# Patient Record
Sex: MALE | Race: WHITE | Employment: OTHER | URBAN - METROPOLITAN AREA
[De-identification: names, ages, dates, MRNs, and addresses within clinical notes are randomized per-mention and may not be internally consistent; named-entity substitution may affect disease eponyms.]

---

## 2017-03-24 ENCOUNTER — APPOINTMENT (OUTPATIENT)
Dept: CT IMAGING | Age: 77
End: 2017-03-24
Attending: EMERGENCY MEDICINE
Payer: MEDICARE

## 2017-03-24 ENCOUNTER — HOSPITAL ENCOUNTER (OUTPATIENT)
Age: 77
Setting detail: OBSERVATION
Discharge: HOME | End: 2017-03-26
Attending: EMERGENCY MEDICINE | Admitting: SURGERY
Payer: MEDICARE

## 2017-03-24 DIAGNOSIS — K56.609 SBO (SMALL BOWEL OBSTRUCTION) (HCC): Primary | ICD-10-CM

## 2017-03-24 PROBLEM — K56.600 PARTIAL SMALL BOWEL OBSTRUCTION (HCC): Status: ACTIVE | Noted: 2017-03-24

## 2017-03-24 PROBLEM — K21.9 GERD (GASTROESOPHAGEAL REFLUX DISEASE): Status: ACTIVE | Noted: 2017-03-24

## 2017-03-24 LAB
ALBUMIN SERPL BCP-MCNC: 4 G/DL (ref 3.5–5)
ALBUMIN/GLOB SERPL: 1 {RATIO} (ref 1.1–2.2)
ALP SERPL-CCNC: 106 U/L (ref 45–117)
ALT SERPL-CCNC: 33 U/L (ref 12–78)
ANION GAP BLD CALC-SCNC: 9 MMOL/L (ref 5–15)
AST SERPL W P-5'-P-CCNC: 22 U/L (ref 15–37)
ATRIAL RATE: 85 BPM
BASOPHILS # BLD AUTO: 0 K/UL (ref 0–0.1)
BASOPHILS # BLD: 0 % (ref 0–1)
BILIRUB SERPL-MCNC: 0.7 MG/DL (ref 0.2–1)
BUN SERPL-MCNC: 17 MG/DL (ref 6–20)
BUN/CREAT SERPL: 15 (ref 12–20)
CALCIUM SERPL-MCNC: 9.7 MG/DL (ref 8.5–10.1)
CALCULATED P AXIS, ECG09: 37 DEGREES
CALCULATED R AXIS, ECG10: -18 DEGREES
CALCULATED T AXIS, ECG11: 27 DEGREES
CHLORIDE SERPL-SCNC: 105 MMOL/L (ref 97–108)
CO2 SERPL-SCNC: 27 MMOL/L (ref 21–32)
CREAT SERPL-MCNC: 1.14 MG/DL (ref 0.7–1.3)
DIAGNOSIS, 93000: NORMAL
EOSINOPHIL # BLD: 0.1 K/UL (ref 0–0.4)
EOSINOPHIL NFR BLD: 1 % (ref 0–7)
ERYTHROCYTE [DISTWIDTH] IN BLOOD BY AUTOMATED COUNT: 14.3 % (ref 11.5–14.5)
GLOBULIN SER CALC-MCNC: 4.2 G/DL (ref 2–4)
GLUCOSE SERPL-MCNC: 156 MG/DL (ref 65–100)
HCT VFR BLD AUTO: 42.7 % (ref 36.6–50.3)
HGB BLD-MCNC: 14.6 G/DL (ref 12.1–17)
LIPASE SERPL-CCNC: 88 U/L (ref 73–393)
LYMPHOCYTES # BLD AUTO: 13 % (ref 12–49)
LYMPHOCYTES # BLD: 1.3 K/UL (ref 0.8–3.5)
MCH RBC QN AUTO: 29.9 PG (ref 26–34)
MCHC RBC AUTO-ENTMCNC: 34.2 G/DL (ref 30–36.5)
MCV RBC AUTO: 87.5 FL (ref 80–99)
MONOCYTES # BLD: 0.5 K/UL (ref 0–1)
MONOCYTES NFR BLD AUTO: 5 % (ref 5–13)
NEUTS SEG # BLD: 7.9 K/UL (ref 1.8–8)
NEUTS SEG NFR BLD AUTO: 81 % (ref 32–75)
P-R INTERVAL, ECG05: 164 MS
PLATELET # BLD AUTO: 214 K/UL (ref 150–400)
POTASSIUM SERPL-SCNC: 4 MMOL/L (ref 3.5–5.1)
PROT SERPL-MCNC: 8.2 G/DL (ref 6.4–8.2)
Q-T INTERVAL, ECG07: 380 MS
QRS DURATION, ECG06: 94 MS
QTC CALCULATION (BEZET), ECG08: 452 MS
RBC # BLD AUTO: 4.88 M/UL (ref 4.1–5.7)
SODIUM SERPL-SCNC: 141 MMOL/L (ref 136–145)
TROPONIN I SERPL-MCNC: <0.04 NG/ML
VENTRICULAR RATE, ECG03: 85 BPM
WBC # BLD AUTO: 9.7 K/UL (ref 4.1–11.1)

## 2017-03-24 PROCEDURE — 74011250636 HC RX REV CODE- 250/636: Performed by: EMERGENCY MEDICINE

## 2017-03-24 PROCEDURE — 93005 ELECTROCARDIOGRAM TRACING: CPT

## 2017-03-24 PROCEDURE — 74011250637 HC RX REV CODE- 250/637: Performed by: EMERGENCY MEDICINE

## 2017-03-24 PROCEDURE — 99218 HC RM OBSERVATION: CPT

## 2017-03-24 PROCEDURE — 83690 ASSAY OF LIPASE: CPT | Performed by: EMERGENCY MEDICINE

## 2017-03-24 PROCEDURE — 96374 THER/PROPH/DIAG INJ IV PUSH: CPT

## 2017-03-24 PROCEDURE — 96375 TX/PRO/DX INJ NEW DRUG ADDON: CPT

## 2017-03-24 PROCEDURE — 74011250636 HC RX REV CODE- 250/636: Performed by: NURSE PRACTITIONER

## 2017-03-24 PROCEDURE — 74011000250 HC RX REV CODE- 250: Performed by: NURSE PRACTITIONER

## 2017-03-24 PROCEDURE — C9113 INJ PANTOPRAZOLE SODIUM, VIA: HCPCS | Performed by: NURSE PRACTITIONER

## 2017-03-24 PROCEDURE — 74176 CT ABD & PELVIS W/O CONTRAST: CPT

## 2017-03-24 PROCEDURE — 84484 ASSAY OF TROPONIN QUANT: CPT | Performed by: EMERGENCY MEDICINE

## 2017-03-24 PROCEDURE — 74011000250 HC RX REV CODE- 250: Performed by: EMERGENCY MEDICINE

## 2017-03-24 PROCEDURE — 99285 EMERGENCY DEPT VISIT HI MDM: CPT

## 2017-03-24 PROCEDURE — 96361 HYDRATE IV INFUSION ADD-ON: CPT

## 2017-03-24 PROCEDURE — 80053 COMPREHEN METABOLIC PANEL: CPT | Performed by: EMERGENCY MEDICINE

## 2017-03-24 PROCEDURE — 85025 COMPLETE CBC W/AUTO DIFF WBC: CPT | Performed by: EMERGENCY MEDICINE

## 2017-03-24 PROCEDURE — 36415 COLL VENOUS BLD VENIPUNCTURE: CPT | Performed by: EMERGENCY MEDICINE

## 2017-03-24 PROCEDURE — C9113 INJ PANTOPRAZOLE SODIUM, VIA: HCPCS | Performed by: EMERGENCY MEDICINE

## 2017-03-24 RX ORDER — THERA TABS 400 MCG
1 TAB ORAL 2 TIMES DAILY
COMMUNITY

## 2017-03-24 RX ORDER — SODIUM CHLORIDE 0.9 % (FLUSH) 0.9 %
5-10 SYRINGE (ML) INJECTION EVERY 8 HOURS
Status: DISCONTINUED | OUTPATIENT
Start: 2017-03-24 | End: 2017-03-26 | Stop reason: HOSPADM

## 2017-03-24 RX ORDER — MORPHINE SULFATE 10 MG/ML
2-4 INJECTION, SOLUTION INTRAMUSCULAR; INTRAVENOUS
Status: DISCONTINUED | OUTPATIENT
Start: 2017-03-24 | End: 2017-03-26 | Stop reason: HOSPADM

## 2017-03-24 RX ORDER — ENOXAPARIN SODIUM 100 MG/ML
40 INJECTION SUBCUTANEOUS EVERY 24 HOURS
Status: DISCONTINUED | OUTPATIENT
Start: 2017-03-24 | End: 2017-03-26 | Stop reason: HOSPADM

## 2017-03-24 RX ORDER — DORZOLAMIDE HYDROCHLORIDE AND TIMOLOL MALEATE 20; 5 MG/ML; MG/ML
1 SOLUTION/ DROPS OPHTHALMIC
COMMUNITY

## 2017-03-24 RX ORDER — DIPHENHYDRAMINE HYDROCHLORIDE 50 MG/ML
12.5 INJECTION, SOLUTION INTRAMUSCULAR; INTRAVENOUS
Status: DISCONTINUED | OUTPATIENT
Start: 2017-03-24 | End: 2017-03-26 | Stop reason: HOSPADM

## 2017-03-24 RX ORDER — ONDANSETRON 2 MG/ML
4 INJECTION INTRAMUSCULAR; INTRAVENOUS
Status: COMPLETED | OUTPATIENT
Start: 2017-03-24 | End: 2017-03-24

## 2017-03-24 RX ORDER — LATANOPROST 50 UG/ML
1 SOLUTION/ DROPS OPHTHALMIC
COMMUNITY
End: 2019-02-01

## 2017-03-24 RX ORDER — SODIUM CHLORIDE 0.9 % (FLUSH) 0.9 %
5-10 SYRINGE (ML) INJECTION AS NEEDED
Status: DISCONTINUED | OUTPATIENT
Start: 2017-03-24 | End: 2017-03-26 | Stop reason: HOSPADM

## 2017-03-24 RX ORDER — DEXTROSE, SODIUM CHLORIDE, AND POTASSIUM CHLORIDE 5; .45; .15 G/100ML; G/100ML; G/100ML
75 INJECTION INTRAVENOUS CONTINUOUS
Status: DISCONTINUED | OUTPATIENT
Start: 2017-03-24 | End: 2017-03-26 | Stop reason: HOSPADM

## 2017-03-24 RX ORDER — AMLODIPINE BESYLATE 5 MG/1
5 TABLET ORAL DAILY
Status: DISCONTINUED | OUTPATIENT
Start: 2017-03-25 | End: 2017-03-26 | Stop reason: HOSPADM

## 2017-03-24 RX ORDER — DORZOLAMIDE HYDROCHLORIDE AND TIMOLOL MALEATE 20; 5 MG/ML; MG/ML
1 SOLUTION/ DROPS OPHTHALMIC 2 TIMES DAILY
Status: DISCONTINUED | OUTPATIENT
Start: 2017-03-24 | End: 2017-03-26 | Stop reason: HOSPADM

## 2017-03-24 RX ORDER — AMLODIPINE BESYLATE 5 MG/1
5 TABLET ORAL DAILY
COMMUNITY
End: 2019-02-03

## 2017-03-24 RX ORDER — NALOXONE HYDROCHLORIDE 0.4 MG/ML
0.4 INJECTION, SOLUTION INTRAMUSCULAR; INTRAVENOUS; SUBCUTANEOUS AS NEEDED
Status: DISCONTINUED | OUTPATIENT
Start: 2017-03-24 | End: 2017-03-26 | Stop reason: HOSPADM

## 2017-03-24 RX ORDER — SUCRALFATE 1 G/10ML
2 SUSPENSION ORAL
Status: COMPLETED | OUTPATIENT
Start: 2017-03-24 | End: 2017-03-24

## 2017-03-24 RX ORDER — LATANOPROST 50 UG/ML
1 SOLUTION/ DROPS OPHTHALMIC
Status: DISCONTINUED | OUTPATIENT
Start: 2017-03-24 | End: 2017-03-26 | Stop reason: HOSPADM

## 2017-03-24 RX ORDER — OMEPRAZOLE 20 MG/1
20 CAPSULE, DELAYED RELEASE ORAL DAILY
COMMUNITY
End: 2020-11-02

## 2017-03-24 RX ORDER — ONDANSETRON 2 MG/ML
4 INJECTION INTRAMUSCULAR; INTRAVENOUS
Status: DISCONTINUED | OUTPATIENT
Start: 2017-03-24 | End: 2017-03-26 | Stop reason: HOSPADM

## 2017-03-24 RX ORDER — PANTOPRAZOLE SODIUM 40 MG/10ML
40 INJECTION, POWDER, LYOPHILIZED, FOR SOLUTION INTRAVENOUS
Status: DISCONTINUED | OUTPATIENT
Start: 2017-03-24 | End: 2017-03-24 | Stop reason: SDUPTHER

## 2017-03-24 RX ORDER — MORPHINE SULFATE 4 MG/ML
4 INJECTION, SOLUTION INTRAMUSCULAR; INTRAVENOUS
Status: COMPLETED | OUTPATIENT
Start: 2017-03-24 | End: 2017-03-24

## 2017-03-24 RX ADMIN — Medication 4 MG: at 11:40

## 2017-03-24 RX ADMIN — SODIUM CHLORIDE 1000 ML: 900 INJECTION, SOLUTION INTRAVENOUS at 09:11

## 2017-03-24 RX ADMIN — ONDANSETRON 4 MG: 2 INJECTION INTRAMUSCULAR; INTRAVENOUS at 09:11

## 2017-03-24 RX ADMIN — DORZOLAMIDE HYDROCHLORIDE AND TIMOLOL MALEATE 1 DROP: 20; 5 SOLUTION/ DROPS OPHTHALMIC at 17:52

## 2017-03-24 RX ADMIN — ENOXAPARIN SODIUM 40 MG: 40 INJECTION SUBCUTANEOUS at 14:09

## 2017-03-24 RX ADMIN — Medication 10 ML: at 22:00

## 2017-03-24 RX ADMIN — SODIUM CHLORIDE 40 MG: 9 INJECTION INTRAMUSCULAR; INTRAVENOUS; SUBCUTANEOUS at 09:11

## 2017-03-24 RX ADMIN — SODIUM CHLORIDE 40 MG: 9 INJECTION INTRAMUSCULAR; INTRAVENOUS; SUBCUTANEOUS at 14:09

## 2017-03-24 RX ADMIN — SUCRALFATE 2 G: 1 SUSPENSION ORAL at 09:11

## 2017-03-24 RX ADMIN — LATANOPROST 1 DROP: 50 SOLUTION OPHTHALMIC at 22:10

## 2017-03-24 RX ADMIN — DEXTROSE MONOHYDRATE, SODIUM CHLORIDE, AND POTASSIUM CHLORIDE 125 ML/HR: 50; 4.5; 1.49 INJECTION, SOLUTION INTRAVENOUS at 14:10

## 2017-03-24 NOTE — CONSULTS
[id number] [facility] Surgery at [address] [facility] Consultation    Admit Date: 3/24/2017  Reason for Consultation: partial SBO    HPI:  Sendy Tinoco is a 64 y.o. male whom we are asked to see in consultation for evaluation of partial small bowel obstruction. Symptoms began around 11p last night with sharp pain over his epigastrium. He put his fingers down his throat to induce vomiting with some relief of symptoms. However pain returned in the same location. He induced vomiting multiple times over night for transient relief of symptoms. Around 5 am he was feeling a bit better, but then had a terrible stabbing pain 10/10 so he called the ambulance. He was vomiting some solid food and yellow bile. Denies fevers. His abdomen is distended and he had to change his pants to larger size this morning. Has hx of IBS, takes soluble fiber daily and normally has several BMs in a day. Has had no BM since yesterday and no flatus. Hx of laparoscopic cholecystectomy which was done for acute attack in [place] last September. Last colonoscopy was about 5 years ago, states there were some polyps but no surgery was done. Denies recent weight loss. Has seen Dr. Gt oJseph (GI) in the past, last visit was years ago. WBC 9.7. CT:  GI: The stomach is empty, however there is mild distention of proximal small  bowel loops with fecal-like material in the mid small bowel. Distal small bowel  loops are decompressed and normal in caliber. Colon demonstrates diverticulosis  without diverticulitis. .  IMPRESSION: There is distention of the proximal half of the small bowel with  fecal-like material in the mid small bowel loops. This is consistent with at  least partial obstruction of the small bowel. No structural abnormality is  identified to account for this.     Pain is improved after receiving morphine. There are no active problems to display for this patient.     Past Medical History:   Diagnosis Date  Depression     GERD (gastroesophageal reflux disease)     Glaucoma     High cholesterol     IBS (irritable bowel syndrome)       Past Surgical History:   Procedure Laterality Date    HX CHOLECYSTECTOMY      HX HEMORRHOIDECTOMY      HX ORTHOPAEDIC      RIGHT knee    HX TONSILLECTOMY        Social History   Substance Use Topics    Smoking status: Never Smoker    Smokeless tobacco: Not on file    Alcohol use Yes      History reviewed. No pertinent family history. Prior to Admission medications    Medication Sig Start Date End Date Taking? Authorizing Provider   amLODIPine (NORVASC) 5 mg tablet Take 5 mg by mouth daily. Yes Historical Provider   therapeutic multivitamin (THERAGRAN) tablet Take 1 Tab by mouth daily. Yes Historical Provider   dorzolamide-timolol (COSOPT) 22.3-6.8 mg/mL ophthalmic solution Administer 1 Drop to both eyes two (2) times a day. Yes Historical Provider   latanoprost (XALATAN) 0.005 % ophthalmic solution Administer 1 Drop to both eyes nightly. Yes Historical Provider   omeprazole (PRILOSEC) 20 mg capsule Take 20 mg by mouth daily. Yes Historical Provider   vortioxetine (BRINTELLIX) 10 mg tablet Take 10 mg by mouth daily. Yes Historical Provider   aspirin 81 mg chewable tablet Take 81 mg by mouth daily. Yes Rodo Bloom MD   atorvastatin (LIPITOR) 40 mg tablet Take 40 mg by mouth daily. Yes Rodo Bloom MD     No current facility-administered medications for this encounter. Current Outpatient Prescriptions   Medication Sig    amLODIPine (NORVASC) 5 mg tablet Take 5 mg by mouth daily.  therapeutic multivitamin (THERAGRAN) tablet Take 1 Tab by mouth daily.  dorzolamide-timolol (COSOPT) 22.3-6.8 mg/mL ophthalmic solution Administer 1 Drop to both eyes two (2) times a day.  latanoprost (XALATAN) 0.005 % ophthalmic solution Administer 1 Drop to both eyes nightly.  omeprazole (PRILOSEC) 20 mg capsule Take 20 mg by mouth daily.     vortioxetine (BRINTELLIX) 10 mg tablet Take 10 mg by mouth daily.  aspirin 81 mg chewable tablet Take 81 mg by mouth daily.  atorvastatin (LIPITOR) 40 mg tablet Take 40 mg by mouth daily. Allergies   Allergen Reactions    Sulfa (Sulfonamide Antibiotics) Unknown (comments)          Subjective:     Review of Systems:    A comprehensive review of systems was negative except for that written in the History of Present Illness. Objective:     Blood pressure 145/68, pulse 78, temperature 97.6 °F (36.4 °C), resp. rate 18, height 5' 11\" (1.803 m), weight 80.7 kg (178 lb), SpO2 91 %. Temp (24hrs), Av.6 °F (36.4 °C), Min:97.6 °F (36.4 °C), Max:97.6 °F (36.4 °C)      Recent Labs      17   0843   WBC  9.7   HGB  14.6   HCT  42.7   PLT  214     Recent Labs      17   0843   NA  141   K  4.0   CL  105   CO2  27   GLU  156*   BUN  17   CREA  1.14   CA  9.7   ALB  4.0   TBILI  0.7   SGOT  22   ALT  33     Recent Labs      17   0843   LPSE  88       No intake or output data in the 24 hours ending 17 1155     _____________________  Physical Exam:     General:  Alert, cooperative, no distress, appears stated age. Eyes:   PERRL, EOMs intact. Sclera clear. Throat: Lips, mucosa, and tongue normal.   Neck: Supple, symmetrical, trachea midline. Lungs:   Clear to auscultation bilaterally. Heart:  Regular rate and rhythm. Abdomen:   Normal BS, moderate distention, soft, NT at present. Well healed laparoscopy sites visible. Extremities: Extremities normal, atraumatic, no cyanosis or edema. Skin: Skin color, texture, turgor normal. No rashes or lesions. Assessment:   Partial small bowel obstruction        Plan:     Admit to obs  IVF/NPO  Place NG if vomiting or severely nauseated  Labs and KUB in am  Further plan per Dr. Jesu Chavis    Thank you for allowing us to participate in the care of this patient. Total time spent with patient: 18 minutes.     Signed By: Torrey Lombardi NP March 24, 2017        Attending addendum:  I supervised the NP and reviewed the progress note. We discussed the plan of care. I visited and examined the pt independently. I have also viewed the CT images. The patient reports sig improvement, that is, no pain and less bloating. Also denies nausea or vomiting. Usually has several BMs daily due to IBS. Has not had BM since onset of symptoms and has no appetite despite missing two meals. Patient Vitals for the past 12 hrs:   Temp Pulse Resp BP SpO2   03/24/17 1401 98.3 °F (36.8 °C) 72 16 158/88 97 %   03/24/17 1300 97.5 °F (36.4 °C) 82 16 120/69 95 %   03/24/17 1230 - - - 126/66 92 %   03/24/17 1200 - - - 137/78 95 %   03/24/17 1130 - - - 145/74 92 %   03/24/17 1100 - - - 164/87 94 %   03/24/17 0930 - - - 145/68 91 %   03/24/17 0900 - - - 143/79 92 %   03/24/17 0835 97.6 °F (36.4 °C) 78 18 - 98 %        Physical Exam:   Gen NAD  Abd Soft, minimal distension and tympany, normal BS, non-tender    Hospital Problems as of 3/24/2017  Never Reviewed          Codes Class Noted - Resolved POA    * (Principal)[facility] small bowel [facility] ([facility] ICD-10-CM: K56.69  ICD-9-CM: 560.9  3/24/2017 - Present Yes        GERD (gastroesophageal reflux disease) ICD-10-CM: K21.9  ICD-9-CM: 530.81  3/24/2017 - Present Yes            He has no signs of ischemia and appears to be improving. I explained the likely etiology of obstruction and the likelihood of resolving nonoperatively. Also discussed what we are monitoring to determine if an operation is needed.      Signed By: Percy Chase MD     March 24, 2017

## 2017-03-24 NOTE — IP AVS SNAPSHOT
Current Discharge Medication List  
  
START taking these medications Dose & Instructions Dispensing Information Comments Morning Noon Evening Bedtime  
 docusate sodium 100 mg capsule Commonly known as:  Joe Sheldon Your last dose was: Your next dose is:    
   
   
 Dose:  100 mg Take 1 Cap by mouth two (2) times a day for 90 days. Quantity:  30 Cap Refills:  0  
     
   
   
   
  
 oxyCODONE-acetaminophen 5-325 mg per tablet Commonly known as:  PERCOCET Your last dose was: Your next dose is:    
   
   
 Dose:  1 Tab Take 1 Tab by mouth every six (6) hours as needed. Max Daily Amount: 4 Tabs. Quantity:  30 Tab Refills:  0 CONTINUE these medications which have NOT CHANGED Dose & Instructions Dispensing Information Comments Morning Noon Evening Bedtime  
 amLODIPine 5 mg tablet Commonly known as:  Maranacecilia Elaines Your last dose was: Your next dose is:    
   
   
 Dose:  5 mg Take 5 mg by mouth daily. Refills:  0  
     
   
   
   
  
 aspirin 81 mg chewable tablet Your last dose was: Your next dose is:    
   
   
 Dose:  81 mg Take 81 mg by mouth daily. Refills:  0 BRINTELLIX 10 mg tablet Generic drug:  vortioxetine Your last dose was: Your next dose is:    
   
   
 Dose:  10 mg Take 10 mg by mouth daily. Refills:  0  
     
   
   
   
  
 dorzolamide-timolol 22.3-6.8 mg/mL ophthalmic solution Commonly known as:  COSOPT Your last dose was: Your next dose is:    
   
   
 Dose:  1 Drop Administer 1 Drop to both eyes two (2) times a day. Refills:  0  
     
   
   
   
  
 latanoprost 0.005 % ophthalmic solution Commonly known as:  Yeny Hark Your last dose was: Your next dose is:    
   
   
 Dose:  1 Drop Administer 1 Drop to both eyes nightly. Refills:  0 LIPITOR 40 mg tablet Generic drug:  atorvastatin Your last dose was: Your next dose is:    
   
   
 Dose:  40 mg Take 40 mg by mouth daily. Refills:  0  
     
   
   
   
  
 omeprazole 20 mg capsule Commonly known as:  PRILOSEC Your last dose was: Your next dose is:    
   
   
 Dose:  20 mg Take 20 mg by mouth daily. Refills:  0  
     
   
   
   
  
 therapeutic multivitamin tablet Commonly known as:  [facility] Your last dose was: Your next dose is:    
   
   
 Dose:  1 Tab Take 1 Tab by mouth daily. Refills:  0 Where to Get Your Medications Information on where to get these meds will be given to you by the nurse or doctor. ! Ask your nurse or doctor about these medications  
  docusate sodium 100 mg capsule  
 oxyCODONE-acetaminophen 5-325 mg per tablet

## 2017-03-24 NOTE — PROGRESS NOTES
Admission Medication Reconciliation:    Information obtained from: Patient, Rx Query, [facility]    Significant PMH/Disease States:   Past Medical History:   Diagnosis Date    Depression     GERD (gastroesophageal reflux disease)     Glaucoma     High cholesterol     IBS (irritable bowel syndrome)        Chief Complaint for this Admission:  Abdominal pain    Allergies:  Sulfa (sulfonamide antibiotics)    Prior to Admission Medications:   Prior to Admission Medications   Prescriptions Last Dose Informant Patient Reported? Taking? amLODIPine (NORVASC) 5 mg tablet 3/23/2017  Yes Yes   Sig: Take 5 mg by mouth daily. aspirin 81 mg chewable tablet 3/23/2017  Yes Yes   Sig: Take 81 mg by mouth daily. atorvastatin (LIPITOR) 40 mg tablet 3/23/2017  Yes Yes   Sig: Take 40 mg by mouth daily. dorzolamide-timolol (COSOPT) 22.3-6.8 mg/mL ophthalmic solution   Yes Yes   Sig: Administer 1 Drop to both eyes two (2) times a day. latanoprost (XALATAN) 0.005 % ophthalmic solution   Yes Yes   Sig: Administer 1 Drop to both eyes nightly. omeprazole (PRILOSEC) 20 mg capsule   Yes Yes   Sig: Take 20 mg by mouth daily. therapeutic multivitamin [facility]) tablet 3/23/2017  Yes Yes   Sig: Take 1 Tab by mouth daily. vortioxetine (BRINTELLIX) 10 mg tablet 3/23/2017  Yes Yes   Sig: Take 10 mg by mouth daily. Facility-Administered Medications: None         Comments/Recommendations: \[facility] [place] [facility]\"" med list updated via information obtained from the patient, Rx Query, and [employer]. 1) The patient states he has not had any of his medications today.     2) In addition to his Rx  medications, the patient reports taking an additional 15 tablets of supplements from the [facility] twice daily for prostate prevention, oil soluble vitamins, arthritis, bone health, curcumen, vitamin D 3, and general health

## 2017-03-24 NOTE — IP AVS SNAPSHOT
[address] [address] 
[phone] Patient: Andrew Reid MRN: [id number] WCB:[birth year] You are allergic to the following Allergen Reactions Sulfa (Sulfonamide Antibiotics) Unknown (comments) Recent Documentation Height Weight BMI Smoking Status 1.803 m 80.7 kg 24.83 kg/m2 Never Smoker Emergency Contacts Name Discharge Info Relation Home Work Mobile Odette Sierra DISCHARGE CAREGIVER [3] Spouse [3] [phone] About your hospitalization You were admitted on:  March 24, 2017 You last received care in the:  [facility] [id number] You were discharged on:  March 26, 2017 Unit phone number:  [phone] Why you were hospitalized Your primary diagnosis was:  Partial Small Bowel Obstruction (Hcc) Your diagnoses also included:  Gerd (Gastroesophageal Reflux Disease) Providers Seen During Your Hospitalizations Provider Role Specialty Primary office phone Chrystal King MD Attending Provider Emergency Medicine [phone] Jose M Gamez MD Attending Provider General Surgery [phone] Your Primary Care Physician (PCP) Primary Care Physician Office Phone Office Fax Venecia Prow [phone] Follow-up Information Follow up With Details Comments Contact Info Martha Shah MD   [address] [place] [address] [phone] Current Discharge Medication List  
  
START taking these medications Dose & Instructions Dispensing Information Comments Morning Noon Evening Bedtime  
 docusate sodium 100 mg capsule Commonly known as:  José Rogelio Your last dose was: Your next dose is:    
   
   
 Dose:  100 mg Take 1 Cap by mouth two (2) times a day for 90 days. Quantity:  30 Cap Refills:  0 oxyCODONE-acetaminophen 5-325 mg per tablet Commonly known as:  PERCOCET Your last dose was: Your next dose is:    
   
   
 Dose:  1 Tab Take 1 Tab by mouth every six (6) hours as needed. Max Daily Amount: 4 Tabs. Quantity:  30 Tab Refills:  0 CONTINUE these medications which have NOT CHANGED Dose & Instructions Dispensing Information Comments Morning Noon Evening Bedtime  
 amLODIPine 5 mg tablet Commonly known as:  Mj Streeter Your last dose was: Your next dose is:    
   
   
 Dose:  5 mg Take 5 mg by mouth daily. Refills:  0  
     
   
   
   
  
 aspirin 81 mg chewable tablet Your last dose was: Your next dose is:    
   
   
 Dose:  81 mg Take 81 mg by mouth daily. Refills:  0 BRINTELLIX 10 mg tablet Generic drug:  vortioxetine Your last dose was: Your next dose is:    
   
   
 Dose:  10 mg Take 10 mg by mouth daily. Refills:  0  
     
   
   
   
  
 dorzolamide-timolol 22.3-6.8 mg/mL ophthalmic solution Commonly known as:  COSOPT Your last dose was: Your next dose is:    
   
   
 Dose:  1 Drop Administer 1 Drop to both eyes two (2) times a day. Refills:  0  
     
   
   
   
  
 latanoprost 0.005 % ophthalmic solution Commonly known as:  Hope Worthington Your last dose was: Your next dose is:    
   
   
 Dose:  1 Drop Administer 1 Drop to both eyes nightly. Refills:  0 LIPITOR 40 mg tablet Generic drug:  atorvastatin Your last dose was: Your next dose is:    
   
   
 Dose:  40 mg Take 40 mg by mouth daily. Refills:  0  
     
   
   
   
  
 omeprazole 20 mg capsule Commonly known as:  PRILOSEC Your last dose was: Your next dose is:    
   
   
 Dose:  20 mg Take 20 mg by mouth daily. Refills:  0  
     
   
   
   
  
 therapeutic multivitamin tablet Commonly known as:  [facility] Your last dose was: Your next dose is:    
   
   
 Dose:  1 Tab Take 1 Tab by mouth daily. Refills:  0 Where to Get Your Medications Information on where to get these meds will be given to you by the nurse or doctor. ! Ask your nurse or doctor about these medications  
  docusate sodium 100 mg capsule  
 oxyCODONE-acetaminophen 5-325 mg per tablet Discharge Instructions 1. Do not drive while on pain medication. You should take a stool softener while on pain medication to prevent constipation. 2.  You may resume your home medications. 3.  You may call [phone] to schedule a follow-up with Dr. Geovanni Moon as needed. Bowel Blockage (Intestinal Obstruction): Care Instructions Your Care Instructions A bowel blockage, also called an intestinal obstruction, can prevent gas, fluids, or solids from moving through the intestines normally. It can cause constipation and, rarely, diarrhea. You may have pain, nausea, vomiting, and cramping. Most of the time, complete blockages require a stay in the hospital and possibly surgery. But if your bowel is only partly blocked, your doctor may tell you to wait until it clears on its own and you are able to pass gas and stool. If so, there are things you can do at home to help make you feel better. If you have had surgery for a bowel blockage, there are things you can do at home to make sure you heal well. You can also make some changes to keep your bowel from becoming blocked again. Follow-up care is a key part of your treatment and safety. Be sure to make and go to all appointments, and call your doctor if you are having problems. It's also a good idea to know your test results and keep a list of the medicines you take. How can you care for yourself at home? If your doctor has told you to wait at home for a blockage to clear on its own: · Follow your doctor's instructions. These may include eating a liquid diet to avoid complete blockage. · Take your medicines exactly as prescribed. Call your doctor if you think you are having a problem with your medicine. · Put a heating pad set on low on your belly to relieve mild cramps and pain. To prevent another blockage · Try to eat smaller amounts of food more often. For example, have 5 or 6 small meals throughout the day instead of 2 or 3 large meals. · Chew your food very well. Try to chew each bite about 20 times or until it is liquid. · Avoid high-fiber foods and raw fruits and vegetables with skins, husks, strings, or seeds. These can form a ball of undigested material that can cause a blockage if a part of your bowel is scarred or narrowed. · Check with your doctor before you eat whole-grain products or use a fiber supplement such as Citrucel or Metamucil. · To help you have regular bowel movements, eat at regular times, do not strain during a bowel movement, and drink at least 8 to 10 glasses of water each day. If you have kidney, heart, or liver disease and have to limit fluids, talk with your doctor or before you increase the amount of fluids you drink. · Drink high-calorie liquid formulas if your doctor says to. Severe symptoms may make it hard for your body to take in vitamins and minerals. · Get regular exercise. It helps you digest your food better. Get at least 30 minutes of physical activity on most days of the week. Walking is a good choice. When should you call for help? Call 911 anytime you think you may need emergency care. For example, call if: 
· You passed out (lost consciousness). · You have severe pain or swelling in your belly. · You vomit blood or what looks like coffee grounds. · You pass maroon or very bloody stools. · You cannot pass any stools or gas. Call your doctor now or seek immediate medical care if: 
· You have a new or higher fever. · You cannot keep fluids or medicines down. · You have new pain that gets worse when you move or cough. · Your symptoms become much worse than usual. 
Watch closely for changes in your health, and be sure to contact your doctor if: 
· You do not get better as expected. · You have steady diarrhea for more than 2 weeks. · You've been losing weight. Where can you learn more? Go to http://pascula-charan.info/. Enter L472 in the search box to learn more about \"Bowel Blockage (Intestinal Obstruction): Care Instructions. \" Current as of: August 9, 2016 Content Version: 11.1 © 6642-8324 Here@ Networks. Care instructions adapted under license by [employer] (which disclaims liability or warranty for this information). If you have questions about a medical condition or this instruction, always ask your healthcare professional. [address] [address] any warranty or liability for your use of this information. DISCHARGE SUMMARY from Nurse The following personal items are in your possession at time of discharge: 
 
Dental Appliances: None Visual Aid: Glasses, With patient Home Medications: Sent home Jewelry: Ring Clothing: At bedside, Belt, Gloves, Footwear, Pants Other Valuables: Cell Phone, Eyeglasses PATIENT INSTRUCTIONS: 
 
After general anesthesia or intravenous sedation, for 24 hours or while taking prescription Narcotics: · Limit your activities · Do not drive and operate hazardous machinery · Do not make important personal or business decisions · Do  not drink alcoholic beverages · If you have not urinated within 8 hours after discharge, please contact your surgeon on call. Report the following to your surgeon: 
· Excessive pain, swelling, redness or odor of or around the surgical area · Temperature over 100.5 · Nausea and vomiting lasting longer than 4 hours or if unable to take medications · Any signs of decreased circulation or nerve impairment to extremity: change in color, persistent  numbness, tingling, coldness or increase pain · Any questions These are general instructions for a healthy lifestyle: No smoking/ No tobacco products/ Avoid exposure to second hand smoke Surgeon General's Warning:  Quitting smoking now greatly reduces serious risk to your health. Obesity, smoking, and sedentary lifestyle greatly increases your risk for illness A healthy diet, regular physical exercise & weight monitoring are important for maintaining a healthy lifestyle You may be retaining fluid if you have a history of heart failure or if you experience any of the following symptoms:  Weight gain of 3 pounds or more overnight or 5 pounds in a week, increased swelling in our hands or feet or shortness of breath while lying flat in bed. Please call your doctor as soon as you notice any of these symptoms; do not wait until your next office visit. Recognize signs and symptoms of STROKE: 
 
F-face looks uneven A-arms unable to move or move unevenly S-speech slurred or non-existent T-time-call 911 as soon as signs and symptoms begin-DO NOT go Back to bed or wait to see if you get better-TIME IS BRAIN. Warning Signs of HEART ATTACK Call 911 if you have these symptoms: 
? Chest discomfort. Most heart attacks involve discomfort in the center of the chest that lasts more than a few minutes, or that goes away and comes back. It can feel like uncomfortable pressure, squeezing, fullness, or pain. ? Discomfort in other areas of the upper body. Symptoms can include pain or discomfort in one or both arms, the back, neck, jaw, or stomach. ? Shortness of breath with or without chest discomfort. ? Other signs may include breaking out in a cold sweat, nausea, or lightheadedness. Don't wait more than five minutes to call [address]!  Fast action can save your life. Calling 911 is almost always the fastest way to get lifesaving treatment. Emergency Medical Services staff can begin treatment when they arrive  up to an hour sooner than if someone gets to the hospital by car. The discharge information has been reviewed with the patient. The patient verbalized understanding. Discharge medications reviewed with the patient and appropriate educational materials and side effects teaching were provided. [employer] Activation Thank you for requesting access to [employer]. Please follow the instructions below to securely access and download your online medical record. [employer] allows you to send messages to your doctor, view your test results, renew your prescriptions, schedule appointments, and more. How Do I Sign Up? 1. In your internet browser, go to [email or web address] 
2. Click on the First Time User? Click Here link in the Sign In box. You will be redirect to the New Member Sign Up page. 3. Enter your [employer] Access Code exactly as it appears below. You will not need to use this code after youve completed the sign-up process. If you do not sign up before the expiration date, you must request a new code. [employer] Access Code: Activation code not generated Current [employer] Status: Active (This is the date your [employer] access code will ) 4. Enter the last four digits of your Social Security Number (xxxx) and Date of Birth (mm/dd/yyyy) as indicated and click Submit. You will be taken to the next sign-up page. 5. Create a [employer] ID. This will be your [employer] login ID and cannot be changed, so think of one that is secure and easy to remember. 6. Create a [employer] password. You can change your password at any time. 7. Enter your Password Reset Question and Answer. This can be used at a later time if you forget your password. 8. Enter your e-mail address.  You will receive e-mail notification when new information is available in [employer]. 9. Click Sign Up. You can now view and download portions of your medical record. 10. Click the Download Summary menu link to download a portable copy of your medical information. Additional Information If you have questions, please visit the Frequently Asked Questions section of the [employer] website at [email or web address] [email or web address] grass [email or web address]/. Remember, MyChart is NOT to be used for urgent needs. For medical emergencies, dial 911. Discharge Orders None [facility]! Dear Torsten Lee: Thank you for requesting a [employer] account. Our records indicate that you already have an active [employer] account. You can access your account anytime at [email or web address]. [employer]/[email or web address] grass [email or web address] Did you know that you can access your hospital and ER discharge instructions at any time in [employer]? You can also review all of your test results from your hospital stay or ER visit. Additional Information If you have questions, please visit the Frequently Asked Questions section of the [employer] website at [email or web address] [email or web address]/. Remember, MyChart is NOT to be used for urgent needs. For medical emergencies, dial 911. Now available from your iPhone and Android! General Information Please provide this summary of care documentation to your next provider. Patient Signature:  ____________________________________________________________ Date:  ____________________________________________________________  
  
Nataliia Lugo Provider Signature:  ____________________________________________________________ Date:  ____________________________________________________________

## 2017-03-24 NOTE — ROUTINE PROCESS
TRANSFER - OUT REPORT:    Verbal report given to Jayleen Hong RN(name) on [employer]  being transferred to [facility](unit) for routine progression of care       Report consisted of patients Situation, Background, Assessment and   Recommendations(SBAR). Information from the following report(s) ED Summary was reviewed with the receiving nurse. Lines:   Peripheral IV 03/24/17 Left Antecubital (Active)   Site Assessment Clean, dry, & intact 3/24/2017  8:53 AM   Phlebitis Assessment 0 3/24/2017  8:53 AM   Dressing Type Transparent 3/24/2017  8:53 AM   Hub Color/Line Status Pink 3/24/2017  8:53 AM       Peripheral IV 03/24/17 Right Hand (Active)   Site Assessment Clean, dry, & intact 3/24/2017  8:53 AM   Phlebitis Assessment 0 3/24/2017  8:53 AM   Infiltration Assessment 0 3/24/2017  8:53 AM   Dressing Status Clean, dry, & intact 3/24/2017  8:53 AM   Dressing Type Transparent 3/24/2017  8:53 AM   Hub Color/Line Status Blue 3/24/2017  8:53 AM        Opportunity for questions and clarification was provided.       Patient transported with:   Kaminario

## 2017-03-24 NOTE — ED PROVIDER NOTES
HPI Comments: 64 y.o. male with past medical history significant for GERD, IBS, high cholesterol, glaucoma, and high cholesterol who presents via EMS with chief complaint of abdominal pain. Yesterday evening (~21:00), patient developed epigastric abdominal pain that has since then presented intermittently, varying in intensity. Patient mentions h/o acid reflux, and regularly takes Omeprazole -- denies any relief. Patient has also taken Rolaids tablets, but without relief. Patient has had some relief with self-induced vomiting -- \"I stuck my fingers down my throat and brought up some yellow liquid. Sometimes it helped, sometimes it didn't. \"  Patient has had similar pain in the past, but notes that it has recently become more frequent in occurrence. Patient notes that the \"intensity\" of the pain felt similar to when he was having his \"gallbladder attacks\" ~6 months ago -- cholecystectomy done around then in [place]. At this time, patient additionally complains of HA. Patient reports passing normal bowel movements. Patient denies any fever or diarrhea. There are no other acute medical concerns at this time. Social hx: +; denies tobacco smoking  PCP: Karli Armenta MD  GI: Herbert Hutchinson MD    Note written by Mendoza Zhu, as dictated by Sasha Stanford MD 8:51 AM    The history is provided by the patient. Past Medical History:   Diagnosis Date    Depression     GERD (gastroesophageal reflux disease)     Glaucoma     High cholesterol     IBS (irritable bowel syndrome)        Past Surgical History:   Procedure Laterality Date    HX CHOLECYSTECTOMY      HX HEMORRHOIDECTOMY      HX ORTHOPAEDIC      RIGHT knee    HX TONSILLECTOMY           No family history on file. Social History     Social History    Marital status:      Spouse name: N/A    Number of children: N/A    Years of education: N/A     Occupational History    Not on file.      Social History Main Topics    Smoking status: Never Smoker    Smokeless tobacco: Not on file    Alcohol use Yes    Drug use: No    Sexual activity: Not on file     Other Topics Concern    Not on file     Social History Narrative         ALLERGIES: Sulfa (sulfonamide antibiotics)    Review of Systems   Constitutional: Negative for diaphoresis and fever. HENT: Negative for facial swelling. Eyes: Negative for visual disturbance. Respiratory: Negative for cough. Cardiovascular: Negative for chest pain. Gastrointestinal: Positive for abdominal pain. Negative for diarrhea. Genitourinary: Negative for dysuria. Musculoskeletal: Negative for joint swelling. Skin: Negative for rash. Neurological: Positive for headaches. Hematological: Negative for adenopathy. Psychiatric/Behavioral: Negative for suicidal ideas. All other systems reviewed and are negative. Vitals:    03/24/17 0835   Pulse: 78   Resp: 18   Temp: 97.6 °F (36.4 °C)   SpO2: 98%   Weight: 80.7 kg (178 lb)   Height: 5' 11\" (1.803 m)            Physical Exam   Constitutional: He is oriented to person, place, and time. He appears well-developed and well-nourished. No distress. HENT:   Head: Normocephalic and atraumatic. Mouth/Throat: Oropharynx is clear and moist.   Eyes: Pupils are equal, round, and reactive to light. Neck: Normal range of motion. Neck supple. Cardiovascular: Normal rate, regular rhythm, normal heart sounds and intact distal pulses. Pulmonary/Chest: Effort normal and breath sounds normal. No respiratory distress. Abdominal: Soft. Bowel sounds are normal. He exhibits no distension. There is no tenderness. Musculoskeletal: Normal range of motion. He exhibits no edema. Neurological: He is alert and oriented to person, place, and time. Skin: Skin is warm and dry. Nursing note and vitals reviewed.      Note written by Mendoza Liu, as dictated by Dejon Roe MD 8:51 AM    MDM  Number of Diagnoses or Management Options  Diagnosis management comments: A:  Abdominal pain and vomiting since last night. VS stable. No focal ttp. DDx:  Pancreatitis, GERD/PUD, bowel obstruction    P:  Labs  Ct a/p  ivf  zofran  pepcid  reassess    ED Course       Procedures    ED EKG interpretation:  Rhythm: normal sinus rhythm. Rate (approx.): 85. Axis: normal.  ST segment:  No concerning ST elevations or depressions. This EKG was interpreted by Jm Boyle MD,ED Provider. Labs unremarkable. CT s/p shows partial SBO. Will discuss findings with general surgery. Plan on admission.

## 2017-03-24 NOTE — ED TRIAGE NOTES
Triage: Abdominal pain that started between 10-11 last night. Pain is medial lower and per patient when the pain gets so intense \" I stick my finger down my throat to throw up and the pain goes down\", vomiting yellow emesis.

## 2017-03-25 LAB
ANION GAP BLD CALC-SCNC: 7 MMOL/L (ref 5–15)
BUN SERPL-MCNC: 14 MG/DL (ref 6–20)
BUN/CREAT SERPL: 14 (ref 12–20)
CALCIUM SERPL-MCNC: 8.2 MG/DL (ref 8.5–10.1)
CHLORIDE SERPL-SCNC: 111 MMOL/L (ref 97–108)
CO2 SERPL-SCNC: 24 MMOL/L (ref 21–32)
CREAT SERPL-MCNC: 1.01 MG/DL (ref 0.7–1.3)
ERYTHROCYTE [DISTWIDTH] IN BLOOD BY AUTOMATED COUNT: 14.3 % (ref 11.5–14.5)
GLUCOSE SERPL-MCNC: 113 MG/DL (ref 65–100)
HCT VFR BLD AUTO: 35 % (ref 36.6–50.3)
HGB BLD-MCNC: 11.6 G/DL (ref 12.1–17)
MAGNESIUM SERPL-MCNC: 2 MG/DL (ref 1.6–2.4)
MCH RBC QN AUTO: 29.4 PG (ref 26–34)
MCHC RBC AUTO-ENTMCNC: 33.1 G/DL (ref 30–36.5)
MCV RBC AUTO: 88.6 FL (ref 80–99)
PHOSPHATE SERPL-MCNC: 2.9 MG/DL (ref 2.6–4.7)
PLATELET # BLD AUTO: 178 K/UL (ref 150–400)
POTASSIUM SERPL-SCNC: 4 MMOL/L (ref 3.5–5.1)
RBC # BLD AUTO: 3.95 M/UL (ref 4.1–5.7)
SODIUM SERPL-SCNC: 142 MMOL/L (ref 136–145)
WBC # BLD AUTO: 6 K/UL (ref 4.1–11.1)

## 2017-03-25 PROCEDURE — C9113 INJ PANTOPRAZOLE SODIUM, VIA: HCPCS | Performed by: NURSE PRACTITIONER

## 2017-03-25 PROCEDURE — 74011250637 HC RX REV CODE- 250/637: Performed by: SURGERY

## 2017-03-25 PROCEDURE — 85027 COMPLETE CBC AUTOMATED: CPT | Performed by: NURSE PRACTITIONER

## 2017-03-25 PROCEDURE — 96376 TX/PRO/DX INJ SAME DRUG ADON: CPT

## 2017-03-25 PROCEDURE — 74011250637 HC RX REV CODE- 250/637: Performed by: NURSE PRACTITIONER

## 2017-03-25 PROCEDURE — 36415 COLL VENOUS BLD VENIPUNCTURE: CPT | Performed by: NURSE PRACTITIONER

## 2017-03-25 PROCEDURE — 74011250636 HC RX REV CODE- 250/636: Performed by: NURSE PRACTITIONER

## 2017-03-25 PROCEDURE — 80048 BASIC METABOLIC PNL TOTAL CA: CPT | Performed by: NURSE PRACTITIONER

## 2017-03-25 PROCEDURE — 96361 HYDRATE IV INFUSION ADD-ON: CPT

## 2017-03-25 PROCEDURE — 84100 ASSAY OF PHOSPHORUS: CPT | Performed by: NURSE PRACTITIONER

## 2017-03-25 PROCEDURE — 74011000250 HC RX REV CODE- 250: Performed by: NURSE PRACTITIONER

## 2017-03-25 PROCEDURE — 99218 HC RM OBSERVATION: CPT

## 2017-03-25 PROCEDURE — 83735 ASSAY OF MAGNESIUM: CPT | Performed by: NURSE PRACTITIONER

## 2017-03-25 PROCEDURE — 96372 THER/PROPH/DIAG INJ SC/IM: CPT

## 2017-03-25 RX ORDER — DOCUSATE SODIUM 100 MG/1
100 CAPSULE, LIQUID FILLED ORAL 2 TIMES DAILY
Status: DISCONTINUED | OUTPATIENT
Start: 2017-03-25 | End: 2017-03-26 | Stop reason: HOSPADM

## 2017-03-25 RX ORDER — OXYCODONE AND ACETAMINOPHEN 5; 325 MG/1; MG/1
1 TABLET ORAL
Status: DISCONTINUED | OUTPATIENT
Start: 2017-03-25 | End: 2017-03-26 | Stop reason: HOSPADM

## 2017-03-25 RX ADMIN — ENOXAPARIN SODIUM 40 MG: 40 INJECTION SUBCUTANEOUS at 15:20

## 2017-03-25 RX ADMIN — DEXTROSE MONOHYDRATE, SODIUM CHLORIDE, AND POTASSIUM CHLORIDE 125 ML/HR: 50; 4.5; 1.49 INJECTION, SOLUTION INTRAVENOUS at 07:42

## 2017-03-25 RX ADMIN — SODIUM CHLORIDE 40 MG: 9 INJECTION INTRAMUSCULAR; INTRAVENOUS; SUBCUTANEOUS at 10:41

## 2017-03-25 RX ADMIN — DORZOLAMIDE HYDROCHLORIDE AND TIMOLOL MALEATE 1 DROP: 20; 5 SOLUTION/ DROPS OPHTHALMIC at 10:44

## 2017-03-25 RX ADMIN — DORZOLAMIDE HYDROCHLORIDE AND TIMOLOL MALEATE 1 DROP: 20; 5 SOLUTION/ DROPS OPHTHALMIC at 18:00

## 2017-03-25 RX ADMIN — Medication 10 ML: at 07:37

## 2017-03-25 RX ADMIN — Medication 10 ML: at 11:30

## 2017-03-25 RX ADMIN — LATANOPROST 1 DROP: 50 SOLUTION OPHTHALMIC at 22:44

## 2017-03-25 RX ADMIN — AMLODIPINE BESYLATE 5 MG: 5 TABLET ORAL at 10:57

## 2017-03-25 NOTE — PROGRESS NOTES
Bedside shift change report given to Sangeetha Berman (oncoming nurse) by Kathleen Montano RN (offgoing nurse). Report included the following information SBAR, Kardex, Intake/Output, MAR and Recent Results.

## 2017-03-25 NOTE — PROGRESS NOTES
Progress Note    Patient: Alexa Lucero MRN: [id number]  SSN: [id number]    YOB: 1940  Age: 64 y.o. Sex: male      Admit Date: 3/24/2017    Partial small bowel obstruction    Subjective:     No acute surgical issues. Pt reported passing a little flatus and had small BM. No nausea or vomiting. Pain in under control.     Objective:     Visit Vitals    /81 (BP Patient Position: Sitting)    Pulse 81    Temp 97.4 °F (36.3 °C)    Resp 20    Ht 5' 11\" (1.803 m)    Wt 178 lb (80.7 kg)    SpO2 97%    BMI 24.83 kg/m2       Temp (24hrs), Av.7 °F (36.5 °C), Min:97.4 °F (36.3 °C), Max:98.3 °F (36.8 °C)        Physical Exam:    Gen:  NAD  Pulm:  Unlabored  Abd:  S/ND/non-TTP    Recent Results (from the past 24 hour(s))   CBC W/O DIFF    Collection Time: 17  6:24 AM   Result Value Ref Range    WBC 6.0 4.1 - 11.1 K/uL    RBC 3.95 (L) 4.10 - 5.70 M/uL    HGB 11.6 (L) 12.1 - 17.0 g/dL    HCT 35.0 (L) 36.6 - 50.3 %    MCV 88.6 80.0 - 99.0 FL    MCH 29.4 26.0 - 34.0 PG    MCHC 33.1 30.0 - 36.5 g/dL    RDW 14.3 11.5 - 14.5 %    PLATELET 059 563 - 342 K/uL   METABOLIC PANEL, BASIC    Collection Time: 17  6:24 AM   Result Value Ref Range    Sodium 142 136 - 145 mmol/L    Potassium 4.0 3.5 - 5.1 mmol/L    Chloride 111 (H) 97 - 108 mmol/L    CO2 24 21 - 32 mmol/L    Anion gap 7 5 - 15 mmol/L    Glucose 113 (H) 65 - 100 mg/dL    BUN 14 6 - 20 MG/DL    Creatinine 1.01 0.70 - 1.30 MG/DL    BUN/Creatinine ratio 14 12 - 20      GFR est AA >60 >60 ml/min/1.73m2    GFR est non-AA >60 >60 ml/min/1.73m2    Calcium 8.2 (L) 8.5 - 10.1 MG/DL   MAGNESIUM    Collection Time: 17  6:24 AM   Result Value Ref Range    Magnesium 2.0 1.6 - 2.4 mg/dL   PHOSPHORUS    Collection Time: 17  6:24 AM   Result Value Ref Range    Phosphorus 2.9 2.6 - 4.7 MG/DL         Assessment:     Hospital Problems  Never Reviewed          Codes Class Noted POA    * (Principal)Partial small bowel obstruction (HCC) ICD-10-CM: K56.69  ICD-9-CM: 560.9  3/24/2017 Yes        GERD (gastroesophageal reflux disease) ICD-10-CM: K21.9  ICD-9-CM: 530.81  3/24/2017 Yes              Plan/Recommendations/Medical Decision Making:     - SBO:  Resolving.   Advance to clears  - Encourage ambulation  - Pain control  - bowel regiment  - Repeat AXR in am    Signed By: Thuy Hobbs MD     March 25, 2017

## 2017-03-26 ENCOUNTER — APPOINTMENT (OUTPATIENT)
Dept: GENERAL RADIOLOGY | Age: 77
End: 2017-03-26
Attending: SURGERY
Payer: MEDICARE

## 2017-03-26 VITALS
SYSTOLIC BLOOD PRESSURE: 151 MMHG | WEIGHT: 178 LBS | HEART RATE: 69 BPM | BODY MASS INDEX: 24.92 KG/M2 | RESPIRATION RATE: 16 BRPM | OXYGEN SATURATION: 96 % | HEIGHT: 71 IN | DIASTOLIC BLOOD PRESSURE: 85 MMHG | TEMPERATURE: 97.5 F

## 2017-03-26 PROCEDURE — 74011250637 HC RX REV CODE- 250/637: Performed by: SURGERY

## 2017-03-26 PROCEDURE — 74011250637 HC RX REV CODE- 250/637: Performed by: NURSE PRACTITIONER

## 2017-03-26 PROCEDURE — 74020 XR ABD FLAT/ ERECT: CPT

## 2017-03-26 PROCEDURE — C9113 INJ PANTOPRAZOLE SODIUM, VIA: HCPCS | Performed by: NURSE PRACTITIONER

## 2017-03-26 PROCEDURE — 99218 HC RM OBSERVATION: CPT

## 2017-03-26 PROCEDURE — 74011250636 HC RX REV CODE- 250/636: Performed by: NURSE PRACTITIONER

## 2017-03-26 PROCEDURE — 74011000250 HC RX REV CODE- 250: Performed by: NURSE PRACTITIONER

## 2017-03-26 PROCEDURE — 74011250636 HC RX REV CODE- 250/636: Performed by: SURGERY

## 2017-03-26 RX ORDER — DOCUSATE SODIUM 100 MG/1
100 CAPSULE, LIQUID FILLED ORAL 2 TIMES DAILY
Qty: 30 CAP | Refills: 0 | Status: SHIPPED | OUTPATIENT
Start: 2017-03-26 | End: 2017-06-24

## 2017-03-26 RX ORDER — OXYCODONE AND ACETAMINOPHEN 5; 325 MG/1; MG/1
1 TABLET ORAL
Qty: 30 TAB | Refills: 0 | Status: SHIPPED | OUTPATIENT
Start: 2017-03-26 | End: 2019-02-03

## 2017-03-26 RX ADMIN — DORZOLAMIDE HYDROCHLORIDE AND TIMOLOL MALEATE 1 DROP: 20; 5 SOLUTION/ DROPS OPHTHALMIC at 10:40

## 2017-03-26 RX ADMIN — SODIUM CHLORIDE 40 MG: 9 INJECTION INTRAMUSCULAR; INTRAVENOUS; SUBCUTANEOUS at 10:36

## 2017-03-26 RX ADMIN — DEXTROSE MONOHYDRATE, SODIUM CHLORIDE, AND POTASSIUM CHLORIDE 75 ML/HR: 50; 4.5; 1.49 INJECTION, SOLUTION INTRAVENOUS at 05:24

## 2017-03-26 NOTE — PROGRESS NOTES
1400 Patient educated with discharge instructions and RX. Patient verbalized understanding with successful teachback. Patient signed a copy of the discharge instructions that were then placed on the hard chart.

## 2017-03-26 NOTE — PROGRESS NOTES
Spiritual Care Partner Volunteer visited patient in Surgical Unit on 03/26/17  Documented by:    Nils Carlisle M.S. MYolandaDiv.   [address] ([id number])

## 2017-03-26 NOTE — DISCHARGE INSTRUCTIONS
1.  Do not drive while on pain medication. You should take a stool softener while on pain medication to prevent constipation. 2.  You may resume your home medications. 3.  You may call [phone] to schedule a follow-up with Dr. Dev Isaac as needed. Bowel Blockage (Intestinal Obstruction): Care Instructions  Your Care Instructions  A bowel blockage, also called an intestinal obstruction, can prevent gas, fluids, or solids from moving through the intestines normally. It can cause constipation and, rarely, diarrhea. You may have pain, nausea, vomiting, and cramping. Most of the time, complete blockages require a stay in the hospital and possibly surgery. But if your bowel is only partly blocked, your doctor may tell you to wait until it clears on its own and you are able to pass gas and stool. If so, there are things you can do at home to help make you feel better. If you have had surgery for a bowel blockage, there are things you can do at home to make sure you heal well. You can also make some changes to keep your bowel from becoming blocked again. Follow-up care is a key part of your treatment and safety. Be sure to make and go to all appointments, and call your doctor if you are having problems. It's also a good idea to know your test results and keep a list of the medicines you take. How can you care for yourself at home? If your doctor has told you to wait at home for a blockage to clear on its own:  · Follow your doctor's instructions. These may include eating a liquid diet to avoid complete blockage. · Take your medicines exactly as prescribed. Call your doctor if you think you are having a problem with your medicine. · Put a heating pad set on low on your belly to relieve mild cramps and pain. To prevent another blockage  · Try to eat smaller amounts of food more often. For example, have 5 or 6 small meals throughout the day instead of 2 or 3 large meals. · Chew your food very well.  Try to chew each bite about 20 times or until it is liquid. · Avoid high-fiber foods and raw fruits and vegetables with skins, husks, strings, or seeds. These can form a ball of undigested material that can cause a blockage if a part of your bowel is scarred or narrowed. · Check with your doctor before you eat whole-grain products or use a fiber supplement such as Citrucel or Metamucil. · To help you have regular bowel movements, eat at regular times, do not strain during a bowel movement, and drink at least 8 to 10 glasses of water each day. If you have kidney, heart, or liver disease and have to limit fluids, talk with your doctor or before you increase the amount of fluids you drink. · Drink high-calorie liquid formulas if your doctor says to. Severe symptoms may make it hard for your body to take in vitamins and minerals. · Get regular exercise. It helps you digest your food better. Get at least 30 minutes of physical activity on most days of the week. Walking is a good choice. When should you call for help? Call 911 anytime you think you may need emergency care. For example, call if:  · You passed out (lost consciousness). · You have severe pain or swelling in your belly. · You vomit blood or what looks like coffee grounds. · You pass maroon or very bloody stools. · You cannot pass any stools or gas. Call your doctor now or seek immediate medical care if:  · You have a new or higher fever. · You cannot keep fluids or medicines down. · You have new pain that gets worse when you move or cough. · Your symptoms become much worse than usual.  Watch closely for changes in your health, and be sure to contact your doctor if:  · You do not get better as expected. · You have steady diarrhea for more than 2 weeks. · You've been losing weight. Where can you learn more? Go to http://pascual-charan.info/.   Enter N866 in the search box to learn more about \"Bowel Blockage (Intestinal Obstruction): Care Instructions. \"  Current as of: August 9, 2016  Content Version: 11.1  © 9230-1339 [employer] ([employer]). Care instructions adapted under license by [employer] (which disclaims liability or warranty for this information). If you have questions about a medical condition or this instruction, always ask your healthcare professional. [place] [address] any warranty or liability for your use of this information. DISCHARGE SUMMARY from Nurse    The following personal items are in your possession at time of discharge:    Dental Appliances: None  Visual Aid: Glasses, With patient     Home Medications: Sent home  Jewelry: Ring  Clothing: At bedside, Belt, Gloves, Footwear, Pants  Other Valuables: Cell Phone, Eyeglasses             PATIENT INSTRUCTIONS:    After general anesthesia or intravenous sedation, for 24 hours or while taking prescription Narcotics:  · Limit your activities  · Do not drive and operate hazardous machinery  · Do not make important personal or business decisions  · Do  not drink alcoholic beverages  · If you have not urinated within 8 hours after discharge, please contact your surgeon on call. Report the following to your surgeon:  · Excessive pain, swelling, redness or odor of or around the surgical area  · Temperature over 100.5  · Nausea and vomiting lasting longer than 4 hours or if unable to take medications  · Any signs of decreased circulation or nerve impairment to extremity: change in color, persistent  numbness, tingling, coldness or increase pain  · Any questions          These are general instructions for a healthy lifestyle:    No smoking/ No tobacco products/ Avoid exposure to second hand smoke    Surgeon General's Warning:  Quitting smoking now greatly reduces serious risk to your health.     Obesity, smoking, and sedentary lifestyle greatly increases your risk for illness    A healthy diet, regular physical exercise & weight monitoring are important for maintaining a healthy lifestyle    You may be retaining fluid if you have a history of heart failure or if you experience any of the following symptoms:  Weight gain of 3 pounds or more overnight or 5 pounds in a week, increased swelling in our hands or feet or shortness of breath while lying flat in bed. Please call your doctor as soon as you notice any of these symptoms; do not wait until your next office visit. Recognize signs and symptoms of STROKE:    F-face looks uneven    A-arms unable to move or move unevenly    S-speech slurred or non-existent    T-time-call 911 as soon as signs and symptoms begin-DO NOT go       Back to bed or wait to see if you get better-TIME IS BRAIN. Warning Signs of HEART ATTACK     Call 911 if you have these symptoms:   Chest discomfort. Most heart attacks involve discomfort in the center of the chest that lasts more than a few minutes, or that goes away and comes back. It can feel like uncomfortable pressure, squeezing, fullness, or pain.  Discomfort in other areas of the upper body. Symptoms can include pain or discomfort in one or both arms, the back, neck, jaw, or stomach.  Shortness of breath with or without chest discomfort.  Other signs may include breaking out in a cold sweat, nausea, or lightheadedness. Don't wait more than five minutes to call 911 - MINUTES MATTER! Fast action can save your life. Calling 911 is almost always the fastest way to get lifesaving treatment. Emergency Medical Services staff can begin treatment when they arrive -- up to an hour sooner than if someone gets to the hospital by car. The discharge information has been reviewed with the patient. The patient verbalized understanding. Discharge medications reviewed with the patient and appropriate educational materials and side effects teaching were provided. Food on the Tablet Activation    Thank you for requesting access to [facility].  Please follow the instructions below to securely access and download your online medical record. [employer] allows you to send messages to your doctor, view your test results, renew your prescriptions, schedule appointments, and more. How Do I Sign Up? 1. In your internet browser, go to [email or web address]  2. Click on the First Time User? Click Here link in the Sign In box. You will be redirect to the New Member Sign Up page. 3. Enter your [employer] Access Code exactly as it appears below. You will not need to use this code after youve completed the sign-up process. If you do not sign up before the expiration date, you must request a new code. [employer] Access Code: Activation code not generated  Current [employer] Status: Active (This is the date your [employer] access code will )    4. Enter the last four digits of your Social Security Number (xxxx) and Date of Birth (mm/dd/yyyy) as indicated and click Submit. You will be taken to the next sign-up page. 5. Create a [employer] ID. This will be your [employer] login ID and cannot be changed, so think of one that is secure and easy to remember. 6. Create a [employer] password. You can change your password at any time. 7. Enter your Password Reset Question and Answer. This can be used at a later time if you forget your password. 8. Enter your e-mail address. You will receive e-mail notification when new information is available in [address] 9. Click Sign Up. You can now view and download portions of your medical record. 10. Click the Download Summary menu link to download a portable copy of your medical information. Additional Information    If you have questions, please visit the Frequently Asked Questions section of the [employer] website at [email or web address] Remember, [employer] is NOT to be used for urgent needs. For medical emergencies, dial 911.

## 2017-03-26 NOTE — PROGRESS NOTES
Progress Note    Patient: Layton Duque MRN: [id number]  SSN: [id number]    YOB: 1940  Age: 64 y.o. Sex: male      Admit Date: 3/24/2017    Partial small bowel obstruction    Subjective:     No acute surgical issues. Pt doing well. Passing flatus and having BM. Pt tolerated GI lite diet. Objective:     Visit Vitals    /85    Pulse 69    Temp 97.5 °F (36.4 °C)    Resp 16    Ht 5' 11\" (1.803 m)    Wt 178 lb (80.7 kg)    SpO2 96%    BMI 24.83 kg/m2       Temp (24hrs), Av °F (36.7 °C), Min:97.5 °F (36.4 °C), Max:98.3 °F (36.8 °C)        Physical Exam:    Gen:  NAD  Pulm:  Unlabored  Abd:  S/ND/non-TTP    No results found for this or any previous visit (from the past 24 hour(s)). Assessment:     Hospital Problems  Never Reviewed          Codes Class Noted POA    * (Principal)Partial small bowel obstruction ([facility] [address] ICD-10-CM: K56.69  ICD-9-CM: 560.9  3/24/2017 Yes        GERD (gastroesophageal reflux disease) ICD-10-CM: K21.9  ICD-9-CM: 530.81  3/24/2017 Yes              Plan/Recommendations/Medical Decision Making:     - SBO:  Resolved.    - GI lite diet  - Encourage ambulation  - Pain control  - bowel regiment  - Plan DC home today    Signed By: Hakan Sandhu MD     2017

## 2017-09-22 ENCOUNTER — HOSPITAL ENCOUNTER (OUTPATIENT)
Dept: GENERAL RADIOLOGY | Age: 77
Discharge: HOSPICE | End: 2017-09-22
Payer: MEDICARE

## 2017-09-22 DIAGNOSIS — R93.89 ABNORMAL CHEST XRAY: ICD-10-CM

## 2017-09-22 PROCEDURE — 71020 XR CHEST PA LAT: CPT

## 2019-02-01 ENCOUNTER — HOSPITAL ENCOUNTER (INPATIENT)
Age: 79
LOS: 2 days | Discharge: HOME | DRG: 310 | End: 2019-02-03
Attending: EMERGENCY MEDICINE | Admitting: FAMILY MEDICINE
Payer: MEDICARE

## 2019-02-01 ENCOUNTER — APPOINTMENT (OUTPATIENT)
Dept: GENERAL RADIOLOGY | Age: 79
DRG: 310 | End: 2019-02-01
Attending: EMERGENCY MEDICINE
Payer: MEDICARE

## 2019-02-01 DIAGNOSIS — I48.0 PAROXYSMAL ATRIAL FIBRILLATION (HCC): Primary | ICD-10-CM

## 2019-02-01 PROBLEM — I48.91 ATRIAL FIBRILLATION WITH RVR (HCC): Status: ACTIVE | Noted: 2019-02-01

## 2019-02-01 LAB
ALBUMIN SERPL-MCNC: 3.8 G/DL (ref 3.5–5)
ALBUMIN/GLOB SERPL: 0.9 {RATIO} (ref 1.1–2.2)
ALP SERPL-CCNC: 90 U/L (ref 45–117)
ALT SERPL-CCNC: 39 U/L (ref 12–78)
ANION GAP SERPL CALC-SCNC: 8 MMOL/L (ref 5–15)
APTT PPP: 32 SEC (ref 22.1–32)
AST SERPL-CCNC: 25 U/L (ref 15–37)
BASOPHILS # BLD: 0 K/UL (ref 0–0.1)
BASOPHILS NFR BLD: 1 % (ref 0–1)
BILIRUB SERPL-MCNC: 1.5 MG/DL (ref 0.2–1)
BNP SERPL-MCNC: 2401 PG/ML (ref 0–450)
BUN SERPL-MCNC: 12 MG/DL (ref 6–20)
BUN/CREAT SERPL: 10 (ref 12–20)
CALCIUM SERPL-MCNC: 9 MG/DL (ref 8.5–10.1)
CHLORIDE SERPL-SCNC: 108 MMOL/L (ref 97–108)
CK SERPL-CCNC: 221 U/L (ref 39–308)
CO2 SERPL-SCNC: 24 MMOL/L (ref 21–32)
COMMENT, HOLDF: NORMAL
CREAT SERPL-MCNC: 1.24 MG/DL (ref 0.7–1.3)
DIFFERENTIAL METHOD BLD: ABNORMAL
EOSINOPHIL # BLD: 0.1 K/UL (ref 0–0.4)
EOSINOPHIL NFR BLD: 2 % (ref 0–7)
ERYTHROCYTE [DISTWIDTH] IN BLOOD BY AUTOMATED COUNT: 13.8 % (ref 11.5–14.5)
GLOBULIN SER CALC-MCNC: 4.3 G/DL (ref 2–4)
GLUCOSE BLD STRIP.AUTO-MCNC: 118 MG/DL (ref 65–100)
GLUCOSE SERPL-MCNC: 105 MG/DL (ref 65–100)
HCT VFR BLD AUTO: 39.3 % (ref 36.6–50.3)
HGB BLD-MCNC: 12.3 G/DL (ref 12.1–17)
IMM GRANULOCYTES # BLD AUTO: 0 K/UL (ref 0–0.04)
IMM GRANULOCYTES NFR BLD AUTO: 0 % (ref 0–0.5)
INR PPP: 1.2 (ref 0.9–1.1)
LYMPHOCYTES # BLD: 1.1 K/UL (ref 0.8–3.5)
LYMPHOCYTES NFR BLD: 20 % (ref 12–49)
MAGNESIUM SERPL-MCNC: 2 MG/DL (ref 1.6–2.4)
MCH RBC QN AUTO: 29 PG (ref 26–34)
MCHC RBC AUTO-ENTMCNC: 31.3 G/DL (ref 30–36.5)
MCV RBC AUTO: 92.7 FL (ref 80–99)
MONOCYTES # BLD: 0.6 K/UL (ref 0–1)
MONOCYTES NFR BLD: 10 % (ref 5–13)
NEUTS SEG # BLD: 3.9 K/UL (ref 1.8–8)
NEUTS SEG NFR BLD: 67 % (ref 32–75)
NRBC # BLD: 0 K/UL (ref 0–0.01)
NRBC BLD-RTO: 0 PER 100 WBC
PLATELET # BLD AUTO: 142 K/UL (ref 150–400)
PMV BLD AUTO: 11.3 FL (ref 8.9–12.9)
POTASSIUM SERPL-SCNC: 3.7 MMOL/L (ref 3.5–5.1)
PROT SERPL-MCNC: 8.1 G/DL (ref 6.4–8.2)
PROTHROMBIN TIME: 11.9 SEC (ref 9–11.1)
RBC # BLD AUTO: 4.24 M/UL (ref 4.1–5.7)
SAMPLES BEING HELD,HOLD: NORMAL
SERVICE CMNT-IMP: ABNORMAL
SODIUM SERPL-SCNC: 140 MMOL/L (ref 136–145)
THERAPEUTIC RANGE,PTTT: NORMAL SECS (ref 58–77)
TROPONIN I SERPL-MCNC: <0.05 NG/ML
TSH SERPL DL<=0.05 MIU/L-ACNC: 2.01 UIU/ML (ref 0.36–3.74)
WBC # BLD AUTO: 5.9 K/UL (ref 4.1–11.1)

## 2019-02-01 PROCEDURE — 85730 THROMBOPLASTIN TIME PARTIAL: CPT

## 2019-02-01 PROCEDURE — 74011000258 HC RX REV CODE- 258: Performed by: EMERGENCY MEDICINE

## 2019-02-01 PROCEDURE — 71045 X-RAY EXAM CHEST 1 VIEW: CPT

## 2019-02-01 PROCEDURE — 74011250637 HC RX REV CODE- 250/637: Performed by: FAMILY MEDICINE

## 2019-02-01 PROCEDURE — 84443 ASSAY THYROID STIM HORMONE: CPT

## 2019-02-01 PROCEDURE — 81001 URINALYSIS AUTO W/SCOPE: CPT

## 2019-02-01 PROCEDURE — 99284 EMERGENCY DEPT VISIT MOD MDM: CPT

## 2019-02-01 PROCEDURE — 74011250636 HC RX REV CODE- 250/636: Performed by: FAMILY MEDICINE

## 2019-02-01 PROCEDURE — 93005 ELECTROCARDIOGRAM TRACING: CPT

## 2019-02-01 PROCEDURE — 82550 ASSAY OF CK (CPK): CPT

## 2019-02-01 PROCEDURE — 96374 THER/PROPH/DIAG INJ IV PUSH: CPT

## 2019-02-01 PROCEDURE — 74011000250 HC RX REV CODE- 250: Performed by: EMERGENCY MEDICINE

## 2019-02-01 PROCEDURE — 80053 COMPREHEN METABOLIC PANEL: CPT

## 2019-02-01 PROCEDURE — 74011000250 HC RX REV CODE- 250: Performed by: FAMILY MEDICINE

## 2019-02-01 PROCEDURE — 83880 ASSAY OF NATRIURETIC PEPTIDE: CPT

## 2019-02-01 PROCEDURE — 36415 COLL VENOUS BLD VENIPUNCTURE: CPT

## 2019-02-01 PROCEDURE — 82962 GLUCOSE BLOOD TEST: CPT

## 2019-02-01 PROCEDURE — 83735 ASSAY OF MAGNESIUM: CPT

## 2019-02-01 PROCEDURE — 84484 ASSAY OF TROPONIN QUANT: CPT

## 2019-02-01 PROCEDURE — 85025 COMPLETE CBC W/AUTO DIFF WBC: CPT

## 2019-02-01 PROCEDURE — 85610 PROTHROMBIN TIME: CPT

## 2019-02-01 PROCEDURE — 65660000001 HC RM ICU INTERMED STEPDOWN

## 2019-02-01 RX ORDER — SODIUM CHLORIDE 9 MG/ML
75 INJECTION, SOLUTION INTRAVENOUS CONTINUOUS
Status: DISCONTINUED | OUTPATIENT
Start: 2019-02-01 | End: 2019-02-02

## 2019-02-01 RX ORDER — OMEPRAZOLE 20 MG/1
20 CAPSULE, DELAYED RELEASE ORAL DAILY
Status: DISCONTINUED | OUTPATIENT
Start: 2019-02-02 | End: 2019-02-01 | Stop reason: CLARIF

## 2019-02-01 RX ORDER — ATORVASTATIN CALCIUM 20 MG/1
40 TABLET, FILM COATED ORAL DAILY
Status: DISCONTINUED | OUTPATIENT
Start: 2019-02-02 | End: 2019-02-03 | Stop reason: HOSPADM

## 2019-02-01 RX ORDER — TIMOLOL MALEATE 5 MG/ML
1 SOLUTION/ DROPS OPHTHALMIC 2 TIMES DAILY
Status: DISCONTINUED | OUTPATIENT
Start: 2019-02-01 | End: 2019-02-02 | Stop reason: SDUPTHER

## 2019-02-01 RX ORDER — BENZONATATE 100 MG/1
100 CAPSULE ORAL
Status: DISCONTINUED | OUTPATIENT
Start: 2019-02-01 | End: 2019-02-03 | Stop reason: HOSPADM

## 2019-02-01 RX ORDER — ACETAMINOPHEN 325 MG/1
650 TABLET ORAL
Status: DISCONTINUED | OUTPATIENT
Start: 2019-02-01 | End: 2019-02-03 | Stop reason: HOSPADM

## 2019-02-01 RX ORDER — SODIUM CHLORIDE 0.9 % (FLUSH) 0.9 %
5-40 SYRINGE (ML) INJECTION EVERY 8 HOURS
Status: DISCONTINUED | OUTPATIENT
Start: 2019-02-01 | End: 2019-02-03 | Stop reason: HOSPADM

## 2019-02-01 RX ORDER — DORZOLAMIDE HCL 20 MG/ML
1 SOLUTION/ DROPS OPHTHALMIC 2 TIMES DAILY
Status: DISCONTINUED | OUTPATIENT
Start: 2019-02-01 | End: 2019-02-02 | Stop reason: SDUPTHER

## 2019-02-01 RX ORDER — BUSPIRONE HYDROCHLORIDE 10 MG/1
10 TABLET ORAL 2 TIMES DAILY
COMMUNITY
End: 2021-02-16

## 2019-02-01 RX ORDER — DILTIAZEM HYDROCHLORIDE 5 MG/ML
10 INJECTION INTRAVENOUS
Status: COMPLETED | OUTPATIENT
Start: 2019-02-01 | End: 2019-02-01

## 2019-02-01 RX ORDER — PANTOPRAZOLE SODIUM 40 MG/1
40 TABLET, DELAYED RELEASE ORAL
Status: DISCONTINUED | OUTPATIENT
Start: 2019-02-02 | End: 2019-02-02

## 2019-02-01 RX ORDER — GUAIFENESIN 100 MG/5ML
81 LIQUID (ML) ORAL DAILY
Status: DISCONTINUED | OUTPATIENT
Start: 2019-02-02 | End: 2019-02-03 | Stop reason: HOSPADM

## 2019-02-01 RX ORDER — BUSPIRONE HYDROCHLORIDE 10 MG/1
10 TABLET ORAL 2 TIMES DAILY
Status: DISCONTINUED | OUTPATIENT
Start: 2019-02-01 | End: 2019-02-03 | Stop reason: HOSPADM

## 2019-02-01 RX ORDER — SODIUM CHLORIDE 0.9 % (FLUSH) 0.9 %
5-40 SYRINGE (ML) INJECTION AS NEEDED
Status: DISCONTINUED | OUTPATIENT
Start: 2019-02-01 | End: 2019-02-03 | Stop reason: HOSPADM

## 2019-02-01 RX ORDER — THERA TABS 400 MCG
1 TAB ORAL DAILY
Status: DISCONTINUED | OUTPATIENT
Start: 2019-02-02 | End: 2019-02-03 | Stop reason: HOSPADM

## 2019-02-01 RX ORDER — DORZOLAMIDE HYDROCHLORIDE AND TIMOLOL MALEATE 20; 5 MG/ML; MG/ML
1 SOLUTION/ DROPS OPHTHALMIC 2 TIMES DAILY
Status: DISCONTINUED | OUTPATIENT
Start: 2019-02-01 | End: 2019-02-01 | Stop reason: RX

## 2019-02-01 RX ADMIN — TIMOLOL MALEATE 1 DROP: 5 SOLUTION OPHTHALMIC at 21:37

## 2019-02-01 RX ADMIN — DILTIAZEM HYDROCHLORIDE 5 MG/HR: 5 INJECTION, SOLUTION INTRAVENOUS at 19:02

## 2019-02-01 RX ADMIN — DORZOLAMIDE HYDROCHLORIDE 1 DROP: 20 SOLUTION/ DROPS OPHTHALMIC at 21:37

## 2019-02-01 RX ADMIN — SODIUM CHLORIDE 75 ML/HR: 900 INJECTION, SOLUTION INTRAVENOUS at 21:45

## 2019-02-01 RX ADMIN — DILTIAZEM HYDROCHLORIDE 2.5 MG/HR: 5 INJECTION, SOLUTION INTRAVENOUS at 18:05

## 2019-02-01 RX ADMIN — DILTIAZEM HYDROCHLORIDE 10 MG: 5 INJECTION INTRAVENOUS at 17:43

## 2019-02-01 RX ADMIN — DILTIAZEM HYDROCHLORIDE 7.5 MG/HR: 5 INJECTION, SOLUTION INTRAVENOUS at 19:26

## 2019-02-01 RX ADMIN — BUSPIRONE HYDROCHLORIDE 10 MG: 10 TABLET ORAL at 21:38

## 2019-02-01 RX ADMIN — Medication 10 ML: at 22:00

## 2019-02-01 RX ADMIN — APIXABAN 5 MG: 5 TABLET, FILM COATED ORAL at 21:37

## 2019-02-01 NOTE — ED PROVIDER NOTES
[de-identified] y.o. male with past medical history significant for high cholesterol, GERD, IBS, depression, glaucoma, who presents ambulatory with wife to the ED with cc of referral for new onset atrial fibrillation. Pt reports \"rapid\" palpitations x 3 weeks with associated shortness of breath and cough worse while supine at night. He notes he had been having intermittent tachycardia on his smart-watch heart monitor and initially believed this was due to device malfunction. However, today he was evaluated by his PCP for cough with negative CXR, and was referred to the ED for abnormal EKG. Pt denies history of CAD or stenting, but is followed by [facility] cardiology and takes Amlodipine. He specifically denies any chest pain. There are no other acute medical concerns at this time. Social Hx: Never Tobacco use, yes EtOH use, denies Illicit Drug use PCP: Mundo Crews MD 
 
Note written by Mendoza Zamarripa, as dictated by Teresita Rodriguez MD 5:23 PM 
 
 
 
The history is provided by the patient and the spouse. No  was used. Past Medical History:  
Diagnosis Date  Depression  GERD (gastroesophageal reflux disease)  Glaucoma  High cholesterol  IBS (irritable bowel syndrome) Past Surgical History:  
Procedure Laterality Date  HX CHOLECYSTECTOMY  HX HEMORRHOIDECTOMY  HX ORTHOPAEDIC    
 RIGHT knee  HX TONSILLECTOMY History reviewed. No pertinent family history. Social History Socioeconomic History  Marital status:  Spouse name: Not on file  Number of children: Not on file  Years of education: Not on file  Highest education level: Not on file Social Needs  Financial resource strain: Not on file  Food insecurity - worry: Not on file  Food insecurity - inability: Not on file  Transportation needs - medical: Not on file  Transportation needs - non-medical: Not on file Occupational History  Not on file Tobacco Use  Smoking status: Never Smoker Substance and Sexual Activity  Alcohol use: Yes  Drug use: No  
 Sexual activity: Not on file Other Topics Concern  Not on file Social History Narrative  Not on file ALLERGIES: Sulfa (sulfonamide antibiotics) Review of Systems Respiratory: Positive for cough and shortness of breath. Cardiovascular: Positive for palpitations. Negative for chest pain. All other systems reviewed and are negative. Vitals:  
 02/01/19 1657 Pulse: (!) 136 SpO2: 96% Physical Exam  
Constitutional: He is oriented to person, place, and time. He appears well-developed and well-nourished. No distress. HENT:  
Head: Normocephalic and atraumatic. Eyes: Conjunctivae are normal. No scleral icterus. Neck: Neck supple. No tracheal deviation present. Cardiovascular: Normal heart sounds and intact distal pulses. An irregularly irregular rhythm present. Exam reveals no gallop and no friction rub. No murmur heard. Atrial fibrillation with RVR Pulmonary/Chest: Effort normal. He has no wheezes. He has rales (bibasilar). Abdominal: Soft. He exhibits no distension. There is no tenderness. There is no rebound and no guarding. Musculoskeletal: He exhibits no edema. No peripheral edema Neurological: He is alert and oriented to person, place, and time. Skin: Skin is warm and dry. No rash noted. Psychiatric: He has a normal mood and affect. Nursing note and vitals reviewed. Note written by Mendoza Ramos, as dictated by Rad West MD 5:23 PM 
 
MDM Number of Diagnoses or Management Options Paroxysmal atrial fibrillation ([facility] [address]): Amount and/or Complexity of Data Reviewed Clinical lab tests: ordered and reviewed Tests in the radiology section of CPT®: ordered and reviewed Tests in the medicine section of CPT®: ordered and reviewed Procedures ED EKG interpretation: Atrial fibrillation with RVR, rate 118, low voltage QRS. Note written by Mendoza Gomez, as dictated by Alexa Leal MD 5:19 PM 
 
5:40 PM 
Old Chart Review: Pt had SBO in March 2018. CONSULT NOTE: 
6:05 PM Alexa Leal MD spoke with Dr. Karyle Mealy, Consult for Cardiology. Discussed available diagnostic tests and clinical findings. Dr. Gladys Ellis recommends Sally Honey, admit to hospitalist and will consult. Total critical care time spent exclusive of procedures:  34   minutes Hospitalist TigerText for Admission, Dr. Corrina العراقي 
6:09 PM 
 
ED Room Number: ER30/30 Patient Name and age:  Santy Ferreira [de-identified] y.o.  male Working Diagnosis: 1. Paroxysmal atrial fibrillation (HCC) Readmission: no 
Isolation Requirements:  no 
Recommended Level of Care:  telemetry Code Status:  Prior Other: full

## 2019-02-01 NOTE — ED TRIAGE NOTES
Referred to ED for further evaluation after abnormal 12 lead EKG today. Pt c/o rapid heart rate, sob and cough onset 3 weeks ago.

## 2019-02-01 NOTE — PROGRESS NOTES
Admission Medication Reconciliation: 
 
Information obtained from: Patient, RX query Significant PMH/Disease States:  
Past Medical History:  
Diagnosis Date  Depression  GERD (gastroesophageal reflux disease)  Glaucoma  High cholesterol  IBS (irritable bowel syndrome) Chief Complaint for this Admission:  rapid heart rate Allergies:  Sulfa (sulfonamide antibiotics) Prior to Admission Medications:  
Prior to Admission Medications Prescriptions Last Dose Informant Patient Reported? Taking? amLODIPine (NORVASC) 5 mg tablet 2/1/2019  Yes Yes Sig: Take 5 mg by mouth daily. aspirin 81 mg chewable tablet 2/1/2019  Yes Yes Sig: Take 81 mg by mouth daily. atorvastatin (LIPITOR) 80 mg tablet 2/1/2019  Yes Yes Sig: Take 40 mg by mouth daily. busPIRone (BUSPAR) 10 mg tablet 2/1/2019 at Unknown time  Yes Yes Sig: Take 10 mg by mouth two (2) times a day. dorzolamide-timolol (COSOPT) 22.3-6.8 mg/mL ophthalmic solution 1/31/2019 at Unknown time  Yes Yes Sig: Administer 1 Drop to right eye two (2) times a day. latanoprostene bunod (VYZULTA) 0.024 % drop 1/31/2019 at Unknown time  Yes Yes Sig: Administer 1 Drop to right eye nightly. omeprazole (PRILOSEC) 20 mg capsule 2/1/2019  Yes Yes Sig: Take 20 mg by mouth daily. oxyCODONE-acetaminophen (PERCOCET) 5-325 mg per tablet 2/1/2019  No Yes Sig: Take 1 Tab by mouth every six (6) hours as needed. Max Daily Amount: 4 Tabs. therapeutic multivitamin [facility]) tablet 2/1/2019  Yes Yes Sig: Take 1 Tab by mouth daily. vortioxetine (BRINTELLIX) 10 mg tablet 2/1/2019  Yes Yes Sig: Take 10 mg by mouth daily. Facility-Administered Medications: None Comments/Recommendations: 1. Removed Xalatan, replaced with Vyzulta 2. Changed Cosopt from both eyes to right eye only 3. Removed Percocet 4. Added buspirone 5. Pt also reports taking \"a lot\" of supplements. He can provide a list later.

## 2019-02-02 ENCOUNTER — APPOINTMENT (OUTPATIENT)
Dept: NON INVASIVE DIAGNOSTICS | Age: 79
DRG: 310 | End: 2019-02-02
Attending: FAMILY MEDICINE
Payer: MEDICARE

## 2019-02-02 LAB
ANION GAP SERPL CALC-SCNC: 10 MMOL/L (ref 5–15)
APPEARANCE UR: CLEAR
ATRIAL RATE: 131 BPM
BACTERIA URNS QL MICRO: NEGATIVE /HPF
BASOPHILS # BLD: 0.1 K/UL (ref 0–0.1)
BASOPHILS NFR BLD: 1 % (ref 0–1)
BILIRUB UR QL: NEGATIVE
BUN SERPL-MCNC: 13 MG/DL (ref 6–20)
BUN/CREAT SERPL: 14 (ref 12–20)
CALCIUM SERPL-MCNC: 8.5 MG/DL (ref 8.5–10.1)
CALCULATED R AXIS, ECG10: -18 DEGREES
CALCULATED T AXIS, ECG11: 70 DEGREES
CHLORIDE SERPL-SCNC: 110 MMOL/L (ref 97–108)
CK SERPL-CCNC: 197 U/L (ref 39–308)
CO2 SERPL-SCNC: 21 MMOL/L (ref 21–32)
COLOR UR: NORMAL
CREAT SERPL-MCNC: 0.95 MG/DL (ref 0.7–1.3)
DIAGNOSIS, 93000: NORMAL
DIFFERENTIAL METHOD BLD: ABNORMAL
EOSINOPHIL # BLD: 0.2 K/UL (ref 0–0.4)
EOSINOPHIL NFR BLD: 5 % (ref 0–7)
EPITH CASTS URNS QL MICRO: NORMAL /LPF
ERYTHROCYTE [DISTWIDTH] IN BLOOD BY AUTOMATED COUNT: 13.7 % (ref 11.5–14.5)
GLUCOSE BLD STRIP.AUTO-MCNC: 106 MG/DL (ref 65–100)
GLUCOSE BLD STRIP.AUTO-MCNC: 120 MG/DL (ref 65–100)
GLUCOSE SERPL-MCNC: 129 MG/DL (ref 65–100)
GLUCOSE UR STRIP.AUTO-MCNC: NEGATIVE MG/DL
HCT VFR BLD AUTO: 35.3 % (ref 36.6–50.3)
HGB BLD-MCNC: 11.4 G/DL (ref 12.1–17)
HGB UR QL STRIP: NEGATIVE
HYALINE CASTS URNS QL MICRO: NORMAL /LPF (ref 0–5)
IMM GRANULOCYTES # BLD AUTO: 0 K/UL (ref 0–0.04)
IMM GRANULOCYTES NFR BLD AUTO: 0 % (ref 0–0.5)
KETONES UR QL STRIP.AUTO: NEGATIVE MG/DL
LEUKOCYTE ESTERASE UR QL STRIP.AUTO: NEGATIVE
LYMPHOCYTES # BLD: 1 K/UL (ref 0.8–3.5)
LYMPHOCYTES NFR BLD: 22 % (ref 12–49)
MCH RBC QN AUTO: 29.8 PG (ref 26–34)
MCHC RBC AUTO-ENTMCNC: 32.3 G/DL (ref 30–36.5)
MCV RBC AUTO: 92.4 FL (ref 80–99)
MONOCYTES # BLD: 0.5 K/UL (ref 0–1)
MONOCYTES NFR BLD: 11 % (ref 5–13)
NEUTS SEG # BLD: 2.7 K/UL (ref 1.8–8)
NEUTS SEG NFR BLD: 61 % (ref 32–75)
NITRITE UR QL STRIP.AUTO: NEGATIVE
NRBC # BLD: 0 K/UL (ref 0–0.01)
NRBC BLD-RTO: 0 PER 100 WBC
PH UR STRIP: 5.5 [PH] (ref 5–8)
PLATELET # BLD AUTO: 131 K/UL (ref 150–400)
PMV BLD AUTO: 11.4 FL (ref 8.9–12.9)
POTASSIUM SERPL-SCNC: 3.5 MMOL/L (ref 3.5–5.1)
PROT UR STRIP-MCNC: NEGATIVE MG/DL
Q-T INTERVAL, ECG07: 354 MS
QRS DURATION, ECG06: 102 MS
QTC CALCULATION (BEZET), ECG08: 496 MS
RBC # BLD AUTO: 3.82 M/UL (ref 4.1–5.7)
RBC #/AREA URNS HPF: NORMAL /HPF (ref 0–5)
SERVICE CMNT-IMP: ABNORMAL
SERVICE CMNT-IMP: ABNORMAL
SODIUM SERPL-SCNC: 141 MMOL/L (ref 136–145)
SP GR UR REFRACTOMETRY: 1.03 (ref 1–1.03)
TROPONIN I SERPL-MCNC: <0.05 NG/ML
UA: UC IF INDICATED,UAUC: NORMAL
UROBILINOGEN UR QL STRIP.AUTO: 0.2 EU/DL (ref 0.2–1)
VENTRICULAR RATE, ECG03: 118 BPM
WBC # BLD AUTO: 4.5 K/UL (ref 4.1–11.1)
WBC URNS QL MICRO: NORMAL /HPF (ref 0–4)

## 2019-02-02 PROCEDURE — 65660000001 HC RM ICU INTERMED STEPDOWN

## 2019-02-02 PROCEDURE — 74011250637 HC RX REV CODE- 250/637: Performed by: NURSE PRACTITIONER

## 2019-02-02 PROCEDURE — 74011250637 HC RX REV CODE- 250/637: Performed by: FAMILY MEDICINE

## 2019-02-02 PROCEDURE — 74011250637 HC RX REV CODE- 250/637: Performed by: INTERNAL MEDICINE

## 2019-02-02 PROCEDURE — 94762 N-INVAS EAR/PLS OXIMTRY CONT: CPT

## 2019-02-02 PROCEDURE — 85025 COMPLETE CBC W/AUTO DIFF WBC: CPT

## 2019-02-02 PROCEDURE — 84484 ASSAY OF TROPONIN QUANT: CPT

## 2019-02-02 PROCEDURE — 74011000258 HC RX REV CODE- 258: Performed by: FAMILY MEDICINE

## 2019-02-02 PROCEDURE — 82550 ASSAY OF CK (CPK): CPT

## 2019-02-02 PROCEDURE — 93306 TTE W/DOPPLER COMPLETE: CPT

## 2019-02-02 PROCEDURE — 80048 BASIC METABOLIC PNL TOTAL CA: CPT

## 2019-02-02 PROCEDURE — 36415 COLL VENOUS BLD VENIPUNCTURE: CPT

## 2019-02-02 PROCEDURE — 82962 GLUCOSE BLOOD TEST: CPT

## 2019-02-02 PROCEDURE — 74011000250 HC RX REV CODE- 250: Performed by: FAMILY MEDICINE

## 2019-02-02 RX ORDER — DOCUSATE SODIUM 100 MG/1
100 CAPSULE, LIQUID FILLED ORAL DAILY
Status: DISCONTINUED | OUTPATIENT
Start: 2019-02-02 | End: 2019-02-03 | Stop reason: HOSPADM

## 2019-02-02 RX ORDER — DOCUSATE SODIUM 100 MG/1
100 CAPSULE, LIQUID FILLED ORAL DAILY
Status: DISCONTINUED | OUTPATIENT
Start: 2019-02-03 | End: 2019-02-02

## 2019-02-02 RX ORDER — DILTIAZEM HYDROCHLORIDE 240 MG/1
240 CAPSULE, COATED, EXTENDED RELEASE ORAL DAILY
Status: DISCONTINUED | OUTPATIENT
Start: 2019-02-02 | End: 2019-02-03 | Stop reason: HOSPADM

## 2019-02-02 RX ORDER — GUAIFENESIN/DEXTROMETHORPHAN 100-10MG/5
10 SYRUP ORAL
Status: DISCONTINUED | OUTPATIENT
Start: 2019-02-02 | End: 2019-02-03 | Stop reason: HOSPADM

## 2019-02-02 RX ORDER — GUAIFENESIN/DEXTROMETHORPHAN 100-10MG/5
10 SYRUP ORAL ONCE
Status: COMPLETED | OUTPATIENT
Start: 2019-02-02 | End: 2019-02-02

## 2019-02-02 RX ORDER — GUAIFENESIN 100 MG/5ML
100 SOLUTION ORAL
Status: DISCONTINUED | OUTPATIENT
Start: 2019-02-02 | End: 2019-02-02

## 2019-02-02 RX ORDER — OMEPRAZOLE 20 MG/1
20 CAPSULE, DELAYED RELEASE ORAL
Status: DISCONTINUED | OUTPATIENT
Start: 2019-02-02 | End: 2019-02-03 | Stop reason: HOSPADM

## 2019-02-02 RX ORDER — DORZOLAMIDE HYDROCHLORIDE AND TIMOLOL MALEATE 20; 5 MG/ML; MG/ML
1 SOLUTION/ DROPS OPHTHALMIC 2 TIMES DAILY
Status: DISCONTINUED | OUTPATIENT
Start: 2019-02-02 | End: 2019-02-03 | Stop reason: HOSPADM

## 2019-02-02 RX ADMIN — ACETAMINOPHEN 650 MG: 325 TABLET ORAL at 22:41

## 2019-02-02 RX ADMIN — GUAIFENESIN 100 MG: 200 SOLUTION ORAL at 09:18

## 2019-02-02 RX ADMIN — TIMOLOL MALEATE 1 DROP: 5 SOLUTION OPHTHALMIC at 08:31

## 2019-02-02 RX ADMIN — APIXABAN 5 MG: 5 TABLET, FILM COATED ORAL at 08:30

## 2019-02-02 RX ADMIN — GUAIFENESIN AND DEXTROMETHORPHAN 10 ML: 100; 10 SYRUP ORAL at 22:41

## 2019-02-02 RX ADMIN — THERA TABS 1 TABLET: TAB at 08:26

## 2019-02-02 RX ADMIN — GUAIFENESIN AND DEXTROMETHORPHAN 10 ML: 100; 10 SYRUP ORAL at 14:07

## 2019-02-02 RX ADMIN — DORZOLAMIDE HYDROCHLORIDE AND TIMOLOL MALEATE 1 DROP: 20; 5 SOLUTION/ DROPS OPHTHALMIC at 20:38

## 2019-02-02 RX ADMIN — DILTIAZEM HYDROCHLORIDE 10 MG/HR: 5 INJECTION, SOLUTION INTRAVENOUS at 05:02

## 2019-02-02 RX ADMIN — Medication 10 ML: at 14:00

## 2019-02-02 RX ADMIN — BENZONATATE 100 MG: 100 CAPSULE ORAL at 00:44

## 2019-02-02 RX ADMIN — BUSPIRONE HYDROCHLORIDE 10 MG: 10 TABLET ORAL at 08:24

## 2019-02-02 RX ADMIN — Medication 10 ML: at 22:42

## 2019-02-02 RX ADMIN — BENZONATATE 100 MG: 100 CAPSULE ORAL at 17:55

## 2019-02-02 RX ADMIN — ATORVASTATIN CALCIUM 40 MG: 20 TABLET, FILM COATED ORAL at 08:26

## 2019-02-02 RX ADMIN — APIXABAN 5 MG: 5 TABLET, FILM COATED ORAL at 17:55

## 2019-02-02 RX ADMIN — ACETAMINOPHEN 650 MG: 325 TABLET ORAL at 05:09

## 2019-02-02 RX ADMIN — DORZOLAMIDE HYDROCHLORIDE 1 DROP: 20 SOLUTION/ DROPS OPHTHALMIC at 08:31

## 2019-02-02 RX ADMIN — DOCUSATE SODIUM 100 MG: 100 CAPSULE, LIQUID FILLED ORAL at 17:55

## 2019-02-02 RX ADMIN — ACETAMINOPHEN 650 MG: 325 TABLET ORAL at 16:22

## 2019-02-02 RX ADMIN — BUSPIRONE HYDROCHLORIDE 10 MG: 10 TABLET ORAL at 17:57

## 2019-02-02 RX ADMIN — ACETAMINOPHEN 650 MG: 325 TABLET ORAL at 01:04

## 2019-02-02 RX ADMIN — DILTIAZEM HYDROCHLORIDE 240 MG: 240 CAPSULE, COATED, EXTENDED RELEASE ORAL at 08:27

## 2019-02-02 RX ADMIN — ASPIRIN 81 MG CHEWABLE TABLET 81 MG: 81 TABLET CHEWABLE at 08:26

## 2019-02-02 NOTE — PROGRESS NOTES
TRANSFER - IN REPORT: 
 
Verbal report received from [facility] RN(name) on [employer]  being received from ED(unit) for routine progression of care Report consisted of patients Situation, Background, Assessment and  
Recommendations(SBAR). Information from the following report(s) SBAR, Kardex, ED Summary, Intake/Output, MAR, Recent Results and Cardiac Rhythm Afib was reviewed with the receiving nurse. Opportunity for questions and clarification was provided. Assessment completed upon patients arrival to unit and care assumed. Patient does not agree to dual skin check at this time. Educated him that we assess every patient's skin for wounds, abrasions, or anything significant. Pt says, \"Can't you just take my word for it? \"

## 2019-02-02 NOTE — PROGRESS NOTES
Problem: Afib Pathway: Day 2 Goal: Medications Outcome: Progressing Towards Goal 
Pt in Afib rate controlled this AM with heart rate from 80's to 100's. Orders received from Dr. Gladys Ellis to stop cardizem drip and give PO cardizem and eliquis. Pt educated on medications and plan of care. Pt verbalized understanding. Bedside shift change report given to Leonel Cheema RN (oncoming nurse) by Josh Ron RN (offgoing nurse). Report included the following information SBAR, Kardex, ED Summary, Procedure Summary, Intake/Output, MAR, Accordion, Recent Results, Med Rec Status, Cardiac Rhythm Afib and Alarm Parameters .

## 2019-02-02 NOTE — PROGRESS NOTES
Hospitalist Progress Note Nelly Mock NP Answering service: [phone] OR [phone] from in house phone Cell: [phone] Date of Service:  2019 NAME:  Delaney Holter :  1940 MRN:  [id number] Admission Summary:  
Mr. Miriam Penny is a [de-identified] yo male presented to the ED for palpitations and some dizziness. He reportedly had a heart monitor at his home reading in the 140s at times. PMH significant for XOL, GERD, depression, glaucoma, anger issues, and IBS. In ED found to be in atrial fibrillation Interval history / Subjective:  
Sitting in chair watching TV. Main complaint is his cough and asking for cough medicine. Off Diltiazem gtt and just received PO Diltiazem. Explained new medications (Diltiazem & Eliquis) at length with him and spouse at bedside including side effects. Assessment & Plan:  
 
Atrial fibrillation, new diagnosis - Continues in a-fib presently - Diltiazem gtt off and now on PO medications - Will need Eliquis at discharge 
- ECHO ordered and pending 
- Dr. Britt Oppenheim to see this week in clinic and if has not converted will consider cardioversion at that time H/o HTN 
- BP ok today -> Holding Amlodipine now that on Diltiazem Cough 
- Order Robitussin - CXR clear Code status: Full DVT prophylaxis: Eliquis Care Plan discussed with: Patient, RN, Dr. Vijaya Enriquez, Dr. Britt Oppenheim 
Disposition: Home tomorrow morning Hospital Problems  Date Reviewed: 2019 Codes Class Noted POA * (Principal) Atrial fibrillation with RVR ([facility] [address] ICD-10-CM: I48.91 
ICD-9-CM: 427.31  2019 Unknown Review of Systems:  
Still having palpitations, dry cough, no fevers/chills, no abdominal pain, no SOB Vital Signs:  
 Last 24hrs VS reviewed since prior progress note. Most recent are: 
Visit Vitals /86 (BP 1 Location: Left arm, BP Patient Position: Sitting) Pulse (!) 103 Temp 97.4 °F (36.3 °C) Resp 19 Wt 86.5 kg (190 lb 9.6 oz) SpO2 97% BMI 26.58 kg/m² Intake/Output Summary (Last 24 hours) at 2/2/2019 1200 Last data filed at 2/2/2019 0830 Gross per 24 hour Intake 1046.25 ml Output  Net 1046.25 ml Physical Examination:  
 
 
     
Constitutional:  No acute distress, cooperative, pleasant   
ENT:  Oral mucous moist, oropharynx benign. Neck supple Resp:  CTA bilaterally. No wheezing/rhonchi/rales. No accessory muscle use CV:  Regular rhythm, normal rate, no murmurs, gallops, rubs. Atrial fibrillation on tele GI:  Soft, non distended, non tender. normoactive bowel sounds, no hepatosplenomegaly Musculoskeletal:  No edema, warm, 2+ pulses throughout Neurologic:  Moves all extremities. AAOx3, CN II-XII reviewed Psych:  Good insight, Not anxious nor agitated. Skin:  Good turgor, no rashes or ulcers Data Review:  
 Review and/or order of clinical lab test 
Review and/or order of tests in the radiology section of CPT Review and/or order of tests in the medicine section of CPT Labs:  
 
Recent Labs 02/02/19 0052 02/01/19 
1726 WBC 4.5 5.9 HGB 11.4* 12.3 HCT 35.3* 39.3 * 142* Recent Labs 02/02/19 0052 02/01/19 
1726  140  
K 3.5 3.7 * 108 CO2 21 24 BUN 13 12 CREA 0.95 1.24 * 105* CA 8.5 9.0 MG  --  2.0 Recent Labs 02/01/19 
1726 SGOT 25 ALT 39 AP 90 TBILI 1.5* TP 8.1 ALB 3.8 GLOB 4.3* Recent Labs 02/01/19 
1726 INR 1.2* PTP 11.9* APTT 32.0 No results for input(s): FE, TIBC, PSAT, FERR in the last 72 hours. No results found for: FOL, RBCF No results for input(s): PH, PCO2, PO2 in the last 72 hours. Recent Labs 02/02/19 0052 02/01/19 
1726   --   
TROIQ <0.05 <0.05 No results found for: CHOL, CHOLX, CHLST, CHOLV, HDL, LDL, LDLC, DLDLP, TGLX, TRIGL, TRIGP, CHHD, CHHDX Lab Results Component Value Date/Time Glucose (POC) 106 (H) 02/02/2019 06:16 AM  
 Glucose (POC) 118 (H) 02/01/2019 09:08 PM  
 Glucose (POC) 119 (H) 03/23/2013 01:40 AM  
 
Lab Results Component Value Date/Time Color DARK YELLOW 02/01/2019 11:50 PM  
 Appearance CLEAR 02/01/2019 11:50 PM  
 Specific gravity 1.027 02/01/2019 11:50 PM  
 pH (UA) 5.5 02/01/2019 11:50 PM  
 Protein NEGATIVE  02/01/2019 11:50 PM  
 Glucose NEGATIVE  02/01/2019 11:50 PM  
 Ketone NEGATIVE  02/01/2019 11:50 PM  
 Bilirubin NEGATIVE  02/01/2019 11:50 PM  
 Urobilinogen 0.2 02/01/2019 11:50 PM  
 Nitrites NEGATIVE  02/01/2019 11:50 PM  
 Leukocyte Esterase NEGATIVE  02/01/2019 11:50 PM  
 Epithelial cells FEW 02/01/2019 11:50 PM  
 Bacteria NEGATIVE  02/01/2019 11:50 PM  
 WBC 0-4 02/01/2019 11:50 PM  
 RBC 0-5 02/01/2019 11:50 PM  
 
 
 
Medications Reviewed:  
 
Current Facility-Administered Medications Medication Dose Route Frequency  dilTIAZem CD (CARDIZEM CD) capsule 240 mg  240 mg Oral DAILY  guaiFENesin (ROBITUSSIN) 100 mg/5 mL oral liquid 100 mg  100 mg Oral Q4H PRN  
 aspirin chewable tablet 81 mg  81 mg Oral DAILY  atorvastatin (LIPITOR) tablet 40 mg  40 mg Oral DAILY  busPIRone (BUSPAR) tablet 10 mg  10 mg Oral BID  
 . PHARMACY TO SUBSTITUTE PER PROTOCOL (Reordered from: latanoprostene bunod (VYZULTA) 0.024 % drop)    Per Protocol  therapeutic multivitamin (THERAGRAN) tablet 1 Tab  1 Tab Oral DAILY  . PHARMACY TO SUBSTITUTE PER PROTOCOL (Reordered from: vortioxetine (BRINTELLIX) 10 mg tablet)    Per Protocol  sodium chloride (NS) flush 5-40 mL  5-40 mL IntraVENous Q8H  
 sodium chloride (NS) flush 5-40 mL  5-40 mL IntraVENous PRN  
 acetaminophen (TYLENOL) tablet 650 mg  650 mg Oral Q4H PRN  
 apixaban (ELIQUIS) tablet 5 mg  5 mg Oral BID  pantoprazole (PROTONIX) tablet 40 mg  40 mg Oral ACB  dorzolamide (TRUSOPT) 2 % ophthalmic solution 1 Drop  1 Drop Right Eye BID  And  
  timolol (TIMOPTIC) 0.5 % ophthalmic solution 1 Drop  1 Drop Right Eye BID  
 benzonatate (TESSALON) capsule 100 mg  100 mg Oral TID PRN  
 
______________________________________________________________________ EXPECTED LENGTH OF STAY: - - - 
ACTUAL LENGTH OF STAY:          1 Tom Correia NP

## 2019-02-02 NOTE — PROGRESS NOTES
Patient has continually and progressively complained about \"not being able to take his medications. \" He is upset he cannot have meds from home. RN educated patient that he can bring in meds as long as they are verified by pharmacy and given a label. Patient says there are too many rules and \"bureaucracy\" here and that he doesn't have \"access to his medications. \" RN asked him which meds he is missing or hasn't had but he cannot verbalize any. RN educated patient that there are reasons why we have to keep documentation of what meds he takes and when, but patient wants us to \"take his word\" for it. Patient is not accepting of education about hospital medication policies.

## 2019-02-02 NOTE — H&P
[address] [facility] HISTORY AND PHYSICAL Katie Baron 
MR#: [id number] : 1940 ACCOUNT #: [de-identified] ADMIT DATE: 2019 86 CHIEF COMPLAINT:  Irregular heartbeat. HISTORY OF PRESENT ILLNESS:  The patient is an 78-year-old male with past medical history of hypercholesterolemia, GERD, IBS, depression, anger issues, glaucoma who presents to the hospital with the above-mentioned symptoms. The patient reports that his symptoms started about 3 weeks back. He started noticing some palpitations, especially when he exercises and felt that he was a little dizzy with the palpitations felt and has a heart monitor at home, which displayed an elevated heart rate. The patient reports that he thought that this was a malfunction and sent the heart monitor for repair, but continued to have the symptoms. The patient reports that in the last 3 days, he started having some shortness of breath and cough. Today, he went to his primary care physician because he was having cough and an x-ray was done which showed atrial fibrillation and the patient was sent to the ER. Since the patient has been in the ER, he reports his cough has gotten much better. Patient was seen in the ER, was found to be in AFib with RVR, rate of around 140s to 150s on arrival.  Patient denies any other complaints or problems. Reports that he follows up with cardiology at [facility] but has not been there for \"many years. \"  The patient denies any chest pain associated with his symptoms, any new medication. Denies any history of atrial fibrillation. Denies ever being on blood thinners. Denies any other complaints or problems.   The patient denies any headache, blurry vision, sore throat, trouble swallowing, trouble with speech, any chest pain, abdominal pain, constipation, diarrhea, urinary symptoms, focal or generalized neurological weakness, recent travel, sick contacts, falls, injuries, hematemesis, melena, hemoptysis, hematuria, or other concerns or problems. PAST MEDICAL HISTORY:  See above. HOME MEDICATIONS:  Currently, the patient is on Vyzulta 1 drop right eye nightly,  BuSpar 10 mg b.i.d., amlodipine 5 mg daily, therapeutic multivitamin, Cosopt 1 drop right eye b.i.d., omeprazole 20 mg daily, aspirin 81 mg daily, Lipitor 80 mg daily. SOCIAL HISTORY:  No history of tobacco abuse. Occasional alcohol. No IV drug abuse. ALLERGIES:  SULFA MEDICATIONS. REVIEW OF SYSTEMS:  All systems were reviewed and found to be essentially negative except for the symptoms mentioned above. FAMILY HISTORY:  Discussed, was found to be noncontributory. PHYSICAL EXAMINATION: 
VITAL SIGNS:  Temperature is not recorded in the chart. Pulse rate is 136, blood pressure 138/81, pulse ox 96% on room air. GENERAL:  Alert x3, awake, mildly distressed, pleasant male, appears to be stated age. HEENT:  Pupils equal, reactive to light. Dry mucous membranes. Tympanic membranes clear. NECK:  Supple. LUNGS:  Clear to auscultation bilaterally. HEART:  Irregularly irregular. ABDOMEN:  Soft, nontender, nondistended. Bowel sounds are physiological. 
EXTREMITIES:  No clubbing. No cyanosis. No edema. NEUROPSYCHIATRIC Pleasant mood and affect. Cranial nerves II-XII grossly intact. Sensory grossly within normal limits. DTR 2+/4. Strength 5/5. SKIN:  Warm. LABORATORY DATA:  White count 5.9, hemoglobin 12.3, hemoglobin 39.3, platelets 723. Sodium 140, potassium 3.7, chloride 100, bicarbonate 24, anion gap 8, glucose 105, BUN 12, creatinine 1.24, calcium 9.0, bilirubin total 1.5, ALT 39, AST 25, alkaline phosphatase 90, troponin less than 0.05. X-ray of the chest shows no evidence of acute cardiopulmonary process. EKG shows atrial fibrillation with rate of around 125 beats per minute.  
 
ASSESSMENT AND PLAN: 
 1.  Atrial fibrillation with rapid ventricular response, new onset. The patient will be admitted on a telemetry bed. Continue patient on diltiazem drip. Cardiology was consulted and per Dr. Robby Gabriel, Cardiology recommends continuing the patient on diltiazem drip and starting the patient on Eliquis. This was per Dr. Jamar Villarreal. The patient will be started on intravenous fluids. Cardiac monitor, echocardiogram, troponins, CK level, and TSH level. We will also get magnesium levels and further intervention will be per hospital course. Once the rate improves and the patient is rate controlled, may consider switching to oral medication. Further intervention will be per hospital course. Continue to closely monitor. 2.  History of hyperlipidemia. Continue home medications and continue to monitor. 3.  Hypertension. I will hold the amlodipine for now, as the patient is on diltiazem drip. 4.  Depression. Continue home medication. The patient denies any suicidal or homicidal ideation. 5.  Gastrointestinal and deep venous thrombosis prophylaxis. The patient is on Eliquis. Katty Penny MD MM/LUIS M 
D: 02/01/2019 19:31    
T: 02/01/2019 20:06 JOB #: [id number]

## 2019-02-02 NOTE — CONSULTS
Cardiology Consult Note    CC: Afib with RVR  Reason for consult:  Afib with RVR  Requesting MD:  Dr. Monroe Panchal     Subjective:      Date of  Admission: 2/1/2019  5:06 PM     Admission type:Emergency    Trev Marie is a [de-identified] y.o. male admitted for Atrial fibrillation with RVR ([facility] [address] Patient complains of one week duration of fatigue and three weeks duration of irregular heart rhythm noted on his heart monitor at home. His HR would be up in 120s with fluctuation. He denies any CP or SOB. He might have felt some dizziness. He was found to be in Afib for the first time yesterday at PCP and was sent here. His cough was the reason he went to see his PCP and CXR did not show any abnormality. His cough overnight on IV Cardizem has improved. No orthopnea or weight change. No HOLLY. Radha Short Two years ago he went to see Dr. Falcon at [facility] for exertional SOB and chest tightness and he was told that his BP was up and started on Amlodipine. His last stress test was about five years ago. Patient Active Problem List    Diagnosis Date Noted    Atrial fibrillation with RVR ([facility] [address] 02/01/2019    Partial small bowel obstruction ([facility] [address] 03/24/2017    GERD (gastroesophageal reflux disease) 03/24/2017      Brooks Peterson MD  Past Medical History:   Diagnosis Date    Depression     GERD (gastroesophageal reflux disease)     Glaucoma     High cholesterol     IBS (irritable bowel syndrome)       Past Surgical History:   Procedure Laterality Date    HX CHOLECYSTECTOMY      HX HEMORRHOIDECTOMY      HX ORTHOPAEDIC      RIGHT knee    HX TONSILLECTOMY       Allergies   Allergen Reactions    Sulfa (Sulfonamide Antibiotics) Unknown (comments)     From infancy, unsure if actually allergic        History reviewed. No pertinent family history.    Current Facility-Administered Medications   Medication Dose Route Frequency    dilTIAZem CD (CARDIZEM CD) capsule 240 mg  240 mg Oral DAILY    aspirin chewable tablet 81 mg  81 mg Oral DAILY    atorvastatin (LIPITOR) tablet 40 mg  40 mg Oral DAILY    busPIRone (BUSPAR) tablet 10 mg  10 mg Oral BID    . PHARMACY TO SUBSTITUTE PER PROTOCOL (Reordered from: latanoprostene bunod (VYZULTA) 0.024 % drop)    Per Protocol    therapeutic multivitamin (THERAGRAN) tablet 1 Tab  1 Tab Oral DAILY    . PHARMACY TO SUBSTITUTE PER PROTOCOL (Reordered from: vortioxetine (BRINTELLIX) 10 mg tablet)    Per Protocol    sodium chloride (NS) flush 5-40 mL  5-40 mL IntraVENous Q8H    sodium chloride (NS) flush 5-40 mL  5-40 mL IntraVENous PRN    0.9% sodium chloride infusion  75 mL/hr IntraVENous CONTINUOUS    acetaminophen (TYLENOL) tablet 650 mg  650 mg Oral Q4H PRN    apixaban (ELIQUIS) tablet 5 mg  5 mg Oral BID    pantoprazole (PROTONIX) tablet 40 mg  40 mg Oral ACB    dorzolamide (TRUSOPT) 2 % ophthalmic solution 1 Drop  1 Drop Right Eye BID    And    timolol (TIMOPTIC) 0.5 % ophthalmic solution 1 Drop  1 Drop Right Eye BID    benzonatate (TESSALON) capsule 100 mg  100 mg Oral TID PRN        Prior to Admission Medications:  Prior to Admission medications    Medication Sig Start Date End Date Taking? Authorizing Provider   latanoprostene bunod (VYZULTA) 0.024 % drop Administer 1 Drop to right eye nightly. Yes Provider, Historical   busPIRone (BUSPAR) 10 mg tablet Take 10 mg by mouth two (2) times a day. Yes Provider, Historical   oxyCODONE-acetaminophen (PERCOCET) 5-325 mg per tablet Take 1 Tab by mouth every six (6) hours as needed. Max Daily Amount: 4 Tabs. 3/26/17  Yes Raynette Sacks, MD   amLODIPine (NORVASC) 5 mg tablet Take 5 mg by mouth daily. Yes Provider, Historical   therapeutic multivitamin (THERAGRAN) tablet Take 1 Tab by mouth daily. Yes Provider, Historical   dorzolamide-timolol (COSOPT) 22.3-6.8 mg/mL ophthalmic solution Administer 1 Drop to right eye two (2) times a day. Yes Provider, Historical   omeprazole (PRILOSEC) 20 mg capsule Take 20 mg by mouth daily.    Yes Provider, Historical vortioxetine (BRINTELLIX) 10 mg tablet Take 10 mg by mouth daily. Yes Provider, Historical   aspirin 81 mg chewable tablet Take 81 mg by mouth daily. Yes Other, MD Rodo   atorvastatin (LIPITOR) 80 mg tablet Take 40 mg by mouth daily. Yes Other, MD Rodo        Review of Symptoms:  Except as noted in HPI, patient denies recent fever or chills, nausea, vomiting, diarrhea, hemoptysis, hematemesis, dysuria, myalgias, focal neurologic symptoms, ecchymosis, angioedema, odynophagia, dysphagia, sore throat, earache,rash, melena, hematochezia, depression, GERD, cold intolerance, petechia, bleeding gums, or significant weight loss. Pertinent items are noted in HPI. Subjective:    24 hr VS reviewed, overall VSSAF  Temp (24hrs), Av.3 °F (36.8 °C), Min:97.9 °F (36.6 °C), Max:99 °F (37.2 °C)    Patient Vitals for the past 8 hrs:   Pulse   19 0400 81   19 0354 80   19 0330 86   19 0300 86   19 0230 90   19 0106 83   19 0000 99    Patient Vitals for the past 8 hrs:   Resp   19 0354 22   19 0000 21    Patient Vitals for the past 8 hrs:   BP   19 0354 125/86   19 0106 128/69   19 0000 119/71        No intake or output data in the 24 hours ending 19 0756      Physical Exam (complete single organ system exam)      Visit Vitals  /86 (BP 1 Location: Left arm, BP Patient Position: At rest)   Pulse 81   Temp 97.9 °F (36.6 °C)   Resp 22   Wt 190 lb 9.6 oz (86.5 kg)   SpO2 95%   BMI 26.58 kg/m²     General Appearance:  Well developed, well nourished,alert and oriented x 3, and individual in no acute distress. Ears/Nose/Mouth/Throat:   Hearing grossly normal.         Neck: Supple. Chest:   Lungs clear to auscultation bilaterally. Cardiovascular:  irregular rate and rhythm, S1, S2 normal, no murmur. Abdomen:   Soft, non-tender, bowel sounds are active. Extremities: No edema bilaterally. Skin: Warm and dry. Cardiographics    Telemetry: AFIB  ECG:  n atrial fibrillation, rate 90s  Echocardiogram: Not done    Labs:   Recent Results (from the past 24 hour(s))   EKG, 12 LEAD, INITIAL    Collection Time: 02/01/19  5:07 PM   Result Value Ref Range    Ventricular Rate 118 BPM    Atrial Rate 131 BPM    QRS Duration 102 ms    Q-T Interval 354 ms    QTC Calculation (Bezet) 496 ms    Calculated R Axis -18 degrees    Calculated T Axis 70 degrees    Diagnosis       Atrial fibrillation  Low voltage QRS  When compared with ECG of 24-MAR-2017 08:56,  Atrial fibrillation has replaced Sinus rhythm     CBC WITH AUTOMATED DIFF    Collection Time: 02/01/19  5:26 PM   Result Value Ref Range    WBC 5.9 4.1 - 11.1 K/uL    RBC 4.24 4.10 - 5.70 M/uL    HGB 12.3 12.1 - 17.0 g/dL    HCT 39.3 36.6 - 50.3 %    MCV 92.7 80.0 - 99.0 FL    MCH 29.0 26.0 - 34.0 PG    MCHC 31.3 30.0 - 36.5 g/dL    RDW 13.8 11.5 - 14.5 %    PLATELET 960 (L) 299 - 400 K/uL    MPV 11.3 8.9 - 12.9 FL    NRBC 0.0 0  WBC    ABSOLUTE NRBC 0.00 0.00 - 0.01 K/uL    NEUTROPHILS 67 32 - 75 %    LYMPHOCYTES 20 12 - 49 %    MONOCYTES 10 5 - 13 %    EOSINOPHILS 2 0 - 7 %    BASOPHILS 1 0 - 1 %    IMMATURE GRANULOCYTES 0 0.0 - 0.5 %    ABS. NEUTROPHILS 3.9 1.8 - 8.0 K/UL    ABS. LYMPHOCYTES 1.1 0.8 - 3.5 K/UL    ABS. MONOCYTES 0.6 0.0 - 1.0 K/UL    ABS. EOSINOPHILS 0.1 0.0 - 0.4 K/UL    ABS. BASOPHILS 0.0 0.0 - 0.1 K/UL    ABS. IMM.  GRANS. 0.0 0.00 - 0.04 K/UL    DF AUTOMATED     METABOLIC PANEL, COMPREHENSIVE    Collection Time: 02/01/19  5:26 PM   Result Value Ref Range    Sodium 140 136 - 145 mmol/L    Potassium 3.7 3.5 - 5.1 mmol/L    Chloride 108 97 - 108 mmol/L    CO2 24 21 - 32 mmol/L    Anion gap 8 5 - 15 mmol/L    Glucose 105 (H) 65 - 100 mg/dL    BUN 12 6 - 20 MG/DL    Creatinine 1.24 0.70 - 1.30 MG/DL    BUN/Creatinine ratio 10 (L) 12 - 20      GFR est AA >60 >60 ml/min/1.73m2    GFR est non-AA 56 (L) >60 ml/min/1.73m2    Calcium 9.0 8.5 - 10.1 MG/DL Bilirubin, total 1.5 (H) 0.2 - 1.0 MG/DL    ALT (SGPT) 39 12 - 78 U/L    AST (SGOT) 25 15 - 37 U/L    Alk. phosphatase 90 45 - 117 U/L    Protein, total 8.1 6.4 - 8.2 g/dL    Albumin 3.8 3.5 - 5.0 g/dL    Globulin 4.3 (H) 2.0 - 4.0 g/dL    A-G Ratio 0.9 (L) 1.1 - 2.2     TROPONIN I    Collection Time: 02/01/19  5:26 PM   Result Value Ref Range    Troponin-I, Qt. <0.05 <0.05 ng/mL   SAMPLES BEING HELD    Collection Time: 02/01/19  5:26 PM   Result Value Ref Range    SAMPLES BEING HELD 1RED 1BLU     COMMENT        Add-on orders for these samples will be processed based on acceptable specimen integrity and analyte stability, which may vary by analyte.    CK W/ REFLX CKMB    Collection Time: 02/01/19  5:26 PM   Result Value Ref Range     39 - 308 U/L   NT-PRO BNP    Collection Time: 02/01/19  5:26 PM   Result Value Ref Range    NT pro-BNP 2,401 (H) 0 - 450 PG/ML   PTT    Collection Time: 02/01/19  5:26 PM   Result Value Ref Range    aPTT 32.0 22.1 - 32.0 sec    aPTT, therapeutic range     58.0 - 77.0 SECS   TSH 3RD GENERATION    Collection Time: 02/01/19  5:26 PM   Result Value Ref Range    TSH 2.01 0.36 - 3.74 uIU/mL   MAGNESIUM    Collection Time: 02/01/19  5:26 PM   Result Value Ref Range    Magnesium 2.0 1.6 - 2.4 mg/dL   PROTHROMBIN TIME + INR    Collection Time: 02/01/19  5:26 PM   Result Value Ref Range    INR 1.2 (H) 0.9 - 1.1      Prothrombin time 11.9 (H) 9.0 - 11.1 sec   GLUCOSE, POC    Collection Time: 02/01/19  9:08 PM   Result Value Ref Range    Glucose (POC) 118 (H) 65 - 100 mg/dL    Performed by Enma Lainez   PCT    URINALYSIS W/ REFLEX CULTURE    Collection Time: 02/01/19 11:50 PM   Result Value Ref Range    Color DARK YELLOW      Appearance CLEAR CLEAR      Specific gravity 1.027 1.003 - 1.030      pH (UA) 5.5 5.0 - 8.0      Protein NEGATIVE  NEG mg/dL    Glucose NEGATIVE  NEG mg/dL    Ketone NEGATIVE  NEG mg/dL    Bilirubin NEGATIVE  NEG      Blood NEGATIVE  NEG      Urobilinogen 0.2 0.2 - 1.0 EU/dL    Nitrites NEGATIVE  NEG      Leukocyte Esterase NEGATIVE  NEG      WBC 0-4 0 - 4 /hpf    RBC 0-5 0 - 5 /hpf    Epithelial cells FEW FEW /lpf    Bacteria NEGATIVE  NEG /hpf    UA:UC IF INDICATED CULTURE NOT INDICATED BY UA RESULT CNI      Hyaline cast 2-5 0 - 5 /lpf   METABOLIC PANEL, BASIC    Collection Time: 02/02/19 12:52 AM   Result Value Ref Range    Sodium 141 136 - 145 mmol/L    Potassium 3.5 3.5 - 5.1 mmol/L    Chloride 110 (H) 97 - 108 mmol/L    CO2 21 21 - 32 mmol/L    Anion gap 10 5 - 15 mmol/L    Glucose 129 (H) 65 - 100 mg/dL    BUN 13 6 - 20 MG/DL    Creatinine 0.95 0.70 - 1.30 MG/DL    BUN/Creatinine ratio 14 12 - 20      GFR est AA >60 >60 ml/min/1.73m2    GFR est non-AA >60 >60 ml/min/1.73m2    Calcium 8.5 8.5 - 10.1 MG/DL   CBC WITH AUTOMATED DIFF    Collection Time: 02/02/19 12:52 AM   Result Value Ref Range    WBC 4.5 4.1 - 11.1 K/uL    RBC 3.82 (L) 4.10 - 5.70 M/uL    HGB 11.4 (L) 12.1 - 17.0 g/dL    HCT 35.3 (L) 36.6 - 50.3 %    MCV 92.4 80.0 - 99.0 FL    MCH 29.8 26.0 - 34.0 PG    MCHC 32.3 30.0 - 36.5 g/dL    RDW 13.7 11.5 - 14.5 %    PLATELET 887 (L) 138 - 400 K/uL    MPV 11.4 8.9 - 12.9 FL    NRBC 0.0 0  WBC    ABSOLUTE NRBC 0.00 0.00 - 0.01 K/uL    NEUTROPHILS 61 32 - 75 %    LYMPHOCYTES 22 12 - 49 %    MONOCYTES 11 5 - 13 %    EOSINOPHILS 5 0 - 7 %    BASOPHILS 1 0 - 1 %    IMMATURE GRANULOCYTES 0 0.0 - 0.5 %    ABS. NEUTROPHILS 2.7 1.8 - 8.0 K/UL    ABS. LYMPHOCYTES 1.0 0.8 - 3.5 K/UL    ABS. MONOCYTES 0.5 0.0 - 1.0 K/UL    ABS. EOSINOPHILS 0.2 0.0 - 0.4 K/UL    ABS. BASOPHILS 0.1 0.0 - 0.1 K/UL    ABS. IMM.  GRANS. 0.0 0.00 - 0.04 K/UL    DF AUTOMATED     CK    Collection Time: 02/02/19 12:52 AM   Result Value Ref Range     39 - 308 U/L   TROPONIN I    Collection Time: 02/02/19 12:52 AM   Result Value Ref Range    Troponin-I, Qt. <0.05 <0.05 ng/mL   GLUCOSE, POC    Collection Time: 02/02/19  6:16 AM   Result Value Ref Range    Glucose (POC) 106 (H) 65 - 100 mg/dL Performed by Tony Camacho   PCT         Assessment:     Assessment:   PAF; new onset  Cough; related to PAF; no evidence of failure  Glaucoma  HTN     Plan:   Tele  Start Cardizem 240 mg everyday and Eliquis 5 mg BID  Followup with me in one to two weeks    Zamzam Vázquez MD

## 2019-02-03 VITALS
DIASTOLIC BLOOD PRESSURE: 99 MMHG | TEMPERATURE: 98.4 F | OXYGEN SATURATION: 96 % | BODY MASS INDEX: 24.57 KG/M2 | SYSTOLIC BLOOD PRESSURE: 128 MMHG | HEART RATE: 118 BPM | HEIGHT: 71 IN | WEIGHT: 175.49 LBS | RESPIRATION RATE: 20 BRPM

## 2019-02-03 PROBLEM — I48.91 ATRIAL FIBRILLATION WITH RVR (HCC): Status: RESOLVED | Noted: 2019-02-01 | Resolved: 2019-02-03

## 2019-02-03 PROBLEM — I48.19 PERSISTENT ATRIAL FIBRILLATION (HCC): Status: ACTIVE | Noted: 2019-02-03

## 2019-02-03 LAB
ECHO AV AREA PEAK VELOCITY: 2.1 CM2
ECHO AV PEAK GRADIENT: 11.1 MMHG
ECHO AV PEAK VELOCITY: 166.71 CM/S
ECHO LA AREA 4C: 26 CM2
ECHO LA MAJOR AXIS: 4.43 CM
ECHO LA VOL 2C: 74.71 ML (ref 18–58)
ECHO LA VOL 4C: 73.2 ML (ref 18–58)
ECHO LA VOL BP: 84.65 ML (ref 18–58)
ECHO LA VOL/BSA BIPLANE: 42.18 ML/M2 (ref 16–28)
ECHO LA VOLUME INDEX A2C: 37.22 ML/M2 (ref 16–28)
ECHO LA VOLUME INDEX A4C: 36.47 ML/M2 (ref 16–28)
ECHO LV E' LATERAL VELOCITY: 14.83 CM/S
ECHO LV E' SEPTAL VELOCITY: 9.46 CM/S
ECHO LV INTERNAL DIMENSION DIASTOLIC: 5.46 CM (ref 4.2–5.9)
ECHO LV INTERNAL DIMENSION SYSTOLIC: 3.61 CM
ECHO LV IVSD: 1.29 CM (ref 0.6–1)
ECHO LV MASS 2D: 347.9 G (ref 88–224)
ECHO LV MASS INDEX 2D: 173.3 G/M2 (ref 49–115)
ECHO LV POSTERIOR WALL DIASTOLIC: 1.25 CM (ref 0.6–1)
ECHO LVOT DIAM: 1.99 CM
ECHO LVOT PEAK GRADIENT: 5.1 MMHG
ECHO LVOT PEAK VELOCITY: 112.99 CM/S
ECHO MV A VELOCITY: 26.74 CM/S
ECHO MV AREA PHT: 4.1 CM2
ECHO MV E DECELERATION TIME (DT): 184.2 MS
ECHO MV E VELOCITY: 1.06 CM/S
ECHO MV E/A RATIO: 0.04
ECHO MV E/E' LATERAL: 0.07
ECHO MV E/E' RATIO (AVERAGED): 0.09
ECHO MV E/E' SEPTAL: 0.11
ECHO MV PRESSURE HALF TIME (PHT): 53.4 MS
ECHO PULMONARY ARTERY SYSTOLIC PRESSURE (PASP): 33 MMHG
ECHO PV MAX VELOCITY: 63.12 CM/S
ECHO PV PEAK GRADIENT: 1.6 MMHG
ECHO RV INTERNAL DIMENSION: 3.23 CM
ECHO RV TAPSE: 2.7 CM (ref 1.5–2)
ECHO TV REGURGITANT MAX VELOCITY: 266.97 CM/S
ECHO TV REGURGITANT PEAK GRADIENT: 28.5 MMHG

## 2019-02-03 PROCEDURE — 74011250637 HC RX REV CODE- 250/637: Performed by: NURSE PRACTITIONER

## 2019-02-03 PROCEDURE — 74011250637 HC RX REV CODE- 250/637: Performed by: FAMILY MEDICINE

## 2019-02-03 PROCEDURE — 74011250637 HC RX REV CODE- 250/637: Performed by: INTERNAL MEDICINE

## 2019-02-03 RX ORDER — LOSARTAN POTASSIUM 50 MG/1
50 TABLET ORAL DAILY
Status: DISCONTINUED | OUTPATIENT
Start: 2019-02-03 | End: 2019-02-03 | Stop reason: HOSPADM

## 2019-02-03 RX ORDER — DILTIAZEM HYDROCHLORIDE 240 MG/1
240 CAPSULE, COATED, EXTENDED RELEASE ORAL DAILY
Qty: 30 CAP | Refills: 1 | Status: SHIPPED | OUTPATIENT
Start: 2019-02-04 | End: 2020-11-02

## 2019-02-03 RX ORDER — ATORVASTATIN CALCIUM 40 MG/1
40 TABLET, FILM COATED ORAL DAILY
Qty: 30 TAB | Refills: 0 | Status: SHIPPED
Start: 2019-02-03 | End: 2020-11-02 | Stop reason: DRUGHIGH

## 2019-02-03 RX ORDER — LOSARTAN POTASSIUM 50 MG/1
50 TABLET ORAL DAILY
Qty: 30 TAB | Refills: 1 | Status: SHIPPED | OUTPATIENT
Start: 2019-02-04 | End: 2020-11-02

## 2019-02-03 RX ADMIN — OMEPRAZOLE 20 MG: 20 CAPSULE, DELAYED RELEASE ORAL at 08:34

## 2019-02-03 RX ADMIN — ASPIRIN 81 MG CHEWABLE TABLET 81 MG: 81 TABLET CHEWABLE at 08:35

## 2019-02-03 RX ADMIN — APIXABAN 5 MG: 5 TABLET, FILM COATED ORAL at 08:35

## 2019-02-03 RX ADMIN — DORZOLAMIDE HYDROCHLORIDE AND TIMOLOL MALEATE 1 DROP: 20; 5 SOLUTION/ DROPS OPHTHALMIC at 08:36

## 2019-02-03 RX ADMIN — Medication 10 ML: at 08:37

## 2019-02-03 RX ADMIN — DOCUSATE SODIUM 100 MG: 100 CAPSULE, LIQUID FILLED ORAL at 08:35

## 2019-02-03 RX ADMIN — DILTIAZEM HYDROCHLORIDE 240 MG: 240 CAPSULE, COATED, EXTENDED RELEASE ORAL at 08:35

## 2019-02-03 RX ADMIN — BUSPIRONE HYDROCHLORIDE 10 MG: 10 TABLET ORAL at 08:41

## 2019-02-03 RX ADMIN — LOSARTAN POTASSIUM 50 MG: 50 TABLET ORAL at 08:35

## 2019-02-03 RX ADMIN — THERA TABS 1 TABLET: TAB at 08:34

## 2019-02-03 NOTE — DISCHARGE INSTRUCTIONS
Discharge Instructions     PATIENT ID: Lavon Ambrose  MRN: [id number]   YOB: 1940    DATE OF ADMISSION: 2/1/2019  5:06 PM    DATE OF DISCHARGE: 2/3/2019  PRIMARY CARE PROVIDER: Jen Cordoba MD   ATTENDING PHYSICIAN: Yuri Ulloa DO  DISCHARGING PROVIDER: Ariel Brown NP. To contact this individual call [phone] and ask the  to page. If unavailable ask to be transferred the Adult Hospitalist Department. DISCHARGE DIAGNOSES   Atrial Fibrillation  Hypertension    CONSULTATIONS: IP CONSULT TO CARDIOLOGY    FOLLOW UP APPOINTMENTS:   Follow-up Information     Follow up With Specialties Details Why Dhiraj Johnson MD [facility]  As needed [address] [facility] [address] [facility]      Manolo Mayer MD Cardiology In 2 weeks  [address] [address]  [phone]           ADDITIONAL CARE RECOMMENDATIONS:   - please see atrial fibrillation education attached to discharge instructions    - you will be starting 3 new medications:     - cardizem    - losartan    - eliquis (co-pay savings cards will be provided on discharge)    - take BP/HR daily as as needed and keep record for cardiology to review    DIET: Cardiac Diet    ACTIVITY: Activity as tolerated  · It is important that you take the medication exactly as they are prescribed. · Keep your medication in the bottles provided by the pharmacist and keep a list of the medication names, dosages, and times to be taken in your wallet. · Do not take other medications without consulting your doctor. NOTIFY YOUR PHYSICIAN FOR ANY OF THE FOLLOWING:   Fever over 101 degrees for 24 hours. Chest pain, shortness of breath, fever, chills, nausea, vomiting, diarrhea, change in mentation, falling, weakness, bleeding. Severe pain or pain not relieved by medications. Or, any other signs or symptoms that you may have questions about.     DISPOSITION:    Home With:   OT  PT [place]  RN       SNF/Inpatient Rehab/LTAC    Independent/assisted living    Hospice   xx Other: Home     CDMP Checked:   Yes *x*     PROBLEM LIST Updated:  Yes *x*     Signed:   Joe Lucero NP  2/3/2019  10:04 AM

## 2019-02-03 NOTE — PROGRESS NOTES
36- RN in room to administer trusopt and timoptic eye drops. Pt says \"don't worry about giving me those I've taken care of it. \" When asked further about what that means, pt says he has his medications from home that we do not have so we don't need to worry about it. When asked to give me the medications to verify with pharmacy, pt refused. Pt admits to taking his home meds, but will not be specific about which ones he took. RN made charge nurse, Mathew Jewell and nursing supervisor, Rolando Pedersen aware of situation. Pt yelling at RN and nursing supervisor when approached about giving us his home meds. Nursing supervisor approved sitter for pt. Pts wife came back to the hospital with his home medications in labeled bottles. Pt allowed RN to take meds to pharmacy for verification and RN witnessed pt give the home meds that were in unlabeled plastic bags to his wife to take home.

## 2019-02-03 NOTE — PROGRESS NOTES
Reviewed discharge instructions and medications with patient. Peripheral IV removed. Prescriptions given to patient. Transported to discharge area via wheelchair with belongings.

## 2019-02-03 NOTE — PROGRESS NOTES
Cardiology Progress Note                                        Admit Date: 2/1/2019 Assessment/Plan:  
 
PAF; I believe duration of Afib is longer than one week; rate is acceptable; AC started HTN; needs better control; will start ARB Andrés Jewell is a [de-identified] y.o. male with PROBLEM LIST: 
Patient Active Problem List  
 Diagnosis Date Noted  Atrial fibrillation with RVR ([facility] 02/01/2019  Partial small bowel obstruction ([facility] [address] 03/24/2017  GERD (gastroesophageal reflux disease) 03/24/2017 Subjective:  
 
Andrés Jewell reports none. Visit Vitals BP (!) 150/93 (BP 1 Location: Left arm, BP Patient Position: At rest) Pulse (!) 102 Temp 98.5 °F (36.9 °C) Resp 17 Ht 5' 11\" (1.803 m) Wt 175 lb 7.8 oz (79.6 kg) SpO2 99% BMI 24.48 kg/m² Intake/Output Summary (Last 24 hours) at 2/3/2019 3833 Last data filed at 2/2/2019 1249 Gross per 24 hour Intake 1286.25 ml Output  Net 1286.25 ml Objective:  
  
Physical Exam: 
HEENT: P[place], EOMI Neck: No JVD,  No thyroidmegaly Resp: CTA bilaterally; No wheezes or rales CV: irregular s1s2 No murmur no s3 Abd:Soft, Nontender Ext: No edema Neuro: Alert and oriented; Nonfocal 
Skin: Warm, Dry, Intact Pulses: 2+ DP/PT/Rad Telemetry: AFIB Current Facility-Administered Medications Medication Dose Route Frequency  losartan (COZAAR) tablet 50 mg  50 mg Oral DAILY  dilTIAZem CD (CARDIZEM CD) capsule 240 mg  240 mg Oral DAILY  guaiFENesin-dextromethorphan (ROBITUSSIN DM) 100-10 mg/5 mL syrup 10 mL  10 mL Oral Q6H PRN  
 docusate sodium (COLACE) capsule 100 mg  100 mg Oral DAILY  dorzolamide-timolol (COSOPT) 22.3-6.8 mg/mL ophthalmic solution 1 Drop (Patient Supplied)  1 Drop Right Eye BID  omeprazole (PRILOSEC) capsule 20 mg (Patient Supplied)  20 mg Oral ACB  aspirin chewable tablet 81 mg  81 mg Oral DAILY  atorvastatin (LIPITOR) tablet 40 mg  40 mg Oral DAILY  busPIRone (BUSPAR) tablet 10 mg  10 mg Oral BID  latanoprostene bunod 0.024 % drop 1 Drop (Patient Supplied)  1 Drop Both Eyes QPM  
 therapeutic multivitamin (THERAGRAN) tablet 1 Tab  1 Tab Oral DAILY  vortioxetine (TRINTELLIX) tablet 10 mg (Patient Supplied)  10 mg Oral DAILY WITH BREAKFAST  sodium chloride (NS) flush 5-40 mL  5-40 mL IntraVENous Q8H  
 sodium chloride (NS) flush 5-40 mL  5-40 mL IntraVENous PRN  
 acetaminophen (TYLENOL) tablet 650 mg  650 mg Oral Q4H PRN  
 apixaban (ELIQUIS) tablet 5 mg  5 mg Oral BID  
 benzonatate (TESSALON) capsule 100 mg  100 mg Oral TID PRN Data Review:  
Labs:   
Recent Results (from the past 24 hour(s)) ECHO ADULT COMPLETE Collection Time: 02/02/19 12:50 PM  
Result Value Ref Range LA Volume 84.65 (A) 18 - 58 mL  
 LV E' Lateral Velocity 14.83 cm/s LV E' Septal Velocity 9.46 cm/s Tapse 2.70 (A) 1.5 - 2.0 cm  
 LA Vol Index 42.18 16 - 28 ml/m2 Aortic Valve Systolic Peak Velocity 730.58 cm/s Aortic Valve Area by Continuity of Peak Velocity 2.1 cm2 AoV PG 11.1 mmHg LVIDd 5.46 4.2 - 5.9 cm  
 LVPWd 1.25 (A) 0.6 - 1.0 cm LVIDs 3.61 cm IVSd 1.29 (A) 0.6 - 1.0 cm  
 LVOT d 1.99 cm  
 LVOT Peak Velocity 112.99 cm/s LVOT Peak Gradient 5.1 mmHg MVA (PHT) 4.1 cm2  
 MV A Aram 26.74 cm/s  
 MV E Aram 1.06 cm/s  
 MV E/A 4.00   
 RVIDd 3.23 cm  
 LA Vol 4C 73.20 (A) 18 - 58 mL  
 LA Vol 2C 74.71 (A) 18 - 58 mL  
 LA Area 4C 26.0 cm2 LV Mass .9 (A) 88 - 224 g LV Mass AL Index 173.3 49 - 115 g/m2 E/E' lateral 0.07   
 E/E' septal 0.11   
 E/E' ratio (averaged) 0.09 Mitral Valve E Wave Deceleration Time 184.2 ms Mitral Valve Pressure Half-time 53.4 ms Left Atrium Major Axis 4.43 cm Triscuspid Valve Regurgitation Peak Gradient 28.5 mmHg Pulmonic Valve Max Velocity 63.12 cm/s  
 TR Max Velocity 266.97 cm/s  
 LA Vol Index 37.22 16 - 28 ml/m2 LA Vol Index 36.47 16 - 28 ml/m2 PV peak gradient 1.6 mmHg PASP 33.0 mmHg GLUCOSE, POC Collection Time: 02/02/19  9:09 PM  
Result Value Ref Range Glucose (POC) 120 (H) 65 - 100 mg/dL Performed by Tiffany Del aCstillo

## 2019-02-03 NOTE — PROGRESS NOTES
Problem: Afib Pathway: Day 3 Goal: Activity/Safety Outcome: Progressing Towards Goal 
Patient up ad maranda, ambulating with steady gait, minimal shortness of breath with exertion. Goal: Respiratory Outcome: Progressing Towards Goal 
Patient O2 saturations >95% on room air. Goal: Psychosocial 
Outcome: Not Progressing Towards Goal 
Patient very agitated about medications and not being able to take them whenever he wants. Becomes easily upset and argumentative.

## 2019-02-03 NOTE — DISCHARGE SUMMARY
Discharge Summary     PATIENT ID: Michell Rebolledo  MRN: [id number]   YOB: 1940    DATE OF ADMISSION: 2/1/2019  5:06 PM    DATE OF DISCHARGE: 2/3/2019  PRIMARY CARE PROVIDER: Anita Roy MD   ATTENDING PHYSICIAN: Selene Sapp MD  DISCHARGING PROVIDER: Kelvin Ellis NP. To contact this individual call [phone] and ask the  to page. If unavailable ask to be transferred the Adult Hospitalist Department. CONSULTATIONS: IP CONSULT TO CARDIOLOGY    ADMITTING [facility] COURSE:   Mr. Lennie Lee is a [de-identified] yo male presented to the ED for palpitations and some dizziness. He reportedly had a heart monitor at his home reading in the 140s at times. PMH significant for XOL, GERD, depression, glaucoma, anger issues, and IBS. In ED found to be in atrial fibrillation. DISCHARGE DIAGNOSES / PLAN:      Atrial fibrillation, new diagnosis:  - Continues in a-fib presently, HR fluctuating from . Cardiology is aware and still ok for pt to be discharged home. Discussed plan with Dr Glenroy Varela over the phone.   - Echo: Estimated EF 56 - 60%. Left ventricular hypertrophy. Normal left ventricular wall motion, no regional wall motion abnormality noted. Normal left ventricular strain. L atrial cavity size is mildly dilated. Mild mitral valve regurgitation. Mild tricuspid valve regurgitation is present.  No evidence of pulmonary hypertension.  - diltiazem and eliquis on discharge  - Dr. Glenroy Varela to see in 1-2 weeks.      Hx HTN:  - Amlodipine stopped now that on Diltiazem   - cardiology added losartan today, will prescribe on d/c     Cough:  - may use Robitussin at home  - CXR clear        FOLLOW UP APPOINTMENTS:    Follow-up Information     Follow up With Specialties Details Why Anton Hubbard MD [facility]  As needed [address] [facility]  [address] [facility]  Azra Guzman MD Cardiology In 2 weeks  [address] [address]  [phone]           ADDITIONAL CARE RECOMMENDATIONS:   - please see atrial fibrillation education attached to discharge instructions    - you will be starting 3 new medications:     - cardizem    - losartan    - eliquis (co-pay savings cards will be provided on discharge)    - take BP/HR daily as as needed and keep record for cardiology to review    DIET: Cardiac Diet    ACTIVITY: Activity as tolerated    DISCHARGE MEDICATIONS:  Current Discharge Medication List      START taking these medications    Details   apixaban (ELIQUIS) 5 mg tablet Take 1 Tab by mouth two (2) times a day. Qty: 60 Tab, Refills: 1      dilTIAZem CD (CARDIZEM CD) 240 mg ER capsule Take 1 Cap by mouth daily. Qty: 30 Cap, Refills: 1      losartan (COZAAR) 50 mg tablet Take 1 Tab by mouth daily. Qty: 30 Tab, Refills: 1         CONTINUE these medications which have CHANGED    Details   atorvastatin (LIPITOR) 40 mg tablet Take 1 Tab by mouth daily. Qty: 30 Tab, Refills: 0         CONTINUE these medications which have NOT CHANGED    Details   latanoprostene bunod (VYZULTA) 0.024 % drop Administer 1 Drop to right eye nightly. busPIRone (BUSPAR) 10 mg tablet Take 10 mg by mouth two (2) times a day. therapeutic multivitamin (THERAGRAN) tablet Take 1 Tab by mouth daily. dorzolamide-timolol (COSOPT) 22.3-6.8 mg/mL ophthalmic solution Administer 1 Drop to right eye two (2) times a day. omeprazole (PRILOSEC) 20 mg capsule Take 20 mg by mouth daily. vortioxetine (BRINTELLIX) 10 mg tablet Take 10 mg by mouth daily. aspirin 81 mg chewable tablet Take 81 mg by mouth daily. STOP taking these medications       oxyCODONE-acetaminophen (PERCOCET) 5-325 mg per tablet Comments:   Reason for Stopping:         amLODIPine (NORVASC) 5 mg tablet Comments:   Reason for Stopping:             NOTIFY YOUR PHYSICIAN FOR ANY OF THE FOLLOWING:   Fever over 101 degrees for 24 hours.    Chest pain, shortness of breath, fever, chills, nausea, vomiting, diarrhea, change in mentation, falling, weakness, bleeding. Severe pain or pain not relieved by medications. Or, any other signs or symptoms that you may have questions about. DISPOSITION:    Home With:   OT  PT  HH  RN       Long term SNF/Inpatient Rehab    Independent/assisted living    Hospice   xx Other: home     PATIENT CONDITION AT DISCHARGE:   Functional status    Poor     Deconditioned    xx Independent      Cognition    xx Lucid     Forgetful     Dementia      Catheters/lines (plus indication)    Cavazos     PICC     PEG    xx None      Code status   xx  Full code     DNR      PHYSICAL EXAMINATION AT DISCHARGE:  BP (!) 128/99 (BP 1 Location: Right arm, BP Patient Position: At rest;Supine)   Pulse (!) 118   Temp 98.4 °F (36.9 °C)   Resp 20   Ht 5' 11\" (1.803 m)   Wt 79.6 kg (175 lb 7.8 oz)   SpO2 96%   BMI 24.48 kg/m²     Pt sitting up in chair, no new complaints. Feels well and denies any SOB, chest pain or lightheadedness/dizziness. Discussed discharge plans for today and reviewed new medications with pt. encouraged him to purchase an up to date BP cuff which will also measure HR. Constitutional:  No acute distress, cooperative, pleasant    ENT:  Oral mucous moist, oropharynx benign. Resp:  CTA bilaterally. No accessory muscle use and on RA   CV:  irregular rhythm. Atrial fibrillation on tele. GI:  Soft, non distended, non tender. Normoactive bowel sounds, no BM (pt reports feels constipated but declined intervention)    Musculoskeletal:  No edema, warm, 2+ pulses throughout    Neurologic:  Moves all extremities. AAOx3, CN II-XII reviewed                           Psych:  Good insight, Not anxious nor agitated.                              Skin:  Good turgor, no rashes or ulcers     CHRONIC MEDICAL DIAGNOSES:  Problem List as of 2/3/2019 Date Reviewed: 2/3/2019          Codes Class Noted - Resolved    Persistent atrial fibrillation ([facility] [address] ICD-10-CM: I48.1  ICD-9-CM: 427.31  2/3/2019 - Present        Partial small bowel obstruction (H[facility]) ICD-10-CM: K56.600  ICD-9-CM: 560.9  3/24/2017 - Present        GERD (gastroesophageal reflux disease) ICD-10-CM: K21.9  ICD-9-CM: 530.81  3/24/2017 - Present        * (Principal) RESOLVED: Atrial fibrillation with RVR ([facility] [address] ICD-10-CM: I48.91  ICD-9-CM: 427.31  2/1/2019 - 2/3/2019            Greater than 30 minutes were spent with the patient on counseling and coordination of care    Signed:   Beverly Mendez NP  2/3/2019  9:59 AM

## 2019-02-03 NOTE — PROGRESS NOTES
2000 - Patient is very agitated during bedside report, upset about medications being \"withheld\" and \"the bureaucracy\" of the hospital. Very argumentative. Patient's wife brought in home medications. Meds brought in and verified were omeprazole, dorzolamide-timolol eye drops, latanaoprostene eye drops, and Trintellix. Placed in locked med  patient room. Patient is very argumentative, says \"there should be a lawsuit against this hospital\" and that that is \"something that he can do. \" 
Patient very agitated that a sitter was placed in the room, said his \"privacy\" was being taken away. He asked to speak to supervisors and have sitter removed. Called supervisor who said if his medications were verified and in locked drawer, and if he relinquished the pills he was holding (which he did), a sitter was not necessary. Pulled sitter. 2245 - Patient more calm and cooperative, requesting cough medication. 2330 - Patient requesting something to relieve constipation. Advised that he received Colace at [phone] and that it would take some time to have an effect. RN suggested to try prune juice, patient agreeable. 0800 - Bedside shift change report given to [address] (oncoming nurse) by Christopher Bermudez (offgoing nurse). Report included the following information SBAR, Kardex, ED Summary, Procedure Summary, Intake/Output, MAR, Recent Results and Cardiac Rhythm Afib.

## 2019-03-13 ENCOUNTER — ANESTHESIA EVENT (OUTPATIENT)
Dept: CARDIAC CATH/INVASIVE PROCEDURES | Age: 79
End: 2019-03-13
Payer: MEDICARE

## 2019-03-13 ENCOUNTER — HOSPITAL ENCOUNTER (OUTPATIENT)
Age: 79
Setting detail: OUTPATIENT SURGERY
Discharge: HOME | End: 2019-03-13
Attending: INTERNAL MEDICINE | Admitting: INTERNAL MEDICINE
Payer: MEDICARE

## 2019-03-13 ENCOUNTER — ANESTHESIA (OUTPATIENT)
Dept: CARDIAC CATH/INVASIVE PROCEDURES | Age: 79
End: 2019-03-13
Payer: MEDICARE

## 2019-03-13 VITALS
HEART RATE: 69 BPM | TEMPERATURE: 97.6 F | OXYGEN SATURATION: 100 % | SYSTOLIC BLOOD PRESSURE: 99 MMHG | DIASTOLIC BLOOD PRESSURE: 59 MMHG | BODY MASS INDEX: 25.47 KG/M2 | WEIGHT: 188 LBS | HEIGHT: 72 IN | RESPIRATION RATE: 17 BRPM

## 2019-03-13 DIAGNOSIS — I48.0 PAROXYSMAL ATRIAL FIBRILLATION (HCC): ICD-10-CM

## 2019-03-13 LAB
ATRIAL RATE: 66 BPM
CALCULATED P AXIS, ECG09: 47 DEGREES
CALCULATED R AXIS, ECG10: -11 DEGREES
CALCULATED T AXIS, ECG11: 13 DEGREES
DIAGNOSIS, 93000: NORMAL
P-R INTERVAL, ECG05: 216 MS
Q-T INTERVAL, ECG07: 454 MS
QRS DURATION, ECG06: 100 MS
QTC CALCULATION (BEZET), ECG08: 475 MS
VENTRICULAR RATE, ECG03: 66 BPM

## 2019-03-13 PROCEDURE — 74011250636 HC RX REV CODE- 250/636

## 2019-03-13 PROCEDURE — 93005 ELECTROCARDIOGRAM TRACING: CPT

## 2019-03-13 PROCEDURE — 77030039046 HC PAD DEFIB RADIOTRNSPNT CNMD -B: Performed by: INTERNAL MEDICINE

## 2019-03-13 PROCEDURE — 92960 CARDIOVERSION ELECTRIC EXT: CPT | Performed by: INTERNAL MEDICINE

## 2019-03-13 PROCEDURE — 76060000031 HC ANESTHESIA FIRST 0.5 HR: Performed by: INTERNAL MEDICINE

## 2019-03-13 RX ORDER — SODIUM CHLORIDE 0.9 % (FLUSH) 0.9 %
5-40 SYRINGE (ML) INJECTION EVERY 8 HOURS
Status: DISCONTINUED | OUTPATIENT
Start: 2019-03-13 | End: 2019-03-13 | Stop reason: HOSPADM

## 2019-03-13 RX ORDER — SODIUM CHLORIDE 0.9 % (FLUSH) 0.9 %
5-40 SYRINGE (ML) INJECTION AS NEEDED
Status: DISCONTINUED | OUTPATIENT
Start: 2019-03-13 | End: 2019-03-13 | Stop reason: HOSPADM

## 2019-03-13 RX ORDER — PROPOFOL 10 MG/ML
INJECTION, EMULSION INTRAVENOUS AS NEEDED
Status: DISCONTINUED | OUTPATIENT
Start: 2019-03-13 | End: 2019-03-13 | Stop reason: HOSPADM

## 2019-03-13 RX ADMIN — PROPOFOL 40 MG: 10 INJECTION, EMULSION INTRAVENOUS at 08:30

## 2019-03-13 NOTE — PROGRESS NOTES
Cardiac Cath Lab Recovery Arrival Note:      Floydene Standard arrived to Cardiac Cath Lab, Recovery Area. Staff introduced to patient. Patient identifiers verified with NAME and DATE OF BIRTH. Procedure verified with patient. Consent forms reviewed and signed by patient or authorized representative and verified. Allergies verified. Patient and family oriented to department. Patient and family informed of procedure and plan of care. Questions answered with review. Patient prepped for procedure, per orders from physician, prior to arrival.    Patient on cardiac monitor, non-invasive blood pressure, SPO2 monitor. On Room Air. Patient is A&Ox 4. Patient reports No pain. Patient in stretcher, in low position, with side rails up, call bell within reach, patient instructed to call if assistance as needed. Patient prep in: [address], [place] [address]. Family in: Waiting Room.    Prep by: Ban Weeks RN

## 2019-03-13 NOTE — ANESTHESIA PREPROCEDURE EVALUATION
Anesthetic History   No history of anesthetic complications            Review of Systems / Medical History  Patient summary reviewed, nursing notes reviewed and pertinent labs reviewed    Pulmonary  Within defined limits                 Neuro/Psych   Within defined limits      Psychiatric history     Cardiovascular  Within defined limits          Dysrhythmias            GI/Hepatic/Renal  Within defined limits   GERD           Endo/Other  Within defined limits           Other Findings              Physical Exam    Airway  Mallampati: II  TM Distance: > 6 cm  Neck ROM: normal range of motion   Mouth opening: Normal     Cardiovascular  Regular rate and rhythm,  S1 and S2 normal,  no murmur, click, rub, or gallop             Dental    Dentition: Caps/crowns     Pulmonary  Breath sounds clear to auscultation               Abdominal  GI exam deferred       Other Findings            Anesthetic Plan    ASA: 3  Anesthesia type: general    Monitoring Plan: BIS      Induction: Intravenous  Anesthetic plan and risks discussed with: Patient

## 2019-03-13 NOTE — DISCHARGE INSTRUCTIONS
AFTER YOUR electrocardioversion    Be sure someone else drives you home. You may feel drowsy for several hours. Do not eat or drink for at least two hours after your procedure. Call your doctor if:      · You have trouble breathing all of a sudden. Doctor: Caitlin    Special Instructions:    No driving for 24 hours.       Discharge Medications: same

## 2019-03-13 NOTE — PROGRESS NOTES
Pt and Pt's spouse verbalized understanding of discharge instructions. PIV removed and guaze with tape placed; no redness or swelling at site. Pt  escorted to car via wheelchair with belongings.

## 2019-03-13 NOTE — PROGRESS NOTES
Cardiac Cath Lab Procedure Area Arrival Note:    Alessia Lewis arrived to Cardiac Cath Lab, Procedure Area. Patient identifiers verified with NAME and DATE OF BIRTH. Procedure verified with patient. Consent forms verified. Allergies verified. Patient informed of procedure and plan of care. Questions answered with review. Patient voiced understanding of procedure and plan of care. Patient on cardiac monitor, non-invasive blood pressure, SPO2 monitor. On O2 @ 2 lpm via NC.  IV of NS on pump at 25 ml/hr. Patient status doing well without problems. Patient is A&Ox 4. Patient reports no pain. Patient medicated during procedure with orders obtained and verified by Dr. Carol Rock. Refer to patients Cardiac Cath Lab PROCEDURE REPORT for vital signs, assessment, status, and response during procedure, printed at end of case. Printed report on chart or scanned into chart. TRANSFER - OUT REPORT:    Verbal report given to Steve Ramirez on Alessia Lewis being transferred to cath lab recovery for routine progression of care       Report consisted of patients Situation, Background, Assessment and   Recommendations(SBAR). Information from the following report(s) Procedure Summary was reviewed with the receiving nurse. Opportunity for questions and clarification was provided.

## 2019-03-13 NOTE — ANESTHESIA POSTPROCEDURE EVALUATION
Procedure(s):  EP CARDIOVERSION. Anesthesia Post Evaluation        Patient location during evaluation: PACU  Note status: Adequate. Level of consciousness: responsive to verbal stimuli and sleepy but conscious  Pain management: satisfactory to patient  Airway patency: patent  Anesthetic complications: no  Cardiovascular status: acceptable  Respiratory status: acceptable  Hydration status: acceptable  Comments: +Post-Anesthesia Evaluation and Assessment    Patient: Janet Cheung MRN: [id number]  SSN: [id number]   YOB: 1940  Age: [de-identified] y.o. Sex: male          Cardiovascular Function/Vital Signs    /61   Pulse 74   Temp 36.4 °C (97.6 °F)   Resp 16   Ht 6' (1.829 m)   Wt 85.3 kg (188 lb)   SpO2 100%   BMI 25.50 kg/m²     Patient is status post Procedure(s):  EP CARDIOVERSION. Nausea/Vomiting: Controlled. Postoperative hydration reviewed and adequate. Pain:  Pain Scale 1: Numeric (0 - 10) (03/13/19 0839)  Pain Intensity 1: 0 (03/13/19 0839)   Managed. Neurological Status: At baseline. Mental Status and Level of Consciousness: Arousable. Pulmonary Status:   O2 Device: Nasal cannula (03/13/19 0839)   Adequate oxygenation and airway patent. Complications related to anesthesia: None    Post-anesthesia assessment completed. No concerns. I have evaluated the patient and the patient is stable and ready to be discharged from PACU .     Signed By: Fly Arias MD    3/13/2019        Visit Vitals  /61   Pulse 74   Temp 36.4 °C (97.6 °F)   Resp 16   Ht 6' (1.829 m)   Wt 85.3 kg (188 lb)   SpO2 100%   BMI 25.50 kg/m²

## 2019-03-13 NOTE — PROGRESS NOTES
TRANSFER - IN REPORT:    Verbal report received from Rajendra Renee on [employer]  being received from procedure area for routine progression of care. Report consisted of patients Situation, Background, Assessment and Recommendations(SBAR). Information from the following report(s) SBAR, Procedure Summary, MAR, Recent Results and Cardiac Rhythm NSR was reviewed with the receiving clinician. Opportunity for questions and clarification was provided. Assessment completed upon patients arrival to [address] [place] [address] and care assumed. Cardiac Cath Lab Recovery Arrival Note:    [employer] arrived to [facility] recovery area. Patient procedure= DCCV. Patient on cardiac monitor, non-invasive blood pressure, SPO2 monitor. On  O2 @ 2 lpm via NC.  IV  of NS on pump at 25 ml/hr. Patient status doing well without problems. Patient is A&Ox 4. Patient reports No Pain. PROCEDURE SITE CHECK:    Procedure site:, No pain/discomfort reported at procedure site. No change in patient status. Continue to monitor patient and status.

## 2019-04-16 ENCOUNTER — HOSPITAL ENCOUNTER (INPATIENT)
Age: 79
LOS: 2 days | Discharge: HOME | DRG: 812 | End: 2019-04-18
Attending: EMERGENCY MEDICINE | Admitting: FAMILY MEDICINE
Payer: MEDICARE

## 2019-04-16 ENCOUNTER — APPOINTMENT (OUTPATIENT)
Dept: GENERAL RADIOLOGY | Age: 79
DRG: 812 | End: 2019-04-16
Attending: EMERGENCY MEDICINE
Payer: MEDICARE

## 2019-04-16 DIAGNOSIS — Z79.01 ANTICOAGULATED: ICD-10-CM

## 2019-04-16 DIAGNOSIS — K62.5 RECTAL BLEEDING: ICD-10-CM

## 2019-04-16 DIAGNOSIS — D64.9 ANEMIA, UNSPECIFIED TYPE: Primary | ICD-10-CM

## 2019-04-16 PROBLEM — I48.91 A-FIB (HCC): Status: ACTIVE | Noted: 2019-04-16

## 2019-04-16 PROBLEM — K92.2 GI BLEED: Status: ACTIVE | Noted: 2019-04-16

## 2019-04-16 LAB
ALBUMIN SERPL-MCNC: 3.4 G/DL (ref 3.5–5)
ALBUMIN/GLOB SERPL: 0.9 {RATIO} (ref 1.1–2.2)
ALP SERPL-CCNC: 85 U/L (ref 45–117)
ALT SERPL-CCNC: 25 U/L (ref 12–78)
ANION GAP SERPL CALC-SCNC: 8 MMOL/L (ref 5–15)
APTT PPP: 30 SEC (ref 22.1–32)
AST SERPL-CCNC: 22 U/L (ref 15–37)
BASOPHILS # BLD: 0 K/UL (ref 0–0.1)
BASOPHILS NFR BLD: 1 % (ref 0–1)
BILIRUB SERPL-MCNC: 0.6 MG/DL (ref 0.2–1)
BUN SERPL-MCNC: 16 MG/DL (ref 6–20)
BUN/CREAT SERPL: 15 (ref 12–20)
CALCIUM SERPL-MCNC: 9 MG/DL (ref 8.5–10.1)
CHLORIDE SERPL-SCNC: 108 MMOL/L (ref 97–108)
CO2 SERPL-SCNC: 23 MMOL/L (ref 21–32)
COMMENT, HOLDF: NORMAL
CREAT SERPL-MCNC: 1.05 MG/DL (ref 0.7–1.3)
DIFFERENTIAL METHOD BLD: ABNORMAL
EOSINOPHIL # BLD: 0.3 K/UL (ref 0–0.4)
EOSINOPHIL NFR BLD: 6 % (ref 0–7)
ERYTHROCYTE [DISTWIDTH] IN BLOOD BY AUTOMATED COUNT: 14.5 % (ref 11.5–14.5)
GLOBULIN SER CALC-MCNC: 4 G/DL (ref 2–4)
GLUCOSE SERPL-MCNC: 103 MG/DL (ref 65–100)
HCT VFR BLD AUTO: 26.5 % (ref 36.6–50.3)
HGB BLD-MCNC: 8 G/DL (ref 12.1–17)
IMM GRANULOCYTES # BLD AUTO: 0 K/UL (ref 0–0.04)
IMM GRANULOCYTES NFR BLD AUTO: 0 % (ref 0–0.5)
INR PPP: 1.1 (ref 0.9–1.1)
LYMPHOCYTES # BLD: 1.7 K/UL (ref 0.8–3.5)
LYMPHOCYTES NFR BLD: 31 % (ref 12–49)
MCH RBC QN AUTO: 26 PG (ref 26–34)
MCHC RBC AUTO-ENTMCNC: 30.2 G/DL (ref 30–36.5)
MCV RBC AUTO: 86 FL (ref 80–99)
MONOCYTES # BLD: 0.7 K/UL (ref 0–1)
MONOCYTES NFR BLD: 12 % (ref 5–13)
NEUTS SEG # BLD: 2.8 K/UL (ref 1.8–8)
NEUTS SEG NFR BLD: 50 % (ref 32–75)
NRBC # BLD: 0 K/UL (ref 0–0.01)
NRBC BLD-RTO: 0 PER 100 WBC
PLATELET # BLD AUTO: 227 K/UL (ref 150–400)
PMV BLD AUTO: 10.7 FL (ref 8.9–12.9)
POTASSIUM SERPL-SCNC: 4 MMOL/L (ref 3.5–5.1)
PROT SERPL-MCNC: 7.4 G/DL (ref 6.4–8.2)
PROTHROMBIN TIME: 11.4 SEC (ref 9–11.1)
RBC # BLD AUTO: 3.08 M/UL (ref 4.1–5.7)
SAMPLES BEING HELD,HOLD: NORMAL
SODIUM SERPL-SCNC: 139 MMOL/L (ref 136–145)
THERAPEUTIC RANGE,PTTT: NORMAL SECS (ref 58–77)
TROPONIN I SERPL-MCNC: <0.05 NG/ML
WBC # BLD AUTO: 5.6 K/UL (ref 4.1–11.1)

## 2019-04-16 PROCEDURE — 85610 PROTHROMBIN TIME: CPT

## 2019-04-16 PROCEDURE — 93005 ELECTROCARDIOGRAM TRACING: CPT

## 2019-04-16 PROCEDURE — 82746 ASSAY OF FOLIC ACID SERUM: CPT

## 2019-04-16 PROCEDURE — 99284 EMERGENCY DEPT VISIT MOD MDM: CPT

## 2019-04-16 PROCEDURE — 80053 COMPREHEN METABOLIC PANEL: CPT

## 2019-04-16 PROCEDURE — 85730 THROMBOPLASTIN TIME PARTIAL: CPT

## 2019-04-16 PROCEDURE — 86900 BLOOD TYPING SEROLOGIC ABO: CPT

## 2019-04-16 PROCEDURE — 82607 VITAMIN B-12: CPT

## 2019-04-16 PROCEDURE — 71045 X-RAY EXAM CHEST 1 VIEW: CPT

## 2019-04-16 PROCEDURE — 85025 COMPLETE CBC W/AUTO DIFF WBC: CPT

## 2019-04-16 PROCEDURE — 65660000000 HC RM CCU STEPDOWN

## 2019-04-16 PROCEDURE — 86923 COMPATIBILITY TEST ELECTRIC: CPT

## 2019-04-16 PROCEDURE — 36415 COLL VENOUS BLD VENIPUNCTURE: CPT

## 2019-04-16 PROCEDURE — 74011250636 HC RX REV CODE- 250/636: Performed by: FAMILY MEDICINE

## 2019-04-16 PROCEDURE — 83540 ASSAY OF IRON: CPT

## 2019-04-16 PROCEDURE — 84484 ASSAY OF TROPONIN QUANT: CPT

## 2019-04-16 RX ORDER — DILTIAZEM HYDROCHLORIDE 240 MG/1
240 CAPSULE, COATED, EXTENDED RELEASE ORAL DAILY
Status: DISCONTINUED | OUTPATIENT
Start: 2019-04-17 | End: 2019-04-17

## 2019-04-16 RX ORDER — SOTALOL HYDROCHLORIDE 80 MG/1
80 TABLET ORAL 2 TIMES DAILY
Status: DISCONTINUED | OUTPATIENT
Start: 2019-04-17 | End: 2019-04-17

## 2019-04-16 RX ORDER — LOSARTAN POTASSIUM 50 MG/1
50 TABLET ORAL DAILY
Status: DISCONTINUED | OUTPATIENT
Start: 2019-04-17 | End: 2019-04-18 | Stop reason: HOSPADM

## 2019-04-16 RX ORDER — SODIUM CHLORIDE 9 MG/ML
250 INJECTION, SOLUTION INTRAVENOUS AS NEEDED
Status: DISCONTINUED | OUTPATIENT
Start: 2019-04-16 | End: 2019-04-18 | Stop reason: HOSPADM

## 2019-04-16 RX ORDER — BUSPIRONE HYDROCHLORIDE 10 MG/1
10 TABLET ORAL 2 TIMES DAILY
Status: DISCONTINUED | OUTPATIENT
Start: 2019-04-17 | End: 2019-04-17

## 2019-04-16 RX ORDER — SODIUM CHLORIDE 0.9 % (FLUSH) 0.9 %
5-40 SYRINGE (ML) INJECTION AS NEEDED
Status: DISCONTINUED | OUTPATIENT
Start: 2019-04-16 | End: 2019-04-18 | Stop reason: HOSPADM

## 2019-04-16 RX ORDER — LORAZEPAM 0.5 MG/1
.5-1 TABLET ORAL
COMMUNITY
End: 2020-11-02

## 2019-04-16 RX ORDER — DORZOLAMIDE HYDROCHLORIDE AND TIMOLOL MALEATE 20; 5 MG/ML; MG/ML
1 SOLUTION/ DROPS OPHTHALMIC 2 TIMES DAILY
Status: DISCONTINUED | OUTPATIENT
Start: 2019-04-17 | End: 2019-04-17

## 2019-04-16 RX ORDER — ATORVASTATIN CALCIUM 40 MG/1
40 TABLET, FILM COATED ORAL DAILY
Status: DISCONTINUED | OUTPATIENT
Start: 2019-04-17 | End: 2019-04-17

## 2019-04-16 RX ORDER — SODIUM CHLORIDE 9 MG/ML
75 INJECTION, SOLUTION INTRAVENOUS CONTINUOUS
Status: DISCONTINUED | OUTPATIENT
Start: 2019-04-17 | End: 2019-04-18 | Stop reason: HOSPADM

## 2019-04-16 RX ORDER — GABAPENTIN 100 MG/1
100-200 CAPSULE ORAL
COMMUNITY
End: 2020-11-02

## 2019-04-16 RX ORDER — LORAZEPAM 0.5 MG/1
.5-1 TABLET ORAL
Status: DISCONTINUED | OUTPATIENT
Start: 2019-04-16 | End: 2019-04-17

## 2019-04-16 RX ORDER — SOTALOL HYDROCHLORIDE 80 MG/1
80 TABLET ORAL 2 TIMES DAILY
Status: ON HOLD | COMMUNITY
End: 2019-04-18 | Stop reason: SDUPTHER

## 2019-04-16 RX ORDER — GABAPENTIN 100 MG/1
100-200 CAPSULE ORAL
Status: DISCONTINUED | OUTPATIENT
Start: 2019-04-16 | End: 2019-04-18 | Stop reason: HOSPADM

## 2019-04-16 RX ORDER — SODIUM CHLORIDE 0.9 % (FLUSH) 0.9 %
5-40 SYRINGE (ML) INJECTION EVERY 8 HOURS
Status: DISCONTINUED | OUTPATIENT
Start: 2019-04-17 | End: 2019-04-18 | Stop reason: HOSPADM

## 2019-04-16 RX ADMIN — SODIUM CHLORIDE 500 ML: 900 INJECTION, SOLUTION INTRAVENOUS at 23:18

## 2019-04-16 RX ADMIN — SODIUM CHLORIDE 75 ML/HR: 900 INJECTION, SOLUTION INTRAVENOUS at 23:47

## 2019-04-16 RX ADMIN — Medication 10 ML: at 23:47

## 2019-04-16 NOTE — ED TRIAGE NOTES
TRIAGE:Pt arrives with c/o 'life threatening anemia, hgb might be 7-7. 4' pt states is having hemorrhoidal bleeding x 3 weeks'. States bright red bleeding, was on blood thinners but were stopped yesterday. C/o increasing fatigue and sob over the last few weeks Denies cp

## 2019-04-17 LAB
ANION GAP SERPL CALC-SCNC: 7 MMOL/L (ref 5–15)
ATRIAL RATE: 98 BPM
BASOPHILS # BLD: 0 K/UL (ref 0–0.1)
BASOPHILS NFR BLD: 1 % (ref 0–1)
BUN SERPL-MCNC: 16 MG/DL (ref 6–20)
BUN/CREAT SERPL: 16 (ref 12–20)
CALCIUM SERPL-MCNC: 8.7 MG/DL (ref 8.5–10.1)
CALCULATED R AXIS, ECG10: -15 DEGREES
CALCULATED T AXIS, ECG11: -11 DEGREES
CHLORIDE SERPL-SCNC: 111 MMOL/L (ref 97–108)
CO2 SERPL-SCNC: 23 MMOL/L (ref 21–32)
CREAT SERPL-MCNC: 0.99 MG/DL (ref 0.7–1.3)
DIAGNOSIS, 93000: NORMAL
DIFFERENTIAL METHOD BLD: ABNORMAL
EOSINOPHIL # BLD: 0.4 K/UL (ref 0–0.4)
EOSINOPHIL NFR BLD: 7 % (ref 0–7)
ERYTHROCYTE [DISTWIDTH] IN BLOOD BY AUTOMATED COUNT: 14.4 % (ref 11.5–14.5)
FOLATE SERPL-MCNC: 24.4 NG/ML (ref 5–21)
GLUCOSE SERPL-MCNC: 118 MG/DL (ref 65–100)
HCT VFR BLD AUTO: 25 % (ref 36.6–50.3)
HCT VFR BLD AUTO: 29.4 % (ref 36.6–50.3)
HEMOCCULT STL QL: POSITIVE
HGB BLD-MCNC: 7.5 G/DL (ref 12.1–17)
HGB BLD-MCNC: 8.9 G/DL (ref 12.1–17)
IMM GRANULOCYTES # BLD AUTO: 0 K/UL (ref 0–0.04)
IMM GRANULOCYTES NFR BLD AUTO: 0 % (ref 0–0.5)
IRON SATN MFR SERPL: 4 % (ref 20–50)
IRON SERPL-MCNC: 16 UG/DL (ref 35–150)
LYMPHOCYTES # BLD: 2.1 K/UL (ref 0.8–3.5)
LYMPHOCYTES NFR BLD: 36 % (ref 12–49)
MCH RBC QN AUTO: 25.5 PG (ref 26–34)
MCHC RBC AUTO-ENTMCNC: 30 G/DL (ref 30–36.5)
MCV RBC AUTO: 85 FL (ref 80–99)
MONOCYTES # BLD: 0.7 K/UL (ref 0–1)
MONOCYTES NFR BLD: 12 % (ref 5–13)
NEUTS SEG # BLD: 2.6 K/UL (ref 1.8–8)
NEUTS SEG NFR BLD: 44 % (ref 32–75)
NRBC # BLD: 0 K/UL (ref 0–0.01)
NRBC BLD-RTO: 0 PER 100 WBC
PLATELET # BLD AUTO: 213 K/UL (ref 150–400)
PMV BLD AUTO: 10.7 FL (ref 8.9–12.9)
POTASSIUM SERPL-SCNC: 4 MMOL/L (ref 3.5–5.1)
Q-T INTERVAL, ECG07: 384 MS
QRS DURATION, ECG06: 98 MS
QTC CALCULATION (BEZET), ECG08: 514 MS
RBC # BLD AUTO: 2.94 M/UL (ref 4.1–5.7)
SODIUM SERPL-SCNC: 141 MMOL/L (ref 136–145)
TIBC SERPL-MCNC: 444 UG/DL (ref 250–450)
VENTRICULAR RATE, ECG03: 108 BPM
VIT B12 SERPL-MCNC: 725 PG/ML (ref 193–986)
WBC # BLD AUTO: 5.8 K/UL (ref 4.1–11.1)

## 2019-04-17 PROCEDURE — 65660000000 HC RM CCU STEPDOWN

## 2019-04-17 PROCEDURE — 36415 COLL VENOUS BLD VENIPUNCTURE: CPT

## 2019-04-17 PROCEDURE — P9016 RBC LEUKOCYTES REDUCED: HCPCS

## 2019-04-17 PROCEDURE — 82272 OCCULT BLD FECES 1-3 TESTS: CPT

## 2019-04-17 PROCEDURE — 85025 COMPLETE CBC W/AUTO DIFF WBC: CPT

## 2019-04-17 PROCEDURE — 85018 HEMOGLOBIN: CPT

## 2019-04-17 PROCEDURE — 30233N1 TRANSFUSION OF NONAUTOLOGOUS RED BLOOD CELLS INTO PERIPHERAL VEIN, PERCUTANEOUS APPROACH: ICD-10-PCS | Performed by: FAMILY MEDICINE

## 2019-04-17 PROCEDURE — 74011250637 HC RX REV CODE- 250/637: Performed by: FAMILY MEDICINE

## 2019-04-17 PROCEDURE — 36430 TRANSFUSION BLD/BLD COMPNT: CPT

## 2019-04-17 PROCEDURE — 80048 BASIC METABOLIC PNL TOTAL CA: CPT

## 2019-04-17 RX ORDER — DILTIAZEM HYDROCHLORIDE 240 MG/1
240 CAPSULE, COATED, EXTENDED RELEASE ORAL DAILY
Status: DISCONTINUED | OUTPATIENT
Start: 2019-04-18 | End: 2019-04-18 | Stop reason: HOSPADM

## 2019-04-17 RX ORDER — OMEPRAZOLE 20 MG/1
20 CAPSULE, DELAYED RELEASE ORAL
Status: DISCONTINUED | OUTPATIENT
Start: 2019-04-18 | End: 2019-04-18 | Stop reason: HOSPADM

## 2019-04-17 RX ORDER — LATANOPROST 50 UG/ML
1 SOLUTION/ DROPS OPHTHALMIC
Status: DISCONTINUED | OUTPATIENT
Start: 2019-04-17 | End: 2019-04-17 | Stop reason: ALTCHOICE

## 2019-04-17 RX ORDER — SOTALOL HYDROCHLORIDE 80 MG/1
40 TABLET ORAL 2 TIMES DAILY
Status: DISCONTINUED | OUTPATIENT
Start: 2019-04-17 | End: 2019-04-18 | Stop reason: HOSPADM

## 2019-04-17 RX ORDER — SOTALOL HYDROCHLORIDE 80 MG/1
40 TABLET ORAL 2 TIMES DAILY
Status: DISCONTINUED | OUTPATIENT
Start: 2019-04-17 | End: 2019-04-17

## 2019-04-17 RX ORDER — ATORVASTATIN CALCIUM 40 MG/1
40 TABLET, FILM COATED ORAL
Status: DISCONTINUED | OUTPATIENT
Start: 2019-04-17 | End: 2019-04-18 | Stop reason: HOSPADM

## 2019-04-17 RX ORDER — BUSPIRONE HYDROCHLORIDE 10 MG/1
10 TABLET ORAL
Status: DISCONTINUED | OUTPATIENT
Start: 2019-04-17 | End: 2019-04-18 | Stop reason: HOSPADM

## 2019-04-17 RX ORDER — DORZOLAMIDE HYDROCHLORIDE AND TIMOLOL MALEATE 20; 5 MG/ML; MG/ML
1 SOLUTION/ DROPS OPHTHALMIC 2 TIMES DAILY
Status: DISCONTINUED | OUTPATIENT
Start: 2019-04-17 | End: 2019-04-18 | Stop reason: HOSPADM

## 2019-04-17 RX ORDER — SODIUM CHLORIDE 9 MG/ML
250 INJECTION, SOLUTION INTRAVENOUS AS NEEDED
Status: DISCONTINUED | OUTPATIENT
Start: 2019-04-17 | End: 2019-04-18 | Stop reason: HOSPADM

## 2019-04-17 RX ADMIN — Medication 10 ML: at 06:22

## 2019-04-17 RX ADMIN — ATORVASTATIN CALCIUM 40 MG: 40 TABLET, FILM COATED ORAL at 16:47

## 2019-04-17 RX ADMIN — BUSPIRONE HYDROCHLORIDE 10 MG: 10 TABLET ORAL at 21:43

## 2019-04-17 RX ADMIN — SOTALOL HYDROCHLORIDE 40 MG: 80 TABLET ORAL at 21:43

## 2019-04-17 RX ADMIN — LORAZEPAM 0.5 MG: 0.5 TABLET ORAL at 02:37

## 2019-04-17 RX ADMIN — DORZOLAMIDE HYDROCHLORIDE AND TIMOLOL MALEATE 1 DROP: 20; 5 SOLUTION/ DROPS OPHTHALMIC at 18:45

## 2019-04-17 RX ADMIN — Medication 10 ML: at 21:44

## 2019-04-17 NOTE — CONSULTS
[address] [facility].   [address] [facility] [address]        GASTROENTEROLOGY CONSULTATION NOTE  Will Ann-Marie Vargas  [phone] office  [phone] NP/PA in-hospital cell phone M-F until 4:30PM  After 5PM or on weekends, please call  for physician on call        NAME:  Michelle Briones   :   1940   MRN:   [id number]       Referring Physician: Dr. Ching Kenny Date: 2019 12:58 PM    Chief Complaint: rectal bleeding     History of Present Illness:  Patient is a [de-identified] y.o. who is seen in consultation at the request of Dr. Miquel Beal for afib/off Eliquis for rectal bleeding/anemia. Patient has a past medical history of atrial fibrillation (on Eliquis), hypercholesterolemia, glaucoma, and GERD. He presented to the ED with reported abnormal lab results (with Hgb 7.4 from his primary care physician's office). Patient was admitted to the hospital on 19. Patient reports a history of intermittent rectal bleeding for several years with worsening symptoms over the last several weeks. He notes a history of hemorrhoidectomy approximately 20 years ago. Rectal bleeding has progressively worsened to include increased frequency and volume of bright red blood. No melena. He has approximately 4-5 bowel movements daily that are formed. No fevers, chills, or unexplained weight loss. Patient was started on Eliquis (2019) for atrial fibrillation. Eliquis was stopped yesterday. No NSAID use. Occasional alcohol use. Former tobacco use. History of cholecystectomy and hemorrhoidectomy. History of colonoscopy less than 10 years ago by Dr. Allie Vazquez with possible history of colon polyps. I have reviewed the emergency room note, hospital admission note, notes by all other clinicians who have seen the patient during this hospitalization to date. I have reviewed the problem list and the reason for this hospitalization.  I have reviewed the allergies and the medications the patient was taking at home prior to this hospitalization. PMH:  Past Medical History:   Diagnosis Date    Atrial fibrillation (HCC)     Depression     GERD (gastroesophageal reflux disease)     Glaucoma     High cholesterol     IBS (irritable bowel syndrome)        PSH:  Past Surgical History:   Procedure Laterality Date    HX CHOLECYSTECTOMY      HX HEMORRHOIDECTOMY      HX ORTHOPAEDIC      RIGHT knee    HX TONSILLECTOMY      FL CARDIOVERSION ELECTIVE ARRHYTHMIA EXTERNAL N/A 3/13/2019    EP CARDIOVERSION performed by Michael Wolf MD at [address] [facility], [facility]       Allergies: Allergies   Allergen Reactions    Sulfa (Sulfonamide Antibiotics) Unknown (comments)     From infancy, unsure if actually allergic         Home Medications:  Prior to Admission Medications   Prescriptions Last Dose Informant Patient Reported? Taking? LORazepam (ATIVAN) 0.5 mg tablet   Yes Yes   Sig: Take 0.5-1 mg by mouth nightly as needed for Anxiety. atorvastatin (LIPITOR) 40 mg tablet   No Yes   Sig: Take 1 Tab by mouth daily. busPIRone (BUSPAR) 10 mg tablet   Yes Yes   Sig: Take 10 mg by mouth two (2) times a day. dilTIAZem CD (CARDIZEM CD) 240 mg ER capsule   No Yes   Sig: Take 1 Cap by mouth daily. dorzolamide-timolol (COSOPT) 22.3-6.8 mg/mL ophthalmic solution   Yes Yes   Sig: Administer 1 Drop to right eye two (2) times a day.   gabapentin (NEURONTIN) 100 mg capsule   Yes Yes   Sig: Take 100-200 mg by mouth nightly as needed. latanoprostene bunod (VYZULTA) 0.024 % drop   Yes Yes   Sig: Administer 1 Drop to right eye nightly. losartan (COZAAR) 50 mg tablet   No Yes   Sig: Take 1 Tab by mouth daily. omeprazole (PRILOSEC) 20 mg capsule   Yes Yes   Sig: Take 20 mg by mouth daily. sotalol (BETAPACE) 80 mg tablet   Yes Yes   Sig: Take 80 mg by mouth two (2) times a day. therapeutic multivitamin [facility]) tablet   Yes Yes   Sig: Take 1 Tab by mouth daily.    vortioxetine (BRINTELLIX) 10 mg tablet Unknown at Unknown time  Yes No   Sig: Take 10 mg by mouth daily. Facility-Administered Medications: None       Hospital Medications:  Current Facility-Administered Medications   Medication Dose Route Frequency    0.9% sodium chloride infusion 250 mL  250 mL IntraVENous PRN    latanoprost (XALATAN) 0.005 % ophthalmic solution 1 Drop  1 Drop Right Eye QHS    sotalol (BETAPACE) tablet 40 mg  40 mg Oral BID    0.9% sodium chloride infusion 250 mL  250 mL IntraVENous PRN    sodium chloride (NS) flush 5-40 mL  5-40 mL IntraVENous Q8H    sodium chloride (NS) flush 5-40 mL  5-40 mL IntraVENous PRN    atorvastatin (LIPITOR) tablet 40 mg  40 mg Oral DAILY    busPIRone (BUSPAR) tablet 10 mg  10 mg Oral BID    dilTIAZem CD (CARDIZEM CD) capsule 240 mg  240 mg Oral DAILY    dorzolamide-timolol (COSOPT) 22.3-6.8 mg/mL ophthalmic solution 1 Drop  1 Drop Right Eye BID    gabapentin (NEURONTIN) capsule 100-200 mg  100-200 mg Oral QHS PRN    LORazepam (ATIVAN) tablet 0.5-1 mg  0.5-1 mg Oral QHS PRN    losartan (COZAAR) tablet 50 mg  50 mg Oral DAILY    0.9% sodium chloride infusion  75 mL/hr IntraVENous CONTINUOUS       Social History:  Social History     Tobacco Use    Smoking status: Never Smoker    Smokeless tobacco: Never Used   Substance Use Topics    Alcohol use: Yes       Family History:  History reviewed. No pertinent family history.     Review of Systems:  Constitutional: negative fever, negative chills, negative weight loss  Eyes:   negative visual changes  ENT:   negative sore throat, tongue or lip swelling  Respiratory:  negative cough, + dyspnea  Cards:  negative for chest pain, palpitations, lower extremity edema  GI:   See HPI  :  negative for frequency, dysuria  Integument:  negative for rash and pruritus  Heme:  + for easy bruising and nose bleeding  Musculoskeletal:negative for myalgias, back pain and muscle weakness  Neuro:  negative for headaches, dizziness  Psych: negative for feelings of anxiety, depression     Objective:     Patient Vitals for the past 8 hrs:   BP Temp Pulse Resp SpO2   04/17/19 1227 120/64 97.5 °F (36.4 °C) 70 21 100 %   04/17/19 1115 121/70 97.5 °F (36.4 °C) 69 21 100 %   04/17/19 1045 112/61 97.5 °F (36.4 °C) 65 21 100 %   04/17/19 1015 101/55 97.1 °F (36.2 °C) 65 20 100 %   04/17/19 1000 102/58 97.4 °F (36.3 °C) 71 17 100 %   04/17/19 0935 120/47 97.3 °F (36.3 °C) 72 17 99 %   04/17/19 0800     100 %   04/17/19 0600 94/62 98 °F (36.7 °C) 75 21 96 %     04/17 0701 - 04/17 1900  In: 323.8   Out: -   04/15 1901 - 04/17 0700  In: -   Out: 200 [Urine:200]    EXAM:     CONST:  Pleasant male lying in bed, no acute distress   NEURO:  Alert and oriented x 3   HEENT: EOMI, no scleral icterus   LUNGS: CTA bilaterally anteriorly   CARD:  S1 S2   ABD:  Soft, non distended, no tenderness, no rebound, no guarding. + Bowel sounds. EXT:  Warm   PSYCH: Full, not anxious     Data Review     Recent Labs     04/17/19 0227 04/16/19 1954   WBC 5.8 5.6   HGB 7.5* 8.0*   HCT 25.0* 26.5*    227     Recent Labs     04/17/19 0227 04/16/19 1954    139   K 4.0 4.0   * 108   CO2 23 23   BUN 16 16   CREA 0.99 1.05   * 103*   CA 8.7 9.0     Recent Labs     04/16/19 1954   SGOT 22   AP 85   TP 7.4   ALB 3.4*   GLOB 4.0     Recent Labs     04/16/19 1954   INR 1.1   PTP 11.4*   APTT 30.0          Assessment:   · Anemia: Hgb 7.5 (8.0 yesterday), received 1 unit PRBCs today, platelets 775, INR 1.1.   · Rectal bleeding  · History of atrial fibrillation: Eliquis on hold. Cardiology following.       Patient Active Problem List   Diagnosis Code    Partial small bowel obstruction (HCC) K56.600    GERD (gastroesophageal reflux disease) K21.9    Persistent atrial fibrillation (HCC) I48.1    A-fib (HCC) I48.91    Anemia D64.9    GI bleed K92.2     Plan:   · Clear liquid diet  · Trend CBC and transfuse as necessary  · Eliquis on hold  · If pt begins to bleed briskly, order CTA GI bleed protocol and immediately consult IR for intervention if the CTA is positive. · Patient does not wish to proceed with a colonoscopy at this time. He would like to monitor rectal bleeding off Eliquis. · Patient was discussed with and will be seen by Dr. Cassia Carvajal  · Thank you for allowing me to participate in care of [facility]     Signed By: Ankit Villela, [id number]     4/17/2019  12:58 PM       Gastroenterology Attending Physician attestation statement and comments. This patient was seen and examined by me in a face-to-face visit today. I reviewed the medical record including lab work, imaging and other provider notes. I confirmed the history as described above. I spoke to the patient, reviewed the medical record including lab work, imaging and other provider notes. I discussed this case in detail with Roxane WEISS. I formulated an  assessment of this patient and developed a treatment plan. I agree with the above consultation note. I agree with the history, exam and assessment and plan as outlined in the note. I would like to add the following:     LGIB: acute on chronic in the setting of Eliquis use. Differential includes AVMs, diverticulosis, polyps, mass, or hemorrhoids. Lengthy discussion with daughter and patient-plan for watchful waiting at this time.     Dr. Cassia Carvajal

## 2019-04-17 NOTE — PROGRESS NOTES
Monitor tech advised that patient had a 3.1 sec pause. Dr. Ofe Haines advised. Orders to call Dr. Cordell Prescott. Dr. Cordell Prescott paged. Will continue to monitor.

## 2019-04-17 NOTE — H&P
[address] [place]  HISTORY AND PHYSICAL Name:  Darryn Majano 
MR#:  [id number] :  1940 ACCOUNT #:  [de-identified] ADMIT DATE:  2019 CHIEF COMPLAINT:  Abnormal labs, anemia. HISTORY OF PRESENT ILLNESS:  An 78-year-old white male with past medical history of atrial fibrillation, long-term anticoagulation, glaucoma, GERD, hypercholesterolemia, irritable bowel syndrome, hemorrhoids, chronic rectal bleeding, presented to the emergency department from home with reported abnormal lab results. The patient provides some history. Remainder of history had been obtained from review of the ED and electronic medical records. Per the collective report, the patient was informed by his PCP office that he was anemic with hemoglobin of 7.4. He was referred to the emergency department for possible admission for blood transfusion per the ER reports. The patient notes he has had chronic rectal bleeding which he attributes to hemorrhoids. He notes this bleeding has been occurring over the last several months. It has worsened over the past three weeks. He notes that he routinely has chronic multiple bowel movements per day, at least 4. He notes he had now up to 7 bloody bowel movements per day, which are notably bright red blood in the stool. He notably had been seen by cardiologist, Dr. Josette Stratton, yesterday. Per the patient's report, he had been taken off of Eliquis. He complains of having fatigue, shortness of breath, dyspnea on exertion (1 flight of stairs), which again has been chronic and worsened over the last few weeks. Of note, the patient was last hospitalized at [facility] from 2019 to 2019 with diagnosis of atrial fibrillation, cough and hypertension. Notably, he was discharged on diltiazem and Eliquis. As mentioned, Eliquis was stopped yesterday.   On arrival to the emergency department, initially recorded vital signs, blood pressure 125/91, heart rate 118, respiratory rate is 16, O2 saturations 100% on room air. Workup including labs showed hemoglobin 8.0 (compared to last hemoglobin of 11.4 on 02/02/2019). Chest x-ray portable showed no acute cardiopulmonary process. A 12-lead EKG showed atrial fibrillation, nonspecific T-wave changes at 108 beats per minute. ED requested the patient be admitted to the hospitalist service, however, hemoglobin of 8.0 per discussion with ER MD, initial plan is for transfusion of blood, but rather would like the patient be admitted and recheck hemoglobin with surveillance and have the patient seen by gastroenterologist specialist tomorrow. At current, the patient is not complaining of the abdominal pain, nausea, or vomiting. He is not complaining of any dizziness, lightheadedness, new onset focal weakness, numbness, paresthesias, slurred speech, facial droop, headache, neck pain, chest pain, palpitations, dysuria, hematuria, increased calf pain, swelling, edema, fever, chills, or rash. He complains of lower back pain \"in my spine\" after sitting in the stretcher. He is not reporting any recent trauma, injuries, or falls. He notes he ambulates unassisted. He notes that last colonoscopy was performed at approximately 5 years ago and showed the colon polyps, however, again he feels all his symptoms are secondary to hemorrhoids, which he has had in the past with prior hemorrhoidectomy. PAST MEDICAL HISTORY: 
1. Atrial fibrillation. 2.  Depression. 3.  GERD. 4.  Glaucoma. 5.  Hypercholesterolemia. 6.  Irritable bowel syndrome. 7.  Rectal bleeding, chronic. 8.  Left ventricular hypertrophy. 9.  Mild mitral regurgitation. 10.  Mild tricuspid regurgitation - noted from 2-D echocardiogram, 02/02/2019, with left ventricular ejection fraction of 56% to 60%. PAST SURGICAL HISTORY: 
1. Cholecystectomy. 2.  Colonoscopy. 3.  Hemorrhoidectomy. 4.  Right knee surgery. 5.  Tonsillectomy. 6.  Cardioversion, 03/13/2019. MEDICATIONS:  Medication list reviewed and noted on chart records. atorvastatin (LIPITOR) 40 mg tablet    02/03/19  -- Cb Mercado NP Take 1 Tab by mouth daily. busPIRone (BUSPAR) 10 mg tablet    --  --  Provider, Historical  
 Take 10 mg by mouth two (2) times a day. dilTIAZem CD (CARDIZEM CD) 240 mg ER capsule    02/04/19  -- Cb Mercado, NP Take 1 Cap by mouth daily. dorzolamide-timolol (COSOPT) 22.3-6.8 mg/mL ophthalmic solution    --  --  Provider, Historical  
 Administer 1 Drop to right eye two (2) times a day.  
 gabapentin (NEURONTIN) 100 mg capsule    --  --  Provider, Historical  
 Take 100-200 mg by mouth nightly as needed. latanoprostene bunod (VYZULTA) 0.024 % drop    --  --  Provider, Historical  
 Administer 1 Drop to right eye nightly. LORazepam (ATIVAN) 0.5 mg tablet    --  --  Provider, Historical  
 Take 0.5-1 mg by mouth nightly as needed for Anxiety. losartan (COZAAR) 50 mg tablet    02/04/19  -- Cb Mercado, NP Take 1 Tab by mouth daily. omeprazole (PRILOSEC) 20 mg capsule    --  --  Provider, Historical  
 Take 20 mg by mouth daily. sotalol (BETAPACE) 80 mg tablet    --  --  Provider, Historical  
 Take 80 mg by mouth two (2) times a day. therapeutic multivitamin (THERAGRAN) tablet    --  --  Provider, Historical  
 Take 1 Tab by mouth daily. vortioxetine (BRINTELLIX) 10 mg tablet  Unknown  --  --  Provider, Historical  
 Take 10 mg by mouth daily. ALLERGIES:  SULFA DRUGS. SOCIAL HISTORY:  The patient denies smoking. No reports of illicit drugs. Positive for occasional alcohol. Ambulates unassisted. . FAMILY HISTORY:  Heart disease in mother. REVIEW OF SYSTEMS:  Pertinent positives were as noted in the HPI, but otherwise negative.  
 
PHYSICAL EXAMINATION: 
VITAL SIGNS:  Temperature is 98.0 degrees Fahrenheit, blood pressure 133/100, heart rate 105 decreased to 73, respiratory rate of 18, O2 saturation 98% on room air. GENERAL:  Elderly male in no acute respiratory  distress. PSYCH:  The patient is awake, alert, and oriented x3, but anxious. NEUROLOGIC:  GCS of 15. Moves extremities x4. Sensation is grossly intact without slurred speech or facial droop. HEENT:  Normocephalic, atraumatic. PERRLA, EOMs intact. Sclerae are anicteric. Conjunctivae are clear. Nares are patent. Oropharynx is clear. Tongue is midline, nonedematous. NECK:  Supple without lymphadenopathy, JVD, carotid bruits or thyromegaly. LYMPHATIC:  Negative for cervical or supraclavicular adenopathy. RESPIRATORY:  Lungs are clear to auscultation bilaterally. CVS:  Heart is regular rate and rhythm. Normal S1 and S2 without murmurs, rubs, or gallops. GI:  Abdomen is soft, nontender, nondistended. Normoactive bowel sounds. No rebound, guarding, or rigidity. No auscultated abdominal bruits. No palpable abdominal mass. BACK:  No CVA tenderness. No step-off deformity. MUSCULOSKELETAL:  No acute palpable bony deformities. Negative for calf tenderness. VASCULAR:  2+ radial, 1+ dorsalis pedis pulses without cyanosis, clubbing. Trace lower extremity nonpitting edema. SKIN:  Warm and dry. LABORATORY DATA:  Labs are reviewed:  Sodium is 139, potassium 4.0, chloride 108, CO2 23, BUN of 16, creatinine 1.05, glucose 103, anion gap of 8, calcium is 9.0, GFR greater than 60, total bilirubin 0.6, total protein 7.4, albumin is 3.4, ALT of 25, AST 22, alkaline phosphatase is 85. Troponin I less than 0.05. WBC 5.6, hemoglobin 8.0, hematocrit 26.5, platelets of 319, neutrophils 50%. INR 1.1, PT is 11.4, PTT is 30.0. Chest x-ray portable, no acute cardiopulmonary process. A 12-lead EKG, atrial fibrillation, nonspecific T-wave changes at 108 beats per minute. IMPRESSION AND PLAN: 
1. Anemia. Admit the patient to telemetry.   We will continue with surveillance with repeat hemoglobin and hematocrit. Hemoglobin is less than 7.0. If the patient becomes hypotensive, signs of hemodynamic instability, then proceed with blood transfusion, however, with hemoglobin currently of 8.0, no blood transfusion has been ordered, which was discussed with the patient; however, I had discussed the risks, benefits, potential complications with the patient. The patient was allowed at this time to ask questions. He notes he has no question at this time and provided informed written consent freely and without coercion. We will repeat hemoglobin and hematocrit. 2.  Rectal bleeding, notably it is chronic, reportedly ongoing for months. No acute gastrointestinal symptoms on exam with no abdominal pain, no reports of nausea or vomiting. Consult with gastroenterologist in the a.m. Keep n.p.o. after midnight. Order IV fluids for hydration and plan for colonoscopy, endoscopy for further evaluation. 3.  History of atrial fibrillation, rate variable, but not sustained tachycardia. As such, we ordered the patient's home medications for now. If the patient develops sustained tachycardia, then we will place on diltiazem titrated IV infusion. We will admit to telemetry for further monitoring. Consult with the patient's Cardiology in the a.m., as he is currently off the Eliquis with concerns of anemia and possible rectal bleeding. 4.  Gastroesophageal reflux disease. Place on Protonix. 5.  Depression. Resume back on home medications. 6.  Restless leg syndrome - as reported per the patient. Continue on home medications. 7.  Venous thromboembolism prophylaxis. Sequential compression devices to lower extremities. CODE STATUS:  Full code. FUNCTIONAL STATUS:  The patient ambulates without assistance. Carlos Mcintosh MD [email or web address] 
D:  04/16/2019 22:26 
T:  04/17/2019 1:14 
JOB #:  [id number]

## 2019-04-17 NOTE — PROGRESS NOTES
Hospitalist Progress Note Eduardo Fu MD 
Answering service: [phone] or [phone] from in house phone Date of Service:  2019 NAME:  Rufus Bonilla :  1940 MRN:  [id number] Admission Summary: An 24-year-old white male with past medical history of atrial fibrillation, long-term anticoagulation, glaucoma, GERD, hypercholesterolemia, irritable bowel syndrome, hemorrhoids, chronic rectal bleeding, presented to the emergency department from home with reported abnormal lab results. The patient provides some history. Remainder of history had been obtained from review of the ED and electronic medical records. Per the collective report, the patient was informed by his PCP office that he was anemic with hemoglobin of 7.4. He was referred to the emergency department for possible admission for blood transfusion per the ER reports. The patient notes he has had chronic rectal bleeding which he attributes to hemorrhoids. He notes this bleeding has been occurring over the last several months Interval history / Subjective:  
I have hemorrhoids and  12 years back had hemorrhoidectomy I started on blood thinners and started this problem Assessment & Plan: 1. Acute on chronic blood loss anemia due to hemorrhoidal bleed 
- hold eliquis - BT < 7 hgb 
- iron studies and ferritin  
- assessment of hemorrhoids by GI and colorectal surgery 2. Rectal bleeding, notably it is chronic, reportedly ongoing for months. - keep NPO colorectal evaluation 3. History of atrial fibrillation, rate variable, but not sustained tachycardia.   
- 3.1 sec pause on tele  
- hold BB and CCB and cardiology consulted - pt was on eliquis will hold 4. Gastroesophageal reflux disease. Place on Protonix. 5.  Depression. Resume back on home medications. 6.  Restless leg syndrome - as reported per the patient.   Continue on home medications. Code status: Full DVT prophylaxis: SCD Care Plan discussed with: Patient/Family and Nurse Disposition: TBD Hospital Problems  Date Reviewed: 4/16/2019 Codes Class Noted POA A-fib [facility]) ICD-10-CM: I48.91 
ICD-9-CM: 427.31  4/16/2019 Unknown * (Principal) Anemia ICD-10-CM: D64.9 ICD-9-CM: 285.9  4/16/2019 Unknown GI bleed ICD-10-CM: K92.2 ICD-9-CM: 578.9  4/16/2019 Unknown Review of Systems: A comprehensive review of systems was negative. Vital Signs:  
 Last 24hrs VS reviewed since prior progress note. Most recent are: 
Visit Vitals BP (P) 102/58 (BP Patient Position: At rest) Pulse (P) 71 Temp (P) 97.4 °F (36.3 °C) Resp 17 Ht 6' (1.829 m) Wt 82.3 kg (181 lb 6.4 oz) SpO2 (P) 100% BMI 24.60 kg/m² Intake/Output Summary (Last 24 hours) at 4/17/2019 1003 Last data filed at 4/17/2019 7580 Gross per 24 hour Intake  Output 200 ml Net -200 ml Physical Examination:  
 
 
     
Constitutional:  No acute distress, cooperative, pleasant   
ENT:  Oral mucous moist, oropharynx benign. Neck supple, Resp:  CTA bilaterally. No wheezing/rhonchi/rales. No accessory muscle use CV:  Regular rhythm, normal rate, no murmurs, gallops, rubs GI:  Soft, non distended, non tender. normoactive bowel sounds, no hepatosplenomegaly Musculoskeletal:  No edema, warm, 2+ pulses throughout Neurologic:  Moves all extremities. AAOx3, CN II-XII reviewed Psych:  Good insight, Not anxious nor agitated. Data Review:  
 I personally reviewed  Image and labs Labs:  
 
Recent Labs 04/17/19 0227 04/16/19 1954 WBC 5.8 5.6 HGB 7.5* 8.0*  
HCT 25.0* 26.5*  
 227 Recent Labs 04/17/19 0227 04/16/19 1954  139  
K 4.0 4.0  
* 108 CO2 23 23 BUN 16 16 CREA 0.99 1.05  
* 103* CA 8.7 9.0 Recent Labs 04/16/19 1954 SGOT 22 ALT 25 AP 85 TBILI 0.6 TP 7.4 ALB 3.4*  
GLOB 4.0 Recent Labs 04/16/19 1954 INR 1.1 PTP 11.4* APTT 30.0 Recent Labs 04/16/19 1954 TIBC 444 PSAT 4*  
  
Lab Results Component Value Date/Time Folate 24.4 (H) 04/16/2019 07:54 PM  
  
No results for input(s): PH, PCO2, PO2 in the last 72 hours. Recent Labs 04/16/19 1954 TROIQ <0.05 No results found for: CHOL, CHOLX, CHLST, CHOLV, HDL, LDL, LDLC, DLDLP, TGLX, TRIGL, TRIGP, CHHD, CHHDX Lab Results Component Value Date/Time Glucose (POC) 120 (H) 02/02/2019 09:09 PM  
 Glucose (POC) 106 (H) 02/02/2019 06:16 AM  
 Glucose (POC) 118 (H) 02/01/2019 09:08 PM  
 Glucose (POC) 119 (H) 03/23/2013 01:40 AM  
 
Lab Results Component Value Date/Time Color DARK YELLOW 02/01/2019 11:50 PM  
 Appearance CLEAR 02/01/2019 11:50 PM  
 Specific gravity 1.027 02/01/2019 11:50 PM  
 pH (UA) 5.5 02/01/2019 11:50 PM  
 Protein NEGATIVE  02/01/2019 11:50 PM  
 Glucose NEGATIVE  02/01/2019 11:50 PM  
 Ketone NEGATIVE  02/01/2019 11:50 PM  
 Bilirubin NEGATIVE  02/01/2019 11:50 PM  
 Urobilinogen 0.2 02/01/2019 11:50 PM  
 Nitrites NEGATIVE  02/01/2019 11:50 PM  
 Leukocyte Esterase NEGATIVE  02/01/2019 11:50 PM  
 Epithelial cells FEW 02/01/2019 11:50 PM  
 Bacteria NEGATIVE  02/01/2019 11:50 PM  
 WBC 0-4 02/01/2019 11:50 PM  
 RBC 0-5 02/01/2019 11:50 PM  
 
 
 
Medications Reviewed:  
 
Current Facility-Administered Medications Medication Dose Route Frequency  0.9% sodium chloride infusion 250 mL  250 mL IntraVENous PRN  
 latanoprost (XALATAN) 0.005 % ophthalmic solution 1 Drop  1 Drop Right Eye QHS  
 0.9% sodium chloride infusion 250 mL  250 mL IntraVENous PRN  
 sodium chloride (NS) flush 5-40 mL  5-40 mL IntraVENous Q8H  
 sodium chloride (NS) flush 5-40 mL  5-40 mL IntraVENous PRN  
 atorvastatin (LIPITOR) tablet 40 mg  40 mg Oral DAILY  busPIRone (BUSPAR) tablet 10 mg  10 mg Oral BID  
  dilTIAZem CD (CARDIZEM CD) capsule 240 mg  240 mg Oral DAILY  dorzolamide-timolol (COSOPT) 22.3-6.8 mg/mL ophthalmic solution 1 Drop  1 Drop Right Eye BID  
 gabapentin (NEURONTIN) capsule 100-200 mg  100-200 mg Oral QHS PRN  
 LORazepam (ATIVAN) tablet 0.5-1 mg  0.5-1 mg Oral QHS PRN  
 losartan (COZAAR) tablet 50 mg  50 mg Oral DAILY  sotalol (BETAPACE) tablet 80 mg  80 mg Oral BID  
 0.9% sodium chloride infusion  75 mL/hr IntraVENous CONTINUOUS  
 
______________________________________________________________________ EXPECTED LENGTH OF STAY: - - - 
ACTUAL LENGTH OF STAY:          1 Jimmy Enriquez MD

## 2019-04-17 NOTE — PROGRESS NOTES
Problem: Falls - Risk of 
Goal: *Absence of Falls Description Document Tresa Espinoza Fall Risk and appropriate interventions in the flowsheet. Outcome: Progressing Towards Goal 
  
Problem: Pressure Injury - Risk of 
Goal: *Prevention of pressure injury Description Document Dorian Scale and appropriate interventions in the flowsheet. Outcome: Progressing Towards Goal 
  
Problem: Pain Goal: *Control of Pain Outcome: Progressing Towards Goal 
  
Problem: Upper and Lower GI Bleed: Day 1 Goal: Activity/Safety Outcome: Progressing Towards Goal 
Goal: Consults, if ordered Outcome: Progressing Towards Goal 
Goal: Diagnostic Test/Procedures Outcome: Progressing Towards Goal 
Goal: Treatments/Interventions/Procedures Outcome: Progressing Towards Goal

## 2019-04-17 NOTE — PROGRESS NOTES
Patient has home medication at the bedside and wants to take home meds himself. MD, Pharmacy and Patient advocacy asked for assistance along with nursing to educate the patient on the importance of having the medications monitored during hospital stay. Patient agreed to have medications placed with pharmacy labels and wants medications to be given per his schedule. Patient provided a list of medications and timeframes in which he likes to take pills. Prior to giving the nurse his medications, patient took 40 mg of omeprazolel, 10mg of buspar, 240 mg of diltiazem, and 40 mg of sotalol. Vitals were /73, HR 77, RR 19, 98T and 100% Sp02 on room air. Medications were labelled by pharmacy and placed in lock draw in patient's room per discussion with patient advocacy for dosing.

## 2019-04-17 NOTE — ED PROVIDER NOTES
[de-identified] y.o. male with past medical history significant for Glaucoma, GERD, high cholesterol, Depression, IBS, and A-fib who presents from home via private vehicle for further evaluation of abnormal lab results. Pt's wife reports pt had blood drawn yesterday, which revealed Hgb of \"7.4\". Pt's wife reports PCP referred pt to ED for possible admission for blood transfusion. Pt notes chronic hemorrhoidal bleeding but reports it has worsened \"over the past 3 weeks\". Pt described blood as \"bright red\". Pt is also c/o SOB and fatigue. Pt reports starting Eliquis on \"4/1\", but states he stopped taking medication \"today\" after labs results showed low Hgb. Pt also reports he used to take baby ASA, but stopped after experiencing nosebleeds. Pt also notes history of restless leg syndrome. Pt denies any chest pain, vomiting or syncope. There are no other acute medical concerns at this time. Old Chart Review: Pt was last admitted to hospital from 2/1/19 to 2/3/19 for paroxysmal A-fib. Echo revealed estimated EF of 56-60%, LVH, mild mitral valve regurg, and mild tricuspid valve regurg. Pt was still in a-fib upon discharge. Pt was discharged home on diltiazem and eliquis and was recommended to f/u with cardiologist w/in 2 weeks and PCP as needed. Pt underwent cardioversion on 3/13/19. Social hx: Non smoker; EtOH use PCP: Katie Hernandez MD 
Cardiologist: Chris Willis MD 
 
Note written by Mendoza Chávez, as dictated by Burnetta Goltz, MD 7:48 PM 
 
 
The history is provided by the patient, medical records and the spouse. No  was used. Past Medical History:  
Diagnosis Date  Atrial fibrillation ([address]  Depression  GERD (gastroesophageal reflux disease)  Glaucoma  High cholesterol  IBS (irritable bowel syndrome) Past Surgical History:  
Procedure Laterality Date  HX CHOLECYSTECTOMY  HX HEMORRHOIDECTOMY  HX ORTHOPAEDIC    
 RIGHT knee  HX TONSILLECTOMY  MS CARDIOVERSION ELECTIVE ARRHYTHMIA EXTERNAL N/A 3/13/2019 EP CARDIOVERSION performed by Castro White MD at [address], [facility] History reviewed. No pertinent family history. Social History Socioeconomic History  Marital status:  Spouse name: Not on file  Number of children: Not on file  Years of education: Not on file  Highest education level: Not on file Occupational History  Not on file Social Needs  Financial resource strain: Not on file  Food insecurity:  
  Worry: Not on file Inability: Not on file  Transportation needs:  
  Medical: Not on file Non-medical: Not on file Tobacco Use  Smoking status: Never Smoker  Smokeless tobacco: Never Used Substance and Sexual Activity  Alcohol use: Yes  Drug use: No  
 Sexual activity: Not on file Lifestyle  Physical activity:  
  Days per week: Not on file Minutes per session: Not on file  Stress: Not on file Relationships  Social connections:  
  Talks on phone: Not on file Gets together: Not on file Attends Gnosticism service: Not on file Active member of club or organization: Not on file Attends meetings of clubs or organizations: Not on file Relationship status: Not on file  Intimate partner violence:  
  Fear of current or ex partner: Not on file Emotionally abused: Not on file Physically abused: Not on file Forced sexual activity: Not on file Other Topics Concern  Not on file Social History Narrative  Not on file ALLERGIES: Sulfa (sulfonamide antibiotics) Review of Systems Constitutional: Positive for fatigue. Negative for fever. Eyes: Negative for visual disturbance. Respiratory: Positive for shortness of breath. Negative for cough and wheezing. Cardiovascular: Negative for chest pain and leg swelling. Gastrointestinal: Positive for anal bleeding.  Negative for abdominal pain, diarrhea, nausea and vomiting. Genitourinary: Negative for dysuria. Musculoskeletal: Negative. Negative for back pain and neck stiffness. Skin: Negative for rash. Neurological: Negative. Negative for syncope and headaches. Psychiatric/Behavioral: Negative for confusion. All other systems reviewed and are negative. Vitals:  
 04/16/19 1911 04/16/19 1946 BP:  (!) 125/91 Pulse: (!) 118 (!) 118 Resp:  16 Temp:  98 °F (36.7 °C) SpO2: 100% 100% Physical Exam  
Constitutional: He appears well-developed and well-nourished. No distress. HENT:  
Head: Normocephalic and atraumatic. Eyes: Pupils are equal, round, and reactive to light. Neck: Normal range of motion. Cardiovascular: Normal rate and regular rhythm. Murmur heard. Systolic murmur is present with a grade of 2/6. Pulmonary/Chest: Effort normal and breath sounds normal. No respiratory distress. Abdominal: Soft. There is no tenderness. Musculoskeletal: Normal range of motion. He exhibits no edema. Neurological: He is alert. He has normal strength. No cranial nerve deficit. Skin: Skin is warm and dry. There is pallor. Psychiatric: He has a normal mood and affect. His behavior is normal.  
Nursing note and vitals reviewed. Note written by Mendoza Landers, as dictated by Harvey Beasley MD 7:48 PM 
 
MDM Procedures ED EKG interpretation: 2031 Rhythm: atrial fib; and regular . Rate (approx.): 108 bpm; ST/T wave: non-specific changes. Note written by Mendoza Landers, as dictated by Harvey Beasley MD 8:42 PM 
 
PROGRESS NOTE: 
8:00 PM 
In February 2019, pt's Hgb was found to be 11.4 CONSULT NOTE: 
8:41 PM Harvey Beasley MD communicated with Dr. Esvin Kaye, Consult for Hospitalist via [facility] Text. Discussed available diagnostic tests and clinical findings. Dr. Esvin Kaye will admit the pt.

## 2019-04-17 NOTE — PROGRESS NOTES
Admission Medication Reconciliation: 
Information obtained from: rx query, chart review Significant PMH/Disease States:  
Past Medical History:  
Diagnosis Date Atrial fibrillation ([address] Depression GERD (gastroesophageal reflux disease) Glaucoma High cholesterol IBS (irritable bowel syndrome) Chief Complaint for this Admission:  low Hgb Allergies:  Sulfa (sulfonamide antibiotics) Prior to Admission Medications:  
Prior to Admission Medications Prescriptions Last Dose Informant Patient Reported? Taking? LORazepam (ATIVAN) 0.5 mg tablet   Yes Yes Sig: Take 0.5-1 mg by mouth nightly as needed for Anxiety. atorvastatin (LIPITOR) 40 mg tablet   No Yes Sig: Take 1 Tab by mouth daily. busPIRone (BUSPAR) 10 mg tablet   Yes Yes Sig: Take 10 mg by mouth two (2) times a day. dilTIAZem CD (CARDIZEM CD) 240 mg ER capsule   No Yes Sig: Take 1 Cap by mouth daily. dorzolamide-timolol (COSOPT) 22.3-6.8 mg/mL ophthalmic solution   Yes Yes Sig: Administer 1 Drop to right eye two (2) times a day.  
gabapentin (NEURONTIN) 100 mg capsule   Yes Yes Sig: Take 100-200 mg by mouth nightly as needed. latanoprostene bunod (VYZULTA) 0.024 % drop   Yes Yes Sig: Administer 1 Drop to right eye nightly. losartan (COZAAR) 50 mg tablet   No Yes Sig: Take 1 Tab by mouth daily. omeprazole (PRILOSEC) 20 mg capsule   Yes Yes Sig: Take 20 mg by mouth daily. sotalol (BETAPACE) 80 mg tablet   Yes Yes Sig: Take 80 mg by mouth two (2) times a day. therapeutic multivitamin [facility]) tablet   Yes Yes Sig: Take 1 Tab by mouth daily. vortioxetine (BRINTELLIX) 10 mg tablet Unknown at Unknown time  Yes No  
Sig: Take 10 mg by mouth daily. Facility-Administered Medications: None Comments/Recommendations: Patient has been busy with multiple providers on each attempt to interview and is currently getting an EKG. Please follow-up if possible. From review of recent events: 
Removed apixaban and aspirin Added: sotalol, gabapentin, and ativan

## 2019-04-17 NOTE — PROGRESS NOTES
Bedside and Verbal shift change report given to Francisco Ayala RN (oncoming nurse) by Winter Bell RN (offgoing nurse). Report included the following information SBAR, Kardex, Intake/Output, MAR, Recent Results and Cardiac Rhythm NSR.

## 2019-04-17 NOTE — ROUTINE PROCESS
TRANSFER - OUT REPORT: 
 
Verbal report given to Ifey (name) on Rika Hogan  being transferred to [employer] (unit) for routine progression of care Report consisted of patients Situation, Background, Assessment and  
Recommendations(SBAR). Information from the following report(s) SBAR, ED Summary, [facility] and Recent Results was reviewed with the receiving nurse. Lines:  
Peripheral IV 04/16/19 Right Forearm (Active) Site Assessment Clean, dry, & intact 4/16/2019  7:58 PM  
Phlebitis Assessment 0 4/16/2019  7:58 PM  
Infiltration Assessment 0 4/16/2019  7:58 PM  
Dressing Status Clean, dry, & intact 4/16/2019  7:58 PM  
Hub Color/Line Status Pink 4/16/2019  7:58 PM  
  
 
Opportunity for questions and clarification was provided. Patient transported with: 
 Monitor Registered Nurse

## 2019-04-18 VITALS
OXYGEN SATURATION: 100 % | TEMPERATURE: 97.5 F | DIASTOLIC BLOOD PRESSURE: 64 MMHG | HEART RATE: 80 BPM | RESPIRATION RATE: 21 BRPM | WEIGHT: 181.66 LBS | HEIGHT: 72 IN | BODY MASS INDEX: 24.61 KG/M2 | SYSTOLIC BLOOD PRESSURE: 125 MMHG

## 2019-04-18 PROBLEM — I48.91 A-FIB (HCC): Status: RESOLVED | Noted: 2019-04-16 | Resolved: 2019-04-18

## 2019-04-18 PROBLEM — D64.9 ANEMIA: Status: RESOLVED | Noted: 2019-04-16 | Resolved: 2019-04-18

## 2019-04-18 PROBLEM — K92.2 GI BLEED: Status: RESOLVED | Noted: 2019-04-16 | Resolved: 2019-04-18

## 2019-04-18 LAB
ABO + RH BLD: NORMAL
ANION GAP SERPL CALC-SCNC: 9 MMOL/L (ref 5–15)
BLD PROD TYP BPU: NORMAL
BLOOD GROUP ANTIBODIES SERPL: NORMAL
BPU ID: NORMAL
BUN SERPL-MCNC: 12 MG/DL (ref 6–20)
BUN/CREAT SERPL: 13 (ref 12–20)
CALCIUM SERPL-MCNC: 9 MG/DL (ref 8.5–10.1)
CHLORIDE SERPL-SCNC: 111 MMOL/L (ref 97–108)
CO2 SERPL-SCNC: 22 MMOL/L (ref 21–32)
COMMENT, HOLDF: NORMAL
CREAT SERPL-MCNC: 0.91 MG/DL (ref 0.7–1.3)
CROSSMATCH RESULT,%XM: NORMAL
ERYTHROCYTE [DISTWIDTH] IN BLOOD BY AUTOMATED COUNT: 14.7 % (ref 11.5–14.5)
GLUCOSE SERPL-MCNC: 95 MG/DL (ref 65–100)
HCT VFR BLD AUTO: 29.9 % (ref 36.6–50.3)
HGB BLD-MCNC: 9.3 G/DL (ref 12.1–17)
MCH RBC QN AUTO: 26.5 PG (ref 26–34)
MCHC RBC AUTO-ENTMCNC: 31.1 G/DL (ref 30–36.5)
MCV RBC AUTO: 85.2 FL (ref 80–99)
NRBC # BLD: 0 K/UL (ref 0–0.01)
NRBC BLD-RTO: 0 PER 100 WBC
PLATELET # BLD AUTO: 243 K/UL (ref 150–400)
PMV BLD AUTO: 11.2 FL (ref 8.9–12.9)
POTASSIUM SERPL-SCNC: 3.7 MMOL/L (ref 3.5–5.1)
RBC # BLD AUTO: 3.51 M/UL (ref 4.1–5.7)
SAMPLES BEING HELD,HOLD: NORMAL
SODIUM SERPL-SCNC: 142 MMOL/L (ref 136–145)
SPECIMEN EXP DATE BLD: NORMAL
STATUS OF UNIT,%ST: NORMAL
UNIT DIVISION, %UDIV: 0
WBC # BLD AUTO: 9.5 K/UL (ref 4.1–11.1)

## 2019-04-18 PROCEDURE — 80048 BASIC METABOLIC PNL TOTAL CA: CPT

## 2019-04-18 PROCEDURE — 85027 COMPLETE CBC AUTOMATED: CPT

## 2019-04-18 PROCEDURE — 36415 COLL VENOUS BLD VENIPUNCTURE: CPT

## 2019-04-18 RX ORDER — SOTALOL HYDROCHLORIDE 80 MG/1
40 TABLET ORAL 2 TIMES DAILY
Qty: 30 TAB | Refills: 0 | Status: SHIPPED
Start: 2019-04-18 | End: 2019-05-18

## 2019-04-18 RX ADMIN — BUSPIRONE HYDROCHLORIDE 10 MG: 10 TABLET ORAL at 08:08

## 2019-04-18 RX ADMIN — DILTIAZEM HYDROCHLORIDE 240 MG: 240 CAPSULE, COATED, EXTENDED RELEASE ORAL at 10:35

## 2019-04-18 RX ADMIN — Medication 10 ML: at 08:08

## 2019-04-18 RX ADMIN — SOTALOL HYDROCHLORIDE 40 MG: 80 TABLET ORAL at 09:00

## 2019-04-18 RX ADMIN — DORZOLAMIDE HYDROCHLORIDE AND TIMOLOL MALEATE 1 DROP: 20; 5 SOLUTION/ DROPS OPHTHALMIC at 08:08

## 2019-04-18 RX ADMIN — OMEPRAZOLE 20 MG: 20 CAPSULE, DELAYED RELEASE ORAL at 08:08

## 2019-04-18 NOTE — PROGRESS NOTES
..Bedside and Verbal shift change report given to Haakn Padilla (oncoming nurse) by Jamie Ferrera (offgoing nurse). Report included the following information SBAR, Intake/Output, MAR and Recent Results.

## 2019-04-18 NOTE — DISCHARGE INSTRUCTIONS
Discharge Instructions       PATIENT ID: Greg Quinn  MRN: [id number]   YOB: 1940    DATE OF ADMISSION: 4/16/2019  7:18 PM    DATE OF DISCHARGE: 4/18/2019    PRIMARY CARE PROVIDER: Timothy Pena MD     ATTENDING PHYSICIAN: Kayla Rankin MD  DISCHARGING PROVIDER: Sherren Flasher, MD    To contact this individual call [phone] and ask the  to page. If unavailable ask to be transferred the Adult Hospitalist Department. DISCHARGE DIAGNOSES GIB due to hemorrhoids     CONSULTATIONS: IP CONSULT TO HOSPITALIST  IP CONSULT TO CARDIOLOGY  IP CONSULT TO GASTROENTEROLOGY  IP CONSULT TO COLORECTAL SURGERY    PROCEDURES/SURGERIES: * No surgery found *    PENDING TEST RESULTS:   At the time of discharge the following test results are still pending:     FOLLOW UP APPOINTMENTS:   Follow-up Information     Follow up With Specialties Details Why Willard Renee MD [facility] On 4/25/2019 Hospital f/u PCP appointment Thursday, 4/25/19 @ 2:15 p.m.  [address]      [address]  [address]:     DIET: Cardiac Diet      ACTIVITY: Activity as tolerated    WOUND CARE:     EQUIPMENT needed:       DISCHARGE MEDICATIONS:   See Medication Reconciliation Form    · It is important that you take the medication exactly as they are prescribed. · Keep your medication in the bottles provided by the pharmacist and keep a list of the medication names, dosages, and times to be taken in your wallet. · Do not take other medications without consulting your doctor. NOTIFY YOUR PHYSICIAN FOR ANY OF THE FOLLOWING:   Fever over 101 degrees for 24 hours. Chest pain, shortness of breath, fever, chills, nausea, vomiting, diarrhea, change in mentation, falling, weakness, bleeding.  Severe pain or pain not relieved by medications. Or, any other signs or symptoms that you may have questions about. DISPOSITION:  X  Home With:   OT  PT  HH  RN       SNF/Inpatient Rehab/LTAC    Independent/assisted living    Hospice    Other:     CDMP Checked: Yes X     PROBLEM LIST Updated:   Yes X       Signed:   Jeniffer Naqvi MD  4/18/2019  10:57 AM

## 2019-04-18 NOTE — PROGRESS NOTES
Cardiology Progress Note                                        Admit Date: 4/16/2019 Assessment/Plan:  
 
PAF; no recurrence Pauses; a component of SSS; no further recurrence since reducing Sotalol dose Anemia; better after transfusion Miley Wilkes is a [de-identified] y.o. male with PROBLEM LIST: 
Patient Active Problem List  
 Diagnosis Date Noted  A-fib ([facility] 04/16/2019  Anemia 04/16/2019  GI bleed 04/16/2019  Persistent atrial fibrillation ([facility] 02/03/2019  Partial small bowel obstruction ([facility] 03/24/2017  GERD (gastroesophageal reflux disease) 03/24/2017 Subjective:  
 
Miley Wilkes reports none. Visit Vitals /71 (BP 1 Location: Right arm, BP Patient Position: Sitting) Pulse 83 Temp 98.4 °F (36.9 °C) Resp 20 Ht 6' (1.829 m) Wt 181 lb 6.4 oz (82.3 kg) SpO2 93% BMI 24.60 kg/m² Intake/Output Summary (Last 24 hours) at 4/18/2019 5319 Last data filed at 4/17/2019 1227 Gross per 24 hour Intake 323.8 ml Output  Net 323.8 ml Objective:  
  
Physical Exam: 
HEENT: [place], EOMI Neck: No JVD,  No thyroidmegaly Resp: CTA bilaterally; No wheezes or rales CV: RRR s1s2 No murmur no s3 Abd:Soft, Nontender Ext: No edema Neuro: Alert and oriented; Nonfocal 
Skin: Warm, Dry, Intact Pulses: 2+ DP/PT/Rad Telemetry: normal sinus rhythm Current Facility-Administered Medications Medication Dose Route Frequency  0.9% sodium chloride infusion 250 mL  250 mL IntraVENous PRN  
 atorvastatin (LIPITOR) tablet 40 mg (Patient Supplied)  40 mg Oral DAILY WITH DINNER  
 dilTIAZem CD (CARDIZEM CD) capsule 240 mg (Patient Supplied)  240 mg Oral DAILY  busPIRone (BUSPAR) tablet 10 mg (Patient Supplied)  10 mg Oral ACB/HS  omeprazole (PRILOSEC) capsule 20 mg (Patient Supplied)  20 mg Oral ACB  sotalol (BETAPACE) tablet 40 mg **HALF TABLET** (Patient Supplied)  40 mg Oral BID  
  vortioxetine (TRINTELLIX) tablet 10 mg (Patient Supplied)  10 mg Oral DAILY WITH DINNER  
 melatonin 6 mg tablet (Patient Supplied)  6 mg Oral QHS PRN  
 dorzolamide-timolol (COSOPT) 22.3-6.8 mg/mL ophthalmic solution 1 Drop (Patient Supplied)  1 Drop Right Eye BID  latanoprostene bunod 0.024 % drop 1 Drop (Patient Supplied)  1 Drop Right Eye PCD  
 0.9% sodium chloride infusion 250 mL  250 mL IntraVENous PRN  
 sodium chloride (NS) flush 5-40 mL  5-40 mL IntraVENous Q8H  
 sodium chloride (NS) flush 5-40 mL  5-40 mL IntraVENous PRN  
 gabapentin (NEURONTIN) capsule 100-200 mg  100-200 mg Oral QHS PRN  
 losartan (COZAAR) tablet 50 mg  50 mg Oral DAILY  0.9% sodium chloride infusion  75 mL/hr IntraVENous CONTINUOUS Data Review:  
Labs:   
Recent Results (from the past 24 hour(s)) OCCULT BLOOD, STOOL Collection Time: 04/17/19 10:45 AM  
Result Value Ref Range Occult blood, stool POSITIVE (A) NEG    
HGB & HCT Collection Time: 04/17/19  3:20 PM  
Result Value Ref Range HGB 8.9 (L) 12.1 - 17.0 g/dL HCT 29.4 (L) 36.6 - 50.3 % SAMPLES BEING HELD Collection Time: 04/18/19  5:33 AM  
Result Value Ref Range SAMPLES BEING HELD LV,GRN   
 COMMENT Add-on orders for these samples will be processed based on acceptable specimen integrity and analyte stability, which may vary by analyte.

## 2019-04-18 NOTE — PROGRESS NOTES
Follow Up Appointment Made: YES Date/Time if applicable: 6/15/36  At 2:15 p.m. Bedside RN performed patient education and medication education. Discharge concerns initiated and discussed with patient, including clarification on \"who\" assists the patient at their home and instructions for when the home going patient should call their provider after discharge. Opportunity for questions and clarification was provided. Patient receptive to education: YES Patient stated: \"I will follow up with Dr. Linda Calloway. \" 
Barriers to Education: None Diagnosis Education given:  YES Length of stay: 2 Expected Day of Discharge: 4/18/19 Ask if they have \"Help at Home\" & add to white board? YES Education Day #: 2 Medication Education Given:  YES 
M in the box Medication name: Sotalol, eliquis Pt aware of HCAHPS survey: YES I have reviewed discharge instructions with the patient and spouse. The patient and spouse verbalized understanding. Patient is being discharged to home by family. IV removed. Belongings and medications with patient. Patient verified that he had all home medications at discharge.

## 2019-04-18 NOTE — PROGRESS NOTES
Reason for Admission:   Low hemoglobin RRAT Score:    17 Do you (patient/family) have any concerns for transition/discharge? No, met with pt at the bedside. He is expecting rounding from physician soon, who is on unit and aware. Pt expressed he is normally independent and has good relationship with PCP, just saw Dr. Giles Mejia in the past week and was sent here d/t lab results. He confirmed he lives with Zaida Levy, his wife, drives self to/from appointments. IMM letter signed and pt verbalized understanding. Plan for utilizing home health:   none Current Advanced Directive/Advance Care Plan:  Did not review AMD, pt expressed and verbally confirmed his wife Zaida Levy is [facility] and emergency contact at this time. Likelihood of readmission?   low Transition of Care Plan: To discharge home, possibly later today. Will request follow up scheduled by CM specialist for Dr. Giles Mejia for LENNOX DUGAN appointment.  
 
DENNIS Staton

## 2019-04-18 NOTE — DISCHARGE SUMMARY
Discharge Summary       PATIENT ID: Afua Olivas  MRN: [id number]   YOB: 1940    DATE OF ADMISSION: 4/16/2019  7:18 PM    DATE OF DISCHARGE: 4/18/19   PRIMARY CARE PROVIDER: Odette Ambrose MD     ATTENDING PHYSICIAN: Richard Johnson  DISCHARGING PROVIDER: Jimmy Enriquez MD    To contact this individual call [phone] and ask the  to page. If unavailable ask to be transferred the Adult Hospitalist Department.     CONSULTATIONS: IP CONSULT TO HOSPITALIST  IP CONSULT TO CARDIOLOGY  IP CONSULT TO GASTROENTEROLOGY  IP CONSULT TO COLORECTAL SURGERY    PROCEDURES/SURGERIES: * No surgery found *    ADMITTING DIAGNOSES & HOSPITAL COURSE:   An 73-year-old white male with past medical history of atrial fibrillation, long-term anticoagulation, glaucoma, GERD, hypercholesterolemia, irritable bowel syndrome, hemorrhoids, chronic rectal bleeding, presented to the emergency department from home with reported abnormal lab results.  The patient provides some history.  Remainder of history had been obtained from review of the ED and electronic medical records.  Per the collective report, the patient was informed by his PCP office that he was anemic with hemoglobin of 7.4. [facility] was referred to the emergency department for possible admission for blood transfusion per the ER reports.  The patient notes he has had chronic rectal bleeding which he attributes to hemorrhoids. [facility] notes this bleeding has been occurring over the last several months      Assessment & Plan:      1.  Acute on chronic blood loss anemia due to hemorrhoidal bleed  - hold eliquis  - after BT hgb stable and last 9.3  - assessment of hemorrhoids by GI and colorectal surgery he does not want any intervention now      2.  Rectal bleeding, notably it is chronic, reportedly ongoing for months from hemorrhoids      3.  History of atrial fibrillation, rate variable, but not sustained tachycardia.    - reduced sotalol by Dr. Richard Mccarthy to 40 x 2 daily on CCB as well  - plan to hold eliquis at this time      4.  Gastroesophageal reflux disease.  on Protonix.     5.  Depression.  Resume back on home medications.     6.  Restless leg syndrome - as reported per the patient. Onofre Forte on home medications.        Code status: Full       DISCHARGE DIAGNOSES / PLAN:      1. D/C Home     ADDITIONAL CARE RECOMMENDATIONS:     PENDING TEST RESULTS:   At the time of discharge the following test results are still pending:     FOLLOW UP APPOINTMENTS:    Follow-up Information     Follow up With Specialties Details Why Adri Potter MD [facility] On 4/25/2019 Hospital f/u PCP appointment Thursday, 4/25/19 @ 2:15 p.m.  [address] [facility] [address]  [employer] [facility]      MD Kaitlin Gatica [facility] [address] [address] [facility]               DIET: Cardiac Diet    ACTIVITY: Activity as tolerated    WOUND CARE:     EQUIPMENT needed:       DISCHARGE MEDICATIONS:  Current Discharge Medication List      CONTINUE these medications which have CHANGED    Details   sotalol (BETAPACE) 80 mg tablet Take 0.5 Tabs by mouth two (2) times a day for 30 days. Qty: 30 Tab, Refills: 0         CONTINUE these medications which have NOT CHANGED    Details   gabapentin (NEURONTIN) 100 mg capsule Take 100-200 mg by mouth nightly as needed. LORazepam (ATIVAN) 0.5 mg tablet Take 0.5-1 mg by mouth nightly as needed for Anxiety. atorvastatin (LIPITOR) 40 mg tablet Take 1 Tab by mouth daily. Qty: 30 Tab, Refills: 0      dilTIAZem CD (CARDIZEM CD) 240 mg ER capsule Take 1 Cap by mouth daily. Qty: 30 Cap, Refills: 1      losartan (COZAAR) 50 mg tablet Take 1 Tab by mouth daily. Qty: 30 Tab, Refills: 1      latanoprostene bunod (VYZULTA) 0.024 % drop Administer 1 Drop to right eye nightly. busPIRone (BUSPAR) 10 mg tablet Take 10 mg by mouth two (2) times a day.       therapeutic multivitamin [facility]) tablet Take 1 Tab by mouth daily. dorzolamide-timolol (COSOPT) 22.3-6.8 mg/mL ophthalmic solution Administer 1 Drop to right eye two (2) times a day. omeprazole (PRILOSEC) 20 mg capsule Take 20 mg by mouth daily. vortioxetine (BRINTELLIX) 10 mg tablet Take 10 mg by mouth daily. NOTIFY YOUR PHYSICIAN FOR ANY OF THE FOLLOWING:   Fever over 101 degrees for 24 hours. Chest pain, shortness of breath, fever, chills, nausea, vomiting, diarrhea, change in mentation, falling, weakness, bleeding. Severe pain or pain not relieved by medications. Or, any other signs or symptoms that you may have questions about.     DISPOSITION:  x  Home With:   OT  PT  HH  RN       Long term SNF/Inpatient Rehab    Independent/assisted living    Hospice    Other:       PATIENT CONDITION AT DISCHARGE:     Functional status    Poor     Deconditioned    x Independent      Cognition    x Lucid     Forgetful     Dementia      Catheters/lines (plus indication)    Cavazos     PICC     PEG    x None      Code status    x Full code     DNR      PHYSICAL EXAMINATION AT DISCHARGE:   Refer to Progress Note      CHRONIC MEDICAL DIAGNOSES:  Problem List as of 4/18/2019 Date Reviewed: 4/18/2019          Codes Class Noted - Resolved    Persistent atrial fibrillation ([facility] ICD-10-CM: I48.1  ICD-9-CM: 427.31  2/3/2019 - Present        Partial small bowel obstruction ([facility] ICD-10-CM: K56.600  ICD-9-CM: 560.9  3/24/2017 - Present        GERD (gastroesophageal reflux disease) ICD-10-CM: K21.9  ICD-9-CM: 530.81  3/24/2017 - Present        RESOLVED: A-fib ([facility] ICD-10-CM: I48.91  ICD-9-CM: 427.31  4/16/2019 - 4/18/2019        * (Principal) RESOLVED: Anemia ICD-10-CM: D64.9  ICD-9-CM: 285.9  4/16/2019 - 4/18/2019        RESOLVED: GI bleed ICD-10-CM: K92.2  ICD-9-CM: 578.9  4/16/2019 - 4/18/2019        RESOLVED: Atrial fibrillation with RVR ([facility] ICD-10-CM: I48.91  ICD-9-CM: 427.31  2/1/2019 - 2/3/2019              Greater than 30  minutes were spent with the patient on counseling and coordination of care    Signed:   Denis De Jesus MD  4/18/2019  10:57 AM

## 2019-04-18 NOTE — PROGRESS NOTES
Problem: Falls - Risk of 
Goal: *Absence of Falls Description Document Samantha Ortega Fall Risk and appropriate interventions in the flowsheet. 4/18/2019 1112 by Ruby Johnson RN Outcome: Progressing Towards Goal 
4/18/2019 1111 by Ruby Johnson RN Outcome: Progressing Towards Goal 
  
Problem: Pressure Injury - Risk of 
Goal: *Prevention of pressure injury Description Document Dorian Scale and appropriate interventions in the flowsheet. 4/18/2019 1112 by Ruby Johnson RN Outcome: Progressing Towards Goal 
4/18/2019 1111 by Ruby Johnson RN Outcome: Progressing Towards Goal 
  
Problem: Pain Goal: *Control of Pain 4/18/2019 1112 by Ruby Johnson RN Outcome: Progressing Towards Goal 
4/18/2019 1111 by Ruby Johnson RN Outcome: Progressing Towards Goal 
  
Problem: Upper and Lower GI Bleed: Day 2 Goal: Activity/Safety Outcome: Progressing Towards Goal 
Goal: Consults, if ordered Outcome: Progressing Towards Goal 
Goal: Diagnostic Test/Procedures Outcome: Progressing Towards Goal 
Goal: Discharge Planning Outcome: Progressing Towards Goal

## 2019-04-18 NOTE — PROGRESS NOTES
Hospital follow-up PCP transitional care appointment has been scheduled with Dr. Mayank Perez for Thursday, 4/25/19 at 2:15 p.m. Pending patient discharge.   Melisa Neff, Care Management Specialist.

## 2019-12-03 ENCOUNTER — ANESTHESIA (OUTPATIENT)
Dept: CARDIAC CATH/INVASIVE PROCEDURES | Age: 79
End: 2019-12-03
Payer: MEDICARE

## 2019-12-03 ENCOUNTER — HOSPITAL ENCOUNTER (OUTPATIENT)
Dept: CARDIAC CATH/INVASIVE PROCEDURES | Age: 79
Discharge: HOME | End: 2019-12-03
Attending: INTERNAL MEDICINE | Admitting: INTERNAL MEDICINE
Payer: MEDICARE

## 2019-12-03 ENCOUNTER — ANESTHESIA EVENT (OUTPATIENT)
Dept: CARDIAC CATH/INVASIVE PROCEDURES | Age: 79
End: 2019-12-03
Payer: MEDICARE

## 2019-12-03 VITALS
OXYGEN SATURATION: 99 % | HEIGHT: 72 IN | RESPIRATION RATE: 18 BRPM | DIASTOLIC BLOOD PRESSURE: 72 MMHG | WEIGHT: 177 LBS | TEMPERATURE: 97.8 F | SYSTOLIC BLOOD PRESSURE: 188 MMHG | BODY MASS INDEX: 23.98 KG/M2 | HEART RATE: 60 BPM

## 2019-12-03 DIAGNOSIS — I48.19 ATRIAL FIBRILLATION, PERSISTENT (HCC): ICD-10-CM

## 2019-12-03 LAB
ANION GAP SERPL CALC-SCNC: 10 MMOL/L (ref 5–15)
ATRIAL RATE: 59 BPM
BUN SERPL-MCNC: 16 MG/DL (ref 6–20)
BUN/CREAT SERPL: 15 (ref 12–20)
CALCIUM SERPL-MCNC: 9.3 MG/DL (ref 8.5–10.1)
CALCULATED P AXIS, ECG09: 45 DEGREES
CALCULATED R AXIS, ECG10: -16 DEGREES
CALCULATED T AXIS, ECG11: -5 DEGREES
CHLORIDE SERPL-SCNC: 108 MMOL/L (ref 97–108)
CO2 SERPL-SCNC: 23 MMOL/L (ref 21–32)
CREAT SERPL-MCNC: 1.07 MG/DL (ref 0.7–1.3)
DIAGNOSIS, 93000: NORMAL
ERYTHROCYTE [DISTWIDTH] IN BLOOD BY AUTOMATED COUNT: 14 % (ref 11.5–14.5)
GLUCOSE SERPL-MCNC: 86 MG/DL (ref 65–100)
HCT VFR BLD AUTO: 41.6 % (ref 36.6–50.3)
HGB BLD-MCNC: 13.5 G/DL (ref 12.1–17)
MCH RBC QN AUTO: 29.6 PG (ref 26–34)
MCHC RBC AUTO-ENTMCNC: 32.5 G/DL (ref 30–36.5)
MCV RBC AUTO: 91.2 FL (ref 80–99)
NRBC # BLD: 0 K/UL (ref 0–0.01)
NRBC BLD-RTO: 0 PER 100 WBC
P-R INTERVAL, ECG05: 190 MS
PLATELET # BLD AUTO: 189 K/UL (ref 150–400)
PMV BLD AUTO: 11.1 FL (ref 8.9–12.9)
POTASSIUM SERPL-SCNC: 5.3 MMOL/L (ref 3.5–5.1)
Q-T INTERVAL, ECG07: 438 MS
QRS DURATION, ECG06: 104 MS
QTC CALCULATION (BEZET), ECG08: 433 MS
RBC # BLD AUTO: 4.56 M/UL (ref 4.1–5.7)
SODIUM SERPL-SCNC: 141 MMOL/L (ref 136–145)
VENTRICULAR RATE, ECG03: 59 BPM
WBC # BLD AUTO: 6.6 K/UL (ref 4.1–11.1)

## 2019-12-03 PROCEDURE — 74011250636 HC RX REV CODE- 250/636: Performed by: NURSE ANESTHETIST, CERTIFIED REGISTERED

## 2019-12-03 PROCEDURE — 74011250637 HC RX REV CODE- 250/637: Performed by: INTERNAL MEDICINE

## 2019-12-03 PROCEDURE — 93005 ELECTROCARDIOGRAM TRACING: CPT

## 2019-12-03 PROCEDURE — 36415 COLL VENOUS BLD VENIPUNCTURE: CPT

## 2019-12-03 PROCEDURE — 80048 BASIC METABOLIC PNL TOTAL CA: CPT

## 2019-12-03 PROCEDURE — 76060000031 HC ANESTHESIA FIRST 0.5 HR

## 2019-12-03 PROCEDURE — 93325 DOPPLER ECHO COLOR FLOW MAPG: CPT

## 2019-12-03 PROCEDURE — 85027 COMPLETE CBC AUTOMATED: CPT

## 2019-12-03 RX ORDER — DILTIAZEM HYDROCHLORIDE 240 MG/1
240 CAPSULE, COATED, EXTENDED RELEASE ORAL ONCE
Status: COMPLETED | OUTPATIENT
Start: 2019-12-03 | End: 2019-12-03

## 2019-12-03 RX ORDER — SODIUM CHLORIDE 0.9 % (FLUSH) 0.9 %
5-40 SYRINGE (ML) INJECTION AS NEEDED
Status: DISCONTINUED | OUTPATIENT
Start: 2019-12-03 | End: 2019-12-03 | Stop reason: HOSPADM

## 2019-12-03 RX ORDER — SODIUM CHLORIDE 9 MG/ML
INJECTION, SOLUTION INTRAVENOUS
Status: DISCONTINUED | OUTPATIENT
Start: 2019-12-03 | End: 2019-12-03 | Stop reason: HOSPADM

## 2019-12-03 RX ORDER — CLOPIDOGREL BISULFATE 75 MG/1
TABLET ORAL
COMMUNITY
End: 2020-01-16

## 2019-12-03 RX ORDER — LOSARTAN POTASSIUM 50 MG/1
50 TABLET ORAL ONCE
Status: COMPLETED | OUTPATIENT
Start: 2019-12-03 | End: 2019-12-03

## 2019-12-03 RX ORDER — PROPOFOL 10 MG/ML
INJECTION, EMULSION INTRAVENOUS AS NEEDED
Status: DISCONTINUED | OUTPATIENT
Start: 2019-12-03 | End: 2019-12-03 | Stop reason: HOSPADM

## 2019-12-03 RX ORDER — SODIUM CHLORIDE 0.9 % (FLUSH) 0.9 %
5-40 SYRINGE (ML) INJECTION EVERY 8 HOURS
Status: DISCONTINUED | OUTPATIENT
Start: 2019-12-03 | End: 2019-12-03 | Stop reason: HOSPADM

## 2019-12-03 RX ADMIN — PROPOFOL 20 MG: 10 INJECTION, EMULSION INTRAVENOUS at 14:43

## 2019-12-03 RX ADMIN — PROPOFOL 20 MG: 10 INJECTION, EMULSION INTRAVENOUS at 14:40

## 2019-12-03 RX ADMIN — LOSARTAN POTASSIUM 50 MG: 50 TABLET, FILM COATED ORAL at 16:09

## 2019-12-03 RX ADMIN — PROPOFOL 40 MG: 10 INJECTION, EMULSION INTRAVENOUS at 14:38

## 2019-12-03 RX ADMIN — PROPOFOL 80 MG: 10 INJECTION, EMULSION INTRAVENOUS at 14:37

## 2019-12-03 RX ADMIN — SODIUM CHLORIDE: 900 INJECTION, SOLUTION INTRAVENOUS at 13:35

## 2019-12-03 RX ADMIN — DILTIAZEM HYDROCHLORIDE 240 MG: 240 CAPSULE, COATED, EXTENDED RELEASE ORAL at 16:09

## 2019-12-03 NOTE — PROGRESS NOTES
Pt and Pt's spouse verbalized understanding of discharge instructions. PIV removed and guaze with tape placed; no bleeding or swelling. Pt escorted to car via wheelchair with belongings.

## 2019-12-03 NOTE — ANESTHESIA PREPROCEDURE EVALUATION
Anesthetic History   No history of anesthetic complications            Review of Systems / Medical History  Patient summary reviewed, nursing notes reviewed and pertinent labs reviewed    Pulmonary  Within defined limits                 Neuro/Psych   Within defined limits      Psychiatric history     Cardiovascular  Within defined limits          Dysrhythmias : atrial fibrillation           GI/Hepatic/Renal  Within defined limits   GERD           Endo/Other  Within defined limits           Other Findings              Physical Exam    Airway  Mallampati: II  TM Distance: > 6 cm  Neck ROM: normal range of motion   Mouth opening: Normal     Cardiovascular  Regular rate and rhythm,  S1 and S2 normal,  no murmur, click, rub, or gallop             Dental    Dentition: Caps/crowns     Pulmonary  Breath sounds clear to auscultation               Abdominal  GI exam deferred       Other Findings            Anesthetic Plan    ASA: 3  Anesthesia type: MAC          Induction: Intravenous  Anesthetic plan and risks discussed with: Patient

## 2019-12-03 NOTE — PROGRESS NOTES
1430:   Cardiac Cath Lab Procedure Area Arrival Note:    Melissa Mcdaniels arrived to Cardiac Cath Lab, Procedure Area. Patient identifiers verified with NAME and DATE OF BIRTH. Procedure verified with patient. Consent forms verified. Allergies verified. Patient informed of procedure and plan of care. Questions answered with review. Patient voiced understanding of procedure and plan of care. Patient on cardiac monitor, non-invasive blood pressure, SPO2 monitor. On RA placed on O2 @ 2 lpm via NC.  IV of NS on pump at 10 ml/hr. Patient status doing well without problems. Patient is A&Ox 4. Patient reports no pain. Patient medicated during procedure with orders obtained and verified by Dr. Manda Gray. Refer to patients Cardiac Cath Lab PROCEDURE REPORT for vital signs, assessment, status, and response during procedure, printed at end of case. Printed report on chart or scanned into chart. 1455: Transfer to [address] [place] [address] from Procedure Area    Verbal report given to Clifford Runner, RN on Melissa Mcdaniels being transferred to Cardiac Cath Lab  for routine post - op   Patient is post AGUSTO procedure. Patient stable upon transfer to . Report consisted of patients Situation, Background, Assessment and   Recommendations(SBAR). Information from the following report(s) Procedure Summary and MAR was reviewed with the receiving nurse. Opportunity for questions and clarification was provided. Patient medicated during procedure with orders obtained and verified by Dr. Myrtle Redman.    Refer to patient PROCEDURE REPORT for vital signs, assessment, status, and response during procedure.

## 2019-12-03 NOTE — PROGRESS NOTES
TRANSFER - IN REPORT:    Verbal report received from [facility] on [employer]  being received from procedure area for routine progression of care. Report consisted of patients Situation, Background, Assessment and Recommendations(SBAR). Information from the following report(s) SBAR, Procedure Summary, MAR, Recent Results and Cardiac Rhythm NSR was reviewed with the receiving clinician. Opportunity for questions and clarification was provided. Assessment completed upon patients arrival to [address] [place] [address] and care assumed. Cardiac Cath Lab Recovery Arrival Note:    [employer] arrived to [facility] recovery area. Patient procedure= AGUSTO. Patient on cardiac monitor, non-invasive blood pressure, SPO2 monitor. On O2 @ 2 lpm via NC.  IV  of NS on pump at 25 ml/hr. Patient status doing well without problems. Patient is A&Ox 4. Patient reports No Pain. PROCEDURE SITE CHECK:    Procedure site:without any bleeding and No Hematoma, No pain/discomfort reported at procedure site. No change in patient status. Continue to monitor patient and status.

## 2019-12-03 NOTE — DISCHARGE INSTRUCTIONS
AFTER YOUR TRANSESOPHAGEAL ECHOCARDIOGRAM    Be sure someone else drives you home. You may feel drowsy for several hours. Do not eat or drink for at least two hours after your procedure. Your throat will be numb and there is a risk you might have difficulty swallowing for a while. Be careful when you do eat or drink for the first time especially with hot fluids since you could easily burn your throat. Call your doctor if:    · You are bleeding from your throat or mouth. · You have trouble breathing all of a sudden. · You have chest pain or any pain that spreads to your neck, jaw, or arms. · You have questions or concerns. · You have a fever greater than 101°F.    Doctor: Caitlin    Special Instructions:    No driving for 24 hours.     Discharge Medications: same home medication

## 2019-12-03 NOTE — PROGRESS NOTES
Dr Kailey Diallo updated om Pt's elevated BP=  190/76 . Pt to be given BP medications not taken at home prior to discharge. See MAR.

## 2019-12-03 NOTE — ANESTHESIA POSTPROCEDURE EVALUATION
* No procedures listed *. MAC    Anesthesia Post Evaluation        Patient location during evaluation: PACU  Patient participation: complete - patient participated  Level of consciousness: awake  Pain management: adequate  Airway patency: patent  Anesthetic complications: no  Cardiovascular status: hemodynamically stable  Respiratory status: acceptable  Hydration status: acceptable  Comments: I have seen and evaluated the patient. The patient is ready for PACU discharge. [address], DO                         Vitals Value Taken Time   /68 12/3/2019  2:52 PM   Temp     Pulse 73 12/3/2019  3:06 PM   Resp 16 12/3/2019  2:52 PM   SpO2 99 % 12/3/2019  3:06 PM   Vitals shown include unvalidated device data.

## 2020-01-16 ENCOUNTER — OFFICE VISIT (OUTPATIENT)
Dept: NEUROLOGY | Age: 80
End: 2020-01-16

## 2020-01-16 VITALS
TEMPERATURE: 98.4 F | BODY MASS INDEX: 23.7 KG/M2 | DIASTOLIC BLOOD PRESSURE: 70 MMHG | RESPIRATION RATE: 16 BRPM | HEART RATE: 70 BPM | OXYGEN SATURATION: 98 % | WEIGHT: 175 LBS | HEIGHT: 72 IN | SYSTOLIC BLOOD PRESSURE: 130 MMHG

## 2020-01-16 DIAGNOSIS — G45.9 TRANSIENT ISCHEMIC ATTACK: Primary | ICD-10-CM

## 2020-01-16 RX ORDER — MAGNESIUM CITRATE
296 SOLUTION, ORAL ORAL
COMMUNITY
End: 2021-02-16

## 2020-01-16 RX ORDER — ASPIRIN 81 MG/1
TABLET ORAL DAILY
COMMUNITY
End: 2020-11-02

## 2020-01-16 NOTE — PATIENT INSTRUCTIONS
[facility]   Statement to Patients  April 1, 2014      In an effort to ensure the large volume of patient prescription refills is processed in the most efficient and expeditious manner, we are asking our patients to assist us by calling your Pharmacy for all prescription refills, this will include also your  Mail Order Pharmacy. The pharmacy will contact our office electronically to continue the refill process. Please do not wait until the last minute to call your pharmacy. We need at least 48 hours (2days) to fill prescriptions. We also encourage you to call your pharmacy before going to  your prescription to make sure it is ready. With regard to controlled substance prescription refill requests (narcotic refills) that need to be picked up at our office, we ask your cooperation by providing us with at least 72 hours (3days) notice that you will need a refill. We will not refill narcotic prescription refill requests after 4:00pm on any weekday, Monday through Thursday, or after 2:00pm on Fridays, or on the weekends. We encourage everyone to explore another way of getting your prescription refill request processed using [employer], our patient web portal through our electronic medical record system. [employer] is an efficient and effective way to communicate your medication request directly to the office and  downloadable as an alicja on your smart phone . [employer] also features a review functionality that allows you to view your medication list as well as leave messages for your physician. Are you ready to get connected? If so please review the attatched instructions or speak to any of our staff to get you set up right away! Thank you so much for your cooperation. Should you have any questions please contact our Practice Administrator.     The Physicians and Staff,  [facility]

## 2020-01-16 NOTE — LETTER
1/16/20 Patient: Sami Conway YOB: 1940 Date of Visit: 1/16/2020 Quinton William MD 
[address] [place] [id number] VIA Facsimile: [phone] Dear Quinton William MD, Thank you for referring Mr. Sami Conway to [address] [place] [address] for evaluation. My notes for this consultation are attached. If you have questions, please do not hesitate to call me. I look forward to following your patient along with you. Sincerely, Luis Chen MD

## 2020-01-16 NOTE — PROGRESS NOTES
[facility]   NEW PATIENT EVALUATION/CONSULTATION       PATIENT NAME: Magui Cooper    MRN: [id number]    REASON FOR CONSULTATION: Follow-up for transient ischemic attack. 01/16/20      Previous records (physician notes, laboratory reports, and radiology reports) and imaging studies were reviewed and summarized. My recommendations will be communicated back to the patient's physician(s) via electronic medical record and/or by [address] mail. HISTORY OF PRESENT ILLNESS:  Magui Cooper is a 78 y.o. right handed male presenting for follow-up of transient ischemic attack at an outside hospital.  In brief, patient was in usual state of health in November 2019 when while driving back from visiting family he had sudden onset of language impairment. Patient presented to [facility] and was admitted with a diagnosis of transient ischemic attack. Exact diagnostics performed at the time are unclear though patient says he had at least 5 or 6 tests completed. At time of discharge he was told to take full dose aspirin as well as Plavix 25 mg for 3 weeks then resume aspirin and to increase his atorvastatin from 40 mg to 80 mg nightly. He is unsure whether lipid panel was checked at that time. As he had a echocardiogram pending he was discharged without this and subsequently had AGUSTO completed which demonstrated biatrial enlargement and a partially occluded left atrial appendage from watchman device. Since that time he is remained in his usual state of health without any recurrent events concerning for TIA. Does not really have any questions feels that that it was sufficiently explained at the time of presentation. Has a watchman in place secondary to significant GI bleed from hemorrhoids resulting in acute blood loss anemia while on anticoagulation.   States he was started on Eliquis after being diagnosed with atrial fibrillation earlier in 2019 with the watchman being placed at [facility] in [place]. Objectively his major complaint today is memory impairment which appears mild. Memory impairment is manifest as not remembering a person's name after meeting them or the name of location though he is not forgetting familiar people or places. Denies any impairment on his day-to-day function, denies getting lost.  Practice is a diet in moderation does not smoke does not regularly check his blood pressure. He does exercise by going up his 3 flights of stairs 5-7 times per day and was previously exercising more regularly before recent health events. PAST MEDICAL HISTORY:  Past Medical History:   Diagnosis Date    Atrial fibrillation (HCC)     Depression     GERD (gastroesophageal reflux disease)     Glaucoma     High cholesterol     IBS (irritable bowel syndrome)        PAST SURGICAL HISTORY:  Past Surgical History:   Procedure Laterality Date    HX CHOLECYSTECTOMY      HX HEMORRHOIDECTOMY      HX ORTHOPAEDIC      RIGHT knee    HX TONSILLECTOMY      [facility] CARDIOVERS[facility] ELECTIVE ARRHYTHMIA EXTERNAL N/A 3/13/2019    EP CARDIOVERSION performed by Kaela Calderón MD at [address], [facility]       FAMILY HISTORY:   History reviewed. No pertinent family history. SOCIAL HISTORY:  Social History     Socioeconomic History    Marital status:      Spouse name: Not on file    Number of children: Not on file    Years of education: Not on file    Highest education level: Not on file   Tobacco Use    Smoking status: Never Smoker    Smokeless tobacco: Never Used   Substance and Sexual Activity    Alcohol use: Yes    Drug use: No         MEDICATIONS:   Current Outpatient Medications   Medication Sig Dispense Refill    aspirin delayed-release 81 mg tablet Take  by mouth daily.  magnesium citrate solution Take 296 mL by mouth now.  atorvastatin (LIPITOR) 40 mg tablet Take 1 Tab by mouth daily. 30 Tab 0    dilTIAZem CD (CARDIZEM CD) 240 mg ER capsule Take 1 Cap by mouth daily.  27 Cap 1    latanoprostene bunod (VYZULTA) 0.024 % drop Administer 1 Drop to right eye nightly.  busPIRone (BUSPAR) 10 mg tablet Take 10 mg by mouth two (2) times a day.  dorzolamide-timolol (COSOPT) 22.3-6.8 mg/mL ophthalmic solution Administer 1 Drop to right eye two (2) times a day.  omeprazole (PRILOSEC) 20 mg capsule Take 20 mg by mouth daily.  vortioxetine (BRINTELLIX) 10 mg tablet Take 10 mg by mouth daily.  clopidogrel (PLAVIX) 75 mg tab Take  by mouth.  gabapentin (NEURONTIN) 100 mg capsule Take 100-200 mg by mouth nightly as needed.  LORazepam (ATIVAN) 0.5 mg tablet Take 0.5-1 mg by mouth nightly as needed for Anxiety.  losartan (COZAAR) 50 mg tablet Take 1 Tab by mouth daily. 30 Tab 1    therapeutic multivitamin (THERAGRAN) tablet Take 1 Tab by mouth daily. ALLERGIES:  Allergies   Allergen Reactions    Sulfa (Sulfonamide Antibiotics) Unknown (comments)     From infancy, unsure if actually allergic           REVIEW OF SYSTEMS:  Review of Systems   Constitutional: Negative for chills, diaphoresis, fever, malaise/fatigue and weight loss. HENT: Negative for congestion, hearing loss, sore throat and tinnitus. Eyes: Negative for blurred vision, double vision and photophobia. Respiratory: Negative for cough, shortness of breath and wheezing. Cardiovascular: Negative for chest pain and PND. Gastrointestinal: Negative for heartburn, nausea and vomiting. Genitourinary: Negative for dysuria. Musculoskeletal: Negative for back pain, falls, joint pain, myalgias and neck pain. Skin: Negative for rash. Neurological: Negative for dizziness, tingling, tremors, sensory change, speech change, focal weakness, seizures, loss of consciousness, weakness and headaches. Endo/Heme/Allergies: Does not bruise/bleed easily. Psychiatric/Behavioral: Positive for memory loss. The patient does not have insomnia.           PHYSICAL EXAM:  Vital Signs:   Visit Vitals  /70 (BP 1 Location: Left arm, BP Patient Position: Sitting)   Pulse 70   Temp 98.4 °F (36.9 °C)   Resp 16   Ht 6' (1.829 m)   Wt 79.4 kg (175 lb)   SpO2 98%   BMI 23.73 kg/m²        General Medical Exam:  General:  Well appearing, comfortable, in no apparent distress. Eyes/ENT: see cranial nerve examination. Neck: No masses appreciated. Full range of motion without tenderness. Respiratory:  Clear to auscultation, good air entry bilaterally. Cardiac:  Regular rate and rhythm, no murmur. GI:  Soft, non-tender, non-distended abdomen. Bowel sounds normal. No masses, organomegaly. Extremities:  No deformities, edema, or skin discoloration. Skin:  No rashes or lesions. Neurological:  · Mental Status:  Alert and oriented to person, place, and time. Attention is intact. Speech is clear without hypophonia or dysarthria. Language is fluent without evidence for aphasia. .   · Cranial Nerves:   CNII/III/IV/VI: visual fields full to confrontation, EOMI, PERRL, no ptosis or nystagmus. CN V: Facial sensation intact bilaterally  CN VII: Facial muscles symmetric and strong   CN VIII: Hearing intact to spoken voice. CN IX/X: Normal palatal movement   CN XI: Full strength shoulder shrug bilaterally   CN XII: Tongue protrusion full and midline without fasciculation or atrophy  · Motor: Normal tone and muscle bulk with no pronator drift.    Individual muscle group testing:  Shoulder abduction:   Left:5/5   Right : 5/5    Elbow flexion:      Left:5/5   Right : 5/5  Elbow extension:    Left:5/5   Right : 5/5   Wrist flexion:    Left:5/5   Right : 5/5  Wrist extension:    Left:5/5   Right : 5/5  Arm pronation:   Left:5/5   Right : 5/5  Arm supination:   Left:5/5   Right : 5/5    Finger flexion:    Left:5/5   Right : 5/5    Finger extension:   Left:5/5   Right : 5/5   Hip flexion:     Left:5/5   Right : 5/5         Knee flexion:    Left:5/5   Right : 5/5    Knee extension:   Left:5/5   Right : 5/5 Dorsiflexion:     Left:5/5   Right : 5/5  Plantar flexion:    Left:5/5   Right : 5/5      · MSRs: No crossed adductors or clonus. RIGHT  LEFT   Brachioradialis 1+ 1+   Biceps 1+ 1+   Triceps 1+ 1+   Knee 2+ 2+   Achilles 1+ 1+        Plantar response Downward Downward     · Sensation: Normal and symmetric perception of touch, temperature. Romberg absent. · Coordination: No dysmetria. Normal rapid alternating movements; finger-to-nose docked in upper extremities  · Gait: Primary gait and station intact, tandem gait intact. PERTINENT DATA:  LDL 2018, 73.6, Chol 149, HDL 39, . TSH, B12 from 2019 within normal limits. CT Results (maximum last 3): ASSESSMENT:      Wilber Uribe is a pleasant 33-year-old right-hand-dominant gentleman with a history of multiple vascular risk factors who presents to neurology clinic for follow-up of transient scheme attack as well as subjective working memory impairment    PLAN:  Transient ischemic attack: While I do not have much in the way of data, patient presumably underwent appropriate evaluation for TIA at [facility] which would include vessel imaging, MRI, serologic evaluation and echocardiogram which was performed after discharge.   Currently the patient is on 81 mg aspirin, encouraged him to stay on this dose as there is no clear indication to be on 325 mg daily  LDL was 73 at the end of 2018 while on 40 mg of Lipitor daily, if patient desires to be on as little medicine as possible would recommend repeating lipid panel this year and adjusting Lipitor as felt appropriate to achieve an LDL of at least less than 70, around 55 given current  recommendations  Patient states that he discussed the incomplete occlusion of his left atrial appendage with cardiologist at [facility] in [place] who said it was nothing to worry about, patient was previously trialed on anticoagulation complicated by significant hemorrhoidal bleeding with blood loss anemia  Other than encouraging patient to maintain a healthy lifestyle, resume regular exercise maintaining blood pressure less than 130/80 and addressing lipid as necessary no further recommendations for TIA exist  Discussed with patient that management of TIA is predominantly same as managing systemic vascular risk factors and as such there is no reason to continue to follow neurology clinic for this    Subjective cognitive impairment:  Is at worst to following domain of mild cognitive impairments patient reports forgetfulness of names new locations and placing objects on his desk without greater impairment of function than this  There is no history of dementia in his family B12 TSH were unremarkable in 2019  Patient denies being told any thing significant out of his imaging at [facility] suggested the patient request copy of his imaging from [facility] if needed in the future  Otherwise given recent imaging would not repeat brain MRI, at this time would not refer for neuropsychologic testing though if symptoms progress this would be recommended  Otherwise again encouraged healthy lifestyle including exercise proper diet social engagement and management of medical comorbidities    Patient can return to clinic as needed welcome to call as needed    Thank you for opportunity participate in care of Mr. Ousmane Esteban please feel free to reach out with questions concerns    I have discussed the diagnosis with the patient and the intended plan as seen in the above orders. Patient is in agreement. The patient has received an after-visit summary and questions were answered concerning future plans. I have discussed medication side effects and warnings with the patient as well. Corrina Rm MD       CC Referring provider:  No referring provider defined for this encounter.     Nanci Pastrana MD

## 2020-04-09 ENCOUNTER — HOSPITAL ENCOUNTER (OUTPATIENT)
Age: 80
Setting detail: OUTPATIENT SURGERY
Discharge: HOME | End: 2020-04-09
Attending: INTERNAL MEDICINE | Admitting: INTERNAL MEDICINE
Payer: MEDICARE

## 2020-04-09 VITALS
DIASTOLIC BLOOD PRESSURE: 69 MMHG | RESPIRATION RATE: 20 BRPM | HEIGHT: 72 IN | TEMPERATURE: 97.6 F | WEIGHT: 182 LBS | OXYGEN SATURATION: 96 % | HEART RATE: 84 BPM | BODY MASS INDEX: 24.65 KG/M2 | SYSTOLIC BLOOD PRESSURE: 156 MMHG

## 2020-04-09 DIAGNOSIS — R06.02 BREATH, SHORTNESS: ICD-10-CM

## 2020-04-09 PROCEDURE — 77030013744: Performed by: INTERNAL MEDICINE

## 2020-04-09 PROCEDURE — 74011250636 HC RX REV CODE- 250/636: Performed by: INTERNAL MEDICINE

## 2020-04-09 PROCEDURE — 93458 L HRT ARTERY/VENTRICLE ANGIO: CPT | Performed by: INTERNAL MEDICINE

## 2020-04-09 PROCEDURE — 77030013715 HC INFL SYS MRTM -B: Performed by: INTERNAL MEDICINE

## 2020-04-09 PROCEDURE — 99153 MOD SED SAME PHYS/QHP EA: CPT | Performed by: INTERNAL MEDICINE

## 2020-04-09 PROCEDURE — C1887 CATHETER, GUIDING: HCPCS | Performed by: INTERNAL MEDICINE

## 2020-04-09 PROCEDURE — 77030004538 HC CATH ANGI DX MP BSC -A: Performed by: INTERNAL MEDICINE

## 2020-04-09 PROCEDURE — 74011250637 HC RX REV CODE- 250/637: Performed by: INTERNAL MEDICINE

## 2020-04-09 PROCEDURE — 99152 MOD SED SAME PHYS/QHP 5/>YRS: CPT | Performed by: INTERNAL MEDICINE

## 2020-04-09 PROCEDURE — 93571 IV DOP VEL&/PRESS C FLO 1ST: CPT | Performed by: INTERNAL MEDICINE

## 2020-04-09 PROCEDURE — C1894 INTRO/SHEATH, NON-LASER: HCPCS | Performed by: INTERNAL MEDICINE

## 2020-04-09 PROCEDURE — 77030029065 HC DRSG HEMO QCLOT ZMED -B: Performed by: INTERNAL MEDICINE

## 2020-04-09 PROCEDURE — C1760 CLOSURE DEV, VASC: HCPCS | Performed by: INTERNAL MEDICINE

## 2020-04-09 PROCEDURE — C1769 GUIDE WIRE: HCPCS | Performed by: INTERNAL MEDICINE

## 2020-04-09 PROCEDURE — 74011636320 HC RX REV CODE- 636/320: Performed by: INTERNAL MEDICINE

## 2020-04-09 PROCEDURE — 74011000250 HC RX REV CODE- 250: Performed by: INTERNAL MEDICINE

## 2020-04-09 RX ORDER — LIDOCAINE HYDROCHLORIDE 10 MG/ML
INJECTION INFILTRATION; PERINEURAL AS NEEDED
Status: DISCONTINUED | OUTPATIENT
Start: 2020-04-09 | End: 2020-04-09 | Stop reason: HOSPADM

## 2020-04-09 RX ORDER — HEPARIN SODIUM 200 [USP'U]/100ML
INJECTION, SOLUTION INTRAVENOUS
Status: COMPLETED | OUTPATIENT
Start: 2020-04-09 | End: 2020-04-09

## 2020-04-09 RX ORDER — MIDAZOLAM HYDROCHLORIDE 1 MG/ML
INJECTION, SOLUTION INTRAMUSCULAR; INTRAVENOUS AS NEEDED
Status: DISCONTINUED | OUTPATIENT
Start: 2020-04-09 | End: 2020-04-09 | Stop reason: HOSPADM

## 2020-04-09 RX ORDER — ACETAMINOPHEN 325 MG/1
650 TABLET ORAL ONCE
Status: COMPLETED | OUTPATIENT
Start: 2020-04-09 | End: 2020-04-09

## 2020-04-09 RX ORDER — HYDRALAZINE HYDROCHLORIDE 20 MG/ML
20 INJECTION INTRAMUSCULAR; INTRAVENOUS ONCE
Status: COMPLETED | OUTPATIENT
Start: 2020-04-09 | End: 2020-04-09

## 2020-04-09 RX ORDER — SODIUM CHLORIDE 9 MG/ML
3 INJECTION, SOLUTION INTRAVENOUS CONTINUOUS
Status: DISPENSED | OUTPATIENT
Start: 2020-04-09 | End: 2020-04-09

## 2020-04-09 RX ORDER — FERROUS GLUCONATE 324(38)MG
324 TABLET ORAL
COMMUNITY
End: 2020-11-02

## 2020-04-09 RX ORDER — LOSARTAN POTASSIUM 50 MG/1
50 TABLET ORAL ONCE
Status: COMPLETED | OUTPATIENT
Start: 2020-04-09 | End: 2020-04-09

## 2020-04-09 RX ORDER — FENTANYL CITRATE 50 UG/ML
INJECTION, SOLUTION INTRAMUSCULAR; INTRAVENOUS AS NEEDED
Status: DISCONTINUED | OUTPATIENT
Start: 2020-04-09 | End: 2020-04-09 | Stop reason: HOSPADM

## 2020-04-09 RX ORDER — DILTIAZEM HYDROCHLORIDE 240 MG/1
240 CAPSULE, COATED, EXTENDED RELEASE ORAL ONCE
Status: COMPLETED | OUTPATIENT
Start: 2020-04-09 | End: 2020-04-09

## 2020-04-09 RX ORDER — SODIUM CHLORIDE 9 MG/ML
1.5 INJECTION, SOLUTION INTRAVENOUS CONTINUOUS
Status: DISCONTINUED | OUTPATIENT
Start: 2020-04-09 | End: 2020-04-09

## 2020-04-09 RX ADMIN — LOSARTAN POTASSIUM 50 MG: 50 TABLET, FILM COATED ORAL at 09:07

## 2020-04-09 RX ADMIN — LOSARTAN POTASSIUM 50 MG: 50 TABLET, FILM COATED ORAL at 10:46

## 2020-04-09 RX ADMIN — DILTIAZEM HYDROCHLORIDE 240 MG: 240 CAPSULE, COATED, EXTENDED RELEASE ORAL at 09:08

## 2020-04-09 RX ADMIN — HYDRALAZINE HYDROCHLORIDE 20 MG: 20 INJECTION INTRAMUSCULAR; INTRAVENOUS at 10:15

## 2020-04-09 RX ADMIN — SODIUM CHLORIDE 3 ML/KG/HR: 900 INJECTION, SOLUTION INTRAVENOUS at 07:30

## 2020-04-09 RX ADMIN — ACETAMINOPHEN 650 MG: 325 TABLET ORAL at 10:51

## 2020-04-09 NOTE — PROGRESS NOTES
Transfer to [address] [place] [address] from Procedure Area    Verbal report given to Neto Owens RN on [employer] being transferred to Cardiac Cath Lab  for routine post - op   Patient is post [facility] procedure. Patient stable upon transfer to . Report consisted of patients Situation, Background, Assessment and   Recommendations(SBAR). Information from the following report(s) Procedure Summary, Intake/Output, MAR and Cardiac Rhythm NSR was reviewed with the receiving nurse. Opportunity for questions and clarification was provided. Patient medicated during procedure with orders obtained and verified by Dr. Jerod Gross. Refer to patient PROCEDURE REPORT for vital signs, assessment, status, and response during procedure.

## 2020-04-09 NOTE — PROGRESS NOTES
Cardiac Cath Lab Recovery Arrival Note:      Tristin Chavis arrived to Cardiac Cath Lab, Recovery Area. Staff introduced to patient. Patient identifiers verified with NAME and DATE OF BIRTH. Procedure verified with patient. Consent forms reviewed and signed by patient or authorized representative and verified. Allergies verified. Patient and family oriented to department. Patient and family informed of procedure and plan of care. Questions answered with review. Patient prepped for procedure, per orders from physician, prior to arrival.    Patient on cardiac monitor, non-invasive blood pressure, SPO2 monitor. On Room Air. Patient is A&Ox 4. Patient reports No Pain. Patient in stretcher, in low position, with side rails up, call bell within reach, patient instructed to call if assistance as needed. Patient prep in: [address], [place] [address]. Family in: Waiting Room.    Prep by: Mariano Garcia RN

## 2020-04-09 NOTE — PROGRESS NOTES
TRANSFER - IN REPORT:    Verbal report received from [address] CVT on Mona Nelson  being received from procedure area for routine progression of care. Report consisted of patients Situation, Background, Assessment and Recommendations(SBAR). Information from the following report(s) SBAR, Procedure Summary, MAR, Recent Results and Cardiac Rhythm NSR was reviewed with the receiving clinician. Opportunity for questions and clarification was provided. Assessment completed upon patients arrival to [address] [place] [address] and care assumed. Cardiac Cath Lab Recovery Arrival Note:    Mona Nelson arrived to [facility] recovery area. Patient procedure= C. Patient on cardiac monitor, non-invasive blood pressure, SPO2 monitor. On Room air . IV  of NS on pump at 25 ml/hr. Patient status doing well without problems. Patient is A&Ox 4. Patient reports No Pain. PROCEDURE SITE CHECK:    Procedure site:with moderate bloody ooze and No Hematoma, No pain/discomfort reported at procedure site. No change in patient status. Continue to monitor patient and status. Manual pressure held to right groin procedure site until hemostasis obtained. Pt tolerated well.

## 2020-04-09 NOTE — PROGRESS NOTES
Pt given additional Losartan 50 mg as per Dr Fani Roque for Pt's elevated BP. Pt assisted with urinal and voided 450 ml . Will monitor BP.  See [facility]

## 2020-04-09 NOTE — PROGRESS NOTES
Cardiac Cath Lab Procedure Area Arrival Note:    Tristin Chavis arrived to Cardiac Cath Lab, Procedure Area. Patient identifiers verified with NAME and DATE OF BIRTH. Procedure verified with patient. Consent forms verified. Allergies verified. Patient informed of procedure and plan of care. Questions answered with review. Patient voiced understanding of procedure and plan of care. Patient on cardiac monitor, non-invasive blood pressure, SPO2 monitor. On room air. Placed on   O2 @ 2 lpm via nasal cannula. IV of NS on pump at 247 ml/hr. Patient status doing well without problems. Patient is A&Ox 4. Patient reports no pain. Patient medicated during procedure with orders obtained and verified by Dr. Kin Shone. Refer to patients Cardiac Cath Lab PROCEDURE REPORT for vital signs, assessment, status, and response during procedure, printed at end of case. Printed report on chart or scanned into chart.

## 2020-04-09 NOTE — PROGRESS NOTES
Pt with elevated /109 HR= 73 . Dr Purnima Saldaña aware . Pt medicated as per Dr Purnima Saldaña see MAR.

## 2020-10-29 ENCOUNTER — TELEPHONE (OUTPATIENT)
Dept: CARDIOLOGY CLINIC | Age: 80
End: 2020-10-29

## 2020-10-29 NOTE — TELEPHONE ENCOUNTER
I called patient's daughter to schedule New Patient appointment. Patient is scheduled 11/2/20 at 1pm split visit Karissa Mathur/Dr. Michael Reyes. I informed her I would call patient to discuss appointment and screen patient for covid-19.

## 2020-10-29 NOTE — TELEPHONE ENCOUNTER
I called patient to discuss new patient appointment for Monday. Patient screened for covid-19. Patient states he doesn't need an appointment reminder tomorrow.

## 2020-11-02 ENCOUNTER — OFFICE VISIT (OUTPATIENT)
Dept: CARDIOLOGY CLINIC | Age: 80
End: 2020-11-02
Payer: MEDICARE

## 2020-11-02 VITALS
OXYGEN SATURATION: 99 % | RESPIRATION RATE: 20 BRPM | TEMPERATURE: 96.7 F | HEART RATE: 76 BPM | SYSTOLIC BLOOD PRESSURE: 124 MMHG | BODY MASS INDEX: 23.46 KG/M2 | DIASTOLIC BLOOD PRESSURE: 66 MMHG | WEIGHT: 173.2 LBS | HEIGHT: 72 IN

## 2020-11-02 DIAGNOSIS — M10.9 GOUT, UNSPECIFIED CAUSE, UNSPECIFIED CHRONICITY, UNSPECIFIED SITE: ICD-10-CM

## 2020-11-02 DIAGNOSIS — I48.19 PERSISTENT ATRIAL FIBRILLATION (HCC): ICD-10-CM

## 2020-11-02 DIAGNOSIS — R06.02 SHORTNESS OF BREATH: ICD-10-CM

## 2020-11-02 DIAGNOSIS — D50.9 IRON DEFICIENCY ANEMIA, UNSPECIFIED IRON DEFICIENCY ANEMIA TYPE: ICD-10-CM

## 2020-11-02 DIAGNOSIS — G47.33 OSA (OBSTRUCTIVE SLEEP APNEA): ICD-10-CM

## 2020-11-02 DIAGNOSIS — R53.83 FATIGUE, UNSPECIFIED TYPE: ICD-10-CM

## 2020-11-02 DIAGNOSIS — Z12.5 SCREENING FOR PROSTATE CANCER: ICD-10-CM

## 2020-11-02 DIAGNOSIS — I48.19 PERSISTENT ATRIAL FIBRILLATION (HCC): Primary | ICD-10-CM

## 2020-11-02 DIAGNOSIS — I50.9 CONGESTIVE HEART FAILURE, UNSPECIFIED HF CHRONICITY, UNSPECIFIED HEART FAILURE TYPE (HCC): ICD-10-CM

## 2020-11-02 DIAGNOSIS — E55.9 VITAMIN D DEFICIENCY: ICD-10-CM

## 2020-11-02 PROCEDURE — 94618 PULMONARY STRESS TESTING: CPT | Performed by: INTERNAL MEDICINE

## 2020-11-02 PROCEDURE — 94621 CARDIOPULM EXERCISE TESTING: CPT | Performed by: INTERNAL MEDICINE

## 2020-11-02 PROCEDURE — G0103 PSA SCREENING: HCPCS | Performed by: INTERNAL MEDICINE

## 2020-11-02 PROCEDURE — 99205 OFFICE O/P NEW HI 60 MIN: CPT | Performed by: INTERNAL MEDICINE

## 2020-11-02 RX ORDER — CHOLECALCIFEROL (VITAMIN D3) 125 MCG
100 CAPSULE ORAL 2 TIMES DAILY
COMMUNITY

## 2020-11-02 RX ORDER — ATORVASTATIN CALCIUM 80 MG/1
80 TABLET, FILM COATED ORAL DAILY
COMMUNITY

## 2020-11-02 RX ORDER — SUCRALFATE 1 G/1
1 TABLET ORAL 4 TIMES DAILY
COMMUNITY
End: 2021-04-14

## 2020-11-02 RX ORDER — PANTOPRAZOLE SODIUM 40 MG/1
40 TABLET, DELAYED RELEASE ORAL DAILY
COMMUNITY
End: 2021-04-22 | Stop reason: ALTCHOICE

## 2020-11-02 RX ORDER — FUROSEMIDE 40 MG/1
40 TABLET ORAL 2 TIMES DAILY
COMMUNITY
End: 2021-03-17

## 2020-11-02 NOTE — PROGRESS NOTES
[facility], [facility] Note    Patient name: Ara Daniels  Patient : 1940  Patient MRN: [id number]  Date of service: 20    Primary care physician: Bienvenido Flores MD  Primary general cardiologist:      Primary [facility] cardiologist: Mine Freeman MD    CHIEF COMPLAINT:  Chronic systolic heart failure    PLAN:  No medical therapy for heart failure for now; until workup completed for amyloid  Discontinued cozaar due to orthostasis and exercise induced lightheadedness  Does not have symptoms of volume overload; will decide on diuretics pending labs  Continue eliquis for one month after ablation per Dr. Maikol Mann current dose of statin, check lipid profile, CPK and LFT  Schedule venofer 200mg IV x 2; discontinue oral iron due to constipation  Schedule sleep study due to high STOP-BANG score 4-5  Schedule PYP test due to high probability of cardiac amyloid  Check CPEST due to discrepancy between reported (by patient) and observed (by wife) symptoms of H  Labs: CBC, BMP, LFT, pro-NT-BNP, iron profile, thyroid profile, vitamin D level, lipid profile, CPK, gammopathy profile, UA with culture, PSA screen for occult malignancy, gammopathy profile, B2M, uric acid, troponin I  Reinforced low salt diet  Reinforced fluid restriction to 6 x 8oz glasses per day  Patient needs advanced care plan on file  Follow-up with primary/EP cardiologist, Dr. Shonda Amor  Follow-up with PCP on evaluation of iron deficiency anemia  Provided patient education materials for COVID precautions  [facility] in 4[facility]6 weeks with MD to review results    IMPRESSION:  Fatigue  Coronary artery disease  · LHC (20) 30% p-LAD, 30% m-LAD, 50% 2nd OM (20); good flow reserve  Atrial fibrillation, paroxysmal  · DCCV 3/2019  · S/p afib ablation (10/20/20 by Dr. Shonda Amor)  · Not on chronic anticoagulation due to life-threatening GI bleed/hemorrhoids  · S/p Watchman device in [place] 7/23/19, well seated by echo (10/2020)  Cardiac risk factors   HTN   HL  Family history of early CAD  DJD  Depression  High risk for DANILO, STOP-BANG 4  RLS on neurontin    CARDIAC IMAGING:  CTA for EP mapping reviewed (10/6/20) Watchman device occlusive  Cardiac CTA (3/30/20) moderate diffuse CAD  Cardiac PET (3/7/19) normal, Rest EF 32 and stress 38  Echo (3/2020) LVEF 55%, IVSd 1.6cm, LV mass 312g increased, RV function normal, PA pressure 30, sclerotic AV without stenosis, trace TR, mild mR, IVC normal  EKG (9/2020) atrial fib, rate in 90s, low voltae, V1-V2 q wave  LHC (LHC (4/9/2020) LHC 30% p-LAD, 30% m-LAD, 50% 2nd OM FFR flow reserve 0.92 (4/9/20)  LHC (2011) tirvial luminal irregularities  Remainder in records (see in epic)    HEMODYNAMICS:  RHC not done  CPEST not done  [employer] underwent a 6 min walk test. His initial O2  saturation was 99 % and his baseline heart rate was 76 BPM. He walkedat a distance of 312.15 meters. His post O2  saturation was 90 % and his post walk heart rate was 118 BPM.    OTHER IMAGING:  CT abd (3/24/17) There is distention of the proximal half of the small bowel with fecal-like material in the mid small bowel loops. This is consistent with at least partial obstruction of the small bowel. No structural abnormality is  identified to account for this. Chest CT( 1/2016) 1. Unchanged pulmonary nodule in the right upper lobe just over 6 mm on  today's exam. Per Fleischner criteria for high risk patients, follow-up is  recommended in 6-9 months. Low risk Fleischner criteria follow-up for 6 to 8mm pulmonary nodules is at 6-12  months and 18-24 months. 2. Aortic valve annular calcifications with suggestion of probable aortic valve leaflet calcifications. . This finding can be associated with aortic   stenosis.  3. Cholelithiasis.     SOCIAL HISTORY:  , never smoker  Alcohol occasional    SOCIAL HISTORY:  Never smoke. Social alcohol. No illicit drugs. HISTORY OF PRESENT ILLNESS:  I had the pleasure of seeing [facility] in [facility] at [address] in [place]. Briefly, Ad Sotelo is a 78 y.o. male with h/o chronic fatigue, high risk for DANILO (STOP-BANG 4), HTN, HL, coronary artery disease (LHC on 4/9/20 showed 30% p-LAD, 30% m-LAD, 50% 2nd OM; good flow reserve), atrial fibrillation, paroxysmal, DCCV 3/2019 and s/p afib ablation (10/20/20 by Dr. Ann-Marie Monte), not on chronic anticoagulation due to life-threatening GI bleed/hemorrhoids, s/p Watchman device in [place] 7/23/19, well seated by echo (10/2020), depression. Patient was referred by Dr. Ann-Marie Monte to evaluated for possible HFpEF. INTERVAL HISTORY:  Today, patient presents for initial clinic visit accompanied by wife. Patient is doing very well. Patient walked to our clinic from parking garage without having to slow down or stop. Patient states he can walk more than one block without symptoms of fatigue or shortness of breath. Patient can walk one flight of stairs without symptoms of fatigue or shortness of breath. Patient can perform home activities without problem and routinely participates in daily walking for more than 15 minutes. Patient denies symptoms of volume overload or leg edema. Patient denies abdominal bloating or change of appetite. Patient's weight remained stable. Patient denies orthopnea, PND. Patient reports nocturia x4 and RLS. Patient denies irregular heart rate or palpitations. Patient denies other cardiac symptoms such as chest pain or leg pain with walking. Patient reports chronic constipation, stopped taking iron. Patient is compliant with fluid restriction and taking medications as prescribed. Patient manages his own medications. Patient account is different from observation of his wife, who notes much less activity for the past year.  Patient reluctant to walk due to exercise induced lightheadness/unsteadiness. Patient not able to complete one block distance due to dyspnea. Unexplained weight loss. 3 pillow orthopnea. Non-restorative sleep, needs daily naps. REVIEW OF SYSTEMS:  General: Denies fever, night sweats. Ear, nose and throat: Denies difficulty hearing, sinus problems, runny nose, post-nasal drip, ringing in ears, mouth sores, loose teeth, ear pain, nosebleeds, sore throate, facial pain or numbess  Cardiovascular: see above in the interval history  Respiratory: Denies cough, wheezing, sputum production, hemoptysis. Gastrointestinal: Denies heartburn, intolerance to certain foods, diarrhea, abdominal pain, nausea, vomiting, difficulty swallowing, blood in stool  Kidney and bladder: Denies painful urination, frequent urination, urgency, prostate problems and impotence  Musculoskeletal: Denies joint pain, muscle weakness  Skin and hair: Denies change in existing skin lesions, hair loss or increase, breast changes    PHYSICAL EXAM:  Visit Vitals  /66 (BP 1 Location: Right arm, BP Patient Position: Sitting)   Pulse 76 Comment: irregular   Temp (!) 96.7 °F (35.9 °C) (Oral)   Resp 20   Ht 6' (1.829 m)   Wt 173 lb 3.2 oz (78.6 kg)   SpO2 99%   BMI 23.49 kg/m²     General: Patient is well developed, well-nourished in no acute distress  HEENT: Normocephalic and atraumatic. No scleral icterus. Pupils are equal, round and reactive to light and accomodation. No conjunctival injection. Oropharynx is clear. Neck: Supple. No evidence of thyroid enlargements or lymphadenopathy. JVD: Cannot be appreciated   Lungs: Breath sounds are equal and clear bilaterally. No wheezes, rhonchi, or rales. Heart: Regular rate and rhythm with normal S1 and S2. No murmurs, gallops or rubs. Abdomen: Soft, no mass or tenderness. No organomegaly or hernia. Bowel sounds present. Genitourinary and rectal: deferred  Extremities: No cyanosis, clubbing, or edema.   Neurologic: No focal sensory or motor deficits are noted. Grossly intact. Psychiatric: Awake, alert an doriented x 3. Appropriate mood and affect. Skin: Warm, dry and well perfused. No lesions, nodules or rashes are noted. PAST MEDICAL HISTORY:  Past Medical History:   Diagnosis Date    Atrial fibrillation (HCC)     Depression     GERD (gastroesophageal reflux disease)     Glaucoma     High cholesterol     IBS (irritable bowel syndrome)        PAST SURGICAL HISTORY:  Past Surgical History:   Procedure Laterality Date    HX CHOLECYSTECTOMY      HX HEMORRHOIDECTOMY      HX ORTHOPAEDIC      RIGHT knee    HX TONSILLECTOMY      [place] CARDIOVERSION ELECTIVE ARRHYTHMIA EXTERNAL N/A 3/13/2019    EP CARDIOVERSION performed by Jerod Corrales MD at [address], [facility]       FAMILY HISTORY:  No family history on file. SOCIAL HISTORY:  Social History     Socioeconomic History    Marital status:      Spouse name: Not on file    Number of children: Not on file    Years of education: Not on file    Highest education level: Not on file   Tobacco Use    Smoking status: Former Smoker     Last attempt to quit:      Years since quittin.8    Smokeless tobacco: Never Used   Substance and Sexual Activity    Alcohol use: Yes     Frequency: Monthly or less    Drug use: No       LABORATORY RESULTS:  No flowsheet data found. ALLERGY:  Allergies   Allergen Reactions    Sulfa (Sulfonamide Antibiotics) Unknown (comments)     From infancy, unsure if actually allergic          CURRENT MEDICATIONS:    Current Outpatient Medications:     ferrous gluconate 324 mg (38 mg iron) tablet, Take 324 mg by mouth Daily (before breakfast). , Disp: , Rfl:     magnesium citrate solution, Take 296 mL by mouth now., Disp: , Rfl:     gabapentin (NEURONTIN) 100 mg capsule, Take 100-200 mg by mouth nightly as needed. , Disp: , Rfl:     LORazepam (ATIVAN) 0.5 mg tablet, Take 0.5-1 mg by mouth nightly as needed for Anxiety. , Disp: , Rfl:    atorvastatin (LIPITOR) 40 mg tablet, Take 1 Tab by mouth daily. , Disp: 30 Tab, Rfl: 0    losartan (COZAAR) 50 mg tablet, Take 1 Tab by mouth daily. , Disp: 30 Tab, Rfl: 1    latanoprostene bunod (VYZULTA) 0.024 % drop, Administer 1 Drop to right eye nightly., Disp: , Rfl:     busPIRone (BUSPAR) 10 mg tablet, Take 10 mg by mouth two (2) times a day., Disp: , Rfl:     therapeutic multivitamin (THERAGRAN) tablet, Take 1 Tab by mouth daily. , Disp: , Rfl:     dorzolamide-timolol (COSOPT) 22.3-6.8 mg/mL ophthalmic solution, Administer 1 Drop to right eye two (2) times a day., Disp: , Rfl:     omeprazole (PRILOSEC) 20 mg capsule, Take 20 mg by mouth daily. , Disp: , Rfl:     vortioxetine (BRINTELLIX) 10 mg tablet, Take 10 mg by mouth daily. , Disp: , Rfl:     Thank you for your referral and allowing me to participate in this patient's care.     Philly Childs MD PhD  [address] [place] [address], [address]  Phone: [phone]  Fax: [phone]    PATIENT CARE TEAM:  Patient Care Team:  Charles Loyola MD as PCP - General (Family Medicine)     Total visit time: 85 minutes (> 50% spent face-to-face counseling)

## 2020-11-02 NOTE — PATIENT INSTRUCTIONS
Medication changes: 
 
Discontinue losartan (cozaar). Continue to log blood pressures and call Friday with readings. Testing Ordered: 
 
Venofer infusion has been ordered. You will be contacted for scheduling. Labs drawn today. You will be contacted with any abnormal results requiring changes to your current plan of care. Orthostatics completed today in clinic 
 
6 minute walk completed in clinic today. Other Recommendations:  
 
Referral to Sleep Medicine has been ordered. You will be contacted for scheduling. An order for cardio pulmonary stress test has been placed to be done as soon as can be scheduled. You will be receiving an automated call from [facility] of Care to schedule this test. If you are unavailable to receive the call or would like to contact coordination  care yourself you may contact [phone] to schedule. Ensure your drinking an adequate amount of water with a goal of 6-8 eight ounce glasses (1.5-2 liters) of fluid daily. Your urine should be clear and light yellow straw colored. If your blood pressure begins to consistently run below 90/60 and/or you begin to experience dizziness or lightheadedness, please contact the [facility] at [phone]. Follow up with [facility] in 6 weeks. Please monitor your blood pressures daily prior to medications and 2 hours after taking medications. Bring a written record of your blood pressures to your next appointment. Please monitor your weights daily upon waking and after using the bathroom. Keep a written records of your weights and bring to your next appointment. If you have a weight gain of 3 or more pounds overnight OR 5 or more pounds in one week please contact our office. Thank you for allowing us the privilege of being a part of your healthcare team! Please do not hesitate to contact our office at [phone] with any questions or concerns. Lily Angel Low Sodium Diet (2,000 Milligram): Care Instructions Your Care Instructions Too much sodium causes your body to hold on to extra water. This can raise your blood pressure and force your heart and kidneys to work harder. In very serious cases, this could cause you to be put in the hospital. It might even be life-threatening. By limiting sodium, you will feel better and lower your risk of serious problems. The most common source of sodium is salt. People get most of the salt in their diet from canned, prepared, and packaged foods. Fast food and restaurant meals also are very high in sodium. Your doctor will probably limit your sodium to less than 2,000 milligrams (mg) a day. This limit counts all the sodium in prepared and packaged foods and any salt you add to your food. Follow-up care is a key part of your treatment and safety. Be sure to make and go to all appointments, and call your doctor if you are having problems. It's also a good idea to know your test results and keep a list of the medicines you take. How can you care for yourself at home? Read food labels · Read labels on cans and food packages. The labels tell you how much sodium is in each serving. Make sure that you look at the serving size. If you eat more than the serving size, you have eaten more sodium. · Food labels also tell you the Percent Daily Value for sodium. Choose products with low Percent Daily Values for sodium. · Be aware that sodium can come in forms other than salt, including monosodium glutamate (MSG), sodium citrate, and sodium bicarbonate (baking soda). MSG is often added to Asian food. When you eat out, you can sometimes ask for food without MSG or added salt. Buy low-sodium foods · Buy foods that are labeled \"unsalted\" (no salt added), \"sodium-free\" (less than 5 mg of sodium per serving), or \"low-sodium\" (less than 140 mg of sodium per serving).  Foods labeled \"reduced-sodium\" and \"light sodium\" may still have too much sodium. Be sure to read the label to see how much sodium you are getting. · Buy fresh vegetables, or frozen vegetables without added sauces. Buy low-sodium versions of canned vegetables, soups, and other canned goods. Prepare low-sodium meals · Cut back on the amount of salt you use in cooking. This will help you adjust to the taste. Do not add salt after cooking. One teaspoon of salt has about 2,300 mg of sodium. · Take the salt shaker off the table. · Flavor your food with garlic, lemon juice, onion, vinegar, herbs, and spices. Do not use soy sauce, lite soy sauce, steak sauce, onion salt, garlic salt, celery salt, mustard, or ketchup on your food. · Use low-sodium salad dressings, sauces, and ketchup. Or make your own salad dressings and sauces without adding salt. · Use less salt (or none) when recipes call for it. You can often use half the salt a recipe calls for without losing flavor. Other foods such as rice, pasta, and grains do not need added salt. · Rinse canned vegetables, and cook them in fresh water. This removes somebut not allof the salt. · Avoid water that is naturally high in sodium or that has been treated with water softeners, which add sodium. Call your local water company to find out the sodium content of your water supply. If you buy bottled water, read the label and choose a sodium-free brand. Avoid high-sodium foods · Avoid eating: 
? Smoked, cured, salted, and canned meat, fish, and poultry. ? Ham, weston, hot dogs, and luncheon meats. ? Regular, hard, and processed cheese and regular peanut butter. ? Crackers with salted tops, and other salted snack foods such as pretzels, chips, and salted popcorn. ? Frozen prepared meals, unless labeled low-sodium. ? Canned and dried soups, broths, and bouillon, unless labeled sodium-free or low-sodium. ? Canned vegetables, unless labeled sodium-free or low-sodium. ? [place] fries, pizza, tacos, and other fast foods. ? Pickles, olives, ketchup, and other condiments, especially soy sauce, unless labeled sodium-free or low-sodium. Where can you learn more? Go to http://www.gray.com/ Enter B323 in the search box to learn more about \"Low Sodium Diet (2,000 Milligram): Care Instructions. \" Current as of: August 22, 2019               Content Version: 12.6 © 2513-0069 [employer]. Care instructions adapted under license by [employer] (which disclaims liability or warranty for this information). If you have questions about a medical condition or this instruction, always ask your healthcare professional. [address] any warranty or liability for your use of this information.

## 2020-11-03 ENCOUNTER — TELEPHONE (OUTPATIENT)
Dept: CARDIOLOGY CLINIC | Age: 80
End: 2020-11-03

## 2020-11-03 NOTE — TELEPHONE ENCOUNTER
Patient called with questions from yesterday's office visit. Reviewed AVS with patient and answered all questions.  Yelitza Lowe RN

## 2020-11-03 NOTE — TELEPHONE ENCOUNTER
Sleep [facility] referral faxed to [address]. They will review this and contact patient to schedule his appointment.

## 2020-11-04 LAB
25(OH)D3+25(OH)D2 SERPL-MCNC: 37.6 NG/ML (ref 30–100)
ALBUMIN SERPL ELPH-MCNC: 3.9 G/DL (ref 2.9–4.4)
ALBUMIN SERPL-MCNC: 4.4 G/DL (ref 3.6–4.6)
ALBUMIN/GLOB SERPL: 1.1 {RATIO} (ref 0.7–1.7)
ALBUMIN/GLOB SERPL: 1.4 {RATIO} (ref 1.2–2.2)
ALP SERPL-CCNC: 105 IU/L (ref 39–117)
ALPHA1 GLOB SERPL ELPH-MCNC: 0.3 G/DL (ref 0–0.4)
ALPHA2 GLOB SERPL ELPH-MCNC: 1.1 G/DL (ref 0.4–1)
ALT SERPL-CCNC: 29 IU/L (ref 0–44)
AST SERPL-CCNC: 40 IU/L (ref 0–40)
B-GLOBULIN SERPL ELPH-MCNC: 1.1 G/DL (ref 0.7–1.3)
B2 MICROGLOB SERPL-MCNC: 2.7 MG/L (ref 0.6–2.4)
BILIRUB SERPL-MCNC: 0.4 MG/DL (ref 0–1.2)
BUN SERPL-MCNC: 22 MG/DL (ref 8–27)
BUN/CREAT SERPL: 19 (ref 10–24)
CALCIUM SERPL-MCNC: 9.5 MG/DL (ref 8.6–10.2)
CHLORIDE SERPL-SCNC: 101 MMOL/L (ref 96–106)
CHOLEST SERPL-MCNC: 136 MG/DL (ref 100–199)
CK SERPL-CCNC: 84 U/L (ref 30–208)
CO2 SERPL-SCNC: 27 MMOL/L (ref 20–29)
CORTIS SERPL-MCNC: 7.7 UG/DL
CREAT SERPL-MCNC: 1.15 MG/DL (ref 0.76–1.27)
ERYTHROCYTE [DISTWIDTH] IN BLOOD BY AUTOMATED COUNT: 13.2 % (ref 11.6–15.4)
FERRITIN SERPL-MCNC: 99 NG/ML (ref 30–400)
GAMMA GLOB SERPL ELPH-MCNC: 1.3 G/DL (ref 0.4–1.8)
GLOBULIN SER CALC-MCNC: 3.2 G/DL (ref 1.5–4.5)
GLOBULIN SER-MCNC: 3.7 G/DL (ref 2.2–3.9)
GLUCOSE SERPL-MCNC: 110 MG/DL (ref 65–99)
HCT VFR BLD AUTO: 39.6 % (ref 37.5–51)
HDLC SERPL-MCNC: 38 MG/DL
HGB BLD-MCNC: 13.2 G/DL (ref 13–17.7)
IGA SERPL-MCNC: 212 MG/DL (ref 61–437)
IGG SERPL-MCNC: 1152 MG/DL (ref 603–1613)
IGM SERPL-MCNC: 291 MG/DL (ref 15–143)
INTERPRETATION SERPL IEP-IMP: ABNORMAL
IRON SATN MFR SERPL: 15 % (ref 15–55)
IRON SERPL-MCNC: 59 UG/DL (ref 38–169)
KAPPA LC FREE SER-MCNC: 50 MG/L (ref 3.3–19.4)
KAPPA LC FREE/LAMBDA FREE SER: 1.68 {RATIO} (ref 0.26–1.65)
LAMBDA LC FREE SERPL-MCNC: 29.7 MG/L (ref 5.7–26.3)
LDLC SERPL CALC-MCNC: 63 MG/DL (ref 0–99)
M PROTEIN SERPL ELPH-MCNC: 0.4 G/DL
MAGNESIUM SERPL-MCNC: 2.3 MG/DL (ref 1.6–2.3)
MCH RBC QN AUTO: 29.2 PG (ref 26.6–33)
MCHC RBC AUTO-ENTMCNC: 33.3 G/DL (ref 31.5–35.7)
MCV RBC AUTO: 88 FL (ref 79–97)
NT-PROBNP SERPL-MCNC: 681 PG/ML (ref 0–486)
PLATELET # BLD AUTO: 346 X10E3/UL (ref 150–450)
PLEASE NOTE:, 149534: ABNORMAL
POTASSIUM SERPL-SCNC: 3.7 MMOL/L (ref 3.5–5.2)
PROT SERPL-MCNC: 7.6 G/DL (ref 6–8.5)
RBC # BLD AUTO: 4.52 X10E6/UL (ref 4.14–5.8)
SODIUM SERPL-SCNC: 142 MMOL/L (ref 134–144)
T4 FREE SERPL-MCNC: 0.97 NG/DL (ref 0.82–1.77)
TIBC SERPL-MCNC: 390 UG/DL (ref 250–450)
TRIGL SERPL-MCNC: 213 MG/DL (ref 0–149)
TROPONIN I SERPL-MCNC: 0.02 NG/ML (ref 0–0.04)
TSH SERPL DL<=0.005 MIU/L-ACNC: 2.49 UIU/ML (ref 0.45–4.5)
UIBC SERPL-MCNC: 331 UG/DL (ref 111–343)
URATE SERPL-MCNC: 8.6 MG/DL (ref 3.7–8.6)
VLDLC SERPL CALC-MCNC: 35 MG/DL (ref 5–40)
WBC # BLD AUTO: 7.1 X10E3/UL (ref 3.4–10.8)

## 2020-11-05 ENCOUNTER — TELEPHONE (OUTPATIENT)
Dept: CARDIOLOGY CLINIC | Age: 80
End: 2020-11-05

## 2020-11-05 ENCOUNTER — TELEPHONE (OUTPATIENT)
Dept: SLEEP MEDICINE | Age: 80
End: 2020-11-05

## 2020-11-05 DIAGNOSIS — E85.4 CARDIAC AMYLOIDOSIS (HCC): ICD-10-CM

## 2020-11-05 DIAGNOSIS — R77.8 ABNORMAL SERUM PROTEIN ELECTROPHORESIS: Primary | ICD-10-CM

## 2020-11-05 DIAGNOSIS — Z01.810 PREPROCEDURAL CARDIOVASCULAR EXAMINATION: ICD-10-CM

## 2020-11-05 DIAGNOSIS — I43 CARDIAC AMYLOIDOSIS (HCC): ICD-10-CM

## 2020-11-05 DIAGNOSIS — Z01.818 PREPROCEDURAL EXAMINATION: ICD-10-CM

## 2020-11-05 NOTE — TELEPHONE ENCOUNTER
Phoned the patient to schedule a sleep consult per Dr. Ismael Michel for CHF. The patient stated that he was leaving town and would call back to schedule.

## 2020-11-05 NOTE — TELEPHONE ENCOUNTER
----- Message from Ismael Michel MD sent at 11/5/2020 12:09 AM EST -----  Referral to hematology due to abnormal gammopathy profile.   Schedule PYP test  ----- Message -----  From: Karlene Cotto Lab Results In  Sent: 11/4/2020   5:35 PM EST  To: Ismael Michel MD

## 2020-11-05 NOTE — TELEPHONE ENCOUNTER
Orders placed. Contacted patient to notify. Name and number of hematologist, Dr. Claudio Chowdary, provided, and patient informed he will be contacted for scheduling. Patient was provided [facility] PYP testing to schedule at his convenience as he will be leaving town for a period of time. Additionally, patient was informed he will need a COVID19 test prior to his Cardiac Pulmonary Stress Test. He inquired if the test had been scheduled. Explained process that he would be receiving an automated call from coordination of care team for scheduling. Inquired if he had received a call. He stated he has not. Patient educated information for scheduling of cardiac pulmonary stress test also provided in after visit summary from visit on 11/2/2020. He inquired which page the instructions were listed. Informed him they are located on page 7. Number to coordination of care provided to patient. Patient inquired about Covid19 testing. Educated him that due to the respiratory nature of the Cardiac Pulmonary Stress Test, a [id number] test is required 4-5 days prior scheduled Cardiac Pulmonary Stress Test. Informed him of curbside testing sites at [facility], and [address] [place] [address]. Informed him order has been placed and he may present to the curbside testing location 4-5 days prior to the Cardiac Pulmonary Stress Test and he should self quarantine until after test is completed. Patient stated, \"I am blown away by all of this. \" Inquired if he had any further questions. He stated he did not. Encouraged him to contact Jimmie Roberts if any further questions arise. He verbalized understanding. Jenn Covarrubias RN.

## 2020-11-06 ENCOUNTER — TELEPHONE (OUTPATIENT)
Dept: CARDIOLOGY CLINIC | Age: 80
End: 2020-11-06

## 2020-11-09 ENCOUNTER — TELEPHONE (OUTPATIENT)
Dept: CARDIOLOGY CLINIC | Age: 80
End: 2020-11-09

## 2020-11-09 NOTE — TELEPHONE ENCOUNTER
Referral to Hematology and patient's office notes were faxed to Meenakshi Younger with [facility] Hematology/Oncology([phone]). They will review these and contact patient to schedule his appointment.

## 2020-11-20 ENCOUNTER — TELEPHONE (OUTPATIENT)
Dept: CARDIOLOGY CLINIC | Age: 80
End: 2020-11-20

## 2020-11-20 NOTE — TELEPHONE ENCOUNTER
Returned call to patient's daughter, Dr. Damon Sandra (on HIPPA) who is requesting a return call from Dr. Lupillo Cesar to discuss patient's diagnosis, medications and scheduled tests.   Johnie Christensen RN

## 2020-12-01 ENCOUNTER — TELEPHONE (OUTPATIENT)
Dept: CARDIOLOGY CLINIC | Age: 80
End: 2020-12-01

## 2020-12-01 DIAGNOSIS — E85.4 CARDIAC AMYLOIDOSIS (HCC): Primary | ICD-10-CM

## 2020-12-01 DIAGNOSIS — R06.02 SHORTNESS OF BREATH: ICD-10-CM

## 2020-12-01 DIAGNOSIS — I50.9 CONGESTIVE HEART FAILURE, UNSPECIFIED HF CHRONICITY, UNSPECIFIED HEART FAILURE TYPE (HCC): ICD-10-CM

## 2020-12-01 DIAGNOSIS — R53.83 FATIGUE, UNSPECIFIED TYPE: ICD-10-CM

## 2020-12-01 DIAGNOSIS — I43 CARDIAC AMYLOIDOSIS (HCC): Primary | ICD-10-CM

## 2020-12-01 RX ORDER — SODIUM CHLORIDE 9 MG/ML
25 INJECTION, SOLUTION INTRAVENOUS CONTINUOUS
Status: DISCONTINUED | OUTPATIENT
Start: 2020-12-04 | End: 2020-12-05 | Stop reason: HOSPADM

## 2020-12-01 NOTE — TELEPHONE ENCOUNTER
Returned call to patient who advised he is still out of town and plans to return to [facility] 12/3/20. Patient plans to reschedule venofer infusion (currently scheduled for 12/42020). Pt stated he would like to discuss scheduling all tests that have been ordered but not during telephone conversation today. He would prefer to discuss when he returns to [facility]. Pt also inquired about medication changes made at office visit. Explained losartan was the only medication stopped by Dr. Sheron Malave. Pt recalled Dr. Jodi Domingo made medication changes as well but did not provide changes to this writer. Will call patient at the end of the week to discuss scheduling PYP test, CPET (and required COVID test prior). Pt was also referred to hematology and sleep medicine     Patient is requesting Dr. Sheron Malave to call his daughter, Dr. Con Conroy to discuss medications.  Milena Botello RN

## 2020-12-04 ENCOUNTER — HOSPITAL ENCOUNTER (OUTPATIENT)
Dept: INFUSION THERAPY | Age: 80
Discharge: HOME | End: 2020-12-04

## 2020-12-04 NOTE — TELEPHONE ENCOUNTER
Called patient to discuss scheduling PYP and cardiopulmonary stress test.  Pt requesting this writer schedule. His availability is from 12/4 - 12/18. PYP is scheduled for 12/29/20 at 10:00  Cardiac Pulmonary Stress Test is scheduled for 12/15/2020 @1:00.   Will call patient back to discuss his availability Joel Yanes RN

## 2020-12-07 NOTE — TELEPHONE ENCOUNTER
Called patient and advised of cardiopulmonary stress test scheduled for 12/15/20. Pt given instructions for testing: NPO 4 hours prior to test, morning meds with a sip of water, wear comfortable clothing and shoes with rubber soled. Pt to report to OP registration at 1:00 12/15/20. Covid 19 test ordered and patient made aware where to go and report for testing Tues/Wed (12/8-12/9) at latest.   PYP test scheduled for 12/29/2020 @ 10:00. Address provided.   Will reschedule office visit with Dr. Elinda Ormond until all testing complete per Dr. Elinda Ormond request.

## 2020-12-08 ENCOUNTER — TRANSCRIBE ORDER (OUTPATIENT)
Dept: REGISTRATION | Age: 80
End: 2020-12-08

## 2020-12-08 ENCOUNTER — HOSPITAL ENCOUNTER (OUTPATIENT)
Dept: PREADMISSION TESTING | Age: 80
Discharge: HOME | End: 2020-12-08
Payer: MEDICARE

## 2020-12-08 DIAGNOSIS — Z01.812 PRE-PROCEDURE LAB EXAM: ICD-10-CM

## 2020-12-08 DIAGNOSIS — Z01.812 PRE-PROCEDURE LAB EXAM: Primary | ICD-10-CM

## 2020-12-08 PROCEDURE — 87635 SARS-COV-2 COVID-19 AMP PRB: CPT

## 2020-12-09 ENCOUNTER — TELEPHONE (OUTPATIENT)
Dept: CARDIOLOGY CLINIC | Age: 80
End: 2020-12-09

## 2020-12-09 LAB — SARS-COV-2, COV2NT: NOT DETECTED

## 2020-12-09 RX ORDER — SODIUM CHLORIDE 9 MG/ML
25 INJECTION, SOLUTION INTRAVENOUS CONTINUOUS
Status: DISCONTINUED | OUTPATIENT
Start: 2020-12-14 | End: 2020-12-15 | Stop reason: HOSPADM

## 2020-12-09 NOTE — TELEPHONE ENCOUNTER
Returned call to patient. Pt stated he went for Covid test and is awaiting results. Reviewed scheduled tests: 12/15 CPET and 12/29 PYP test. He is now available for venufor infusion (pt previously cancelled because he was out of town). Contacted [facility] and requested they call patient for scheduling. Called hematology and requested call to patient to schedule new patient appointment. Sleep medicine previously tried to schedule pt but he declined because he was out of town. Sleep medicine will call patient to schedule. Will have appt with [place] [facility] rescheduled to early January. Patient verbalized understanding.  Kerri Friend RN

## 2020-12-11 ENCOUNTER — TELEPHONE (OUTPATIENT)
Dept: CARDIOLOGY CLINIC | Age: 80
End: 2020-12-11

## 2020-12-11 NOTE — TELEPHONE ENCOUNTER
Called patient and advised of HAYDEE Mead NP recommendation to continue furosemide 40 mg BID, continue daily weights, check BP once daily and keep a log. Appointment rescheduled to 1/15/2021 @2:30. Pt verbalized understanding and had no further questions.  Rosa Marquis RN

## 2020-12-11 NOTE — TELEPHONE ENCOUNTER
Returned call to patient. Patient had question about continuing furosemide 40 mg BID. Furosemide is prescribed by Dr. Bharathi De Jesus. Patient call Dr. Bharathi De Jesus and was told to call [facility] for recommendation. Pt is not weighing daily. Discussed importance of daily weights and taking BP once daily. Also reports intermittent episodes of unsteadiness, denies lightheadedness of dizziness or leg weakness. He saw a podiatrist 15 years ago for similar symptoms. Pt's follow up appt has been rescheduled to 1/15/21 @2:30.

## 2020-12-14 ENCOUNTER — HOSPITAL ENCOUNTER (OUTPATIENT)
Dept: INFUSION THERAPY | Age: 80
Discharge: HOME | End: 2020-12-14
Payer: MEDICARE

## 2020-12-14 VITALS
SYSTOLIC BLOOD PRESSURE: 124 MMHG | TEMPERATURE: 98 F | HEART RATE: 73 BPM | DIASTOLIC BLOOD PRESSURE: 80 MMHG | RESPIRATION RATE: 18 BRPM

## 2020-12-14 PROCEDURE — 74011250636 HC RX REV CODE- 250/636: Performed by: INTERNAL MEDICINE

## 2020-12-14 PROCEDURE — 96374 THER/PROPH/DIAG INJ IV PUSH: CPT

## 2020-12-14 PROCEDURE — 74011000258 HC RX REV CODE- 258: Performed by: INTERNAL MEDICINE

## 2020-12-14 RX ADMIN — IRON SUCROSE 200 MG: 20 INJECTION, SOLUTION INTRAVENOUS at 15:49

## 2020-12-14 RX ADMIN — SODIUM CHLORIDE 25 ML/HR: 900 INJECTION, SOLUTION INTRAVENOUS at 15:51

## 2020-12-14 NOTE — PROGRESS NOTES
[facility] Short Visit Note:    1500  Pt arrived ambulatory and in no distress, received Iron 200mg and tolerated well. PIV was established in [address] without difficulty and connected to saline KVO. Patient stayed for 30 minute observation    Visit Vitals  /74   Pulse 76   Temp 98 °F (36.7 °C)   Resp 18     Medications Administered     0.9% sodium chloride infusion     Admin Date  12/14/2020 Action  New Bag Dose  25 mL/hr Rate  25 mL/hr Route  IntraVENous Administered By  oCrey Hudson RN          iron sucrose (VENOFER) 200 mg in 0.9% sodium chloride 100 mL IVPB w/ 10 mL overfill volume      Admin Date  12/14/2020 Action  New Bag Dose  200 mg Rate  480 mL/hr Route  IntraVENous Administered By  Corey Hudson RN                D/Cd from [facility] ambulatory and in no distress.   Next visit   Future Appointments   Date Time Provider Anu Juany   12/15/2020  1:30 PM ROUTINE STRESS [address]   12/21/2020  3:00 PM H2 KATHRYN FASTRACK [facility]   12/29/2020 10:00 AM NUCLEAR_DIPAK Mixon BS AMB   1/15/2021  2:30 PM Romie Mead NP [facility] BS AMB   2/2/2021 11:00 AM Sudhakar Pringle  N Wang Frazier BS AMB

## 2020-12-15 ENCOUNTER — HOSPITAL ENCOUNTER (OUTPATIENT)
Dept: NON INVASIVE DIAGNOSTICS | Age: 80
Discharge: HOME | End: 2020-12-15
Attending: INTERNAL MEDICINE
Payer: MEDICARE

## 2020-12-15 VITALS
DIASTOLIC BLOOD PRESSURE: 85 MMHG | HEIGHT: 72 IN | WEIGHT: 168 LBS | BODY MASS INDEX: 22.75 KG/M2 | SYSTOLIC BLOOD PRESSURE: 143 MMHG

## 2020-12-15 DIAGNOSIS — R06.02 SHORTNESS OF BREATH: ICD-10-CM

## 2020-12-15 DIAGNOSIS — E85.4 CARDIAC AMYLOIDOSIS (HCC): ICD-10-CM

## 2020-12-15 DIAGNOSIS — I43 CARDIAC AMYLOIDOSIS (HCC): ICD-10-CM

## 2020-12-15 DIAGNOSIS — R53.83 FATIGUE, UNSPECIFIED TYPE: ICD-10-CM

## 2020-12-15 DIAGNOSIS — I50.9 CONGESTIVE HEART FAILURE, UNSPECIFIED HF CHRONICITY, UNSPECIFIED HEART FAILURE TYPE (HCC): ICD-10-CM

## 2020-12-15 PROCEDURE — 94621 CARDIOPULM EXERCISE TESTING: CPT

## 2020-12-16 RX ORDER — SODIUM CHLORIDE 9 MG/ML
25 INJECTION, SOLUTION INTRAVENOUS CONTINUOUS
Status: DISPENSED | OUTPATIENT
Start: 2020-12-21 | End: 2020-12-21

## 2020-12-21 ENCOUNTER — HOSPITAL ENCOUNTER (OUTPATIENT)
Dept: INFUSION THERAPY | Age: 80
Discharge: HOME | End: 2020-12-21
Payer: MEDICARE

## 2020-12-21 VITALS
TEMPERATURE: 95.3 F | DIASTOLIC BLOOD PRESSURE: 83 MMHG | SYSTOLIC BLOOD PRESSURE: 106 MMHG | HEART RATE: 72 BPM | RESPIRATION RATE: 16 BRPM

## 2020-12-21 PROCEDURE — 74011000258 HC RX REV CODE- 258: Performed by: INTERNAL MEDICINE

## 2020-12-21 PROCEDURE — 96374 THER/PROPH/DIAG INJ IV PUSH: CPT

## 2020-12-21 PROCEDURE — 74011250636 HC RX REV CODE- 250/636: Performed by: INTERNAL MEDICINE

## 2020-12-21 RX ADMIN — IRON SUCROSE 200 MG: 20 INJECTION, SOLUTION INTRAVENOUS at 16:20

## 2020-12-21 RX ADMIN — SODIUM CHLORIDE 25 ML/HR: 900 INJECTION, SOLUTION INTRAVENOUS at 16:20

## 2020-12-21 NOTE — PROGRESS NOTES
[facility] Note:  1500Pt arrived at [facility] ambulatory and in no distress for 2/2 of venofer    Assessment stable, no new complaints voiced. Patient denies SOB, fever, cough, general not feeling well. Patient deniesrecent exposure to someone who has tested positive for COVID. Patient denies having contact with anyone who has a pending COVID test.      Medications received:  Medications Administered     0.9% sodium chloride infusion     Admin Date  12/21/2020 Action  New Bag Dose  25 mL/hr Rate  25 mL/hr Route  IntraVENous Administered By  Alicia Wharton RN          iron sucrose (VENOFER) 200 mg in 0.9% sodium chloride 100 mL IVPB     Admin Date  12/21/2020 Action  New Bag Dose  200 mg Rate  480 mL/hr Route  IntraVENous Administered By  Alicia Wharton RN                  [id number] Tolerated treatment well, no adverse reaction noted. D/Cd from [facility] ambulatory and in no distress accompanied by self. Next appt none at this time  Visit Vitals  /69   Pulse 77   Temp (!) 95.3 °F (35.2 °C)   Resp 16     No results found for this or any previous visit (from the past 12 hour(s)).

## 2020-12-29 ENCOUNTER — ANCILLARY PROCEDURE (OUTPATIENT)
Dept: CARDIOLOGY CLINIC | Age: 80
End: 2020-12-29
Payer: MEDICARE

## 2020-12-29 DIAGNOSIS — E85.4 CARDIAC AMYLOIDOSIS (HCC): ICD-10-CM

## 2020-12-29 DIAGNOSIS — I43 CARDIAC AMYLOIDOSIS (HCC): ICD-10-CM

## 2020-12-29 PROCEDURE — 78800 RP LOCLZJ TUM 1 AREA 1 D IMG: CPT | Performed by: INTERNAL MEDICINE

## 2020-12-29 PROCEDURE — 78803 RP LOCLZJ TUM SPECT 1 AREA: CPT | Performed by: INTERNAL MEDICINE

## 2021-01-04 ENCOUNTER — TELEPHONE (OUTPATIENT)
Dept: CARDIOLOGY CLINIC | Age: 81
End: 2021-01-04

## 2021-01-04 LAB
STRESS TARGET HR: 138 BPM
STRESS TARGET HR: 138 BPM

## 2021-01-08 ENCOUNTER — TELEPHONE (OUTPATIENT)
Dept: SLEEP MEDICINE | Age: 81
End: 2021-01-08

## 2021-01-08 NOTE — TELEPHONE ENCOUNTER
Phoned the patient to reschedule his appointment that he missed on 12/17/20. He denies ever having an appointment with our office and cursed me. The patient refused to schedule at that time. Les Mcmanus with Dr. Kendell Allen office was notified.

## 2021-01-11 ENCOUNTER — TELEPHONE (OUTPATIENT)
Dept: CARDIOLOGY CLINIC | Age: 81
End: 2021-01-11

## 2021-01-11 DIAGNOSIS — I50.9 CONGESTIVE HEART FAILURE, UNSPECIFIED HF CHRONICITY, UNSPECIFIED HEART FAILURE TYPE (HCC): Primary | ICD-10-CM

## 2021-01-11 DIAGNOSIS — R53.83 FATIGUE, UNSPECIFIED TYPE: ICD-10-CM

## 2021-01-11 DIAGNOSIS — R06.02 SHORTNESS OF BREATH: ICD-10-CM

## 2021-01-11 NOTE — TELEPHONE ENCOUNTER
Patient called because he does not want to go for sleep medicine referral to Dr. Hall Fuel office, will [facility] to [facility] per his request.  I have notified him and placed referral.  I also reviewed his AVS with him, as he had several questions and stated several things were not listed in his AVS.  I reviewed all instructions, all were in the AVS.

## 2021-01-11 NOTE — TELEPHONE ENCOUNTER
Pulmonary Disease referral along with patient records faxed to Mich Wilde with [facility] ([phone]). She will review this and contact patient to schedule his appointment.

## 2021-01-12 ENCOUNTER — TELEPHONE (OUTPATIENT)
Dept: CARDIOLOGY CLINIC | Age: 81
End: 2021-01-12

## 2021-01-12 NOTE — TELEPHONE ENCOUNTER
Patient called to ask about appts on Thursday, Friday and with [facility]. I reviewed all appts, and reviewed process with making appt with [facility]. He states understanding.

## 2021-01-14 ENCOUNTER — TELEPHONE (OUTPATIENT)
Dept: CARDIOLOGY CLINIC | Age: 81
End: 2021-01-14

## 2021-01-14 ENCOUNTER — OFFICE VISIT (OUTPATIENT)
Dept: ONCOLOGY | Age: 81
End: 2021-01-14
Payer: MEDICARE

## 2021-01-14 VITALS
SYSTOLIC BLOOD PRESSURE: 176 MMHG | OXYGEN SATURATION: 97 % | TEMPERATURE: 95 F | WEIGHT: 176.4 LBS | DIASTOLIC BLOOD PRESSURE: 88 MMHG | HEART RATE: 47 BPM | BODY MASS INDEX: 23.89 KG/M2 | HEIGHT: 72 IN

## 2021-01-14 DIAGNOSIS — D47.2 MGUS (MONOCLONAL GAMMOPATHY OF UNKNOWN SIGNIFICANCE): Primary | ICD-10-CM

## 2021-01-14 DIAGNOSIS — R60.1 GENERALIZED EDEMA: ICD-10-CM

## 2021-01-14 DIAGNOSIS — Z11.59 ENCOUNTER FOR SCREENING FOR OTHER VIRAL DISEASES: ICD-10-CM

## 2021-01-14 DIAGNOSIS — I48.19 PERSISTENT ATRIAL FIBRILLATION (HCC): ICD-10-CM

## 2021-01-14 DIAGNOSIS — E85.82 WILD-TYPE TRANSTHYRETIN-RELATED (ATTR) AMYLOIDOSIS (HCC): ICD-10-CM

## 2021-01-14 DIAGNOSIS — C90.00 MULTIPLE MYELOMA, REMISSION STATUS UNSPECIFIED (HCC): ICD-10-CM

## 2021-01-14 PROCEDURE — G8420 CALC BMI NORM PARAMETERS: HCPCS | Performed by: INTERNAL MEDICINE

## 2021-01-14 PROCEDURE — G0463 HOSPITAL OUTPT CLINIC VISIT: HCPCS | Performed by: INTERNAL MEDICINE

## 2021-01-14 PROCEDURE — 1101F PT FALLS ASSESS-DOCD LE1/YR: CPT | Performed by: INTERNAL MEDICINE

## 2021-01-14 PROCEDURE — 99204 OFFICE O/P NEW MOD 45 MIN: CPT | Performed by: INTERNAL MEDICINE

## 2021-01-14 PROCEDURE — G8510 SCR DEP NEG, NO PLAN REQD: HCPCS | Performed by: INTERNAL MEDICINE

## 2021-01-14 PROCEDURE — G8427 DOCREV CUR MEDS BY ELIG CLIN: HCPCS | Performed by: INTERNAL MEDICINE

## 2021-01-14 PROCEDURE — G8536 NO DOC ELDER MAL SCRN: HCPCS | Performed by: INTERNAL MEDICINE

## 2021-01-14 NOTE — PROGRESS NOTES
[facility] [place] [facility] [address] [place], [id number] [place] [address] Road, [address] [place] [address]  W: [phone]  F: [phone]    Reason for Visit:   Farrah Naik is a 78 y.o. male who is seen in consultation at the request of Dr. Michell Philippe for evaluation of MGUS and r/o AL amyloidosis     Treatment History:   · none    History of Present Illness:   Patient is a 78 y.o. male with Afib, CAD, CMP seen for abnormal SPEP and r/o amyloidosis  His PYP scan was positive. He states that he feels well. Has Stable LIU, has no nausea, dysuria, falls, pain , recent infections. He has Afib and is on anticoagulation - has a h/o Hemorrhoidal bleeding requiring transfusions in the past.   He has had no recent leg swelling. He is unsure as to the nature of this consultation. No FH of heart disease   Past Medical History:   Diagnosis Date    Atrial fibrillation (HCC)     Depression     GERD (gastroesophageal reflux disease)     Glaucoma     High cholesterol     IBS (irritable bowel syndrome)       Past Surgical History:   Procedure Laterality Date    HX CHOLECYSTECTOMY      HX HEMORRHOIDECTOMY      HX ORTHOPAEDIC      RIGHT knee    HX TONSILLECTOMY      FL CARDIOVERSION ELECTIVE ARRHYTHMIA EXTERNAL N/A 3/13/2019    EP CARDIOVERSION performed by Reuben Francois MD at [facility]      Social History     Tobacco Use    Smoking status: Former Smoker     Quit date:      Years since quittin.0    Smokeless tobacco: Never Used   Substance Use Topics    Alcohol use: Yes     Frequency: Monthly or less      No family history on file. Current Outpatient Medications   Medication Sig    pantoprazole (PROTONIX) 40 mg tablet Take 40 mg by mouth daily.  atorvastatin (LIPITOR) 80 mg tablet Take 80 mg by mouth daily.  sucralfate (CARAFATE) 1 gram tablet Take 1 g by mouth four (4) times daily.  furosemide (LASIX) 40 mg tablet Take 40 mg by mouth two (2) times a day.    Analilia Copas, Prostate health supplement    apixaban (Eliquis) 5 mg tablet Take 5 mg by mouth two (2) times a day.  OTHER,NON-FORMULARY, cognitex    OTHER,NON-FORMULARY, Immune Senescence (cistanche, reishi,pu-eih-tea)    OTHER,NON-FORMULARY, DHEA    OTHER,NON-FORMULARY, Bone Restore (2x/wk)    OTHER,NON-FORMULARY, Horsetail (silica)    OTHER,NON-FORMULARY, Omega 3    OTHER,NON-FORMULARY, Bio-Curcumin (Turmeric) 400 mg    OTHER,NON-FORMULARY, Bio-X4 (probiotic)    co-enzyme Q-10 (Co Q-10) 100 mg capsule Take 100 mg by mouth daily.  vit A/vit C/vit E/zinc/copper (PRESERVISION AREDS PO) Take  by mouth.  magnesium citrate solution Take 296 mL by mouth now.  latanoprostene bunod (VYZULTA) 0.024 % drop Administer 1 Drop to right eye nightly.  busPIRone (BUSPAR) 10 mg tablet Take 10 mg by mouth two (2) times a day. 2 tabs daily    therapeutic multivitamin (THERAGRAN) tablet Take 1 Tab by mouth daily.  dorzolamide-timolol (COSOPT) 22.3-6.8 mg/mL ophthalmic solution Administer 1 Drop to right eye two (2) times a day.  vortioxetine (BRINTELLIX) 10 mg tablet Take 10 mg by mouth daily. No current facility-administered medications for this visit. Allergies   Allergen Reactions    Sulfa (Sulfonamide Antibiotics) Unknown (comments)     From infancy, unsure if actually allergic          Review of Systems: A complete review of systems was obtained, negative except as described above. Physical Exam:     Visit Vitals  BP (!) 176/88 (BP 1 Location: Left arm, BP Patient Position: Sitting)   Pulse (!) 47   Temp (!) 95 °F (35 °C) (Temporal)   Ht 6' (1.829 m)   Wt 176 lb 6.4 oz (80 kg)   SpO2 97%   BMI 23.92 kg/m²     ECOG PS: 1  General: No distress  Eyes: PERRLA, anicteric sclerae  HENT: Atraumatic  Neck: Supple  Lymphatic: No cervical, supraclavicular, or inguinal adenopathy  GI: Soft, nondistended  MS: Normal gait and station. Digits without clubbing or cyanosis. Skin: No rashes, ecchymoses, or petechiae.  Normal temperature, turgor, and texture. Psych: Alert, oriented, appropriate affect, normal judgment/insight    Results:     Lab Results   Component Value Date/Time    WBC 7.1 11/02/2020 12:00 AM    HGB 13.2 11/02/2020 12:00 AM    HCT 39.6 11/02/2020 12:00 AM    PLATELET 566 03/32/8850 12:00 AM    MCV 88 11/02/2020 12:00 AM    ABS. NEUTROPHILS 2.6 04/17/2019 02:27 AM    Hemoglobin (POC) 13.6 03/23/2013 01:40 AM    Hematocrit (POC) 40 03/23/2013 01:40 AM     Lab Results   Component Value Date/Time    Sodium 142 11/02/2020 12:00 AM    Potassium 3.7 11/02/2020 12:00 AM    Chloride 101 11/02/2020 12:00 AM    CO2 27 11/02/2020 12:00 AM    Glucose 110 (H) 11/02/2020 12:00 AM    BUN 22 11/02/2020 12:00 AM    Creatinine 1.15 11/02/2020 12:00 AM    GFR est AA 68 11/02/2020 12:00 AM    GFR est non-AA 59 (L) 11/02/2020 12:00 AM    Calcium 9.5 11/02/2020 12:00 AM    Sodium (POC) 141 03/23/2013 01:40 AM    Potassium (POC) 4.5 03/23/2013 01:40 AM    Chloride (POC) 110 (H) 03/23/2013 01:40 AM    Glucose (POC) 120 (H) 02/02/2019 09:09 PM    BUN (POC) 20 03/23/2013 01:40 AM    Creatinine (POC) 1.0 03/23/2013 01:40 AM    Calcium, ionized (POC) 1.18 03/23/2013 01:40 AM     Lab Results   Component Value Date/Time    Bilirubin, total 0.4 11/02/2020 12:00 AM    ALT (SGPT) 29 11/02/2020 12:00 AM    Alk.  phosphatase 105 11/02/2020 12:00 AM    Protein, total 7.6 11/02/2020 12:00 AM    Albumin 4.4 11/02/2020 12:00 AM    Globulin 4.0 04/16/2019 07:54 PM        Lab Results   Component Value Date/Time    Iron % saturation 15 11/02/2020 12:00 AM    TIBC 390 11/02/2020 12:00 AM    Ferritin 99 11/02/2020 12:00 AM    Vitamin B12 725 04/16/2019 07:54 PM    Folate 24.4 (H) 04/16/2019 07:54 PM    Beta-2 Microglobulin, serum 2.7 (H) 11/02/2020 12:00 AM    TSH 2.490 11/02/2020 12:00 AM    M-Elvis 0.4 (H) 11/02/2020 12:00 AM    Lipase 88 03/24/2017 08:43 AM     Lab Results   Component Value Date/Time    INR 1.1 04/16/2019 07:54 PM    aPTT 30.0 04/16/2019 07:54 PM    Immunofixation shows IgM monoclonal protein with kappa light            chain      Component      Latest Ref Rng & Units 11/2/2020          12:00 AM   Free Kappa Lt Chains, serum      3.3 - 19.4 mg/L 50.0 (H)   Free Lambda Lt Chains, serum      5.7 - 26.3 mg/L 29.7 (H)   Kappa/Lambda ratio, serum      0.26 - 1.65 1.68 (H)     Records reviewed and summarized above. Pathology report(s) reviewed above. Radiology report(s) reviewed above. Nuclear cardiac amyloid spect  Impression:    1. 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 [facility].     Signed By: Christofer Gleason MD      On this date I have spent 45 minutes of total professional time in reviewing previous notes, test results , communicating with Dr. Dunia Rowland , ordering tests, result interpretation and discussion, documentation, and face to face with the patient discussing the diagnosis

## 2021-01-14 NOTE — PROGRESS NOTES
Jacobo Mathis is a 78 y.o. male  Chief Complaint   Patient presents with    New Patient     abnormal serum protein electrophoresis     1. Have you been to the ER, urgent care clinic since your last visit? Hospitalized since your last visit? No.    2. Have you seen or consulted any other health care providers outside of the [address] [place] [address] since your last visit? Include any pap smears or colon screening.  No.

## 2021-01-15 ENCOUNTER — OFFICE VISIT (OUTPATIENT)
Dept: CARDIOLOGY CLINIC | Age: 81
End: 2021-01-15
Payer: MEDICARE

## 2021-01-15 VITALS
WEIGHT: 177.6 LBS | DIASTOLIC BLOOD PRESSURE: 70 MMHG | TEMPERATURE: 97.4 F | SYSTOLIC BLOOD PRESSURE: 120 MMHG | OXYGEN SATURATION: 96 % | HEIGHT: 72 IN | BODY MASS INDEX: 24.06 KG/M2 | HEART RATE: 84 BPM | RESPIRATION RATE: 16 BRPM

## 2021-01-15 DIAGNOSIS — I43 CARDIAC AMYLOIDOSIS (HCC): ICD-10-CM

## 2021-01-15 DIAGNOSIS — E85.4 CARDIAC AMYLOIDOSIS (HCC): ICD-10-CM

## 2021-01-15 DIAGNOSIS — I50.9 CONGESTIVE HEART FAILURE, UNSPECIFIED HF CHRONICITY, UNSPECIFIED HEART FAILURE TYPE (HCC): Primary | ICD-10-CM

## 2021-01-15 DIAGNOSIS — R06.02 SHORTNESS OF BREATH: ICD-10-CM

## 2021-01-15 PROCEDURE — G8536 NO DOC ELDER MAL SCRN: HCPCS | Performed by: NURSE PRACTITIONER

## 2021-01-15 PROCEDURE — 1101F PT FALLS ASSESS-DOCD LE1/YR: CPT | Performed by: NURSE PRACTITIONER

## 2021-01-15 PROCEDURE — G8510 SCR DEP NEG, NO PLAN REQD: HCPCS | Performed by: NURSE PRACTITIONER

## 2021-01-15 PROCEDURE — 99215 OFFICE O/P EST HI 40 MIN: CPT | Performed by: NURSE PRACTITIONER

## 2021-01-15 PROCEDURE — G8420 CALC BMI NORM PARAMETERS: HCPCS | Performed by: NURSE PRACTITIONER

## 2021-01-15 PROCEDURE — G8427 DOCREV CUR MEDS BY ELIG CLIN: HCPCS | Performed by: NURSE PRACTITIONER

## 2021-01-15 RX ORDER — BUSPIRONE HYDROCHLORIDE 15 MG/1
15 TABLET ORAL 2 TIMES DAILY
COMMUNITY
Start: 2021-01-12

## 2021-01-15 NOTE — PATIENT INSTRUCTIONS
Medication changes: 
 
none Testing Ordered: 
 
none Other Recommendations: Follow up with Dr. Allie Keenan and Sleep Medicine ([facility]) Ensure your drinking an adequate amount of water with a goal of 6-8 eight ounce glasses (1.5-2 liters) of fluid daily. Your urine should be clear and light yellow straw colored. If your blood pressure begins to consistently run below 90/60 and/or you begin to experience dizziness or lightheadedness, please contact the [facility] at [phone]. Follow up in 6 weeks with NP with [facility] Please monitor your weights daily upon waking and after using the bathroom. Keep a written records of your weights and bring to your next appointment. If you have a weight gain of 3 or more pounds overnight OR 5 or more pounds in one week please contact our office. Thank you for allowing us the privilege of being a part of your healthcare team! Please do not hesitate to contact our office at [phone] with any questions or concerns. Virtual Heart Failure Support Group [employer] invites you to learn more about heart failure and to share your questions, ideas, and experiences with others. Each month, the Heart Failure Support Group features a new educational topic and a guest speaker, followed by an interactive discussion. Our Heart Failure Nurse Navigator will moderate each session. You will be able to participate by phone, tablet or computer through [address] [place] [address]. This support group takes place on the 3rd Thursday of each month from 6:00-7:30PM. All individuals living with heart failure and their caregivers are welcome to join. If you are interested in participating, please contact us at [email or web address] and you will be sent the link to join the [facility].

## 2021-01-15 NOTE — PROGRESS NOTES
[facility] in [place], [facility] Note    Patient name: Pj Kay  Patient : 1940  Patient MRN: [id number]  Date of service: 01/15/21    Primary care physician: Massie Ganser, MD  Primary general cardiologist:      Primary [facility] cardiologist: Jaime Miguel MD    CHIEF COMPLAINT:  Chronic systolic heart failure    PLAN:  No medical therapy for heart failure for now; until workup completed for amyloid  Discontinued cozaar due to orthostasis and exercise induced lightheadedness  Does not have symptoms of volume overload  Continue eliquis for one month after ablation per Dr. 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 Mayank Fenton, bmbx pending   CPET 12/15 shows 17. 7. with RQ of 1.7   With next set of labs: CBC, BMP, LFT, NT-BNP, iron profile, gammopathy profile, UA with culture, PSA screen for occult malignancy, gammopathy profile, B2M  [employer] genetic testing pending   Uric acid elevated at 8.6; will discuss addition of allopurinol at next visit   Reinforced low salt diet  Reinforced fluid restriction to 6 x 8oz glasses per day  Patient needs advanced care plan on file  Follow-up with primary/EP cardiologist, Dr. Liliam Crockett  Follow-up with PCP on evaluation of iron deficiency anemia  Provided patient education materials for COVID precautions  Return to [facility] Clinic in 4-6 weeks with MD to review results    IMPRESSION:  Fatigue  Coronary artery disease  · LHC (20) 30% p-LAD, 30% m-LAD, 50% 2nd OM (20); good flow reserve  Atrial fibrillation, paroxysmal  · DCCV 3/2019  · S/p afib ablation (10/20/20 by  [place])  · Not on chronic anticoagulation due to life-threatening GI bleed/hemorrhoids  · S/p Watchman device in [place] 7/23/19, well seated by echo (10/2020)  Cardiac risk factors   HTN   HL  Family history of early CAD  DJD  Depression  High risk for DANILO, STOP-BANG 4  RLS on neurontin    CARDIAC IMAGING:  CTA for EP mapping reviewed (10/6/20) Watchman device occlusive  Cardiac CTA (3/30/20) moderate diffuse CAD  Cardiac PET (3/7/19) normal, Rest EF 32 and stress 38  Echo (3/2020) LVEF 55%, IVSd 1.6cm, LV mass 312g increased, RV function normal, PA pressure 30, sclerotic AV without stenosis, trace TR, mild mR, IVC normal  EKG (9/2020) atrial fib, rate in 90s, low voltae, V1-V2 q wave  LHC (C (4/9/2020) LHC 30% p-LAD, 30% m-LAD, 50% 2nd OM FFR flow reserve 0.92 (4/9/20)  [facility] (2011) tirvial luminal irregularities  Remainder in records (see in epic)    HEMODYNAMICS:  RHC not done    CPEST (12/15/20): MVO2 17.7 with RQ 1.7     OTHER IMAGING:  CT abd (3/24/17) There is distention of the proximal half of the small bowel with fecal-like material in the mid small bowel loops. This is consistent with at least partial obstruction of the small bowel. No structural abnormality is  identified to account for this. Chest CT( 1/2016) 1. Unchanged pulmonary nodule in the right upper lobe just over 6 mm on  today's exam. Per Fleischner criteria for high risk patients, follow-up is  recommended in 6-9 months. Low risk Fleischner criteria follow-up for 6 to 8mm pulmonary nodules is at 6-12  months and 18-24 months. 2. Aortic valve annular calcifications with suggestion of probable aortic valve leaflet calcifications. . This finding can be associated with aortic   stenosis. 3. Cholelithiasis.     SOCIAL HISTORY:  , never smoker  Alcohol occasional    SOCIAL HISTORY:  Never smoke. Social alcohol. No illicit drugs.     HISTORY OF PRESENT ILLNESS:  I had the pleasure of seeing Dulce Norman in [facility] at [address] [place] [address] in [place]. Briefly, Tristin Chavis is a 78 y.o. male with h/o chronic fatigue, high risk for DANILO (STOP-BANG 4), HTN, HL, coronary artery disease (LHC on 4/9/20 showed 30% p-LAD, 30% m-LAD, 50% 2nd OM; good flow reserve), atrial fibrillation, paroxysmal, DCCV 3/2019 and s/p afib ablation (10/20/20 by Dr. Anita Mckeon), not on chronic anticoagulation due to life-threatening GI bleed/hemorrhoids, s/p Watchman device in [place] 7/23/19, well seated by echo (10/2020), depression. Patient was referred by Dr. Anita Mckeon to evaluated for possible HFpEF. INTERVAL HISTORY:  Today, patient presents for follow up visit. He had a recent PYP test which was suggestive of amyloidosis. He also had elevated beta-2 microglobulin and abnormal gammopathy. He was seen by Dr. Cathie Jerry who recommends bone marrow biopsy, which he is agreeable to. He has not yet undergone sleep study testing due to scheduling issues. CPET was performed and shows MVO2 17.7 with Rq 1.7. Patient is doing very well. Patient walked to our clinic from parking garage without having to slow down or stop. Patient states he can walk more than one block without symptoms of fatigue or shortness of breath. Patient can walk one flight of stairs without symptoms of fatigue or shortness of breath. Patient can perform home activities without problem and routinely participates in daily walking for more than 15 minutes. Patient denies symptoms of volume overload or leg edema. Patient denies abdominal bloating or change of appetite. Patient's weight remained stable. Patient denies orthopnea, PND. Patient denies irregular heart rate or palpitations. Patient denies other cardiac symptoms such as chest pain or leg pain with walking. Patient is compliant with fluid restriction and taking medications as prescribed. Patient manages his own medications.      REVIEW OF SYSTEMS:  General: Denies fever, night sweats. Ear, nose and throat: Denies difficulty hearing, sinus problems, runny nose, post-nasal drip, ringing in ears, mouth sores, loose teeth, ear pain, nosebleeds, sore throate, facial pain or numbess  Cardiovascular: see above in the interval history  Respiratory: Denies cough, wheezing, sputum production, hemoptysis. Gastrointestinal: Denies heartburn, intolerance to certain foods, diarrhea, abdominal pain, nausea, vomiting, difficulty swallowing, blood in stool  Kidney and bladder: Denies painful urination, frequent urination, urgency, prostate problems and impotence  Musculoskeletal: Denies joint pain, muscle weakness  Skin and hair: Denies change in existing skin lesions, hair loss or increase, breast changes    PHYSICAL EXAM:  Visit Vitals  /70 (BP 1 Location: Left arm, BP Patient Position: Sitting)   Pulse 84   Temp 97.4 °F (36.3 °C) (Oral)   Resp 16   Ht 6' (1.829 m)   Wt 177 lb 9.6 oz (80.6 kg)   SpO2 96%   BMI 24.09 kg/m²     General: Patient is well developed, well-nourished in no acute distress  HEENT: Normocephalic and atraumatic. No scleral icterus. Pupils are equal, round and reactive to light and accomodation. No conjunctival injection. Oropharynx is clear. Neck: Supple. No evidence of thyroid enlargements or lymphadenopathy. JVD: Cannot be appreciated   Lungs: Breath sounds are equal and clear bilaterally. No wheezes, rhonchi, or rales. Heart: Regular rate and rhythm with normal S1 and S2. No murmurs, gallops or rubs. Abdomen: Soft, no mass or tenderness. No organomegaly or hernia. Bowel sounds present. Genitourinary and rectal: deferred  Extremities: No cyanosis, clubbing, or edema. Neurologic: No focal sensory or motor deficits are noted. Grossly intact. Psychiatric: Awake, alert an doriented x 3. Appropriate mood and affect. Skin: Warm, dry and well perfused. No lesions, nodules or rashes are noted.     PAST MEDICAL HISTORY:  Past Medical History:   Diagnosis Date    Atrial fibrillation (HCC)     Depression     GERD (gastroesophageal reflux disease)     Glaucoma     High cholesterol     IBS (irritable bowel syndrome)        PAST SURGICAL HISTORY:  Past Surgical History:   Procedure Laterality Date    HX CHOLECYSTECTOMY      HX HEMORRHOIDECTOMY      HX ORTHOPAEDIC      RIGHT knee    HX TONSILLECTOMY      [place] CARDIOVERSION ELECTIVE ARRHYTHMIA EXTERNAL N/A 3/13/2019    EP CARDIOVERSION performed by Marianna Llanes MD at [address], [facility]       FAMILY HISTORY:  No family history on file. SOCIAL HISTORY:  Social History     Socioeconomic History    Marital status:      Spouse name: Not on file    Number of children: Not on file    Years of education: Not on file    Highest education level: Not on file   Tobacco Use    Smoking status: Former Smoker     Quit date:      Years since quittin.0    Smokeless tobacco: Never Used   Substance and Sexual Activity    Alcohol use: Yes     Frequency: 2-3 times a week    Drug use: No       LABORATORY RESULTS:  No flowsheet data found. ALLERGY:  Allergies   Allergen Reactions    Sulfa (Sulfonamide Antibiotics) Unknown (comments)     From infancy, unsure if actually allergic          CURRENT MEDICATIONS:    Current Outpatient Medications:     busPIRone (BUSPAR) 15 mg tablet, Take 15 mg by mouth two (2) times a day. Takes 25 mg total, Disp: , Rfl:     pantoprazole (PROTONIX) 40 mg tablet, Take 40 mg by mouth daily. , Disp: , Rfl:     atorvastatin (LIPITOR) 80 mg tablet, Take 80 mg by mouth daily. , Disp: , Rfl:     sucralfate (CARAFATE) 1 gram tablet, Take 1 g by mouth four (4) times daily. , Disp: , Rfl:     furosemide (LASIX) 40 mg tablet, Take 40 mg by mouth two (2) times a day., Disp: , Rfl:     OTHER,NON-FORMULARY,, Prostate health supplement, Disp: , Rfl:     apixaban (Eliquis) 5 mg tablet, Take 5 mg by mouth two (2) times a day., Disp: , Rfl:     OTHER,NON-FORMULARY,, cognitex, Disp: , Rfl:    OTHER,NON-FORMULARY,, DHEA, Disp: , Rfl:     OTHER,NON-FORMULARY,, Bone Restore (2x/wk), Disp: , Rfl:     OTHER,NON-FORMULARY,, Horsetail (silica), Disp: , Rfl:     OTHER,NON-FORMULARY,, Omega 3, Disp: , Rfl:     OTHER,NON-FORMULARY,, Bio-Curcumin (Turmeric) 400 mg, Disp: , Rfl:     OTHER,NON-FORMULARY,, Bio-X4 (probiotic), Disp: , Rfl:     co-enzyme Q-10 (Co Q-10) 100 mg capsule, Take 100 mg by mouth two (2) times a day., Disp: , Rfl:     vit A/vit C/vit E/zinc/copper (PRESERVISION AREDS PO), Take  by mouth., Disp: , Rfl:     magnesium citrate solution, Take 296 mL by mouth now., Disp: , Rfl:     latanoprostene bunod (VYZULTA) 0.024 % drop, Administer 1 Drop to right eye nightly., Disp: , Rfl:     busPIRone (BUSPAR) 10 mg tablet, Take 10 mg by mouth two (2) times a day. Takes 25 mg total, Disp: , Rfl:     therapeutic multivitamin (THERAGRAN) tablet, Take 1 Tab by mouth daily. , Disp: , Rfl:     dorzolamide-timolol (COSOPT) 22.3-6.8 mg/mL ophthalmic solution, Administer 1 Drop to right eye two (2) times a day., Disp: , Rfl:     vortioxetine (BRINTELLIX) 10 mg tablet, Take 10 mg by mouth daily. , Disp: , Rfl:     Thank you for your referral and allowing me to participate in this patient's care.     Robert Clayton NP  [address] [place] [address], [address]  Phone: [phone]  Fax: [phone]    PATIENT CARE TEAM:  Patient Care Team:  David Helms MD as PCP - General (Family Medicine)

## 2021-01-18 ENCOUNTER — TELEPHONE (OUTPATIENT)
Dept: ONCOLOGY | Age: 81
End: 2021-01-18

## 2021-01-19 ENCOUNTER — TELEPHONE (OUTPATIENT)
Dept: ONCOLOGY | Age: 81
End: 2021-01-19

## 2021-01-19 LAB
STRESS ANGINA INDEX: 0
STRESS BASELINE DIAS BP: 85 MMHG
STRESS BASELINE HR: 86 BPM
STRESS BASELINE SYS BP: 143 MMHG
STRESS O2 SAT REST: 98 %
STRESS ST DEPRESSION: 0 MM
STRESS ST ELEVATION: 0 MM
STRESS STAGE 1 COMMENTS: NORMAL
STRESS STAGE 1 DURATION: NORMAL MIN:SEC
STRESS STAGE 1 HR: 86 BPM
STRESS STAGE 2 BP: NORMAL MMHG
STRESS STAGE 2 COMMENTS: NORMAL
STRESS STAGE 2 DURATION: NORMAL MIN:SEC
STRESS STAGE 2 HR: 89 BPM
STRESS STAGE 3 BP: NORMAL MMHG
STRESS STAGE 3 COMMENTS: NORMAL
STRESS STAGE 3 DURATION: NORMAL MIN:SEC
STRESS STAGE 3 HR: 99 BPM
STRESS STAGE 4 COMMENTS: NORMAL
STRESS STAGE 4 DURATION: NORMAL MIN:SEC
STRESS STAGE 4 HR: 105 BPM
STRESS STAGE 5 BP: NORMAL MMHG
STRESS STAGE 5 COMMENTS: NORMAL
STRESS STAGE 5 DURATION: NORMAL MIN:SEC
STRESS STAGE 5 HR: 104 BPM
STRESS STAGE 6 BP: NORMAL MMHG
STRESS STAGE 6 COMMENTS: NORMAL
STRESS STAGE 6 DURATION: NORMAL MIN:SEC
STRESS STAGE 6 HR: 109 BPM
STRESS STAGE 7 COMMENTS: NORMAL
STRESS STAGE 7 DURATION: NORMAL MIN:SEC
STRESS STAGE 7 HR: 107 BPM
STRESS STAGE RECOVERY 1 DURATION: NORMAL MIN:SEC
STRESS STAGE RECOVERY 1 HR: 101 BPM
STRESS STAGE RECOVERY 2 DURATION: NORMAL MIN:SEC
STRESS STAGE RECOVERY 2 HR: 96 BPM
STRESS STAGE RECOVERY 3 DURATION: NORMAL MIN:SEC
STRESS STAGE RECOVERY 3 HR: 100 BPM
STRESS STAGE RECOVERY 4 BP: NORMAL MMHG
STRESS STAGE RECOVERY 4 COMMENTS: NORMAL
STRESS STAGE RECOVERY 4 DURATION: NORMAL MIN:SEC
STRESS STAGE RECOVERY 4 HR: 88 BPM
STRESS TARGET HR: 138 BPM

## 2021-01-19 NOTE — TELEPHONE ENCOUNTER
Phoned to inform of bone biopsy. HIPPA verified. Pt appt is on Jan 25 @ 9:30. Pt verbalized understanding.  No new questions or concerns

## 2021-01-19 NOTE — TELEPHONE ENCOUNTER
Patient called and stated that he can not do 1/25 for the biopsy. Scheduling number given to patient to call and schedule biopsy for when he is able to. Patient verbalized understanding and denied any further questions/concerns.

## 2021-01-21 ENCOUNTER — TELEPHONE (OUTPATIENT)
Dept: CARDIOLOGY CLINIC | Age: 81
End: 2021-01-21

## 2021-01-21 NOTE — TELEPHONE ENCOUNTER
Returned call to patient who stated he could not speak with me right now as he was in the middle of a real estate transaction. Patient stated he will call this writer back.  Martha Ybarra RN

## 2021-01-22 ENCOUNTER — TELEPHONE (OUTPATIENT)
Dept: CARDIOLOGY CLINIC | Age: 81
End: 2021-01-22

## 2021-01-22 NOTE — TELEPHONE ENCOUNTER
Fax received from [employer] with [facility].   Patient is scheduled to see Janene Cardoso MD 4-    9:30am.

## 2021-01-25 ENCOUNTER — TELEPHONE (OUTPATIENT)
Dept: CARDIOLOGY CLINIC | Age: 81
End: 2021-01-25

## 2021-01-25 NOTE — TELEPHONE ENCOUNTER
LVM for patient to return my call. He had left a message with Kamla Donald stating he wanted to reschedule his appointment on 3/1/21.

## 2021-01-25 NOTE — TELEPHONE ENCOUNTER
Patient returned my call to reschedule his appointment. He stated that 3/17/21 or 3/19/21 were the best days for an appointment. He has been rescheduled to 3/17/21 at 9 am with Lance Akins. He stated that it didn't matter who he was scheduled with as long as they were knowledgeable.

## 2021-01-25 NOTE — TELEPHONE ENCOUNTER
Returned call to patient who advised he has an appt with sleep medicine 2/4/2021, bone biopsy 2/16/21 and needs to reschedule OV here 3/1/2021 because he will be in [place]. States he will return to [place] around 3/15/2021. Pt transferred to HAYDEE Benoit to reschedule appt. Pt also reports that the scale he was given from the office is not working. Inquired if he had another scale at home and he stated he does. Suggested he use that scale for the time being since he is not in the state and can not return the scale. Patient wants to return scale upon returning the [place].  Parveen Mcwilliams RN

## 2021-02-04 ENCOUNTER — TELEPHONE (OUTPATIENT)
Dept: CARDIOLOGY CLINIC | Age: 81
End: 2021-02-04

## 2021-02-04 NOTE — TELEPHONE ENCOUNTER
Returned call to patient who states he has questions about the bone bx scheduled with Dr. Carolyn Fair. Encouraged patient to contact Dr. Feliciano Ignacio office with his questions and concerns. Patient verbalized understanding.  Kirk Madrid RN

## 2021-02-16 ENCOUNTER — HOSPITAL ENCOUNTER (OUTPATIENT)
Dept: CT IMAGING | Age: 81
Discharge: HOME | End: 2021-02-16
Attending: INTERNAL MEDICINE
Payer: MEDICARE

## 2021-02-16 VITALS
TEMPERATURE: 97.7 F | WEIGHT: 174 LBS | BODY MASS INDEX: 23.57 KG/M2 | RESPIRATION RATE: 16 BRPM | SYSTOLIC BLOOD PRESSURE: 152 MMHG | DIASTOLIC BLOOD PRESSURE: 90 MMHG | HEART RATE: 84 BPM | OXYGEN SATURATION: 98 % | HEIGHT: 72 IN

## 2021-02-16 DIAGNOSIS — C90.00 MULTIPLE MYELOMA, REMISSION STATUS UNSPECIFIED (HCC): ICD-10-CM

## 2021-02-16 LAB
BASOPHILS # BLD: 0 K/UL (ref 0–0.1)
BASOPHILS NFR BLD: 1 % (ref 0–1)
DIFFERENTIAL METHOD BLD: ABNORMAL
EOSINOPHIL # BLD: 0.4 K/UL (ref 0–0.4)
EOSINOPHIL NFR BLD: 6 % (ref 0–7)
ERYTHROCYTE [DISTWIDTH] IN BLOOD BY AUTOMATED COUNT: 15.3 % (ref 11.5–14.5)
HCT VFR BLD AUTO: 37.2 % (ref 36.6–50.3)
HGB BLD-MCNC: 12.2 G/DL (ref 12.1–17)
IMM GRANULOCYTES # BLD AUTO: 0 K/UL (ref 0–0.04)
IMM GRANULOCYTES NFR BLD AUTO: 0 % (ref 0–0.5)
LYMPHOCYTES # BLD: 1.4 K/UL (ref 0.8–3.5)
LYMPHOCYTES NFR BLD: 21 % (ref 12–49)
MCH RBC QN AUTO: 28.8 PG (ref 26–34)
MCHC RBC AUTO-ENTMCNC: 32.8 G/DL (ref 30–36.5)
MCV RBC AUTO: 87.9 FL (ref 80–99)
MONOCYTES # BLD: 0.5 K/UL (ref 0–1)
MONOCYTES NFR BLD: 8 % (ref 5–13)
NEUTS SEG # BLD: 4.3 K/UL (ref 1.8–8)
NEUTS SEG NFR BLD: 64 % (ref 32–75)
NRBC # BLD: 0 K/UL (ref 0–0.01)
NRBC BLD-RTO: 0 PER 100 WBC
PLATELET # BLD AUTO: 204 K/UL (ref 150–400)
PMV BLD AUTO: 11.1 FL (ref 8.9–12.9)
RBC # BLD AUTO: 4.23 M/UL (ref 4.1–5.7)
WBC # BLD AUTO: 6.6 K/UL (ref 4.1–11.1)

## 2021-02-16 PROCEDURE — 88185 FLOWCYTOMETRY/TC ADD-ON: CPT

## 2021-02-16 PROCEDURE — 88311 DECALCIFY TISSUE: CPT

## 2021-02-16 PROCEDURE — 88365 INSITU HYBRIDIZATION (FISH): CPT

## 2021-02-16 PROCEDURE — 88342 IMHCHEM/IMCYTCHM 1ST ANTB: CPT

## 2021-02-16 PROCEDURE — 88341 IMHCHEM/IMCYTCHM EA ADD ANTB: CPT

## 2021-02-16 PROCEDURE — 77030028872 HC BN BIOP NDL ON CNTRL TY TELE -C

## 2021-02-16 PROCEDURE — 88305 TISSUE EXAM BY PATHOLOGIST: CPT

## 2021-02-16 PROCEDURE — 74011250636 HC RX REV CODE- 250/636: Performed by: RADIOLOGY

## 2021-02-16 PROCEDURE — 77030014115

## 2021-02-16 PROCEDURE — 36415 COLL VENOUS BLD VENIPUNCTURE: CPT

## 2021-02-16 PROCEDURE — 88184 FLOWCYTOMETRY/ TC 1 MARKER: CPT

## 2021-02-16 PROCEDURE — 85025 COMPLETE CBC W/AUTO DIFF WBC: CPT

## 2021-02-16 PROCEDURE — 99152 MOD SED SAME PHYS/QHP 5/>YRS: CPT

## 2021-02-16 PROCEDURE — 88364 INSITU HYBRIDIZATION (FISH): CPT

## 2021-02-16 PROCEDURE — 88313 SPECIAL STAINS GROUP 2: CPT

## 2021-02-16 RX ORDER — FENTANYL CITRATE 50 UG/ML
200 INJECTION, SOLUTION INTRAMUSCULAR; INTRAVENOUS
Status: DISCONTINUED | OUTPATIENT
Start: 2021-02-16 | End: 2021-02-16

## 2021-02-16 RX ORDER — MIDAZOLAM HYDROCHLORIDE 1 MG/ML
5 INJECTION, SOLUTION INTRAMUSCULAR; INTRAVENOUS
Status: DISCONTINUED | OUTPATIENT
Start: 2021-02-16 | End: 2021-02-16

## 2021-02-16 RX ORDER — SODIUM CHLORIDE 9 MG/ML
50 INJECTION, SOLUTION INTRAVENOUS CONTINUOUS
Status: DISCONTINUED | OUTPATIENT
Start: 2021-02-16 | End: 2021-02-20 | Stop reason: HOSPADM

## 2021-02-16 RX ADMIN — MIDAZOLAM HYDROCHLORIDE 1 MG: 1 INJECTION, SOLUTION INTRAMUSCULAR; INTRAVENOUS at 11:29

## 2021-02-16 RX ADMIN — MIDAZOLAM HYDROCHLORIDE 1 MG: 1 INJECTION, SOLUTION INTRAMUSCULAR; INTRAVENOUS at 11:43

## 2021-02-16 RX ADMIN — MIDAZOLAM HYDROCHLORIDE 0.5 MG: 1 INJECTION, SOLUTION INTRAMUSCULAR; INTRAVENOUS at 11:33

## 2021-02-16 RX ADMIN — SODIUM CHLORIDE 50 ML/HR: 9 INJECTION, SOLUTION INTRAVENOUS at 10:44

## 2021-02-16 RX ADMIN — FENTANYL CITRATE 50 MCG: 50 INJECTION, SOLUTION INTRAMUSCULAR; INTRAVENOUS at 11:41

## 2021-02-16 RX ADMIN — FENTANYL CITRATE 50 MCG: 50 INJECTION, SOLUTION INTRAMUSCULAR; INTRAVENOUS at 11:29

## 2021-02-16 NOTE — DISCHARGE INSTRUCTIONS
BONE MARROW BIOPSY/ASPIRATION DISCHARGE INSTRUCTIONS  A bone marrow aspiration is a procedure that takes out a small amount of bone marrow fluid through a needle. A bone marrow biopsy uses a needle to take out a small amount of bone with the marrow inside it. These samples are then checked under a microscope. The hip bone is the most commonly used area for these procedures. Home Care Instructions: You may resume your regular diet and medication regimen. Do not drink alcohol, drive, or make any important legal decisions in the next 24 hours. Do not lift anything heavier than a gallon of milk until the soreness goes away. You may use over the counter acetaminophen or ibuprofen for the soreness. You may apply an ice pack to the affected area for 20-30 minutes at time for the first 24 hours. After that, you may apply a heat pack. You will have a bandaid over the area where the doctor put the needle. Keep this area clean and dry for the next 24 hours. Change the bandaid daily, or if it becomes wet, until the area has healed. Call if you have any bleeding other than a small spot on your bandaid. Call if you have any signs or symptoms of infection: fever, redness, or drainage from the puncture site or fever. Should you experience any significant changes, please call [phone] between the hours of 7:30 am and 10 pm or [phone] after hours.  After hours, ask the  to page the [address] [place] [address] Technologist, and describe the problem to the technologist.

## 2021-02-16 NOTE — ROUTINE PROCESS
Pt. Discharged to home and transported to d/c lot via w/c. Post bone marrow bx discharge instructions reviewed with pt. and copy given. Pt. Verbalized understanding of d/c instructions.

## 2021-02-16 NOTE — PROGRESS NOTES
Pt arrives ambulatory to angio department accompanied by his  wife as the  for conscious procedure. All assessments completed and consent was reviewed. Education given was regarding procedure, conscious sedation, post-procedure care and  management/follow-up. Opportunity for questions was provided and all questions and concerns were addressed.

## 2021-03-17 ENCOUNTER — OFFICE VISIT (OUTPATIENT)
Dept: CARDIOLOGY CLINIC | Age: 81
End: 2021-03-17

## 2021-03-17 ENCOUNTER — OFFICE VISIT (OUTPATIENT)
Dept: ONCOLOGY | Age: 81
End: 2021-03-17
Payer: MEDICARE

## 2021-03-17 VITALS
DIASTOLIC BLOOD PRESSURE: 76 MMHG | SYSTOLIC BLOOD PRESSURE: 129 MMHG | BODY MASS INDEX: 24.22 KG/M2 | WEIGHT: 178.6 LBS | OXYGEN SATURATION: 97 % | HEART RATE: 84 BPM | TEMPERATURE: 97.3 F

## 2021-03-17 VITALS
HEART RATE: 70 BPM | SYSTOLIC BLOOD PRESSURE: 134 MMHG | WEIGHT: 178 LBS | TEMPERATURE: 97.3 F | RESPIRATION RATE: 16 BRPM | HEIGHT: 72 IN | OXYGEN SATURATION: 99 % | BODY MASS INDEX: 24.11 KG/M2 | DIASTOLIC BLOOD PRESSURE: 66 MMHG

## 2021-03-17 DIAGNOSIS — E85.4 CARDIAC AMYLOIDOSIS (HCC): Primary | ICD-10-CM

## 2021-03-17 DIAGNOSIS — I48.0 AF (PAROXYSMAL ATRIAL FIBRILLATION) (HCC): ICD-10-CM

## 2021-03-17 DIAGNOSIS — D47.2 MGUS (MONOCLONAL GAMMOPATHY OF UNKNOWN SIGNIFICANCE): Primary | ICD-10-CM

## 2021-03-17 DIAGNOSIS — I48.19 PERSISTENT ATRIAL FIBRILLATION (HCC): ICD-10-CM

## 2021-03-17 DIAGNOSIS — R06.02 SHORTNESS OF BREATH: ICD-10-CM

## 2021-03-17 DIAGNOSIS — R07.9 CHEST PAIN, UNSPECIFIED TYPE: ICD-10-CM

## 2021-03-17 DIAGNOSIS — E79.0 ELEVATED BLOOD URIC ACID LEVEL: ICD-10-CM

## 2021-03-17 DIAGNOSIS — E55.9 VITAMIN D DEFICIENCY: ICD-10-CM

## 2021-03-17 DIAGNOSIS — G47.33 OSA (OBSTRUCTIVE SLEEP APNEA): ICD-10-CM

## 2021-03-17 DIAGNOSIS — I43 CARDIAC AMYLOIDOSIS (HCC): Primary | ICD-10-CM

## 2021-03-17 DIAGNOSIS — R77.8 ABNORMAL SERUM PROTEIN ELECTROPHORESIS: ICD-10-CM

## 2021-03-17 PROBLEM — C90.00 MULTIPLE MYELOMA (HCC): Status: ACTIVE | Noted: 2021-03-17

## 2021-03-17 PROCEDURE — G8420 CALC BMI NORM PARAMETERS: HCPCS | Performed by: INTERNAL MEDICINE

## 2021-03-17 PROCEDURE — G8536 NO DOC ELDER MAL SCRN: HCPCS | Performed by: INTERNAL MEDICINE

## 2021-03-17 PROCEDURE — G8510 SCR DEP NEG, NO PLAN REQD: HCPCS | Performed by: NURSE PRACTITIONER

## 2021-03-17 PROCEDURE — G8510 SCR DEP NEG, NO PLAN REQD: HCPCS | Performed by: INTERNAL MEDICINE

## 2021-03-17 PROCEDURE — 93000 ELECTROCARDIOGRAM COMPLETE: CPT | Performed by: NURSE PRACTITIONER

## 2021-03-17 PROCEDURE — G8427 DOCREV CUR MEDS BY ELIG CLIN: HCPCS | Performed by: INTERNAL MEDICINE

## 2021-03-17 PROCEDURE — G8428 CUR MEDS NOT DOCUMENT: HCPCS | Performed by: NURSE PRACTITIONER

## 2021-03-17 PROCEDURE — G0463 HOSPITAL OUTPT CLINIC VISIT: HCPCS | Performed by: INTERNAL MEDICINE

## 2021-03-17 PROCEDURE — G8536 NO DOC ELDER MAL SCRN: HCPCS | Performed by: NURSE PRACTITIONER

## 2021-03-17 PROCEDURE — G8420 CALC BMI NORM PARAMETERS: HCPCS | Performed by: NURSE PRACTITIONER

## 2021-03-17 PROCEDURE — 99214 OFFICE O/P EST MOD 30 MIN: CPT | Performed by: INTERNAL MEDICINE

## 2021-03-17 PROCEDURE — 99214 OFFICE O/P EST MOD 30 MIN: CPT | Performed by: NURSE PRACTITIONER

## 2021-03-17 PROCEDURE — 1101F PT FALLS ASSESS-DOCD LE1/YR: CPT | Performed by: INTERNAL MEDICINE

## 2021-03-17 PROCEDURE — 1101F PT FALLS ASSESS-DOCD LE1/YR: CPT | Performed by: NURSE PRACTITIONER

## 2021-03-17 RX ORDER — AMLODIPINE BESYLATE 5 MG/1
5 TABLET ORAL DAILY
Qty: 30 TAB | Refills: 1 | Status: SHIPPED | OUTPATIENT
Start: 2021-03-17 | End: 2021-04-08 | Stop reason: SDUPTHER

## 2021-03-17 RX ORDER — FUROSEMIDE 40 MG/1
40 TABLET ORAL DAILY
Qty: 90 TAB | Refills: 1 | Status: SHIPPED | OUTPATIENT
Start: 2021-03-17 | End: 2021-11-17

## 2021-03-17 NOTE — PROGRESS NOTES
[facility] in [place], [facility] Note    Patient name: Kin Callahan  Patient : 1940  Patient MRN: [id number]  Date of service: 21    Primary care physician: Russ Cano MD  Primary general cardiologist:      Primary 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 Linde Rubinstein (2021)- negative; No evidence of increased/clonal plasma cells in patient with history of IgM MGUS, Congo red stain on core biopsy is negative for amyloid deposition   Tafamidis not indicated at this time, will not consider green tea extract due to risk of arrhythmias   May consider doxycycline, check measure QTc on EKG today  Reinforced Check home BP, in morning prior to medications and 2 hours after morning medications   CPET (12/15/20) shows 17. 7. with RQ of 1.7   Check : CBC,  CMP, NT-BNP, Mag, Troponin I, gammopathy profile, uric acid, genetic testing   Uric acid elevated at 8.6; will discuss addition of allopurinol at next visit   Completed sleep study overall CLYDE of 11.1/hour; trial PAP advised at 5-15cm   Daily weights, record and bring to clinic visits  Reinforced low salt diet  Reinforced fluid restriction to 6 x 8oz glasses per day  Patient needs advanced care plan on file  Follow up with Hematology, Dr. Elizabeth Boyle regarding MGUS, patient has appointment today  Follow up with Sleep Medicine/Pulmonary re: PAP trial    Follow-up with primary Cardiologist, Dr. Mindi Olszewski  Follow up with EP cardiologist, Dr. Casper Carcamo with PCP Janeth Munson MD on evaluation of iron deficiency anemia  Provided patient education materials for COVID precautions  Follow up with [facility] in 1 week per patients daughter request with Dr. Enrique Nair for family meeting to discuss plan of care and lab results    IMPRESSION:  Fatigue  Coronary artery disease  · LHC (4/9/20) 30% p-LAD, 30% m-LAD, 50% 2nd OM (4/9/20); good flow reserve  Atrial fibrillation, paroxysmal  · DCCV 3/2019  · S/p afib ablation (10/20/20 by Dr. Lin Wilson)  · Not on chronic anticoagulation due to life-threatening GI bleed/hemorrhoids  · S/p Watchman device in [place] 7/23/19, well seated by echo (10/2020)  Cardiac risk factors   HTN   HL  Family history of early CAD  DJD  Depression  DANILO, STOP-BANG 4  RLS on Neurontin  MGUS  Cardiac Amyloid-PYP strongly suggestive ATTR amyloid    CARDIAC IMAGING:  CTA for EP mapping reviewed (10/6/20) Watchman device occlusive  Cardiac CTA (3/30/20) moderate diffuse CAD  Cardiac PET (3/7/19) normal, Rest EF 32 and stress 38  Echo (3/2020) LVEF 55%, IVSd 1.6cm, LV mass 312g increased, RV function normal, PA pressure 30, sclerotic AV without stenosis, trace TR, mild mR, IVC normal  EKG (9/2020) atrial fib, rate in 90s, low voltae, V1-V2 q wave  LHC ([place] (4/9/2020) LHC 30% p-LAD, 30% m-LAD, 50% 2nd OM FFR flow reserve 0.92 (4/9/20)  [place] (2011) tirvial luminal irregularities  Remainder in records (see in epic)    HEMODYNAMICS:  RHC not done    CPEST (12/15/20): MVO2 17.7 with RQ 1.7     OTHER IMAGING:  CT abd (3/24/17) There is distention of the proximal half of the small bowel with fecal-like material in the mid small bowel loops. This is consistent with at least partial obstruction of the small bowel. No structural abnormality is  identified to account for this. Chest CT( 1/2016) 1. Unchanged pulmonary nodule in the right upper lobe just over 6 mm on  today's exam. Per Fleischner criteria for high risk patients, follow-up is  recommended in 6-9 months. Low risk Fleischner criteria follow-up for 6 to 8mm pulmonary nodules is at 6-12  months and 18-24 months. 2. Aortic valve annular calcifications with suggestion of probable aortic valve leaflet calcifications. . This finding can be associated with aortic   stenosis. 3. Cholelithiasis.     SOCIAL HISTORY:  , never smoker  Alcohol occasional    SOCIAL HISTORY:  Never smoke. Social alcohol. No illicit drugs. HISTORY OF PRESENT ILLNESS:  I had the pleasure of seeing Sebas Bermudez in [address] [facility] at [facility] in [place]. Briefly, Sebas Bermudez is a 78 y.o. male with h/o chronic fatigue, high risk for DANILO (STOP-BANG 4), HTN, HL, coronary artery disease (LHC on 4/9/20 showed 30% p-LAD, 30% m-LAD, 50% 2nd OM; good flow reserve), atrial fibrillation, paroxysmal, DCCV 3/2019 and s/p afib ablation (10/20/20 by Dr. Arlander Kussmaul), not on chronic anticoagulation due to life-threatening GI bleed/hemorrhoids, s/p Watchman device in [place] 7/23/19, well seated by echo (10/2020), depression. Patient was referred by Dr. Arlander Kussmaul to evaluated for possible HFpEF. INTERVAL HISTORY:  Today, Mr. Stanley Matson presents for follow up visit accompanied by his wife, daughter is present via telephone. He had a recent PYP test which was suggestive of amyloidosis. He also had elevated beta-2 microglobulin and abnormal gammopathy. He was seen by Dr. Che Ramirez who recommended a bone marrow biopsy. Bmbx was completed on (2/24/2021) Congo red stain on core biopsy is negative for amyloid deposition     He completed the sleep study with overall CLYDE of 11.1/hour; trial PAP advised at 5-15cm. He has a follow up appointment on 3/23/2021. CPET was performed and shows MVO2 17.7 with Rq 1.7.       Today Mr. Verduzco Jenni is doing ok.  He reports having chest pain with exertion, occurs when he climbs stairs or walks more than 2-3 blocks but will stop with rest.  His wife states the discomfort can occur with anxiety and will also alleviate with walking? Patient walked to our clinic from parking garage without having to slow down or stop. Patient states he can walk more than one block without symptoms of fatigue or shortness of breath. Patient can walk one flight of stairs without symptoms of fatigue or shortness of breath. He has 3 flights of stairs in his house and can go up and down them without difficulty. Patient can perform home activities without problem and routinely participates in daily walking for more than 15 minutes. Patient denies symptoms of volume overload or leg edema. Patient denies abdominal bloating or change of appetite. Patient's scale does not work so he is unaware if his weights are accurate, today's weight is 178lbs per [facility] scale    Patient denies orthopnea, PND. Patient denies irregular heart rate or palpitations. Patient denies leg pain with walking. Patient is compliant with fluid restriction and taking medications as prescribed. Patient manages his own medications. REVIEW OF SYSTEMS:  General: Denies fever, night sweats. Ear, nose and throat: Denies difficulty hearing, sinus problems, runny nose, post-nasal drip, ringing in ears, mouth sores, loose teeth, ear pain, nosebleeds, sore throate, facial pain or numbess  Cardiovascular: see above in the interval history  Respiratory: Denies cough, wheezing, sputum production, hemoptysis.   Gastrointestinal: Denies heartburn, intolerance to certain foods, diarrhea, abdominal pain, nausea, vomiting, difficulty swallowing, blood in stool  Kidney and bladder: Denies painful urination, frequent urination, urgency, prostate problems and impotence  Musculoskeletal: Denies joint pain, muscle weakness  Skin and hair: Denies change in existing skin lesions, hair loss or increase, breast changes    PHYSICAL EXAM:  Visit Vitals  /66 (BP 1 Location: Right arm, BP Patient Position: Sitting, BP Cuff Size: Adult)   Pulse 70   Temp 97.3 °F (36.3 °C) (Oral)   Resp 16   Ht 6' (1.829 m)   Wt 178 lb (80.7 kg)   SpO2 99%   BMI 24.14 kg/m²     General: Patient is well developed, well-nourished in no acute distress  HEENT: Normocephalic and atraumatic. No scleral icterus. Pupils are equal, round and reactive to light and accomodation. No conjunctival injection. Oropharynx is clear. Neck: Supple. No evidence of thyroid enlargements or lymphadenopathy. JVD: Cannot be appreciated   Lungs: Breath sounds are equal and clear bilaterally. No wheezes, rhonchi, or rales. Heart: Regular rate and rhythm with normal S1 and S2. No murmurs, gallops or rubs. Abdomen: Soft, no mass or tenderness. No organomegaly or hernia. Bowel sounds present. Genitourinary and rectal: deferred  Extremities: No cyanosis or clubbing, +1 BLE edema. Neurologic: No focal sensory or motor deficits are noted. Grossly intact. Psychiatric: Awake, alert and oriented x 3. Blunt, argumentative   Skin: Warm, dry and well perfused. No lesions, nodules or rashes are noted. PAST MEDICAL HISTORY:  Past Medical History:   Diagnosis Date    Atrial fibrillation (HCC)     Depression     GERD (gastroesophageal reflux disease)     Glaucoma     High cholesterol     IBS (irritable bowel syndrome)        PAST SURGICAL HISTORY:  Past Surgical History:   Procedure Laterality Date    HX CHOLECYSTECTOMY      HX HEMORRHOIDECTOMY      HX ORTHOPAEDIC      RIGHT knee    HX TONSILLECTOMY      CO CARDIOVERSION ELECTIVE ARRHYTHMIA EXTERNAL N/A 3/13/2019    EP CARDIOVERSION performed by Reuben Francois MD at [address], [facility]       FAMILY HISTORY:  No family history on file.     SOCIAL HISTORY:  Social History     Socioeconomic History    Marital status:      Spouse name: Not on file    Number of children: Not on file    Years of education: Not on file    Highest education level: Not on file   Tobacco Use    Smoking status: Former Smoker     Quit date:      Years since quittin.2    Smokeless tobacco: Never Used   Substance and Sexual Activity    Alcohol use: Yes     Frequency: 2-3 times a week    Drug use: No       LABORATORY RESULTS:  Labs Latest Ref Rng & Units 2021   WBC 4.1 - 11.1 K/uL 6.6   RBC 4.10 - 5.70 M/uL 4.23   Hemoglobin 12.1 - 17.0 g/dL 12.2   Hematocrit 36.6 - 50.3 % 37.2   MCV 80.0 - 99.0 FL 87.9   Platelets 612 - 342 K/uL 204   Lymphocytes 12 - 49 % 21   Monocytes 5 - 13 % 8   Eosinophils 0 - 7 % 6   Basophils 0 - 1 % 1   Some recent data might be hidden       ALLERGY:  Allergies   Allergen Reactions    Sulfa (Sulfonamide Antibiotics) Unknown (comments)     From infancy, unsure if actually allergic          CURRENT MEDICATIONS:    Current Outpatient Medications:     amLODIPine (NORVASC) 5 mg tablet, Take 1 Tab by mouth daily. , Disp: 30 Tab, Rfl: 1    furosemide (LASIX) 40 mg tablet, Take 1 Tab by mouth daily. , Disp: 90 Tab, Rfl: 1    busPIRone (BUSPAR) 15 mg tablet, Take 15 mg by mouth two (2) times a day. Takes 25 mg total, Disp: , Rfl:     atorvastatin (LIPITOR) 80 mg tablet, Take 80 mg by mouth daily. , Disp: , Rfl:     OTHER,NON-FORMULARY,, Prostate health supplement, Disp: , Rfl:     apixaban (Eliquis) 5 mg tablet, Take 5 mg by mouth two (2) times a day., Disp: , Rfl:     OTHER,NON-FORMULARY,, cognitex, Disp: , Rfl:     OTHER,NON-FORMULARY,, DHEA, Disp: , Rfl:     OTHER,NON-FORMULARY,, two (2) times a day.  Bone Restore (2x/wk) , Disp: , Rfl:     OTHER,NON-FORMULARY,, Horsetail (silica), Disp: , Rfl:     OTHER,NON-FORMULARY,, Omega 3, Disp: , Rfl:     OTHER,NON-FORMULARY,, Bio-Curcumin (Turmeric) 400 mg, Disp: , Rfl:     co-enzyme Q-10 (Co Q-10) 100 mg capsule, Take 100 mg by mouth two (2) times a day., Disp: , Rfl:     vit A/vit C/vit E/zinc/copper (PRESERVISION AREDS PO), Take  by mouth., Disp: , Rfl:     latanoprostene bunod (VYZULTA) 0.024 % drop, Administer 1 Drop to right eye nightly., Disp: , Rfl:     therapeutic multivitamin (THERAGRAN) tablet, Take 1 Tab by mouth daily. , Disp: , Rfl:     dorzolamide-timolol (COSOPT) 22.3-6.8 mg/mL ophthalmic solution, Administer 1 Drop to right eye two (2) times a day., Disp: , Rfl:     vortioxetine (BRINTELLIX) 10 mg tablet, Take 10 mg by mouth daily. , Disp: , Rfl:     pantoprazole (PROTONIX) 40 mg tablet, Take 40 mg by mouth daily. , Disp: , Rfl:     sucralfate (CARAFATE) 1 gram tablet, Take 1 g by mouth four (4) times daily. , Disp: , Rfl:     Thank you for your referral and allowing me to participate in this patient's care.     Eli Kebede NP  [address] [place] [address], [address]  Phone: [phone]  Fax: [phone]    PATIENT CARE TEAM:  Patient Care Team:  Nabeel Carter MD as PCP - General (Family Medicine)

## 2021-03-17 NOTE — PROGRESS NOTES
Ashwin Resendiz is a 78 y.o. male  Chief Complaint   Patient presents with    Follow-up     MGUS     1. Have you been to the ER, urgent care clinic since your last visit? Hospitalized since your last visit? No.  2. Have you seen or consulted any other health care providers outside of the [address] [place] [address] since your last visit? Include any pap smears or colon screening.  No.

## 2021-03-17 NOTE — PROGRESS NOTES
[facility] [place] [facility]  [address], [address]  W: [phone]  F: [phone]    Reason for Visit:   Peyton Hutton is a 78 y.o. 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 male with Afib, CAD, CMP seen for abnormal SPEP and r/o amyloidosis  His PYP scan was positive. He is seen to discuss additional work up  No FH of heart disease   Comes with his wife and daughter is on the phone    Past Medical History:   Diagnosis Date    Atrial fibrillation ([address]     Depression     GERD (gastroesophageal reflux disease)     Glaucoma     High cholesterol     IBS (irritable bowel syndrome)       Past Surgical History:   Procedure Laterality Date    HX CHOLECYSTECTOMY      HX HEMORRHOIDECTOMY      HX ORTHOPAEDIC      RIGHT knee    HX TONSILLECTOMY      [place] CARDIOVERSION ELECTIVE ARRHYTHMIA EXTERNAL N/A 3/13/2019    EP CARDIOVERSION performed by Aj Ramirez MD at [address], [facility]  CATH LAB      Social History     Tobacco Use    Smoking status: Former Smoker     Quit date:      Years since quittin.2    Smokeless tobacco: Never Used   Substance Use Topics    Alcohol use: Yes     Frequency: 2-3 times a week      History reviewed. No pertinent family history. Current Outpatient Medications   Medication Sig    amLODIPine (NORVASC) 5 mg tablet Take 1 Tab by mouth daily.  furosemide (LASIX) 40 mg tablet Take 1 Tab by mouth daily.  busPIRone (BUSPAR) 15 mg tablet Take 15 mg by mouth two (2) times a day. Takes 25 mg total    pantoprazole (PROTONIX) 40 mg tablet Take 40 mg by mouth daily.  atorvastatin (LIPITOR) 80 mg tablet Take 80 mg by mouth daily.  sucralfate (CARAFATE) 1 gram tablet Take 1 g by mouth four (4) times daily.    Meryl Pedro, Prostate health supplement    apixaban (Eliquis) 5 mg tablet Take 5 mg by mouth two (2) times a day.  OTHER,NON-FORMULARY, cognitex    OTHER,NON-FORMULARY, DHEA    OTHER,NON-FORMULARY, two (2) times a day. Bone Restore (2x/wk)     OTHER,NON-FORMULARY, Horsetail (silica)    OTHER,NON-FORMULARY, Omega 3    OTHER,NON-FORMULARY, Bio-Curcumin (Turmeric) 400 mg    co-enzyme Q-10 (Co Q-10) 100 mg capsule Take 100 mg by mouth two (2) times a day.  vit A/vit C/vit E/zinc/copper (PRESERVISION AREDS PO) Take  by mouth.  latanoprostene bunod (VYZULTA) 0.024 % drop Administer 1 Drop to right eye nightly.  therapeutic multivitamin (THERAGRAN) tablet Take 1 Tab by mouth daily.  dorzolamide-timolol (COSOPT) 22.3-6.8 mg/mL ophthalmic solution Administer 1 Drop to right eye two (2) times a day.  vortioxetine (BRINTELLIX) 10 mg tablet Take 10 mg by mouth daily. No current facility-administered medications for this visit. Allergies   Allergen Reactions    Sulfa (Sulfonamide Antibiotics) Unknown (comments)     From infancy, unsure if actually allergic          Review of Systems: A complete review of systems was obtained, negative except as described above. Physical Exam:     Visit Vitals  /76 (BP 1 Location: Left upper arm, BP Patient Position: Sitting)   Pulse 84   Temp 97.3 °F (36.3 °C) (Temporal)   Wt 178 lb 9.6 oz (81 kg)   SpO2 97%   BMI 24.22 kg/m²     ECOG PS: 1  General: No distress  Eyes: PERRLA, anicteric sclerae  Psych: Alert, oriented, appropriate affect, normal judgment/insight    Results:     Lab Results   Component Value Date/Time    WBC 6.6 02/16/2021 10:15 AM    HGB 12.2 02/16/2021 10:15 AM    HCT 37.2 02/16/2021 10:15 AM    PLATELET 619 48/87/5575 10:15 AM    MCV 87.9 02/16/2021 10:15 AM    ABS.  NEUTROPHILS 4.3 02/16/2021 10:15 AM    Hemoglobin (POC) 13.6 03/23/2013 01:40 AM    Hematocrit (POC) 40 03/23/2013 01:40 AM     Lab Results   Component Value Date/Time    Sodium 142 11/02/2020 12:00 AM Potassium 3.7 11/02/2020 12:00 AM    Chloride 101 11/02/2020 12:00 AM    CO2 27 11/02/2020 12:00 AM    Glucose 110 (H) 11/02/2020 12:00 AM    BUN 22 11/02/2020 12:00 AM    Creatinine 1.15 11/02/2020 12:00 AM    GFR est AA 68 11/02/2020 12:00 AM    GFR est non-AA 59 (L) 11/02/2020 12:00 AM    Calcium 9.5 11/02/2020 12:00 AM    Sodium (POC) 141 03/23/2013 01:40 AM    Potassium (POC) 4.5 03/23/2013 01:40 AM    Chloride (POC) 110 (H) 03/23/2013 01:40 AM    Glucose (POC) 120 (H) 02/02/2019 09:09 PM    BUN (POC) 20 03/23/2013 01:40 AM    Creatinine (POC) 1.0 03/23/2013 01:40 AM    Calcium, ionized (POC) 1.18 03/23/2013 01:40 AM     Lab Results   Component Value Date/Time    Bilirubin, total 0.4 11/02/2020 12:00 AM    ALT (SGPT) 29 11/02/2020 12:00 AM    Alk. phosphatase 105 11/02/2020 12:00 AM    Protein, total 7.6 11/02/2020 12:00 AM    Albumin 4.4 11/02/2020 12:00 AM    Globulin 4.0 04/16/2019 07:54 PM        Lab Results   Component Value Date/Time    Iron % saturation 15 11/02/2020 12:00 AM    TIBC 390 11/02/2020 12:00 AM    Ferritin 99 11/02/2020 12:00 AM    Vitamin B12 725 04/16/2019 07:54 PM    Folate 24.4 (H) 04/16/2019 07:54 PM    Beta-2 Microglobulin, serum 2.7 (H) 11/02/2020 12:00 AM    TSH 2.490 11/02/2020 12:00 AM    M-Elvis 0.4 (H) 11/02/2020 12:00 AM    Lipase 88 03/24/2017 08:43 AM     Lab Results   Component Value Date/Time    INR 1.1 04/16/2019 07:54 PM    aPTT 30.0 04/16/2019 07:54 PM    Immunofixation shows IgM monoclonal protein with kappa light            chain      Component      Latest Ref Rng & Units 11/2/2020          12:00 AM   Free Kappa Lt Chains, serum      3.3 - 19.4 mg/L 50.0 (H)   Free Lambda Lt Chains, serum      5.7 - 26.3 mg/L 29.7 (H)   Kappa/Lambda ratio, serum      0.26 - 1.65 1.68 (H)     Records reviewed and summarized above.   Pathology report(s) reviewed     Bone marrow biopsy 2/16/2021     FINAL PATHOLOGIC DIAGNOSIS   Bone marrow, posterior iliac crest, aspirate, clot section, and decalcified core biopsy:   Mildly hypercellular marrow with maturing trilineage hematopoiesis and scattered lymphoid   aggregates comprising less than 10% of the cellularity, see comment   No evidence of increased/clonal plasma cells in patient with history of IgM MGUS   Flow cytometry shows no diagnostic immunophenotypic abnormalities   Congo red stain on core biopsy is negative for amyloid deposition   MYD88 not mutated    Radiology report(s) reviewed above. Nuclear cardiac amyloid spect  Impression:    1. 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 Has some lymphoid aggregates. 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 Daughter Jonnathan Blackwood is a Physician     I appreciate the opportunity to participate in [facility].     Signed By: Delma Garcia MD

## 2021-03-17 NOTE — PATIENT INSTRUCTIONS
Medication changes:    Start Amlodipine 5 mg once daily    You may take Miralax or Senna Docusate for constipation-both can be found over the counter at your pharmacy    Please take this to your pharmacy to notify them of the change in medications. Testing Ordered:    Lab work has been drawn today. You will be contacted with any abnormal results requiring changes to your current plan of care. Other Recommendations:      Ensure your drinking an adequate amount of water with a goal of 6-8 eight ounce glasses (1.5-2 liters) of fluid daily. Your urine should be clear and light yellow straw colored. If your blood pressure begins to consistently run below 90/60 and/or you begin to experience dizziness or lightheadedness, please [facility] at [phone]. Check your blood pressure prior to medication (amlodipine) and 2 hours after taking amlodipine and record. Please bring these numbers to your next appt. Follow up in 4 weeks with NP with [facility]      Please monitor your weights daily upon waking and after using the bathroom. Keep a written records of your weights and bring to your next appointment. If you have a weight gain of 3 or more pounds overnight OR 5 or more pounds in one week please contact our office. Thank you for allowing us the privilege of being a part of your healthcare team! Please do not hesitate to contact our office at [phone] with any questions or concerns. Virtual Heart Failure [address] invites you to learn more about heart failure and to share your questions, ideas, and experiences with others. Each month, the Heart Failure Support Group features a new educational topic and a guest speaker, followed by an interactive discussion. Our Heart Failure Nurse Navigator will moderate each session. You will be able to participate by phone, tablet or computer through [address] [place] [address].  This support group takes place on the 3rd Thursday of each month from 6:00-7:30PM. All individuals living with heart failure and their caregivers are welcome to join. If you are interested in participating, please contact us at [email or web address] and you will be sent the link to join the [employer]. Low Sodium Diet (2,000 Milligram): Care Instructions  Your Care Instructions     Too much sodium causes your body to hold on to extra water. This can raise your blood pressure and force your heart and kidneys to work harder. In very serious cases, this could cause you to be put in the hospital. It might even be life-threatening. By limiting sodium, you will feel better and lower your risk of serious problems. The most common source of sodium is salt. People get most of the salt in their diet from canned, prepared, and packaged foods. Fast food and restaurant meals also are very high in sodium. Your doctor will probably limit your sodium to less than 2,000 milligrams (mg) a day. This limit counts all the sodium in prepared and packaged foods and any salt you add to your food. Follow-up care is a key part of your treatment and safety. Be sure to make and go to all appointments, and call your doctor if you are having problems. It's also a good idea to know your test results and keep a list of the medicines you take. How can you care for yourself at home? Read food labels  · Read labels on cans and food packages. The labels tell you how much sodium is in each serving. Make sure that you look at the serving size. If you eat more than the serving size, you have eaten more sodium. · Food labels also tell you the Percent Daily Value for sodium. Choose products with low Percent Daily Values for sodium. · Be aware that sodium can come in forms other than salt, including monosodium glutamate (MSG), sodium citrate, and sodium bicarbonate (baking soda). MSG is often added to Asian food.  When you eat out, you can sometimes ask for food without MSG or added salt. Buy low-sodium foods  · Buy foods that are labeled \"unsalted\" (no salt added), \"sodium-free\" (less than 5 mg of sodium per serving), or \"low-sodium\" (less than 140 mg of sodium per serving). Foods labeled \"reduced-sodium\" and \"light sodium\" may still have too much sodium. Be sure to read the label to see how much sodium you are getting. · Buy fresh vegetables, or frozen vegetables without added sauces. Buy low-sodium versions of canned vegetables, soups, and other canned goods. Prepare low-sodium meals  · Cut back on the amount of salt you use in cooking. This will help you adjust to the taste. Do not add salt after cooking. One teaspoon of salt has about 2,300 mg of sodium. · Take the salt shaker off the table. · Flavor your food with garlic, lemon juice, onion, vinegar, herbs, and spices. Do not use soy sauce, lite soy sauce, steak sauce, onion salt, garlic salt, celery salt, mustard, or ketchup on your food. · Use low-sodium salad dressings, sauces, and ketchup. Or make your own salad dressings and sauces without adding salt. · Use less salt (or none) when recipes call for it. You can often use half the salt a recipe calls for without losing flavor. Other foods such as rice, pasta, and grains do not need added salt. · Rinse canned vegetables, and cook them in fresh water. This removes some--but not all--of the salt. · Avoid water that is naturally high in sodium or that has been treated with water softeners, which add sodium. Call your local water company to find out the sodium content of your water supply. If you buy bottled water, read the label and choose a sodium-free brand. Avoid high-sodium foods  · Avoid eating:  ? Smoked, cured, salted, and canned meat, fish, and poultry. ? Ham, weston, hot dogs, and luncheon meats. ? Regular, hard, and processed cheese and regular peanut butter.   ? Crackers with salted tops, and other salted snack foods such as pretzels, chips, and salted popcorn. ? Frozen prepared meals, unless labeled low-sodium. ? Canned and dried soups, broths, and bouillon, unless labeled sodium-free or low-sodium. ? Canned vegetables, unless labeled sodium-free or low-sodium. ? [place] fries, pizza, tacos, and other fast foods. ? Pickles, olives, ketchup, and other condiments, especially soy sauce, unless labeled sodium-free or low-sodium. Where can you learn more? Go to http://www.gray.com/  Enter V843 in the search box to learn more about \"Low Sodium Diet (2,000 Milligram): Care Instructions. \"  Current as of: August 22, 2019               Content Version: 12.6  © 9636-7868 [employer]. Care instructions adapted under license by [employer] (which disclaims liability or warranty for this information). If you have questions about a medical condition or this instruction, always ask your healthcare professional. [place] [address] any warranty or liability for your use of this information.

## 2021-03-18 ENCOUNTER — TELEPHONE (OUTPATIENT)
Dept: CARDIOLOGY CLINIC | Age: 81
End: 2021-03-18

## 2021-03-18 LAB
25(OH)D3 SERPL-MCNC: 36.2 NG/ML (ref 30–100)
ALBUMIN SERPL-MCNC: 3.7 G/DL (ref 3.5–5)
ALBUMIN/GLOB SERPL: 1 {RATIO} (ref 1.1–2.2)
ALP SERPL-CCNC: 83 U/L (ref 45–117)
ALT SERPL-CCNC: 28 U/L (ref 12–78)
ANION GAP SERPL CALC-SCNC: 6 MMOL/L (ref 5–15)
AST SERPL-CCNC: 22 U/L (ref 15–37)
BILIRUB SERPL-MCNC: 0.4 MG/DL (ref 0.2–1)
BNP SERPL-MCNC: 462 PG/ML
BUN SERPL-MCNC: 18 MG/DL (ref 6–20)
BUN/CREAT SERPL: 16 (ref 12–20)
CALCIUM SERPL-MCNC: 9.3 MG/DL (ref 8.5–10.1)
CHLORIDE SERPL-SCNC: 110 MMOL/L (ref 97–108)
CO2 SERPL-SCNC: 25 MMOL/L (ref 21–32)
CREAT SERPL-MCNC: 1.12 MG/DL (ref 0.7–1.3)
ERYTHROCYTE [DISTWIDTH] IN BLOOD BY AUTOMATED COUNT: 14.7 % (ref 11.5–14.5)
GLOBULIN SER CALC-MCNC: 3.8 G/DL (ref 2–4)
GLUCOSE SERPL-MCNC: 105 MG/DL (ref 65–100)
HCT VFR BLD AUTO: 39.3 % (ref 36.6–50.3)
HGB BLD-MCNC: 12.8 G/DL (ref 12.1–17)
MAGNESIUM SERPL-MCNC: 2.4 MG/DL (ref 1.6–2.4)
MCH RBC QN AUTO: 29.7 PG (ref 26–34)
MCHC RBC AUTO-ENTMCNC: 32.6 G/DL (ref 30–36.5)
MCV RBC AUTO: 91.2 FL (ref 80–99)
NRBC # BLD: 0 K/UL (ref 0–0.01)
NRBC BLD-RTO: 0 PER 100 WBC
PLATELET # BLD AUTO: 210 K/UL (ref 150–400)
PMV BLD AUTO: 11.3 FL (ref 8.9–12.9)
POTASSIUM SERPL-SCNC: 4.6 MMOL/L (ref 3.5–5.1)
PROT SERPL-MCNC: 7.5 G/DL (ref 6.4–8.2)
RBC # BLD AUTO: 4.31 M/UL (ref 4.1–5.7)
SODIUM SERPL-SCNC: 141 MMOL/L (ref 136–145)
TROPONIN I SERPL-MCNC: <0.05 NG/ML
WBC # BLD AUTO: 5.4 K/UL (ref 4.1–11.1)

## 2021-03-18 RX ORDER — ACETAMINOPHEN 500 MG
2000 TABLET ORAL 2 TIMES DAILY
Qty: 60 CAP | Refills: 3 | Status: SHIPPED | OUTPATIENT
Start: 2021-03-18

## 2021-03-18 NOTE — TELEPHONE ENCOUNTER
Genetic testing ordered. Sample and order shipped by [facility] to [facility].  Elysa Prader, RN

## 2021-03-18 NOTE — TELEPHONE ENCOUNTER
----- Message from Aydin Singh NP sent at 3/18/2021  8:39 AM EDT -----  Labs reviewed: NTproBNP 462- ensure he is taking lasix 40mg daily and following daily weights- call [facility] for weight gain 3lbs overnight or 5lbs in 7 days. Vitamin D 36.4 start cholecalciferol 4000units daily. I called patient to review all labs, he states he would like to call back to review all medications. He will call back. Patient returned call, states he doesn't understand the AVS, why it states to stop medication. I explained to him that MIKAYolanda Ford Supriya did not tell him to stop any medication, that he was no longer taking one of his medications, so it was stopped, and therefore that is how it was on his AVS. He told me that I didn't know what I was talking about, to tell someone else who knew what they were talking about to call him and then he hung up on me. Case referred to Celso Wyatt, Practice Supervisor.

## 2021-03-18 NOTE — PROGRESS NOTES
Labs reviewed: NTproBNP 462- ensure he is taking lasix 40mg daily and following daily weights- call [facility] for weight gain 3lbs overnight or 5lbs in 7 days. Vitamin D 36.4 start cholecalciferol 4000units daily.

## 2021-03-24 ENCOUNTER — TELEPHONE (OUTPATIENT)
Dept: CARDIOLOGY CLINIC | Age: 81
End: 2021-03-24

## 2021-03-24 NOTE — TELEPHONE ENCOUNTER
Daughter Lakia Roberts called asking to schedule an appt with Dr. Vivek Rodriguez with her conferencing in during appt. She states patient is becoming more agitated with becoming more confused, which she believes is due to amyloid. She would like to discuss plan of action now that patient has seen Dr. Abiel Shoemaker. Will transfer her to  and schedule appt. She states understanding.

## 2021-03-26 ENCOUNTER — IMMUNIZATION (OUTPATIENT)
Dept: INTERNAL MEDICINE CLINIC | Age: 81
End: 2021-03-26
Payer: MEDICARE

## 2021-03-26 DIAGNOSIS — Z23 ENCOUNTER FOR IMMUNIZATION: Primary | ICD-10-CM

## 2021-03-26 PROCEDURE — 0001A COVID-19, MRNA, LNP-S, PF, 30MCG/0.3ML DOSE(PFIZER): CPT | Performed by: FAMILY MEDICINE

## 2021-03-26 PROCEDURE — 91300 COVID-19, MRNA, LNP-S, PF, 30MCG/0.3ML DOSE(PFIZER): CPT | Performed by: FAMILY MEDICINE

## 2021-04-06 ENCOUNTER — TELEPHONE (OUTPATIENT)
Dept: CARDIOLOGY CLINIC | Age: 81
End: 2021-04-06

## 2021-04-06 NOTE — TELEPHONE ENCOUNTER
Returned patients call using two patient identifiers. Patient left a voicemail stating he could not find the address for where to  CPAP equipment and did not have [facility] number. When phone call was returned patient stated that he had already received [facility] number and will follow up with them regarding CPAP equipment. Patient had no further questions.  Sue Rubio RN

## 2021-04-08 RX ORDER — AMLODIPINE BESYLATE 5 MG/1
5 TABLET ORAL DAILY
Qty: 30 TAB | Refills: 1 | Status: SHIPPED | OUTPATIENT
Start: 2021-04-08 | End: 2021-04-30 | Stop reason: SDUPTHER

## 2021-04-08 NOTE — TELEPHONE ENCOUNTER
Requested Prescriptions     Signed Prescriptions Disp Refills    amLODIPine (NORVASC) 5 mg tablet 30 Tab 1     Sig: Take 1 Tab by mouth daily.      Authorizing Provider: Sarwat Mccullough     Ordering User: Scott Pascual

## 2021-04-12 ENCOUNTER — TELEPHONE (OUTPATIENT)
Dept: CARDIOLOGY CLINIC | Age: 81
End: 2021-04-12

## 2021-04-12 NOTE — TELEPHONE ENCOUNTER
Pt LM 4/9/21 stating he has tried 3x to go to [facility] to get CPAP equipment but had difficulty finding office and his Maximiano Roni" was unable to locate the office. When he did find it he got there after hours. He is very frustrated. Called [facility] and spoke with Loc Enriquez who states the patient was offered 3 appointments and he was either late by 30 minutes or more or came on a day where the office was not open. They are open Tues/Thurs for appointments and M-F for curbside. Per Loc Enriquez, patient presented to the office on 4/9 (Friday) and \"banged on the door for ten [employer] attempted to explain how equipment works/when, etc.. and pt started using profanity. Staff were advised not to open the door. Patient will not be allowed back into the office. The  has tried to contact patient by phone to set up mail delivery but has been unsuccessful.  Bhakti Alvarado RN

## 2021-04-13 ENCOUNTER — TELEPHONE (OUTPATIENT)
Dept: CARDIOLOGY CLINIC | Age: 81
End: 2021-04-13

## 2021-04-13 NOTE — TELEPHONE ENCOUNTER
I called patient to let him know we could reschedule to 2pm.  Patient screened for covid-19.     I LVM with patient's daughter to let her know the appt was rescheduled to 2pm.

## 2021-04-13 NOTE — TELEPHONE ENCOUNTER
Returned call to patient who expressed difficulty acquiring CPAP equipment. [facility] to discuss options. Pt questioned why he needed to weight every day. Educated patient on the importance and to bring log with him to appointments. Pt verbalized understanding.  Nusrat House RN

## 2021-04-13 NOTE — TELEPHONE ENCOUNTER
I called patients daughter to offer 11:00am or 12:30pm per Dr. Melia Echols. She is unable to have the appt during those times. She stated she would be available later.

## 2021-04-13 NOTE — TELEPHONE ENCOUNTER
LVM with patient's daughter to return my call due to scheduling conflict. Tomorrow's appointment needs to be rescheduled to 11am or 12:30pm.  Time per Dr. Amna Davis. I have also called patient to inform him that I am waiting on a call back from his daughter to discuss changing the appointment time for tomorrow's appointment. He would like his daughter to decide the appointment time. I informed him I would return his call to let him know what time the appointment will be after I speak with her. Patient also stated he left a message with the HF nurses asking for a call back to discuss the CPAP situation.

## 2021-04-14 ENCOUNTER — OFFICE VISIT (OUTPATIENT)
Dept: CARDIOLOGY CLINIC | Age: 81
End: 2021-04-14
Payer: MEDICARE

## 2021-04-14 VITALS
RESPIRATION RATE: 16 BRPM | SYSTOLIC BLOOD PRESSURE: 138 MMHG | OXYGEN SATURATION: 98 % | WEIGHT: 180.4 LBS | BODY MASS INDEX: 24.43 KG/M2 | HEIGHT: 72 IN | HEART RATE: 80 BPM | TEMPERATURE: 97.8 F | DIASTOLIC BLOOD PRESSURE: 80 MMHG

## 2021-04-14 DIAGNOSIS — E88.09 HYPOALBUMINEMIA: ICD-10-CM

## 2021-04-14 DIAGNOSIS — E85.4 CARDIAC AMYLOIDOSIS (HCC): ICD-10-CM

## 2021-04-14 DIAGNOSIS — M10.9 GOUT, UNSPECIFIED CAUSE, UNSPECIFIED CHRONICITY, UNSPECIFIED SITE: ICD-10-CM

## 2021-04-14 DIAGNOSIS — E44.0 PROTEIN-CALORIE MALNUTRITION, MODERATE (HCC): ICD-10-CM

## 2021-04-14 DIAGNOSIS — I50.9 CONGESTIVE HEART FAILURE, UNSPECIFIED HF CHRONICITY, UNSPECIFIED HEART FAILURE TYPE (HCC): ICD-10-CM

## 2021-04-14 DIAGNOSIS — R77.0 ABNORMALITY OF ALBUMIN: ICD-10-CM

## 2021-04-14 DIAGNOSIS — E44.0 MALNUTRITION OF MODERATE DEGREE (HCC): ICD-10-CM

## 2021-04-14 DIAGNOSIS — R63.4 WEIGHT LOSS: ICD-10-CM

## 2021-04-14 DIAGNOSIS — R63.0 ANOREXIA: ICD-10-CM

## 2021-04-14 DIAGNOSIS — R06.02 SHORTNESS OF BREATH: Primary | ICD-10-CM

## 2021-04-14 DIAGNOSIS — E46 MALNUTRITION, UNSPECIFIED TYPE (HCC): ICD-10-CM

## 2021-04-14 DIAGNOSIS — D50.9 IRON DEFICIENCY ANEMIA, UNSPECIFIED IRON DEFICIENCY ANEMIA TYPE: ICD-10-CM

## 2021-04-14 DIAGNOSIS — E55.9 VITAMIN D DEFICIENCY: ICD-10-CM

## 2021-04-14 DIAGNOSIS — R53.83 FATIGUE, UNSPECIFIED TYPE: ICD-10-CM

## 2021-04-14 DIAGNOSIS — I43 CARDIAC AMYLOIDOSIS (HCC): ICD-10-CM

## 2021-04-14 PROCEDURE — 1101F PT FALLS ASSESS-DOCD LE1/YR: CPT | Performed by: INTERNAL MEDICINE

## 2021-04-14 PROCEDURE — G8420 CALC BMI NORM PARAMETERS: HCPCS | Performed by: INTERNAL MEDICINE

## 2021-04-14 PROCEDURE — G8427 DOCREV CUR MEDS BY ELIG CLIN: HCPCS | Performed by: INTERNAL MEDICINE

## 2021-04-14 PROCEDURE — 99215 OFFICE O/P EST HI 40 MIN: CPT | Performed by: INTERNAL MEDICINE

## 2021-04-14 PROCEDURE — G8432 DEP SCR NOT DOC, RNG: HCPCS | Performed by: INTERNAL MEDICINE

## 2021-04-14 PROCEDURE — G8536 NO DOC ELDER MAL SCRN: HCPCS | Performed by: INTERNAL MEDICINE

## 2021-04-14 NOTE — PROGRESS NOTES
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; will require follow-up in [facility] at least quarterly with assessment of prognostic labs, orthostatics, 6MW, EKG, echo and annual CPEST ()  · Complete evaluation for aTTR amyloidosis today (repeat gammopathy, genetic testing)  · Annual evaluation of MGUS with labs and hematology visits; BM bx was negative   · Start application for tafamidis, drink green tea, consider adding doxycycline if tolerates      Patient to check BP in AM while supine and then 2 hours later to r/o autonomic dysfunction  Start antihypertensive therapy once we have measurements of BP at home; preferred agent is norvasc  Avoid beta-blockers andor ACEi/ARB/ARNi due to orthostasis and exercise induced lightheadedness  Consider spironolactone once lab results available, watch K  Does not require diuretics  Check lbs: genetic testing, CBC, RTC, CMP, pro-NT-BNP, Mg, Troponin I, gammopathy profile, uric acid, iron profile with ferritin, vitamin D level  Start allopurinol if uric acid remains elevated on repeat labs  Reinforced low salt diet  Reinforced fluid restriction to 6 x 8oz glasses per day  Patient needs advanced care plan on file  Follow up with hematology, Dr. Augusto Trammell regarding MGUS annually  Follow up with sleep medicine/pulmonary re: CPAP trial    Follow-up with primary Cardiologist, Dr. Pako Solano on evaluation of CAD  Follow up with EP cardiologist, Dr. Miko Suarez  Follow-up with PCP Mp Avina MD on evaluation of iron deficiency anemia and abdominal symptoms  Provided patient education materials for COVID precautions  Follow up with [facility] in 1 month with Dr. 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  · Bone marrow biopsy negative (2/24/21)     CARDIAC IMAGING:  CTA for EP mapping reviewed (10/6/20) Watchman device occlusive  Cardiac CTA (3/30/20) moderate diffuse CAD  Cardiac PET (3/7/19) normal, Rest EF 32 and stress 38  Echo (3/2020) LVEF 55%, IVSd 1.6cm, LV mass 312g increased, RV function normal, PA pressure 30, sclerotic AV without stenosis, trace TR, mild mR, IVC normal  EKG (9/2020) atrial fib, rate in 90s, low voltae, V1-V2 q wave  [facility] ([facility] (4/9/2020) LHC 30% p-LAD, 30% m-LAD, 50% 2nd OM FFR flow reserve 0.92 (4/9/20)  [facility] (2011) tirvial luminal irregularities  Remainder in records (see in epic)     HEMODYNAMICS:  RHC not done  CPEST (12/15/20): MVO2 17.7 with RQ 1.7      OTHER IMAGING:  CT abd (3/24/17) There is distention of the proximal half of the small bowel with fecal-like material in the mid small bowel loops. This is consistent with at least partial obstruction of the small bowel. No structural abnormality is  identified to account for this. Chest CT( 1/2016) 1.  Unchanged pulmonary nodule in the right upper lobe just over 6 mm on  today's exam. Per Fleischner criteria for high risk patients, follow-up is  recommended in 6-9 months. Low risk Fleischner criteria follow-up for 6 to 8mm pulmonary nodules is at 6-12  months and 18-24 months. 2. Aortic valve annular calcifications with suggestion of probable aortic valve leaflet calcifications. . This finding can be associated with aortic   stenosis. 3. Cholelithiasis.     SOCIAL HISTORY:  , never smoker  Alcohol occasional     SOCIAL HISTORY:  Never smoke. Social alcohol. No illicit drugs.     HISTORY OF PRESENT ILLNESS:  I had the pleasure of seeing Luis Miguel Hoffman in [facility] at [address] [facility] in [address] [place] [address], Luis Miguel Hoffman is a 78 y.o. male with h/o chronic fatigue, DANILO (not started CPAP yet), HTN, HL, coronary artery disease (LHC on 4/9/20 showed 30% p-LAD, 30% m-LAD, 50% 2nd OM; good flow reserve), atrial fibrillation, paroxysmal, DCCV 3/2019 and s/p afib ablation (10/20/20 by Dr. Richie Urena), not on chronic anticoagulation due to life-threatening GI bleed/hemorrhoids, s/p Watchman device in [place] 7/23/19, well seated by echo (10/2020), depression. Patient was referred by Dr. Richie Urena to evaluated for possible HFpEF. Patient had PYP test suggestive of aTTR. 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 Bone marrow biopsy was negative (2/24/21); no evidence of increased/clonal plasma cells in patient with history of IgM MGUS, congo red stain on core biopsy is negative for amyloid deposition. Troponin was negative and pro-NT-BNP in 400s, indicating stage 1 cardiac amyloidosis. Awaiting genetic studies to confirm wild type aTTR cardiac amyloidosis. CPEST (12/2020) revealed pVO2 17.7 consistent with excellent prognosis.  Patient had sleep study which revealed DANILO and CPAP trial is pending.     INTERVAL HISTORY:  Today, patient presents for follow up visit accompanied by his wife and daughter is present via telephone.      Patient reports chest pressure when walking more than 4 blocks which resolved with rest. Patient walked every day several time upstairs 3 stories without symptoms. Occasionally he has chest pain at rest in the evening after meal.      Patient walked to our clinic from parking garage without having to slow down or stop. Patient states he can walk more than one block without symptoms of fatigue or shortness of breath. Patient can perform home activities without problem and routinely participates in daily walking for more than 15 minutes.      Patient denies symptoms of volume overload or leg edema. Patient denies abdominal bloating or change of appetite. Patient's scale does not work so he is unaware if his weights are accurate, today's weight is 178lbs per [facility] scale     Patient denies orthopnea, PND.      Patient denies irregular heart rate or palpitations.     Patient denies leg pain with walking.      Patient is compliant with fluid restriction and taking medications as prescribed. Patient manages his own medications. REVIEW OF SYSTEMS:  General: Denies fever, night sweats. Ear, nose and throat: Denies difficulty hearing, sinus problems, runny nose, post-nasal drip, ringing in ears, mouth sores, loose teeth, ear pain, nosebleeds, sore throate, facial pain or numbess  Cardiovascular: see above in the interval history  Respiratory: Denies cough, wheezing, sputum production, hemoptysis.   Gastrointestinal: Denies heartburn, constipation, diarrhea, abdominal pain, nausea, vomiting, difficulty swallowing, blood in stool  Kidney and bladder: Denies painful urination, frequent urination, urgency  Musculoskeletal: Denies joint pain, muscle weakness  Skin and hair: Denies change in existing skin lesions, hair loss or increase, breast changes    PHYSICAL EXAM:  Visit Vitals  /80 (BP 1 Location: Right arm, BP Patient Position: Sitting, BP Cuff Size: Adult)   Pulse 80   Temp 97.8 °F (36.6 °C) (Oral)   Resp 16   Ht 6' (1.829 m)   Wt 180 lb 6.4 oz (81.8 kg)   SpO2 98%   BMI 24.47 kg/m² General: Patient is well developed, well-nourished in no acute distress  HEENT: Normocephalic and atraumatic. No scleral icterus. Pupils are equal, round and reactive to light and accomodation. No conjunctival injection. Oropharynx is clear. Neck: Supple. No evidence of thyroid enlargements or lymphadenopathy. JVD: Cannot be appreciated   Lungs: Breath sounds are equal and clear bilaterally. No wheezes, rhonchi, or rales. Heart: Regular rate and rhythm with normal S1 and S2. No murmurs, gallops or rubs. Abdomen: Soft, no mass or tenderness. No organomegaly or hernia. Bowel sounds present. Genitourinary and rectal: deferred  Extremities: No cyanosis, clubbing, or edema. Neurologic: No focal sensory or motor deficits are noted. Grossly intact. Psychiatric: Awake, alert an doriented x 3. Appropriate mood and affect. Skin: Warm, dry and well perfused. No lesions, nodules or rashes are noted. PAST MEDICAL HISTORY:  Past Medical History:   Diagnosis Date    Atrial fibrillation (HCC)     Depression     GERD (gastroesophageal reflux disease)     Glaucoma     High cholesterol     IBS (irritable bowel syndrome)        PAST SURGICAL HISTORY:  Past Surgical History:   Procedure Laterality Date    HX CHOLECYSTECTOMY      HX HEMORRHOIDECTOMY      HX ORTHOPAEDIC      RIGHT knee    HX TONSILLECTOMY      AK CARDIOVERSION ELECTIVE ARRHYTHMIA EXTERNAL N/A 3/13/2019    EP CARDIOVERSION performed by Nora Carter MD at [facility] [address] [facility]       FAMILY HISTORY:  No family history on file.     SOCIAL HISTORY:  Social History     Socioeconomic History    Marital status:      Spouse name: Not on file    Number of children: Not on file    Years of education: Not on file    Highest education level: Not on file   Tobacco Use    Smoking status: Former Smoker     Quit date:      Years since quittin.3    Smokeless tobacco: Never Used   Substance and Sexual Activity    Alcohol use: Yes     Frequency: 2-3 times a week    Drug use: No       LABORATORY RESULTS:  Labs Latest Ref Rng & Units 3/17/2021 2/16/2021   WBC 4.1 - 11.1 K/uL 5.4 6.6   RBC 4.10 - 5.70 M/uL 4.31 4.23   Hemoglobin 12.1 - 17.0 g/dL 12.8 12.2   Hematocrit 36.6 - 50.3 % 39.3 37.2   MCV 80.0 - 99.0 FL 91.2 87.9   Platelets 929 - 171 K/uL 210 204   Lymphocytes 12 - 49 % - 21   Monocytes 5 - 13 % - 8   Eosinophils 0 - 7 % - 6   Basophils 0 - 1 % - 1   Albumin 3.5 - 5.0 g/dL 3.7 -   Calcium 8.5 - 10.1 MG/DL 9.3 -   Glucose 65 - 100 mg/dL 105(H) -   BUN 6 - 20 MG/DL 18 -   Creatinine 0.70 - 1.30 MG/DL 1.12 -   Sodium 136 - 145 mmol/L 141 -   Potassium 3.5 - 5.1 mmol/L 4.6 -   Some recent data might be hidden       ALLERGY:  Allergies   Allergen Reactions    Sulfa (Sulfonamide Antibiotics) Unknown (comments)     From infancy, unsure if actually allergic          CURRENT MEDICATIONS:    Current Outpatient Medications:     amLODIPine (NORVASC) 5 mg tablet, Take 1 Tab by mouth daily. , Disp: 30 Tab, Rfl: 1    cholecalciferol (VITAMIN D3) (2,000 UNITS /50 MCG) cap capsule, Take 1 Cap by mouth two (2) times a day. (Patient taking differently: Take 2,000 Units by mouth daily.), Disp: 60 Cap, Rfl: 3    furosemide (LASIX) 40 mg tablet, Take 1 Tab by mouth daily. , Disp: 90 Tab, Rfl: 1    busPIRone (BUSPAR) 15 mg tablet, Take 15 mg by mouth two (2) times a day. Takes 25 mg total, Disp: , Rfl:     atorvastatin (LIPITOR) 80 mg tablet, Take 80 mg by mouth daily. , Disp: , Rfl:     OTHER,NON-FORMULARY,, Prostate health supplement, Disp: , Rfl:     apixaban (Eliquis) 5 mg tablet, Take 5 mg by mouth two (2) times a day., Disp: , Rfl:     OTHER,NON-FORMULARY,, cognitex, Disp: , Rfl:     OTHER,NON-FORMULARY,, DHEA, Disp: , Rfl:     OTHER,NON-FORMULARY,, two (2) times a day.  Bone Restore (2x/wk) , Disp: , Rfl:     OTHER,NON-FORMULARY,, Horsetail (silica), Disp: , Rfl:     OTHER,NON-FORMULARY,, Omega 3 - 2 caps in the am and 2 in the pm, Disp: , Rfl:    OTHER,NON-FORMULARY,, Bio-Curcumin (Turmeric) 400 mg, Disp: , Rfl:     co-enzyme Q-10 (Co Q-10) 100 mg capsule, Take 100 mg by mouth two (2) times a day., Disp: , Rfl:     vit A/vit C/vit E/zinc/copper (PRESERVISION AREDS PO), Take  by mouth., Disp: , Rfl:     latanoprostene bunod (VYZULTA) 0.024 % drop, Administer 1 Drop to right eye nightly., Disp: , Rfl:     therapeutic multivitamin (THERAGRAN) tablet, Take 1 Tab by mouth daily. , Disp: , Rfl:     dorzolamide-timolol (COSOPT) 22.3-6.8 mg/mL ophthalmic solution, Administer 1 Drop to right eye two (2) times a day., Disp: , Rfl:     vortioxetine (BRINTELLIX) 10 mg tablet, Take 10 mg by mouth daily. , Disp: , Rfl:     pantoprazole (PROTONIX) 40 mg tablet, Take 40 mg by mouth daily. , Disp: , Rfl:     Thank you for your referral and allowing me to participate in this patient's care.     Jose Ruby MD PhD  [address] [place] [address], [address]  Phone: [phone]  Fax: [phone]    PATIENT CARE TEAM:  Patient Care Team:  Christie Cuevas MD as PCP - General (Family Medicine)     Total visit time: 40 minutes (> 50% spent face-to-face counseling)

## 2021-04-14 NOTE — PATIENT INSTRUCTIONS
Medication changes: You can take over the counter Tums for GERD as directed on boxed instructions. Testing Ordered: 
 
Labs drawn in clinic today. You will be notified of any abnormal results requiring medication changes. Genetic testing was drawn today. The results will be discussed at your next office visit once received. Other Recommendations:  
 
Referral to Nutritionist. Please call [phone] to schedule. Please schedule a nurse visit to have your blood pressure cuff and scale calibrated. After nurse visit, please check your blood pressure before morning medications and then 2 (two) hours after your medication, keep a log and call the office after 3 days of reading.s Please follow up with [facility] regarding your CPAP equipment ([phone]) Heart failure booklet provided in clinic today. Please complete [employer] patient assistance application and return to the office. Follow up with Dr. Valeriy Brooke regarding possible need for stress test. 
  
Ensure your drinking an adequate amount of water with a goal of 6-8 eight ounce glasses (1.5-2 liters) of fluid daily. Your urine should be clear and light yellow straw colored. If your blood pressure begins to consistently run below 90/60 and/or you begin to experience dizziness or lightheadedness, please contact the Alexa Portillo at [phone]. Follow up one month with [facility] MD 
 
Carafate (sucralfate) was removed from your medication list because you reported that you are not currently taking this medication. Please monitor your weights daily upon waking and after using the bathroom. Keep a written records of your weights and bring to your next appointment. If you have a weight gain of 3 or more pounds overnight OR 5 or more pounds in one week please contact our office.   
 
 
Thank you for allowing us the privilege of being a part of your healthcare team! Please do not hesitate to contact our office at [phone] with any questions or concerns. Virtual Heart Failure Support Group [employer] invites you to learn more about heart failure and to share your questions, ideas, and experiences with others. Each month, the Heart Failure Support Group features a new educational topic and a guest speaker, followed by an interactive discussion. Our Heart Failure Nurse Navigator will moderate each session. You will be able to participate by phone, tablet or computer through [address] [place] [address]. This support group takes place on the 3rd Thursday of each month from 6:00-7:30PM. All individuals living with heart failure and their caregivers are welcome to join. If you are interested in participating, please contact us at [email or web address] and you will be sent the link to join the [employer].

## 2021-04-15 ENCOUNTER — TELEPHONE (OUTPATIENT)
Dept: CARDIOLOGY CLINIC | Age: 81
End: 2021-04-15

## 2021-04-15 LAB
ALBUMIN SERPL-MCNC: 4.1 G/DL (ref 3.5–5)
ALBUMIN/GLOB SERPL: 1 {RATIO} (ref 1.1–2.2)
ALP SERPL-CCNC: 86 U/L (ref 45–117)
ALT SERPL-CCNC: 35 U/L (ref 12–78)
ANION GAP SERPL CALC-SCNC: 7 MMOL/L (ref 5–15)
AST SERPL-CCNC: 24 U/L (ref 15–37)
BASOPHILS # BLD: 0 K/UL (ref 0–0.1)
BASOPHILS NFR BLD: 1 % (ref 0–1)
BILIRUB SERPL-MCNC: 0.3 MG/DL (ref 0.2–1)
BNP SERPL-MCNC: 222 PG/ML
BUN SERPL-MCNC: 22 MG/DL (ref 6–20)
BUN/CREAT SERPL: 20 (ref 12–20)
CALCIUM SERPL-MCNC: 9.6 MG/DL (ref 8.5–10.1)
CHLORIDE SERPL-SCNC: 107 MMOL/L (ref 97–108)
CO2 SERPL-SCNC: 24 MMOL/L (ref 21–32)
CREAT SERPL-MCNC: 1.09 MG/DL (ref 0.7–1.3)
DIFFERENTIAL METHOD BLD: NORMAL
EOSINOPHIL # BLD: 0.4 K/UL (ref 0–0.4)
EOSINOPHIL NFR BLD: 5 % (ref 0–7)
ERYTHROCYTE [DISTWIDTH] IN BLOOD BY AUTOMATED COUNT: 13.3 % (ref 11.5–14.5)
FERRITIN SERPL-MCNC: 70 NG/ML (ref 26–388)
GLOBULIN SER CALC-MCNC: 4.1 G/DL (ref 2–4)
GLUCOSE SERPL-MCNC: 83 MG/DL (ref 65–100)
HCT VFR BLD AUTO: 40.7 % (ref 36.6–50.3)
HGB BLD-MCNC: 13.3 G/DL (ref 12.1–17)
IMM GRANULOCYTES # BLD AUTO: 0 K/UL (ref 0–0.04)
IMM GRANULOCYTES NFR BLD AUTO: 0 % (ref 0–0.5)
IRON SATN MFR SERPL: 22 % (ref 20–50)
IRON SERPL-MCNC: 80 UG/DL (ref 35–150)
LYMPHOCYTES # BLD: 1.5 K/UL (ref 0.8–3.5)
LYMPHOCYTES NFR BLD: 22 % (ref 12–49)
MAGNESIUM SERPL-MCNC: 2.4 MG/DL (ref 1.6–2.4)
MCH RBC QN AUTO: 30.2 PG (ref 26–34)
MCHC RBC AUTO-ENTMCNC: 32.7 G/DL (ref 30–36.5)
MCV RBC AUTO: 92.5 FL (ref 80–99)
MONOCYTES # BLD: 0.5 K/UL (ref 0–1)
MONOCYTES NFR BLD: 7 % (ref 5–13)
NEUTS SEG # BLD: 4.4 K/UL (ref 1.8–8)
NEUTS SEG NFR BLD: 65 % (ref 32–75)
NRBC # BLD: 0 K/UL (ref 0–0.01)
NRBC BLD-RTO: 0 PER 100 WBC
PLATELET # BLD AUTO: 192 K/UL (ref 150–400)
PMV BLD AUTO: 11.6 FL (ref 8.9–12.9)
POTASSIUM SERPL-SCNC: 4.4 MMOL/L (ref 3.5–5.1)
PREALB SERPL-MCNC: 33.7 MG/DL (ref 20–40)
PROT SERPL-MCNC: 8.2 G/DL (ref 6.4–8.2)
RBC # BLD AUTO: 4.4 M/UL (ref 4.1–5.7)
SODIUM SERPL-SCNC: 138 MMOL/L (ref 136–145)
TIBC SERPL-MCNC: 361 UG/DL (ref 250–450)
TROPONIN I SERPL-MCNC: <0.05 NG/ML
URATE SERPL-MCNC: 6.5 MG/DL (ref 3.5–7.2)
WBC # BLD AUTO: 6.7 K/UL (ref 4.1–11.1)

## 2021-04-15 NOTE — TELEPHONE ENCOUNTER
Prior auth completed for vyndamax via covermymeds, awaiting response.      Prior auth approved for vyndamax

## 2021-04-15 NOTE — TELEPHONE ENCOUNTER
Genetic testing ordered. Sample and order shipped by [employer] to [facility].  Soledad Gong RN

## 2021-04-16 ENCOUNTER — IMMUNIZATION (OUTPATIENT)
Dept: INTERNAL MEDICINE CLINIC | Age: 81
End: 2021-04-16
Payer: MEDICARE

## 2021-04-16 DIAGNOSIS — Z23 ENCOUNTER FOR IMMUNIZATION: Primary | ICD-10-CM

## 2021-04-16 LAB
1,25(OH)2D3 SERPL-MCNC: 45.4 PG/ML (ref 19.9–79.3)
ALBUMIN MFR UR ELPH: 23.2 %
ALPHA1 GLOB MFR UR ELPH: 5.9 %
ALPHA2 GLOB 24H MFR UR ELPH: 13.7 %
B-GLOBULIN 24H MFR UR ELPH: 42.4 %
B2 MICROGLOB SERPL-MCNC: 2.4 MG/L (ref 0.6–2.4)
GAMMA GLOB 24H MFR UR ELPH: 14.9 %
KAPPA LC FREE SER-MCNC: 33.9 MG/L (ref 3.3–19.4)
KAPPA LC FREE/LAMBDA FREE SER: 1.53 {RATIO} (ref 0.26–1.65)
LAMBDA LC FREE SERPL-MCNC: 22.2 MG/L (ref 5.7–26.3)
M PROTEIN MFR UR ELPH: NORMAL %
PLEASE NOTE:, 133800: NORMAL
PROT UR-MCNC: 5.8 MG/DL

## 2021-04-16 PROCEDURE — 91300 COVID-19, MRNA, LNP-S, PF, 30MCG/0.3ML DOSE(PFIZER): CPT | Performed by: FAMILY MEDICINE

## 2021-04-16 PROCEDURE — 0002A COVID-19, MRNA, LNP-S, PF, 30MCG/0.3ML DOSE(PFIZER): CPT | Performed by: FAMILY MEDICINE

## 2021-04-20 LAB
ALBUMIN SERPL ELPH-MCNC: 3.9 G/DL (ref 2.9–4.4)
ALBUMIN/GLOB SERPL: 1.1 {RATIO} (ref 0.7–1.7)
ALPHA1 GLOB SERPL ELPH-MCNC: 0.2 G/DL (ref 0–0.4)
ALPHA2 GLOB SERPL ELPH-MCNC: 1 G/DL (ref 0.4–1)
B-GLOBULIN SERPL ELPH-MCNC: 1.1 G/DL (ref 0.7–1.3)
GAMMA GLOB SERPL ELPH-MCNC: 1.4 G/DL (ref 0.4–1.8)
GLOBULIN SER-MCNC: 3.7 G/DL (ref 2.2–3.9)
IGA SERPL-MCNC: 166 MG/DL (ref 61–437)
IGG SERPL-MCNC: 1112 MG/DL (ref 603–1613)
IGM SERPL-MCNC: 281 MG/DL (ref 15–143)
INTERPRETATION SERPL IEP-IMP: ABNORMAL
KAPPA LC FREE SER-MCNC: 32 MG/L (ref 3.3–19.4)
KAPPA LC FREE/LAMBDA FREE SER: 1.26 {RATIO} (ref 0.26–1.65)
LAMBDA LC FREE SERPL-MCNC: 25.4 MG/L (ref 5.7–26.3)
M PROTEIN SERPL ELPH-MCNC: 0.3 G/DL
PROT SERPL-MCNC: 7.6 G/DL (ref 6–8.5)

## 2021-04-21 ENCOUNTER — TELEPHONE (OUTPATIENT)
Dept: CARDIOLOGY CLINIC | Age: 81
End: 2021-04-21

## 2021-04-22 NOTE — TELEPHONE ENCOUNTER
Patient returned call and provided the following BP and weights:   4/15: 171 lbs  143/79  (67)  2 hrs post  137/69 (84)  4/16: 170 lbs  167/85  (88)                    132/66 (93)  4/17: 169 lbs  141/74  (82)                    147/74 (76)  4/18: 171 lbs  138/76  (78)                    152/83  (72)  4/19: 171 lbs  164/82  (67)                    150/81  (87)  4/20: 171 lbs  154/93  (87)                    129/82  (93)  4/21: 169 lbs  146/76  (72)                    160/70  (70)  4/22: 170 lbs  161/80  (71)                    148/77  (84)    Patient reports taking amlodipine as directed. Plan: patient to take BP before getting out of bed for the next three days and then taking BP 2 hours after morning medications, keep a log. Will call patient Monday 4/25 with readings. Patient is using his own scale as he states the scale that was provided to him indicates a \"much higher weight and can not be correct. \"  Reminded patient he was contacted earlier this week by our staff to schedule a time to come to the office with the BP cuff and scale for calibration. Patient states he will not do that. Pt has another scale in the home that he is using. During call patient wanted to discuss AVS page by page for discrepancies. Questions were answered and patient was satisfied with explanations. Patient had no further questions and is agreeable to the current plan. Alayna Ariza RN    Time discussing above:  56 min.

## 2021-04-23 ENCOUNTER — TELEPHONE (OUTPATIENT)
Dept: CARDIOLOGY CLINIC | Age: 81
End: 2021-04-23

## 2021-04-26 NOTE — TELEPHONE ENCOUNTER
Called patient for BP readings from Saturday 4/24-4/26. (BP should be taken before he gets out of bed and then 2 hours after morning medication). Patient states he was at [facility] and would call this writer back.

## 2021-04-29 NOTE — TELEPHONE ENCOUNTER
Returned call to patient who provided the following: first BP reading is upon waking and before getting out of bed and second BP reading is two hours after morning medications. Confirmed patient is taking amlodipine 5 mg daily. 4/23: 174/93 (HR85)   158/79 (68)  171 lbs  4/24: 174/99 (87)        145/73 (83)  174 lbs  4/25: 164/90 (83)        149/102 (79) 172 lbs  4/26: 165/89 (85)        119/70 (76) and pt took another immediately 129/70 (75) 173 lbs. Per Shaun Vick NP: V.O.R.B: Would continue the amlodipine for now, uric acid was normal on last labs so he does not need allopurinol.  K+ is already in mid 4's so can wait on spironolactone until he sees Dr. Petra Miller for follow up. Called patient and provided NP recommendation to continue current dose of amlodipine 5 mg daily. Instructed patient to continue daily weights and check BP every morning before medications (and supplements) and two hours after medications (and supplements). Patient expressed frustration with the difference in weight on the two scales he has at home, \"checking BP for months and still having high BP\" and not being able to obtain CPAP equipment. Plan: 1) use the scale at home and not the one that was provided by this office. 2)Educated patient on importance of weighing daily upon waking and after urinating, step on scale, log weight. If patient experiences a weight gain of 2-3 lbs overnight, call the office. 3) Check BP before morning medications (and supplements) and then two hours after morning medications (and any supplements). Continue to keep log and bring to next appt 5/12/21. 4) Encouraged patient to call [facility] and inquire if he can get CPAP equipment from another vendor. This writer called [facility] and requested staff to contact patient. Patient verbalized understanding and had no further questions at this time.    Concetta Phelps RN

## 2021-04-30 DIAGNOSIS — I50.9 CONGESTIVE HEART FAILURE, UNSPECIFIED HF CHRONICITY, UNSPECIFIED HEART FAILURE TYPE (HCC): Primary | ICD-10-CM

## 2021-04-30 DIAGNOSIS — I15.9 SECONDARY HYPERTENSION: ICD-10-CM

## 2021-04-30 RX ORDER — AMLODIPINE BESYLATE 5 MG/1
5 TABLET ORAL DAILY
Qty: 30 TAB | Refills: 5 | Status: SHIPPED | OUTPATIENT
Start: 2021-04-30 | End: 2021-08-17

## 2021-04-30 NOTE — TELEPHONE ENCOUNTER
Requested Prescriptions     Signed Prescriptions Disp Refills    amLODIPine (NORVASC) 5 mg tablet 30 Tab 5     Sig: Take 1 Tab by mouth daily. Authorizing Provider: Norma Kenny     Ordering User: Lexi Gaona     Last OV 4/14/21; next OV 5/12/21.  Shelby Fernandez RN

## 2021-05-11 ENCOUNTER — TELEPHONE (OUTPATIENT)
Dept: CARDIOLOGY CLINIC | Age: 81
End: 2021-05-11

## 2021-05-11 NOTE — TELEPHONE ENCOUNTER
Telephone Call RE:  Appointment reminder     Outcome:     [x] Patient confirmed appointment   [] Patient rescheduled appointment for    [] Unable to reach   [] Left message              [] Other:       Confirmed w/pt. Screened patient for Covid,  Reminder to arrive 15 minutes early for screening and check in.   Alyse Soulier

## 2021-05-12 ENCOUNTER — TELEPHONE (OUTPATIENT)
Dept: CARDIOLOGY CLINIC | Age: 81
End: 2021-05-12

## 2021-05-12 ENCOUNTER — OFFICE VISIT (OUTPATIENT)
Dept: CARDIOLOGY CLINIC | Age: 81
End: 2021-05-12
Payer: MEDICARE

## 2021-05-12 VITALS
BODY MASS INDEX: 24.27 KG/M2 | RESPIRATION RATE: 16 BRPM | WEIGHT: 179.2 LBS | OXYGEN SATURATION: 99 % | TEMPERATURE: 96.7 F | HEIGHT: 72 IN | SYSTOLIC BLOOD PRESSURE: 124 MMHG | DIASTOLIC BLOOD PRESSURE: 74 MMHG | HEART RATE: 75 BPM

## 2021-05-12 DIAGNOSIS — R41.3 MEMORY LOSS: ICD-10-CM

## 2021-05-12 DIAGNOSIS — D50.9 IRON DEFICIENCY ANEMIA, UNSPECIFIED IRON DEFICIENCY ANEMIA TYPE: ICD-10-CM

## 2021-05-12 DIAGNOSIS — R06.02 SHORTNESS OF BREATH: ICD-10-CM

## 2021-05-12 DIAGNOSIS — R53.83 FATIGUE, UNSPECIFIED TYPE: ICD-10-CM

## 2021-05-12 DIAGNOSIS — E55.9 VITAMIN D DEFICIENCY: ICD-10-CM

## 2021-05-12 DIAGNOSIS — I50.9 CONGESTIVE HEART FAILURE, UNSPECIFIED HF CHRONICITY, UNSPECIFIED HEART FAILURE TYPE (HCC): Primary | ICD-10-CM

## 2021-05-12 PROCEDURE — G8510 SCR DEP NEG, NO PLAN REQD: HCPCS | Performed by: INTERNAL MEDICINE

## 2021-05-12 PROCEDURE — G8420 CALC BMI NORM PARAMETERS: HCPCS | Performed by: INTERNAL MEDICINE

## 2021-05-12 PROCEDURE — 99215 OFFICE O/P EST HI 40 MIN: CPT | Performed by: INTERNAL MEDICINE

## 2021-05-12 PROCEDURE — G8427 DOCREV CUR MEDS BY ELIG CLIN: HCPCS | Performed by: INTERNAL MEDICINE

## 2021-05-12 PROCEDURE — G8536 NO DOC ELDER MAL SCRN: HCPCS | Performed by: INTERNAL MEDICINE

## 2021-05-12 PROCEDURE — 1101F PT FALLS ASSESS-DOCD LE1/YR: CPT | Performed by: INTERNAL MEDICINE

## 2021-05-12 NOTE — PROGRESS NOTES
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 with labs and hematology visits; BM bx was negative   · Start tafamidis, drink green tea, consider adding doxycycline if tefamidis tolerated       Continue norvasc 5mg daily; bring BP cuff to clinic to calibrate against our cuffs  Avoid beta-blockers andor ACEi/ARB/ARNi due to orthostasis and exercise induced lightheadedness  Consider spironolactone once lab results available, watch K  Does not require diuretics  Does not require allopurinol, uric acid normal  Reinforced low salt diet  Reinforced fluid restriction to 6 x 8oz glasses per day  Patient needs advanced care plan on file  Referral for neurocognitive testing and neurology evaluation for memory loss  Follow up with hematology annually for MGUS with Dr. Andrew Ward  Follow up with sleep medicine/pulmonary re: CPAP trial    Follow-up with primary Cardiologist, Dr. Beau Carvajal for CAD  Follow up with EP cardiologist, Dr. Bren Montoya   Follow-up with PCP for iron deficiency anemia, h/o positive hemoccult and abdominal symptoms  Recommend flu, covid and pneumonia vaccinations  Provided patient education materials for COVID precautions  Follow up with [facility] in 3 months with NP/MD     IMPRESSION:  Fatigue  Chronic diastolic heart failure  · Stage C, NYHA class I symptoms  · HFpEF, LVEF 56-60% (by echo 2019), normal RV function  · aTTR cardiac amyloidosis (by PYP 2020)  · MGUS, negative bone marrow biopsy  · Genetic testing for iaTTR negative  Coronary artery disease  · LHC (20) 30% p-LAD, 30% m-LAD, 50% 2nd OM (20); good flow reserve  Atrial fibrillation, paroxysmal  · S/p DCCV 3/2019  · S/p afib ablation (10/20/20 by Dr. 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  · Bone marrow biopsy negative (2/24/21)     CARDIAC IMAGING:  CTA for EP mapping reviewed (10/6/20) Watchman device occlusive  Cardiac CTA (3/30/20) moderate diffuse CAD  Cardiac PET (3/7/19) normal, Rest EF 32 and stress 38  Echo (3/2020) LVEF 55%, IVSd 1.6cm, LV mass 312g increased, RV function normal, PA pressure 30, sclerotic AV without stenosis, trace TR, mild mR, IVC normal  EKG (9/2020) atrial fib, rate in 90s, low voltae, V1-V2 q wave  LHC (LHC (4/9/2020) LHC 30% p-LAD, 30% m-LAD, 50% 2nd OM FFR flow reserve 0.92 (4/9/20)  [facility] (2011) tirvial luminal irregularities  Remainder in records (see in epic)     HEMODYNAMICS:  RHC not done  CPEST (12/15/20): MVO2 17.7 with RQ 1.7      OTHER IMAGING:  CT abd (3/24/17) There is distention of the proximal half of the small bowel with fecal-like material in the mid small bowel loops. This is consistent with at least partial obstruction of the small bowel. No structural abnormality is  identified to account for this. Chest CT( 1/2016) 1. Unchanged pulmonary nodule in the right upper lobe just over 6 mm on  today's exam. Per Fleischner criteria for high risk patients, follow-up is  recommended in 6-9 months. Low risk Fleischner criteria follow-up for 6 to 8mm pulmonary nodules is at 6-12  months and 18-24 months. 2. Aortic valve annular calcifications with suggestion of probable aortic valve leaflet calcifications. . This finding can be associated with aortic   stenosis.  3. Cholelithiasis.     SOCIAL HISTORY:  , never smoker  Alcohol occasional     SOCIAL HISTORY:  Never smoke. Social alcohol. No illicit drugs.     HISTORY OF PRESENT ILLNESS:  I had the pleasure of seeing [facility] Reddy 12 [facility] 64 a 78 y. o. male with h/o chronic fatigue, DANILO (not started CPAP yet), HTN, HL, coronary artery disease (LHC on 4/9/20 showed 30% p-LAD, 30% m-LAD, 50% 2nd OM; good flow reserve), atrial fibrillation, paroxysmal, DCCV 3/2019 and s/p afib ablation (10/20/20 by Dr. Eliceo Sequeira), not on chronic anticoagulation due to life-threatening GI bleed/hemorrhoids, s/p Watchman device in [place] 7/23/19, well seated by echo (10/2020), depression. Patient was referred by Dr. Eliceo Sequeira to evaluated for possible HFpEF.     Patient had PYP test suggestive of aTTR. 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 Bone marrow biopsy was negative (2/24/21); no evidence of increased/clonal plasma cells in patient with history of IgM MGUS, congo red stain on core biopsy is negative for amyloid deposition. Troponin was negative and pro-NT-BNP in 400s, indicating stage 1 cardiac amyloidosis. Awaiting genetic studies to confirm wild type aTTR cardiac amyloidosis. CPEST (12/2020) revealed pVO2 17.7 consistent with excellent prognosis. Patient had sleep study which revealed DANILO and CPAP trial is pending.     INTERVAL HISTORY:  Janak Mcnamara presents for follow up visit accompanied by his wife and daughter is present via telephone.      Patient reports chest pressure when walking more than 4 blocks which resolved with rest. Patient walked every day several time upstairs 3 stories without symptoms. Occasionally he has chest pain at rest in the evening after meal.      Patient walked to our clinic from parking garage without having to slow down or stop.  Patient states he can walk more than one block without symptoms of fatigue or shortness of breath. Patient can perform home activities without problem and routinely participates in daily walking for more than 15 minutes.      Patient denies symptoms of volume overload or leg edema. Patient denies abdominal bloating or change of appetite. Patient's scale does not work so he is unaware if his weights are accurate, today's weight is 178lbs per [facility] scale     Patient denies orthopnea, PND.      Patient denies irregular heart rate or palpitations.     Patient denies leg pain with walking.      Patient is compliant with fluid restriction and taking medications as prescribed. Patient manages his own medications. REVIEW OF SYSTEMS:  General: Denies fever, night sweats. Ear, nose and throat: Denies difficulty hearing, sinus problems, runny nose, post-nasal drip, ringing in ears, mouth sores, loose teeth, ear pain, nosebleeds, sore throate, facial pain or numbess  Cardiovascular: see above in the interval history  Respiratory: Denies cough, wheezing, sputum production, hemoptysis. Gastrointestinal: Denies heartburn, constipation, diarrhea, abdominal pain, nausea, vomiting, difficulty swallowing, blood in stool  Kidney and bladder: Denies painful urination, frequent urination, urgency  Musculoskeletal: Denies joint pain, muscle weakness  Skin and hair: Denies change in existing skin lesions, hair loss or increase, breast changes    PHYSICAL EXAM:  Visit Vitals  /74 (BP 1 Location: Right arm, BP Patient Position: Sitting, BP Cuff Size: Adult)   Pulse 75   Temp (!) 96.7 °F (35.9 °C) (Oral)   Resp 16   Ht 6' (1.829 m)   Wt 179 lb 3.2 oz (81.3 kg)   SpO2 99%   BMI 24.30 kg/m²     General: Patient is well developed, well-nourished in no acute distress  HEENT: Normocephalic and atraumatic. No scleral icterus. Pupils are equal, round and reactive to light and accomodation. No conjunctival injection. Oropharynx is clear. Neck: Supple.  No evidence of thyroid enlargements or lymphadenopathy. JVD: Cannot be appreciated   Lungs: Breath sounds are equal and clear bilaterally. No wheezes, rhonchi, or rales. Heart: Regular rate and rhythm with normal S1 and S2. No murmurs, gallops or rubs. Abdomen: Soft, no mass or tenderness. No organomegaly or hernia. Bowel sounds present. Genitourinary and rectal: deferred  Extremities: No cyanosis, clubbing, or edema. Neurologic: No focal sensory or motor deficits are noted. Grossly intact. Psychiatric: Awake, alert an doriented x 3. Appropriate mood and affect. Skin: Warm, dry and well perfused. No lesions, nodules or rashes are noted. PAST MEDICAL HISTORY:  Past Medical History:   Diagnosis Date    Atrial fibrillation (HCC)     Depression     GERD (gastroesophageal reflux disease)     Glaucoma     High cholesterol     IBS (irritable bowel syndrome)        PAST SURGICAL HISTORY:  Past Surgical History:   Procedure Laterality Date    HX CHOLECYSTECTOMY      HX HEMORRHOIDECTOMY      HX ORTHOPAEDIC      RIGHT knee    HX TONSILLECTOMY      SC CARDIOVERSION ELECTIVE ARRHYTHMIA EXTERNAL N/A 3/13/2019    EP CARDIOVERSION performed by Romy Carvajal MD at [facility], [facility]       FAMILY HISTORY:  No family history on file.     SOCIAL HISTORY:  Social History     Socioeconomic History    Marital status:      Spouse name: Not on file    Number of children: Not on file    Years of education: Not on file    Highest education level: Not on file   Tobacco Use    Smoking status: Former Smoker     Quit date:      Years since quittin.3    Smokeless tobacco: Never Used   Substance and Sexual Activity    Alcohol use: Yes     Frequency: 2-3 times a week    Drug use: No       LABORATORY RESULTS:  Labs Latest Ref Rng & Units 2021 3/17/2021   WBC 4.1 - 11.1 K/uL 6.7 5.4   RBC 4.10 - 5.70 M/uL 4.40 4.31   Hemoglobin 12.1 - 17.0 g/dL 13.3 12.8   Hematocrit 36.6 - 50.3 % 40.7 39.3   MCV 80.0 - 99.0 FL 92.5 91.2   Platelets 097 - 400 K/uL 192 210   Lymphocytes 12 - 49 % 22 -   Monocytes 5 - 13 % 7 -   Eosinophils 0 - 7 % 5 -   Basophils 0 - 1 % 1 -   Albumin 3.5 - 5.0 g/dL 4.1 3.7   Calcium 8.5 - 10.1 MG/DL 9.6 9.3   Glucose 65 - 100 mg/dL 83 105(H)   BUN 6 - 20 MG/DL 22(H) 18   Creatinine 0.70 - 1.30 MG/DL 1.09 1.12   Sodium 136 - 145 mmol/L 138 141   Potassium 3.5 - 5.1 mmol/L 4.4 4.6   Some recent data might be hidden       ALLERGY:  Allergies   Allergen Reactions    Sulfa (Sulfonamide Antibiotics) Unknown (comments)     From infancy, unsure if actually allergic          CURRENT MEDICATIONS:    Current Outpatient Medications:     amLODIPine (NORVASC) 5 mg tablet, Take 1 Tab by mouth daily. , Disp: 30 Tab, Rfl: 5    cholecalciferol (VITAMIN D3) (2,000 UNITS /50 MCG) cap capsule, Take 1 Cap by mouth two (2) times a day. (Patient taking differently: Take 2,000 Units by mouth daily.), Disp: 60 Cap, Rfl: 3    furosemide (LASIX) 40 mg tablet, Take 1 Tab by mouth daily. , Disp: 90 Tab, Rfl: 1    busPIRone (BUSPAR) 15 mg tablet, Take 15 mg by mouth two (2) times a day. Takes 25 mg total, Disp: , Rfl:     atorvastatin (LIPITOR) 80 mg tablet, Take 80 mg by mouth daily. , Disp: , Rfl:     OTHER,NON-FORMULARY,, Prostate health supplement, Disp: , Rfl:     apixaban (Eliquis) 5 mg tablet, Take 5 mg by mouth two (2) times a day., Disp: , Rfl:     OTHER,NON-FORMULARY,, cognitex, Disp: , Rfl:     OTHER,NON-FORMULARY,, DHEA, Disp: , Rfl:     OTHER,NON-FORMULARY,, two (2) times a day.  Bone Restore (2x/wk) , Disp: , Rfl:     OTHER,NON-FORMULARY,, Horsetail (silica), Disp: , Rfl:     OTHER,NON-FORMULARY,, Omega 3 - 2 caps in the am and 2 in the pm, Disp: , Rfl:     OTHER,NON-FORMULARY,, Bio-Curcumin (Turmeric) 400 mg, Disp: , Rfl:     co-enzyme Q-10 (Co Q-10) 100 mg capsule, Take 100 mg by mouth two (2) times a day., Disp: , Rfl:     vit A/vit C/vit E/zinc/copper (PRESERVISION AREDS PO), Take  by mouth., Disp: , Rfl:     latanoprostene bunod (VYZULTA) 0.024 % drop, Administer 1 Drop to right eye nightly., Disp: , Rfl:     therapeutic multivitamin (THERAGRAN) tablet, Take 1 Tab by mouth daily. , Disp: , Rfl:     dorzolamide-timolol (COSOPT) 22.3-6.8 mg/mL ophthalmic solution, Administer 1 Drop to right eye two (2) times a day., Disp: , Rfl:     vortioxetine (BRINTELLIX) 10 mg tablet, Take 10 mg by mouth daily. , Disp: , Rfl:     Thank you for your referral and allowing me to participate in this patient's care.     Shayla Su MD PhD  [address] [place] [address], [place] [address]  Phone: [phone]  Fax: [phone]    PATIENT CARE TEAM:  Patient Care Team:  Luz Elena Foster MD as PCP - General (Family Medicine)     Total visit time: 40 minutes (> 50% spent face-to-face counseling)

## 2021-05-12 NOTE — TELEPHONE ENCOUNTER
Per Dr. Jennyfer [place] patient to start Vyndamax 61mg ASAP. Spoke with patient who stated medication was too expensive, so patient assistance was completed. Noted patient assistance faxed 4/21/21. Contacted [employer] who stated insurance cards and prior auth determination received, but assistance application was not received. Application refaxed this date. Per [employer] rep will process once received. Mukesh Cordoba RN.

## 2021-05-12 NOTE — PATIENT INSTRUCTIONS
Medication changes: START vyndamax 61mg- Take one capsule by mouth daily. Our office will contact patient assistance to inquire about the status of your application and provide you with information once received. Testing Ordered: An order for an Echocardiogram has been placed to be done next available. You will be receiving an automated call from Coordination of Care to schedule this test. If you are unavailable to receive the call or would like to contact coordination of care yourself you may contact [phone] to schedule. You will need to contact coordination of care yourself if you miss their calls as they will only make 3 attempts to reach you. Lab work has been ordered TO BE DONE A FEW DAYS PRIOR TO YOUR NEXT FOLLOW UP APPOINTMENT IN 3 MONTHS. Please present to a [facility] location of your choice with the orders provided to have lab work done. Please wear a mask when you present to [facility] and practice diligent hand hygeine before and after your visit. Our office will notify you of any abnormal results requiring changes to your current plan of care. A referral to Neurology has been placed for Neurocognitive testing. You will be contacted for scheduling. Other Recommendations:  
  
Ensure your drinking an adequate amount of water with a goal of 6-8 eight ounce glasses (1.5-2 liters) of fluid daily. Your urine should be clear and light yellow straw colored. If your blood pressure begins to consistently run below 90/60 and/or you begin to experience dizziness or lightheadedness, please contact the [facility] at [phone]. Follow up in 1-2 WEEKS for NURSE ONLY BP CHECK. Please bring your home blood pressure cuff to correlate readings. Follow up 3 MONTHS with [facility] Please monitor your weights daily upon waking and after using the bathroom. Keep a written records of your weights and bring to your next appointment.  If you have a weight gain of 3 or more pounds overnight OR 5 or more pounds in one week please contact our office. Thank you for allowing us the privilege of being a part of your healthcare team! Please do not hesitate to contact our office at [phone] with any questions or concerns. Virtual Heart Failure Support Group [employer] invites you to learn more about heart failure and to share your questions, ideas, and experiences with others. Each month, the Heart Failure Support Group features a new educational topic and a guest speaker, followed by an interactive discussion. Our Heart Failure Nurse Navigator will moderate each session. You will be able to participate by phone, tablet or computer through [address] [place] [address]. This support group takes place on the 3rd Thursday of each month from 6:00-7:30PM. All individuals living with heart failure and their caregivers are welcome to join. If you are interested in participating, please contact us at [email or web address] and you will be sent the link to join the [employer].

## 2021-05-18 ENCOUNTER — TELEPHONE (OUTPATIENT)
Dept: CARDIOLOGY CLINIC | Age: 81
End: 2021-05-18

## 2021-05-18 ENCOUNTER — HOSPITAL ENCOUNTER (OUTPATIENT)
Dept: NON INVASIVE DIAGNOSTICS | Age: 81
Discharge: HOME | End: 2021-05-18
Attending: INTERNAL MEDICINE
Payer: MEDICARE

## 2021-05-18 VITALS — BODY MASS INDEX: 24.24 KG/M2 | HEIGHT: 72 IN | WEIGHT: 179 LBS

## 2021-05-18 DIAGNOSIS — R41.3 MEMORY LOSS: ICD-10-CM

## 2021-05-18 DIAGNOSIS — D50.9 IRON DEFICIENCY ANEMIA, UNSPECIFIED IRON DEFICIENCY ANEMIA TYPE: ICD-10-CM

## 2021-05-18 DIAGNOSIS — R06.02 SHORTNESS OF BREATH: ICD-10-CM

## 2021-05-18 DIAGNOSIS — I50.9 CONGESTIVE HEART FAILURE, UNSPECIFIED HF CHRONICITY, UNSPECIFIED HEART FAILURE TYPE (HCC): ICD-10-CM

## 2021-05-18 DIAGNOSIS — E55.9 VITAMIN D DEFICIENCY: ICD-10-CM

## 2021-05-18 DIAGNOSIS — R53.83 FATIGUE, UNSPECIFIED TYPE: ICD-10-CM

## 2021-05-18 LAB
ECHO AO ROOT DIAM: 3.69 CM
ECHO AV AREA PEAK VELOCITY: 0.84 CM2
ECHO AV AREA/BSA PEAK VELOCITY: 0.4 CM2/M2
ECHO AV PEAK GRADIENT: 14.43 MMHG
ECHO AV PEAK VELOCITY: 189.97 CM/S
ECHO LA AREA 4C: 24.1 CM2
ECHO LA MAJOR AXIS: 5.01 CM
ECHO LA MINOR AXIS: 2.47 CM
ECHO LA VOL 2C: 88.47 ML (ref 18–58)
ECHO LA VOL 4C: 73.17 ML (ref 18–58)
ECHO LA VOL BP: 87.19 ML (ref 18–58)
ECHO LA VOL/BSA BIPLANE: 42.9 ML/M2 (ref 16–28)
ECHO LA VOLUME INDEX A2C: 43.53 ML/M2 (ref 16–28)
ECHO LA VOLUME INDEX A4C: 36 ML/M2 (ref 16–28)
ECHO LV EDV A2C: 51.5 ML
ECHO LV EDV A4C: 80.95 ML
ECHO LV EDV BP: 64.39 ML (ref 67–155)
ECHO LV EDV INDEX A4C: 39.8 ML/M2
ECHO LV EDV INDEX BP: 31.7 ML/M2
ECHO LV EDV NDEX A2C: 25.3 ML/M2
ECHO LV EJECTION FRACTION A2C: 52 PERCENT
ECHO LV EJECTION FRACTION A4C: 63 PERCENT
ECHO LV EJECTION FRACTION BIPLANE: 55.5 PERCENT (ref 55–100)
ECHO LV ESV A2C: 24.81 ML
ECHO LV ESV A4C: 30.12 ML
ECHO LV ESV BP: 28.67 ML (ref 22–58)
ECHO LV ESV INDEX A2C: 12.2 ML/M2
ECHO LV ESV INDEX A4C: 14.8 ML/M2
ECHO LV ESV INDEX BP: 14.1 ML/M2
ECHO LV INTERNAL DIMENSION DIASTOLIC: 4.52 CM (ref 4.2–5.9)
ECHO LV INTERNAL DIMENSION SYSTOLIC: 3.29 CM
ECHO LV IVSD: 1.39 CM (ref 0.6–1)
ECHO LV MASS 2D: 220.3 G (ref 88–224)
ECHO LV MASS INDEX 2D: 108.4 G/M2 (ref 49–115)
ECHO LV POSTERIOR WALL DIASTOLIC: 1.18 CM (ref 0.6–1)
ECHO LVOT DIAM: 1.97 CM
ECHO LVOT PEAK GRADIENT: 1.08 MMHG
ECHO LVOT PEAK VELOCITY: 52.08 CM/S
ECHO MV A VELOCITY: 67.86 CM/S
ECHO MV E DECELERATION TIME (DT): 161.37 MS
ECHO MV E VELOCITY: 64.95 CM/S
ECHO MV E/A RATIO: 0.96
ECHO PV PEAK INSTANTANEOUS GRADIENT SYSTOLIC: 1.27 MMHG
ECHO TV REGURGITANT MAX VELOCITY: 290.44 CM/S
ECHO TV REGURGITANT PEAK GRADIENT: 33.74 MMHG
LA VOL DISK BP: 81.23 ML (ref 18–58)

## 2021-05-18 PROCEDURE — 93306 TTE W/DOPPLER COMPLETE: CPT

## 2021-05-18 PROCEDURE — 93306 TTE W/DOPPLER COMPLETE: CPT | Performed by: SPECIALIST

## 2021-05-18 NOTE — TELEPHONE ENCOUNTER
I called [employer], they state patient will be screened for patient assistance, as his coinsurance is 49% up to total cost of $4130. They will call back once patient is screened. I called [employer], they will call back back next week. I called [employer], they state they were unable to confirm income electronically. They will need documents submitted by patient, they will reach out to patient today. Patient approved for [employer] patient assistance, new prescription faxed so they can set up shipment.

## 2021-05-19 ENCOUNTER — TELEPHONE (OUTPATIENT)
Dept: CARDIOLOGY CLINIC | Age: 81
End: 2021-05-19

## 2021-05-19 NOTE — TELEPHONE ENCOUNTER
Patient is scheduled to see   Dr. Reggie Silverio (neurology)   9-   9:20am  [address] [facility]    [address]  [address] [place] [address]  [phone]    This information was left on patient's voice mail.

## 2021-05-19 NOTE — TELEPHONE ENCOUNTER
Spoke to patient went over referrals placed at last visit. Let him know one was for Echo and one was for appointment with Neurology. Patient states he completed Echo on 05/18/21. He has not received a call from Neurology Advised Dr. Saulo Kwan contact information is on AVS.Patient will contact office for an appointment. Patient states that he will fax a updated medication list so our records can be updated. He states he didn't receive lab slip. I am making a copy of lab slip and locations and mailing to patient. Patient insisted that his high b/p is not being addressed. I reviewed b/p at last visit 124/74. He states he has higher readings at home. Advised patient to bring b/p monitor to next visit. He has a b/p nurse visit on 05/20/21.

## 2021-05-19 NOTE — TELEPHONE ENCOUNTER
Telephone Call RE:  Appointment reminder     Outcome:     [x] Patient confirmed appointment   [] Patient rescheduled appointment for    [] Unable to reach   [] Left message              [] Other:       Confirmed w/pt. Screened patient for Covid. Reminder to arrive 15 minutes early for screening and check in.   Shannon oGdfrey

## 2021-05-20 ENCOUNTER — CLINICAL SUPPORT (OUTPATIENT)
Dept: CARDIOLOGY CLINIC | Age: 81
End: 2021-05-20
Payer: MEDICARE

## 2021-05-20 VITALS
BODY MASS INDEX: 24.89 KG/M2 | HEART RATE: 80 BPM | HEIGHT: 72 IN | SYSTOLIC BLOOD PRESSURE: 127 MMHG | TEMPERATURE: 96.5 F | DIASTOLIC BLOOD PRESSURE: 78 MMHG | WEIGHT: 183.8 LBS | OXYGEN SATURATION: 99 %

## 2021-05-20 DIAGNOSIS — I50.22 SYSTOLIC CHF, CHRONIC (HCC): Primary | ICD-10-CM

## 2021-05-20 PROCEDURE — 99215 OFFICE O/P EST HI 40 MIN: CPT | Performed by: INTERNAL MEDICINE

## 2021-05-20 NOTE — PROGRESS NOTES
Patient had vitals taken 114/68 HR 70. His home b/p monitor registered 127/68 HR 80. Reviewed results with Dr. Dulce Andrade. She advised patient should get a new home b/p monitor. Spoke with patient advised to get a new home monitor. He stated he had his current b/p monitor for 5 years. Reviewed with patient to have labs done several days before appointment in [facility] instructed patient on how to get to [facility].

## 2021-05-24 NOTE — TELEPHONE ENCOUNTER
Patient called with questions regarding when to go for his labs-he states he doesn't understand-I returned his call and he asked to return call tomorrow. I returned call to patient, confirmed to him to go for labs a few days before his appt in August.  He states he has lab slips and directions to [facility].  He also states he faxed current med list-I updated his medications-he confirmed that [facility] has stopped his Eliquis. He asked about heart failure support group-I recommended that he try it. He asked about Vyndamax-I called and they are still evaluating his benefits-I will call back next Tuesday for update.

## 2021-06-14 ENCOUNTER — TELEPHONE (OUTPATIENT)
Dept: CARDIOLOGY CLINIC | Age: 81
End: 2021-06-14

## 2021-06-14 NOTE — TELEPHONE ENCOUNTER
Patient called to reschedule his appt to before 8/1/2 or after 8/16/21. Patient rescheduled to  8/17/21 at 10:30am with Dr. Dillon Rasmussen.

## 2021-07-16 ENCOUNTER — TELEPHONE (OUTPATIENT)
Dept: CARDIOLOGY CLINIC | Age: 81
End: 2021-07-16

## 2021-07-16 NOTE — TELEPHONE ENCOUNTER
Patient called and left a message stating he had complaints regarding physicians who he had been referred to. I returned his call but was unable to leave message due to voicemail being full. Patient returned call, he states he found out that his CPAP equipment is being recalled but is unable to get in to [facility] until October. He is very upset. He also wanted Dr. Serena Moreira to know that he is starting his vyndamax this week. I called several pulmonologists in [place], there are only 3 places to go -[facility], [facility], or [facility]. I called [address] [place] [address], which patient has been to before, they are willing to take patient back, made appt for August 31 at 2:20 pm, they will send paperwork to this office to give to patient when he comes for his appt in August.    I called patient  To notify and he states he now has an appointment with [facility] tomorrow am with PA. He will continue to see them. I cancelled his appointment with [address] [place] [address]. He has no further questions at this time.

## 2021-08-16 ENCOUNTER — TELEPHONE (OUTPATIENT)
Dept: CARDIOLOGY CLINIC | Age: 81
End: 2021-08-16

## 2021-08-16 NOTE — TELEPHONE ENCOUNTER
Returned patients call using two patient identifiers. Patient states he went to [facility] on Friday to have labs drawn however [facility] states that they could not draw all the labs. He states that a message was sent to on call provider and no one ever gave [facility] an answer so he left without having labs drawn. Patient also inquiring how long it will take for labs to result. Advised him that I will contact [facility] and return his call when I have more information. [place] Barry RN       Called [facility] and spoke with eugenia. She states that the  code for troponin I is non order able and to have the patient write down new code [phone] [id number] [phone] for troponin T. When asked how long labs will take to result she said most of the labs will result next morning however troponin I, vitamin D, and Gammopathy can take up to several days to result. eugenia also states she will call over to [facility] to review codes with patient   VERONA Campa RN       Called patient using two patient identifiers. Reviewed above information per Eugenia. He stated understanding and states he will present this morning to [facility] to have lab work drawn. He stated understanding and had no further questions  VERONA Campa RN

## 2021-08-17 ENCOUNTER — OFFICE VISIT (OUTPATIENT)
Dept: CARDIOLOGY CLINIC | Age: 81
End: 2021-08-17
Payer: MEDICARE

## 2021-08-17 VITALS
DIASTOLIC BLOOD PRESSURE: 58 MMHG | HEIGHT: 72 IN | TEMPERATURE: 98.1 F | OXYGEN SATURATION: 97 % | WEIGHT: 182 LBS | HEART RATE: 80 BPM | SYSTOLIC BLOOD PRESSURE: 126 MMHG | BODY MASS INDEX: 24.65 KG/M2

## 2021-08-17 DIAGNOSIS — I50.9 CONGESTIVE HEART FAILURE, UNSPECIFIED HF CHRONICITY, UNSPECIFIED HEART FAILURE TYPE (HCC): ICD-10-CM

## 2021-08-17 DIAGNOSIS — I15.9 SECONDARY HYPERTENSION: ICD-10-CM

## 2021-08-17 PROCEDURE — 1101F PT FALLS ASSESS-DOCD LE1/YR: CPT | Performed by: INTERNAL MEDICINE

## 2021-08-17 PROCEDURE — G8536 NO DOC ELDER MAL SCRN: HCPCS | Performed by: INTERNAL MEDICINE

## 2021-08-17 PROCEDURE — 93000 ELECTROCARDIOGRAM COMPLETE: CPT | Performed by: INTERNAL MEDICINE

## 2021-08-17 PROCEDURE — 99215 OFFICE O/P EST HI 40 MIN: CPT | Performed by: INTERNAL MEDICINE

## 2021-08-17 PROCEDURE — G8420 CALC BMI NORM PARAMETERS: HCPCS | Performed by: INTERNAL MEDICINE

## 2021-08-17 PROCEDURE — G8427 DOCREV CUR MEDS BY ELIG CLIN: HCPCS | Performed by: INTERNAL MEDICINE

## 2021-08-17 PROCEDURE — G8510 SCR DEP NEG, NO PLAN REQD: HCPCS | Performed by: INTERNAL MEDICINE

## 2021-08-17 RX ORDER — OMEPRAZOLE 40 MG/1
1 CAPSULE, DELAYED RELEASE ORAL
COMMUNITY
End: 2021-09-02

## 2021-08-17 RX ORDER — FERROUS GLUCONATE 324(38)MG
TABLET ORAL
COMMUNITY
End: 2021-11-17 | Stop reason: ALTCHOICE

## 2021-08-17 RX ORDER — TAFAMIDIS 61 MG/1
61 CAPSULE, LIQUID FILLED ORAL DAILY
COMMUNITY

## 2021-08-17 RX ORDER — AMLODIPINE BESYLATE 5 MG/1
5 TABLET ORAL 2 TIMES DAILY
Qty: 60 TABLET | Refills: 3 | Status: SHIPPED | OUTPATIENT
Start: 2021-08-17 | End: 2021-11-17 | Stop reason: SDUPTHER

## 2021-08-17 RX ORDER — LATANOPROSTENE BUNOD 0.24 MG/ML
SOLUTION/ DROPS OPHTHALMIC
COMMUNITY
Start: 2021-07-09

## 2021-08-17 NOTE — PATIENT INSTRUCTIONS
Medication changes:    Increase your Norvasc (amlodipine) to 5 mg twice daily    Please take this to your pharmacy to notify them of the change in medications. Testing Ordered:    none    Other Recommendations: Follow up with your PCP regarding your low iron levels    You have an appointment on 9/13/21 with Dr. Cindi Herndon for Neurocognitive testing    Follow [facility] with [facility] regarding your CPAP equipment     Ensure your drinking an adequate amount of water with a goal of 6-8 eight ounce glasses (1.5-2 liters) of fluid daily. Your urine should be clear and light yellow straw colored. If your blood pressure begins to consistently run below 90/60 and/or you begin to experience dizziness or lightheadedness, please contact the [facility] at [phone]. Follow up in 3 months with virtual appointment with [facility]-send 2 week history of blood pressure readings by Movinto Funzena prior to appointment      Please monitor your weights daily upon waking and after using the bathroom. Keep a written records of your weights and bring to your next appointment. If you have a weight gain of 3 or more pounds overnight OR 5 or more pounds in one week please contact our office. Thank you for allowing us the privilege of being a part of your healthcare team! Please do not hesitate to contact our office at [phone] with any questions or concerns. Virtual Heart Failure [address] invites you to learn more about heart failure and to share your questions, ideas, and experiences with others. Each month, the Heart Failure Support Group features a new educational topic and a guest speaker, followed by an interactive discussion. Our Heart Failure Nurse Navigator will moderate each session. You will be able to participate by phone, tablet or computer through [address] [place] [address].  This support group takes place on the 3rd Thursday of each month from 6:00-7:30PM. All individuals living with heart failure and their caregivers are welcome to join. If you are interested in participating, please contact us at [email or web address] and you will be sent the link to join the [employer].

## 2021-08-17 NOTE — PROGRESS NOTES
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 with labs and hematology visits; BM bx was negative   · Continue tafamidis, drink green tea, consider adding green tea extract and doxycycline if tefamidis tolerated and green tea tolerated     RECOMMENDATIONS:  Continue tefamidis  Increase norvasc to 5mg twice daily; bring BP cuff to clinic to calibrate against our cuffs  Avoid beta-blockers andor ACEi/ARB/ARNi due to orthostasis and exercise induced lightheadedness  Consider adding spironolactone once lab results available, watch K  Does not require diuretics  Does not require allopurinol, uric acid normal  Reinforced low salt diet  Reinforced fluid restriction to 6 x 8oz glasses per day  Patient needs advanced care plan on file  Recommend physical activity; walking 30 minutes daily  Previously referred for neurocognitive testing and neurology evaluation for memory loss  Patient to contact [facility] to interpret addendum to his genetic testing; offered  consult at [facility] and testing of two family members if patient wishes  Follow up with hematology annually for MGUS with Dr. Shiva Wilson  Follow up with sleep medicine/pulmonary re: CPAP trial    Follow-up with primary cardiologist, Dr. Lisa Swain CAD  Follow up with EP cardiologist, Dr. 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 Normal cavity size, systolic function (ejection fraction normal) and diastolic function. Moderate concentric hypertrophy. Wall motion: unable to assess due to limited image quality. Normal left ventricular strain. · LA: Moderately dilated left atrium. · AV: With no significant stenosis. Mild aortic valve regurgitation is present. · MV: Mitral valve non-specific thickening. Mild to moderate mitral valve regurgitation is present.   · Image quality for this study was suboptimal.  · IVS 1.39cm  CTA for EP mapping reviewed (10/6/20) Watchman device occlusive  Cardiac CTA (3/30/20) moderate diffuse CAD  Cardiac PET (3/7/19) normal, Rest EF 32 and stress 38  Echo (3/2020) LVEF 55%, IVSd 1.6cm, LV mass 312g increased, RV function normal, PA pressure 30, sclerotic AV without stenosis, trace TR, mild mR, IVC normal  EKG (9/2020) atrial fib, rate in 90s, low voltae, V1-V2 q wave  LHC (LHC (4/9/2020) LHC 30% p-LAD, 30% m-LAD, 50% 2nd OM FFR flow reserve 0.92 (4/9/20)  [facility] (2011) tirvial luminal irregularities  Remainder in records (see in epic)     HEMODYNAMICS:  RHC not done  CPEST (12/15/20): MVO2 17.7 with RQ 1.7      OTHER IMAGING:  CT abd (3/24/17) There is distention of the proximal half of the small bowel with fecal-like material in the mid small bowel loops. This is consistent with at least partial obstruction of the small bowel. No structural abnormality is  identified to account for this. Chest CT( 1/2016) 1. Unchanged pulmonary nodule in the right upper lobe just over 6 mm on  today's exam. Per Fleischner criteria for high risk patients, follow-up is  recommended in 6-9 months. Low risk Fleischner criteria follow-up for 6 to 8mm pulmonary nodules is at 6-12  months and 18-24 months. 2. Aortic valve annular calcifications with suggestion of probable aortic valve leaflet calcifications. . This finding can be associated with aortic   stenosis. 3. Cholelithiasis.     SOCIAL HISTORY:  , never smoker  Alcohol occasional     SOCIAL HISTORY:  Never smoke. Social alcohol. No illicit drugs.     HISTORY OF PRESENT ILLNESS:  I had the pleasure of seeing Rakan Reddy 14 at [facility] in [facility] 64 a 78 y. o. male with h/o chronic fatigue, DANILO (not started CPAP yet), HTN, HL, coronary artery disease (LHC on 4/9/20 showed 30% p-LAD, 30% m-LAD, 50% 2nd OM; good flow reserve), atrial fibrillation, paroxysmal, DCCV 3/2019 and s/p afib ablation (10/20/20 by Dr. Rainer Loyola), not on chronic anticoagulation due to life-threatening GI bleed/hemorrhoids, s/p Watchman device in [place] 7/23/19, well seated by echo (10/2020), depression. Patient was referred by Dr. Rainer Loyola to evaluated for possible HFpEF.     Patient had PYP test suggestive of aTTR. 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  Bone marrow biopsy was negative (2/24/21); no evidence of increased/clonal plasma cells in patient with history of IgM MGUS, congo red stain on core biopsy is negative for amyloid deposition. Troponin was negative and pro-NT-BNP in 400s, indicating stage 1 cardiac amyloidosis. Genetic studies were negative for inherited aTTR; suggesting this was wild type aTTR cardiac amyloidosis. CPEST (12/2020) revealed pVO2 17.7 consistent with excellent prognosis. Patient had sleep study which revealed DANILO and CPAP trial is pending.     INTERVAL HISTORY:  Today, patient presents for follow up visit accompanied by his wife.      Patient reports feeling well.      Patient walked to our clinic from parking garage without having to slow down or stop. Patient states he can walk more than one block without symptoms of fatigue or shortness of breath. Patient can perform home activities without problem and routinely participates in daily walking for more than 15 minutes.      Patient denies symptoms of volume overload or leg edema. Patient denies abdominal bloating or change of appetite. Patient weight had been stable (on our scale 178-182lbs)     Patient denies orthopnea, PND.      Patient denies irregular heart rate or palpitations.     Patient denies leg pain with walking.      Patient is compliant with fluid restriction and taking medications as prescribed. Patient manages his own medications. REVIEW OF SYSTEMS:  General: Denies fever, night sweats. Ear, nose and throat: Denies difficulty hearing, sinus problems, runny nose, post-nasal drip, ringing in ears, mouth sores, loose teeth, ear pain, nosebleeds, sore throate, facial pain or numbess  Cardiovascular: see above in the interval history  Respiratory: Denies cough, wheezing, sputum production, hemoptysis.   Gastrointestinal: Denies heartburn, constipation, diarrhea, abdominal pain, nausea, vomiting, difficulty swallowing, blood in stool  Kidney and bladder: Denies painful urination, frequent urination, urgency  Musculoskeletal: Denies joint pain, muscle weakness  Skin and hair: Denies change in existing skin lesions, hair loss or increase, breast changes    PHYSICAL EXAM:  Visit Vitals  BP (!) 126/58 (BP 1 Location: Left arm, BP Patient Position: Sitting, BP Cuff Size: Adult)   Pulse 80   Temp 98.1 °F (36.7 °C) (Oral)   Ht 6' (1.829 m)   Wt 182 lb (82.6 kg)   SpO2 97%   BMI 24.68 kg/m²     General: Patient is well developed, well-nourished in no acute distress  HEENT: Normocephalic and atraumatic. No scleral icterus. Pupils are equal, round and reactive to light and accomodation. No conjunctival injection. Oropharynx is clear. Neck: Supple. No evidence of thyroid enlargements or lymphadenopathy. JVD: Cannot be appreciated   Lungs: Breath sounds are equal and clear bilaterally. No wheezes, rhonchi, or rales. Heart: Regular rate and rhythm with normal S1 and S2. No murmurs, gallops or rubs. Abdomen: Soft, no mass or tenderness. No organomegaly or hernia. Bowel sounds present. Genitourinary and rectal: deferred  Extremities: No cyanosis, clubbing, or edema. Neurologic: No focal sensory or motor deficits are noted. Grossly intact. Psychiatric: Awake, alert an doriented x 3. Appropriate mood and affect. Skin: Warm, dry and well perfused. No lesions, nodules or rashes are noted. PAST MEDICAL HISTORY:  Past Medical History:   Diagnosis Date    Atrial fibrillation (HCC)     Depression     GERD (gastroesophageal reflux disease)     Glaucoma     High cholesterol     IBS (irritable bowel syndrome)        PAST SURGICAL HISTORY:  Past Surgical History:   Procedure Laterality Date    HX CHOLECYSTECTOMY      HX HEMORRHOIDECTOMY      HX ORTHOPAEDIC      RIGHT knee    HX TONSILLECTOMY      [place] CARDIOVERSION ELECTIVE ARRHYTHMIA EXTERNAL N/A 3/13/2019    EP CARDIOVERSION performed by Chiquis Gamez MD at [facility]/s Dr [facility]       FAMILY HISTORY:  No family history on file.     SOCIAL HISTORY:  Social History     Socioeconomic History    Marital status:      Spouse name: Not on file    Number of children: Not on file    Years of education: Not on file    Highest education level: Not on file   Tobacco Use    Smoking status: Former Smoker     Quit date:      Years since quittin.6    Smokeless tobacco: Never Used   Substance and Sexual Activity    Alcohol use: Yes    Drug use: No     Social Determinants of Health     Financial Resource Strain:     Difficulty of Paying Living Expenses:    Food Insecurity:     Worried About Running Out of Food in the Last Year:     [address] in the Last Year:    Transportation Needs:     Lack of Transportation (Medical):  Lack of Transportation (Non-Medical):    Physical Activity:     Days of Exercise per Week:     Minutes of Exercise per Session:    Stress:     Feeling of Stress :    Social Connections:     Frequency of Communication with Friends and Family:     Frequency of Social Gatherings with Friends and Family:     Attends Jehovah's witness Services:     Active Member of Clubs or Organizations:     Attends Club or Organization Meetings:     Marital Status:        LABORATORY RESULTS:  No flowsheet data found. ALLERGY:  Allergies   Allergen Reactions    Sulfa (Sulfonamide Antibiotics) Unknown (comments)     From infancy, unsure if actually allergic          CURRENT MEDICATIONS:    Current Outpatient Medications:     L.acid/B.bifidum/B.animal/FOS (PROBIOTIC COMPLEX PO), Take  by mouth., Disp: , Rfl:     OTHER, daily. SAME supplement, Disp: , Rfl:     OTHER, daily. soluber fiber 1 TBSP, Disp: , Rfl:     OTHER, daily. 3 TBSP Glucosamine/Chondroitin/MSM, Disp: , Rfl:     OTHER, daily. Super Digestive Enzymes, Disp: , Rfl:     OTHER, daily. Mirtogenol (bilberry, pycnogenol), Disp: , Rfl:     MAGNESIUM CITRATE PO, Take  by mouth daily. , Disp: , Rfl:     amLODIPine (NORVASC) 5 mg tablet, Take 1 Tab by mouth daily. , Disp: 30 Tab, Rfl: 5    cholecalciferol (VITAMIN D3) (2,000 UNITS /50 MCG) cap capsule, Take 1 Cap by mouth two (2) times a day. (Patient taking differently: Take 2,000 Units by mouth daily.), Disp: 60 Cap, Rfl: 3    furosemide (LASIX) 40 mg tablet, Take 1 Tab by mouth daily. (Patient not taking: Reported on 5/26/2021), Disp: 90 Tab, Rfl: 1    busPIRone (BUSPAR) 15 mg tablet, Take 15 mg by mouth two (2) times a day. Takes 25 mg total, Disp: , Rfl:     atorvastatin (LIPITOR) 80 mg tablet, Take 80 mg by mouth daily. , Disp: , Rfl:     OTHER,NON-FORMULARY,, two (2) times a day. Prostate health supplement , Disp: , Rfl:     OTHER,NON-FORMULARY,, cognitex, Disp: , Rfl:     OTHER,NON-FORMULARY,, DHEA, Disp: , Rfl:     OTHER,NON-FORMULARY,, two (2) times a day. Bone Restore (2x/wk) , Disp: , Rfl:     OTHER,NON-FORMULARY,, Horsetail (silica), Disp: , Rfl:     OTHER,NON-FORMULARY,, Omega 3 - 2 caps in the am and 2 in the pm, Disp: , Rfl:     OTHER,NON-FORMULARY,, Bio-Curcumin (Turmeric) 400 mg, Disp: , Rfl:     co-enzyme Q-10 (Co Q-10) 100 mg capsule, Take 100 mg by mouth two (2) times a day., Disp: , Rfl:     vit A/vit C/vit E/zinc/copper (PRESERVISION AREDS PO), Take  by mouth two (2) times a day., Disp: , Rfl:     therapeutic multivitamin (THERAGRAN) tablet, Take 1 Tab by mouth daily. , Disp: , Rfl:     dorzolamide-timolol (COSOPT) 22.3-6.8 mg/mL ophthalmic solution, Administer 1 Drop to right eye two (2) times a day., Disp: , Rfl:     vortioxetine (BRINTELLIX) 10 mg tablet, Take 10 mg by mouth daily. , Disp: , Rfl:     Thank you for your referral and allowing me to participate in this patient's care.     Airam Bradley MD PhD  [address] [place] [address]  Phone: [phone]  Fax: [phone]    PATIENT CARE TEAM:  Patient Care Team:  Ofe Greenwood MD as PCP - General (Family Medicine)  Madelyn Schwarz MD (Sleep Medicine)     Total visit time: 40 minutes (> 50% spent face-to-face counseling)

## 2021-08-18 LAB
25(OH)D3+25(OH)D2 SERPL-MCNC: 38.6 NG/ML (ref 30–100)
ALBUMIN SERPL ELPH-MCNC: 3.6 G/DL (ref 2.9–4.4)
ALBUMIN SERPL-MCNC: 4.5 G/DL (ref 3.6–4.6)
ALBUMIN/GLOB SERPL: 1.1 {RATIO} (ref 0.7–1.7)
ALBUMIN/GLOB SERPL: 1.8 {RATIO} (ref 1.2–2.2)
ALP SERPL-CCNC: 66 IU/L (ref 48–121)
ALPHA1 GLOB SERPL ELPH-MCNC: 0.2 G/DL (ref 0–0.4)
ALPHA2 GLOB SERPL ELPH-MCNC: 1 G/DL (ref 0.4–1)
ALT SERPL-CCNC: 22 IU/L (ref 0–44)
AST SERPL-CCNC: 29 IU/L (ref 0–40)
B-GLOBULIN SERPL ELPH-MCNC: 1 G/DL (ref 0.7–1.3)
BILIRUB SERPL-MCNC: 0.5 MG/DL (ref 0–1.2)
BUN SERPL-MCNC: 15 MG/DL (ref 8–27)
BUN/CREAT SERPL: 15 (ref 10–24)
CALCIUM SERPL-MCNC: 9.5 MG/DL (ref 8.6–10.2)
CHLORIDE SERPL-SCNC: 104 MMOL/L (ref 96–106)
CO2 SERPL-SCNC: 23 MMOL/L (ref 20–29)
CREAT SERPL-MCNC: 1.03 MG/DL (ref 0.76–1.27)
ERYTHROCYTE [DISTWIDTH] IN BLOOD BY AUTOMATED COUNT: 13.6 % (ref 11.6–15.4)
FERRITIN SERPL-MCNC: 142 NG/ML (ref 30–400)
GAMMA GLOB SERPL ELPH-MCNC: 1.2 G/DL (ref 0.4–1.8)
GLOBULIN SER CALC-MCNC: 2.5 G/DL (ref 1.5–4.5)
GLOBULIN SER-MCNC: 3.4 G/DL (ref 2.2–3.9)
GLUCOSE SERPL-MCNC: 94 MG/DL (ref 65–99)
HCT VFR BLD AUTO: 39.8 % (ref 37.5–51)
HGB BLD-MCNC: 13 G/DL (ref 13–17.7)
IGA SERPL-MCNC: 169 MG/DL (ref 61–437)
IGG SERPL-MCNC: 967 MG/DL (ref 603–1613)
IGM SERPL-MCNC: 240 MG/DL (ref 15–143)
INTERPRETATION SERPL IEP-IMP: ABNORMAL
IRON SATN MFR SERPL: 40 % (ref 15–55)
IRON SERPL-MCNC: 126 UG/DL (ref 38–169)
KAPPA LC FREE SER-MCNC: 36.3 MG/L (ref 3.3–19.4)
KAPPA LC FREE/LAMBDA FREE SER: 1.75 {RATIO} (ref 0.26–1.65)
LAMBDA LC FREE SERPL-MCNC: 20.8 MG/L (ref 5.7–26.3)
M PROTEIN SERPL ELPH-MCNC: 0.4 G/DL
MAGNESIUM SERPL-MCNC: 2.2 MG/DL (ref 1.6–2.3)
MCH RBC QN AUTO: 29.3 PG (ref 26.6–33)
MCHC RBC AUTO-ENTMCNC: 32.7 G/DL (ref 31.5–35.7)
MCV RBC AUTO: 90 FL (ref 79–97)
NT-PROBNP SERPL-MCNC: 264 PG/ML (ref 0–486)
PLATELET # BLD AUTO: 210 X10E3/UL (ref 150–450)
PLEASE NOTE:, 149534: ABNORMAL
POTASSIUM SERPL-SCNC: 4.7 MMOL/L (ref 3.5–5.2)
PROT SERPL-MCNC: 7 G/DL (ref 6–8.5)
RBC # BLD AUTO: 4.44 X10E6/UL (ref 4.14–5.8)
SODIUM SERPL-SCNC: 140 MMOL/L (ref 134–144)
TIBC SERPL-MCNC: 317 UG/DL (ref 250–450)
TROPONIN T SERPL HS-MCNC: 14 NG/L (ref 0–22)
UIBC SERPL-MCNC: 191 UG/DL (ref 111–343)
URATE SERPL-MCNC: 6.3 MG/DL (ref 3.8–8.4)
WBC # BLD AUTO: 6.7 X10E3/UL (ref 3.4–10.8)

## 2021-08-31 ENCOUNTER — HOSPITAL ENCOUNTER (EMERGENCY)
Age: 81
Discharge: HOME | End: 2021-08-31
Attending: STUDENT IN AN ORGANIZED HEALTH CARE EDUCATION/TRAINING PROGRAM | Admitting: STUDENT IN AN ORGANIZED HEALTH CARE EDUCATION/TRAINING PROGRAM
Payer: MEDICARE

## 2021-08-31 VITALS
BODY MASS INDEX: 24.65 KG/M2 | TEMPERATURE: 98.3 F | OXYGEN SATURATION: 98 % | SYSTOLIC BLOOD PRESSURE: 144 MMHG | WEIGHT: 182 LBS | DIASTOLIC BLOOD PRESSURE: 79 MMHG | HEART RATE: 78 BPM | HEIGHT: 72 IN

## 2021-08-31 DIAGNOSIS — R05.9 COUGH: ICD-10-CM

## 2021-08-31 DIAGNOSIS — J30.9 ALLERGIC RHINITIS WITH POSTNASAL DRIP: ICD-10-CM

## 2021-08-31 DIAGNOSIS — B34.9 VIRAL SYNDROME: ICD-10-CM

## 2021-08-31 DIAGNOSIS — J02.9 ACUTE PHARYNGITIS, UNSPECIFIED ETIOLOGY: Primary | ICD-10-CM

## 2021-08-31 DIAGNOSIS — R09.82 ALLERGIC RHINITIS WITH POSTNASAL DRIP: ICD-10-CM

## 2021-08-31 PROCEDURE — 99281 EMR DPT VST MAYX REQ PHY/QHP: CPT

## 2021-08-31 RX ORDER — DEXAMETHASONE 4 MG/1
TABLET ORAL
Qty: 3 TABLET | Refills: 0 | Status: SHIPPED | OUTPATIENT
Start: 2021-08-31 | End: 2021-11-17 | Stop reason: ALTCHOICE

## 2021-08-31 RX ORDER — FLUTICASONE PROPIONATE 50 MCG
2 SPRAY, SUSPENSION (ML) NASAL DAILY
Qty: 1 EACH | Refills: 0 | Status: SHIPPED | OUTPATIENT
Start: 2021-08-31

## 2021-08-31 NOTE — ED NOTES
Triage: Patient reports sore throat X 3 days and congestion with mucus. Covid vaccine UTD. Covid test negative yesterday.

## 2021-08-31 NOTE — ED PROVIDER NOTES
Date of Service:  2021    Patient:  Shawnee Wright    Chief Complaint:  Sore Throat       HPI:  Shawnee Wright is a 78 y.o.  male who presents for evaluation of sore throat that is worse with swallowing. Aleeve helps somewhat. Patient was evaluated recently had a urgent care with a negative Covid and negative strep test.  Patient concerned about the pain in his neck/throat with swallowing. ABCs intact. Patient having no difficulty swallowing secretions. No change in voice. The history is provided by the patient and medical records. Sore Throat   This is a new problem. The current episode started more than 2 days ago. The problem has not changed since onset. There has been no fever. Associated symptoms include congestion, ear pain and cough. Pertinent negatives include no diarrhea, no vomiting, no headaches, no shortness of breath, no stridor and no trouble swallowing. He has had no exposure to strep or mono. He has tried NSAIDs and acetaminophen for the symptoms. The treatment provided mild relief. Past Medical History:   Diagnosis Date    Atrial fibrillation (HCC)     Depression     GERD (gastroesophageal reflux disease)     Glaucoma     High cholesterol     IBS (irritable bowel syndrome)        Past Surgical History:   Procedure Laterality Date    HX CHOLECYSTECTOMY      HX HEMORRHOIDECTOMY      HX ORTHOPAEDIC      RIGHT knee    HX TONSILLECTOMY      [place] CARDIOVERSION ELECTIVE ARRHYTHMIA EXTERNAL N/A 3/13/2019    EP CARDIOVERSION performed by Jacki Maya MD at [facility], [facility]/s [facility]         History reviewed. No pertinent family history.     Social History     Socioeconomic History    Marital status:      Spouse name: Not on file    Number of children: Not on file    Years of education: Not on file    Highest education level: Not on file   Occupational History    Not on file   Tobacco Use    Smoking status: Former Smoker     Quit date:      Years since quittin.6  Smokeless tobacco: Never Used   Substance and Sexual Activity    Alcohol use: Yes    Drug use: No    Sexual activity: Not on file   Other Topics Concern    Not on file   Social History Narrative    Not on file     Social Determinants of Health     Financial Resource Strain:     Difficulty of Paying Living Expenses:    Food Insecurity:     Worried About Running Out of Food in the Last Year:     [address] in the Last Year:    Transportation Needs:     Lack of Transportation (Medical):  Lack of Transportation (Non-Medical):    Physical Activity:     Days of Exercise per Week:     Minutes of Exercise per Session:    Stress:     Feeling of Stress :    Social Connections:     Frequency of Communication with Friends and Family:     Frequency of Social Gatherings with Friends and Family:     Attends Nondenominational Services:     Active Member of Clubs or Organizations:     Attends Club or Organization Meetings:     Marital Status:    Intimate Partner Violence:     Fear of Current or Ex-Partner:     Emotionally Abused:     Physically Abused:     Sexually Abused: ALLERGIES: Sulfa (sulfonamide antibiotics)    Review of Systems   Constitutional: Negative. HENT: Positive for congestion, ear pain and sore throat. Negative for trouble swallowing. Eyes: Negative. Respiratory: Positive for cough. Negative for shortness of breath and stridor. Cardiovascular: Negative. Gastrointestinal: Negative. Negative for diarrhea and vomiting. Endocrine: Negative. Genitourinary: Negative. Musculoskeletal: Negative. Skin: Negative. Allergic/Immunologic: Negative. Neurological: Negative. Negative for headaches. Hematological: Negative. Psychiatric/Behavioral: Negative.         Vitals:    08/31/21 1028   BP: (!) 144/79   Pulse: 78   Temp: 98.3 °F (36.8 °C)   SpO2: 98%   Weight: 82.6 kg (182 lb)   Height: 6' (1.829 m)            Physical Exam  Vitals and nursing note reviewed. Constitutional:       General: He is not in acute distress. Appearance: Normal appearance. HENT:      Head: Normocephalic and atraumatic. Right Ear: Tympanic membrane, ear canal and external ear normal. No drainage or swelling. Left Ear: Tympanic membrane, ear canal and external ear normal. No drainage or swelling. Nose: Congestion and rhinorrhea present. Mouth/Throat:      Mouth: Mucous membranes are moist. No oral lesions. Pharynx: Oropharynx is clear. No pharyngeal swelling, oropharyngeal exudate, posterior oropharyngeal erythema or uvula swelling. Eyes:      Extraocular Movements: Extraocular movements intact. Conjunctiva/sclera: Conjunctivae normal.   Cardiovascular:      Rate and Rhythm: Normal rate. Pulses: Normal pulses. Heart sounds: Normal heart sounds. Pulmonary:      Effort: Pulmonary effort is normal.      Breath sounds: Normal breath sounds. Chest:      Chest wall: No deformity or tenderness. Abdominal:      General: Abdomen is flat. There is no distension. Tenderness: There is no abdominal tenderness. Musculoskeletal:         General: No deformity or signs of injury. Normal range of motion. Cervical back: Normal range of motion and neck supple. No tenderness. Skin:     General: Skin is warm and dry. Capillary Refill: Capillary refill takes less than 2 seconds. Neurological:      General: No focal deficit present. Mental Status: He is alert and oriented to person, place, and time.    Psychiatric:         Mood and Affect: Mood normal.         Behavior: Behavior normal.          MDM       Differential diagnosis: Strep pharyngitis, viral pharyngitis, Covid, viral URI, allergic rhinitis, postnasal drip, peritonsillar abscess    MEDICATIONS GIVEN:  Medications - No data to display    MDM: Patient is a relatively healthy and functional 43-year-old male coming to the ED with sore throat with a recent Covid and strep swab negative who is afebrile with stable vital signs with no concerning physical exam findings most likely has a viral pharyngitis complicated by allergic rhinitis and possible URI appropriate for outpatient management with prescription written for 1 dose of Decadron as well as steroids with referral information provided for ENT. Patient's vital signs are stable, patient afebrile. Patient physical exam unremarkable. No peritonsillar abscess, tonsillar edema or swelling. Patient is tolerating secretions and having no difficulty swallowing. Patient had tried to call ENT office without success, earliest date he was able to obtain was in October. No further emergency medical interventions indicated at this time. Patient below for personal and specific discharge instructions to the patient. Key Discharge Instructions: You presented to the ED with 3 to 4 days of sore throat with negative Covid and strep throat at outside clinic. Most likely you have a viral pharyngitis that is causing your pain. A prescription for 1 dose of steroids was written as well as Flonase. Also recommend a trial of antihistamines like Zyrtec or Claritin, generic is fine. Please follow-up with ENT, information is listed below in your discharge paperwork. ED Impression:    ICD-10-CM ICD-9-CM    1. Acute pharyngitis, unspecified etiology  J02.9 462     Strep negative   2. Allergic rhinitis with postnasal drip  J30.9 477.9     R09.82 784.91    3. Cough  R05 786.2     Covid negative, vaccinated   4.  Viral syndrome  B34.9 079.99         Disposition: Discharged        Procedures

## 2021-08-31 NOTE — DISCHARGE INSTRUCTIONS
You presented to the ED with 3 to 4 days of sore throat with negative Covid and strep throat at outside clinic. Most likely you have a viral pharyngitis that is causing her pain. A prescription for 1 dose of steroids was written as well as Flonase. Also recommend a trial of antihistamines like Zyrtec or Claritin, generic is fine. Please follow-up with ENT, information is listed below in your discharge paperwork.

## 2021-09-02 ENCOUNTER — HOSPITAL ENCOUNTER (EMERGENCY)
Age: 81
Discharge: HOME | End: 2021-09-02
Attending: EMERGENCY MEDICINE | Admitting: EMERGENCY MEDICINE
Payer: MEDICARE

## 2021-09-02 VITALS
SYSTOLIC BLOOD PRESSURE: 148 MMHG | OXYGEN SATURATION: 99 % | RESPIRATION RATE: 18 BRPM | TEMPERATURE: 97.3 F | DIASTOLIC BLOOD PRESSURE: 84 MMHG | HEART RATE: 88 BPM

## 2021-09-02 DIAGNOSIS — K21.9 GASTRO-ESOPHAGEAL REFLUX DISEASE WITHOUT ESOPHAGITIS: ICD-10-CM

## 2021-09-02 DIAGNOSIS — J02.9 SORE THROAT: Primary | ICD-10-CM

## 2021-09-02 LAB
ALBUMIN SERPL-MCNC: 3.6 G/DL (ref 3.5–5)
ALBUMIN/GLOB SERPL: 0.8 {RATIO} (ref 1.1–2.2)
ALP SERPL-CCNC: 63 U/L (ref 45–117)
ALT SERPL-CCNC: 36 U/L (ref 12–78)
ANION GAP SERPL CALC-SCNC: 3 MMOL/L (ref 5–15)
AST SERPL-CCNC: 46 U/L (ref 15–37)
ATRIAL RATE: 75 BPM
BASOPHILS # BLD: 0 K/UL (ref 0–0.1)
BASOPHILS NFR BLD: 0 % (ref 0–1)
BILIRUB SERPL-MCNC: 0.8 MG/DL (ref 0.2–1)
BUN SERPL-MCNC: 30 MG/DL (ref 6–20)
BUN/CREAT SERPL: 24 (ref 12–20)
CALCIUM SERPL-MCNC: 9.1 MG/DL (ref 8.5–10.1)
CALCULATED P AXIS, ECG09: 41 DEGREES
CALCULATED R AXIS, ECG10: -16 DEGREES
CALCULATED T AXIS, ECG11: 29 DEGREES
CHLORIDE SERPL-SCNC: 112 MMOL/L (ref 97–108)
CO2 SERPL-SCNC: 27 MMOL/L (ref 21–32)
COMMENT, HOLDF: NORMAL
CREAT SERPL-MCNC: 1.27 MG/DL (ref 0.7–1.3)
DIAGNOSIS, 93000: NORMAL
DIFFERENTIAL METHOD BLD: ABNORMAL
EOSINOPHIL # BLD: 0 K/UL (ref 0–0.4)
EOSINOPHIL NFR BLD: 0 % (ref 0–7)
ERYTHROCYTE [DISTWIDTH] IN BLOOD BY AUTOMATED COUNT: 14.1 % (ref 11.5–14.5)
GLOBULIN SER CALC-MCNC: 4.6 G/DL (ref 2–4)
GLUCOSE SERPL-MCNC: 107 MG/DL (ref 65–100)
HCT VFR BLD AUTO: 38.5 % (ref 36.6–50.3)
HGB BLD-MCNC: 13 G/DL (ref 12.1–17)
IMM GRANULOCYTES # BLD AUTO: 0.1 K/UL (ref 0–0.04)
IMM GRANULOCYTES NFR BLD AUTO: 1 % (ref 0–0.5)
LIPASE SERPL-CCNC: 64 U/L (ref 73–393)
LYMPHOCYTES # BLD: 1.4 K/UL (ref 0.8–3.5)
LYMPHOCYTES NFR BLD: 11 % (ref 12–49)
MCH RBC QN AUTO: 30.4 PG (ref 26–34)
MCHC RBC AUTO-ENTMCNC: 33.8 G/DL (ref 30–36.5)
MCV RBC AUTO: 90 FL (ref 80–99)
MONOCYTES # BLD: 1 K/UL (ref 0–1)
MONOCYTES NFR BLD: 8 % (ref 5–13)
NEUTS SEG # BLD: 10.5 K/UL (ref 1.8–8)
NEUTS SEG NFR BLD: 80 % (ref 32–75)
NRBC # BLD: 0 K/UL (ref 0–0.01)
NRBC BLD-RTO: 0 PER 100 WBC
P-R INTERVAL, ECG05: 168 MS
PLATELET # BLD AUTO: 175 K/UL (ref 150–400)
PMV BLD AUTO: 11.1 FL (ref 8.9–12.9)
POTASSIUM SERPL-SCNC: 4.2 MMOL/L (ref 3.5–5.1)
PROT SERPL-MCNC: 8.2 G/DL (ref 6.4–8.2)
Q-T INTERVAL, ECG07: 376 MS
QRS DURATION, ECG06: 96 MS
QTC CALCULATION (BEZET), ECG08: 419 MS
RBC # BLD AUTO: 4.28 M/UL (ref 4.1–5.7)
SAMPLES BEING HELD,HOLD: NORMAL
SODIUM SERPL-SCNC: 142 MMOL/L (ref 136–145)
VENTRICULAR RATE, ECG03: 75 BPM
WBC # BLD AUTO: 13 K/UL (ref 4.1–11.1)

## 2021-09-02 PROCEDURE — 85025 COMPLETE CBC W/AUTO DIFF WBC: CPT

## 2021-09-02 PROCEDURE — 80053 COMPREHEN METABOLIC PANEL: CPT

## 2021-09-02 PROCEDURE — 83690 ASSAY OF LIPASE: CPT

## 2021-09-02 PROCEDURE — 93005 ELECTROCARDIOGRAM TRACING: CPT

## 2021-09-02 PROCEDURE — 36415 COLL VENOUS BLD VENIPUNCTURE: CPT

## 2021-09-02 PROCEDURE — 99284 EMERGENCY DEPT VISIT MOD MDM: CPT

## 2021-09-02 PROCEDURE — 74011250637 HC RX REV CODE- 250/637: Performed by: EMERGENCY MEDICINE

## 2021-09-02 RX ORDER — PANTOPRAZOLE SODIUM 40 MG/1
40 TABLET, DELAYED RELEASE ORAL DAILY
Qty: 30 TABLET | Refills: 0 | Status: SHIPPED | OUTPATIENT
Start: 2021-09-03 | End: 2021-10-03

## 2021-09-02 RX ORDER — PANTOPRAZOLE SODIUM 40 MG/1
40 TABLET, DELAYED RELEASE ORAL
Status: COMPLETED | OUTPATIENT
Start: 2021-09-02 | End: 2021-09-02

## 2021-09-02 RX ORDER — FAMOTIDINE 20 MG/1
40 TABLET, FILM COATED ORAL 2 TIMES DAILY
Qty: 16 TABLET | Refills: 0 | Status: SHIPPED | OUTPATIENT
Start: 2021-09-02 | End: 2021-09-06

## 2021-09-02 RX ORDER — SUCRALFATE 1 G/1
1 TABLET ORAL 4 TIMES DAILY
Qty: 40 TABLET | Refills: 0 | Status: SHIPPED | OUTPATIENT
Start: 2021-09-02 | End: 2021-09-12

## 2021-09-02 RX ADMIN — PANTOPRAZOLE SODIUM 40 MG: 40 TABLET, DELAYED RELEASE ORAL at 08:00

## 2021-09-02 RX ADMIN — ALUMINUM HYDROXIDE AND MAGNESIUM HYDROXIDE 30 ML: 200; 200 SUSPENSION ORAL at 08:01

## 2021-09-02 NOTE — ED PROVIDER NOTES
The history is provided by the patient. Sore Throat   This is a recurrent problem. The current episode started more than 2 days ago. The problem has not changed since onset. There has been no fever. Associated symptoms include vomiting (states he is regurgitating all liquids and food overnight especially when lying flat) and trouble swallowing. Pertinent negatives include no drooling, no shortness of breath and no cough. Past Medical History:   Diagnosis Date    Atrial fibrillation (HCC)     Depression     GERD (gastroesophageal reflux disease)     Glaucoma     High cholesterol     IBS (irritable bowel syndrome)        Past Surgical History:   Procedure Laterality Date    HX CHOLECYSTECTOMY      HX HEMORRHOIDECTOMY      HX ORTHOPAEDIC      RIGHT knee    HX TONSILLECTOMY      MS CARDIOVERSION ELECTIVE ARRHYTHMIA EXTERNAL N/A 3/13/2019    EP CARDIOVERSION performed by Kimmy Leroy MD at [address], [facility] [facility]         History reviewed. No pertinent family history. Social History     Socioeconomic History    Marital status:      Spouse name: Not on file    Number of children: Not on file    Years of education: Not on file    Highest education level: Not on file   Occupational History    Not on file   Tobacco Use    Smoking status: Former Smoker     Quit date:      Years since quittin.6    Smokeless tobacco: Never Used   Substance and Sexual Activity    Alcohol use: Yes    Drug use: No    Sexual activity: Not on file   Other Topics Concern    Not on file   Social History Narrative    Not on file     Social Determinants of Health     Financial Resource Strain:     Difficulty of Paying Living Expenses:    Food Insecurity:     Worried About Running Out of Food in the Last Year:     [address] in the Last Year:    Transportation Needs:     Lack of Transportation (Medical):      Lack of Transportation (Non-Medical):    Physical Activity:     Days of Exercise per Week:  Minutes of Exercise per Session:    Stress:     Feeling of Stress :    Social Connections:     Frequency of Communication with Friends and Family:     Frequency of Social Gatherings with Friends and Family:     Attends Confucianist Services:     Active Member of Clubs or Organizations:     Attends Club or Organization Meetings:     Marital Status:    Intimate Partner Violence:     Fear of Current or Ex-Partner:     Emotionally Abused:     Physically Abused:     Sexually Abused: ALLERGIES: Sulfa (sulfonamide antibiotics)    Review of Systems   HENT: Positive for sore throat and trouble swallowing. Negative for drooling. Respiratory: Negative for cough and shortness of breath. Gastrointestinal: Positive for vomiting (states he is regurgitating all liquids and food overnight especially when lying flat). All other systems reviewed and are negative. Vitals:    09/02/21 0658   BP: (!) 157/87   Pulse: 82   Resp: 18   Temp: 97.3 °F (36.3 °C)   SpO2: 98%            Physical Exam  Vitals and nursing note reviewed. Constitutional:       General: He is not in acute distress. Appearance: He is well-developed. HENT:      Head: Normocephalic and atraumatic. Mouth/Throat:      Pharynx: Oropharynx is clear. No pharyngeal swelling, oropharyngeal exudate or posterior oropharyngeal erythema. Tonsils: No tonsillar exudate. 0 on the right. 0 on the left. Eyes:      Conjunctiva/sclera: Conjunctivae normal.   Neck:      Trachea: No tracheal deviation. Cardiovascular:      Rate and Rhythm: Normal rate and regular rhythm. Pulmonary:      Effort: Pulmonary effort is normal. No respiratory distress. Abdominal:      General: There is no distension. Palpations: Abdomen is soft. Tenderness: There is no abdominal tenderness. There is no guarding or rebound. Hernia: No hernia is present. Musculoskeletal:         General: No deformity. Normal range of motion.       Cervical back: Neck supple. Skin:     General: Skin is warm and dry. Neurological:      Mental Status: He is alert. Cranial Nerves: No cranial nerve deficit. Psychiatric:         Behavior: Behavior normal.          [facility]  ED Course as of Sep 02 0757   Thu Sep 02, 2021   0730 EKG 0712: Rate 75, Normal sinus rhythm, No ST segment or T wave abnormalities. Normal EKG. [DK]      ED Course User Index  [DK] Christopher Jarquin MD     78 y.o. male presents with ongoing sore throat and feeling of fullness, increased regurgitation and some vomiting and severely worsening reflux especially when lying flat. Tolerated a full can of ginger ale on arrival, no signs of esophageal impaction. Will start patient on empiric PPI/H2 blocker/carafate therapy for symptomatic relief. Provided referral to GI in addition to ENT follow up for evaluation of throat which he has not called about from previous visit 2 days ago. WBC slightly elevated likely in response to mild dehydration and vomiting, no fever or abdominal tenderness. Plan to follow up with PCP as needed and return precautions discussed for worsening or new concerning symptoms.      Procedures

## 2021-09-02 NOTE — ED TRIAGE NOTES
Pt arrives to ED reporting throat pain x 1 week and reflux, epigastric pain and increased burning starting severely last night. Pt has a history of GERD.

## 2021-09-20 NOTE — PROGRESS NOTES
Becky Duque is a 78 y.o. male here for 6 month follow up for MGUS. 1. Have you been to the ER, urgent care clinic since your last visit? Hospitalized since your last visit? 9/2/21 sore throat    2. Have you seen or consulted any other health care providers outside of the [address] [place] [address] since your last visit? Include any pap smears or colon screening. No    Pt has been having a lot of coughing, discomfort in chest, and throat congestion for last few weeks. States he has not been able to get in with his doctor yet about it so he made a visit to ER instead.

## 2021-09-21 ENCOUNTER — OFFICE VISIT (OUTPATIENT)
Dept: ONCOLOGY | Age: 81
End: 2021-09-21
Payer: MEDICARE

## 2021-09-21 VITALS
OXYGEN SATURATION: 98 % | BODY MASS INDEX: 23.7 KG/M2 | HEART RATE: 85 BPM | TEMPERATURE: 97.7 F | DIASTOLIC BLOOD PRESSURE: 85 MMHG | WEIGHT: 175 LBS | HEIGHT: 72 IN | SYSTOLIC BLOOD PRESSURE: 144 MMHG

## 2021-09-21 DIAGNOSIS — D47.2 MGUS (MONOCLONAL GAMMOPATHY OF UNKNOWN SIGNIFICANCE): Primary | ICD-10-CM

## 2021-09-21 PROCEDURE — G0463 HOSPITAL OUTPT CLINIC VISIT: HCPCS | Performed by: INTERNAL MEDICINE

## 2021-09-21 PROCEDURE — G8427 DOCREV CUR MEDS BY ELIG CLIN: HCPCS | Performed by: INTERNAL MEDICINE

## 2021-09-21 PROCEDURE — 1101F PT FALLS ASSESS-DOCD LE1/YR: CPT | Performed by: INTERNAL MEDICINE

## 2021-09-21 PROCEDURE — G8432 DEP SCR NOT DOC, RNG: HCPCS | Performed by: INTERNAL MEDICINE

## 2021-09-21 PROCEDURE — G8420 CALC BMI NORM PARAMETERS: HCPCS | Performed by: INTERNAL MEDICINE

## 2021-09-21 PROCEDURE — 99213 OFFICE O/P EST LOW 20 MIN: CPT | Performed by: INTERNAL MEDICINE

## 2021-09-21 PROCEDURE — G8536 NO DOC ELDER MAL SCRN: HCPCS | Performed by: INTERNAL MEDICINE

## 2021-09-21 NOTE — PROGRESS NOTES
[facility] [place] [facility] [place] [address] [address], [address]  W: [phone]  F: [phone]    Reason for Visit:   Krishna Bryan is a 78 y.o. 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 male with Afib, CAD, CMP seen for abnormal SPEP and r/o amyloidosis  His PYP scan was positive. He is on observation for MGUS    He has had some congestion, has some HA, has had no bleeding. He is frustrated that he has not been getting in with doctors  No FH of heart disease       Past Medical History:   Diagnosis Date    Atrial fibrillation ([address]     Depression     GERD (gastroesophageal reflux disease)     Glaucoma     High cholesterol     IBS (irritable bowel syndrome)       Past Surgical History:   Procedure Laterality Date    HX CHOLECYSTECTOMY      HX HEMORRHOIDECTOMY      HX ORTHOPAEDIC      RIGHT knee    HX TONSILLECTOMY      RI CARDIOVERSION ELECTIVE ARRHYTHMIA EXTERNAL N/A 3/13/2019    EP CARDIOVERSION performed by Nickie Durant MD at [facility] [facility]      Social History     Tobacco Use    Smoking status: Former Smoker     Quit date:      Years since quittin.7    Smokeless tobacco: Never Used   Substance Use Topics    Alcohol use: Yes      No family history on file. Current Outpatient Medications   Medication Sig    pantoprazole (Protonix) 40 mg tablet Take 1 Tablet by mouth daily for 30 days.  dexAMETHasone (DECADRON) 4 mg tablet Take 12 mg by mouth once for pharyngitis    fluticasone propionate (FLONASE) 50 mcg/actuation nasal spray 2 Sprays by Both Nostrils route daily.  Vyzulta 0.024 % drop PLACE 1 DROP INTO THE RIGHT EYE NIGHTLY    ferrous gluconate 324 mg (38 mg iron) tablet as directed    tafamidis (Vyndamax) 61 mg cap Take 61 mg by mouth daily.     amLODIPine (NORVASC) 5 mg tablet Take 1 Tablet by mouth two (2) times a day.  L.acid/B.bifidum/B.animal/FOS (PROBIOTIC COMPLEX PO) Take  by mouth.  OTHER daily. SAME supplement    OTHER daily. soluber fiber 1 TBSP    OTHER daily. 3 TBSP Glucosamine/Chondroitin/MSM    OTHER daily. Super Digestive Enzymes    OTHER daily. Mirtogenol (bilberry, pycnogenol)    MAGNESIUM CITRATE PO Take  by mouth daily.  cholecalciferol (VITAMIN D3) (2,000 UNITS /50 MCG) cap capsule Take 1 Cap by mouth two (2) times a day. (Patient taking differently: Take 2,000 Units by mouth daily.)    furosemide (LASIX) 40 mg tablet Take 1 Tab by mouth daily.  busPIRone (BUSPAR) 15 mg tablet Take 15 mg by mouth two (2) times a day. Takes 25 mg total    atorvastatin (LIPITOR) 80 mg tablet Take 80 mg by mouth daily.  OTHER,NON-FORMULARY, two (2) times a day. Prostate health supplement     OTHER,NON-FORMULARY, cognitex    OTHER,NON-FORMULARY, DHEA    OTHER,NON-FORMULARY, two (2) times a day. Bone Restore (2x/wk)     OTHER,NON-FORMULARY, Horsetail (silica)    OTHER,NON-FORMULARY, Omega 3 - 2 caps in the am and 2 in the pm    OTHER,NON-FORMULARY, Bio-Curcumin (Turmeric) 1000 mg    co-enzyme Q-10 (Co Q-10) 100 mg capsule Take 100 mg by mouth two (2) times a day.  vit A/vit C/vit E/zinc/copper (PRESERVISION AREDS PO) Take  by mouth two (2) times a day.  therapeutic multivitamin (THERAGRAN) tablet Take 1 Tab by mouth daily.  dorzolamide-timolol (COSOPT) 22.3-6.8 mg/mL ophthalmic solution Administer 1 Drop to right eye two (2) times a day.  vortioxetine (BRINTELLIX) 10 mg tablet Take 10 mg by mouth daily. No current facility-administered medications for this visit. Allergies   Allergen Reactions    Sulfa (Sulfonamide Antibiotics) Unknown (comments)     From infancy, unsure if actually allergic          Review of Systems: A complete review of systems was obtained, negative except as described above.     Physical Exam:     Visit Vitals  Ht 6' (1.829 m)   Wt 175 lb (79.4 kg)   BMI 23.73 kg/m²     ECOG PS: 1  General: No distress  Eyes: PERRLA, anicteric sclerae  Psych: Alert, oriented, appropriate affect, normal judgment/insight    Results:     Lab Results   Component Value Date/Time    WBC 13.0 (H) 09/02/2021 07:10 AM    HGB 13.0 09/02/2021 07:10 AM    HCT 38.5 09/02/2021 07:10 AM    PLATELET 816 69/68/6853 07:10 AM    MCV 90.0 09/02/2021 07:10 AM    ABS. NEUTROPHILS 10.5 (H) 09/02/2021 07:10 AM    Hemoglobin (POC) 13.6 03/23/2013 01:40 AM    Hematocrit (POC) 40 03/23/2013 01:40 AM     Lab Results   Component Value Date/Time    Sodium 142 09/02/2021 07:10 AM    Potassium 4.2 09/02/2021 07:10 AM    Chloride 112 (H) 09/02/2021 07:10 AM    CO2 27 09/02/2021 07:10 AM    Glucose 107 (H) 09/02/2021 07:10 AM    BUN 30 (H) 09/02/2021 07:10 AM    Creatinine 1.27 09/02/2021 07:10 AM    GFR est AA >60 09/02/2021 07:10 AM    GFR est non-AA 54 (L) 09/02/2021 07:10 AM    Calcium 9.1 09/02/2021 07:10 AM    Sodium (POC) 141 03/23/2013 01:40 AM    Potassium (POC) 4.5 03/23/2013 01:40 AM    Chloride (POC) 110 (H) 03/23/2013 01:40 AM    Glucose (POC) 120 (H) 02/02/2019 09:09 PM    BUN (POC) 20 03/23/2013 01:40 AM    Creatinine (POC) 1.0 03/23/2013 01:40 AM    Calcium, ionized (POC) 1.18 03/23/2013 01:40 AM     Lab Results   Component Value Date/Time    Bilirubin, total 0.8 09/02/2021 07:10 AM    ALT (SGPT) 36 09/02/2021 07:10 AM    Alk.  phosphatase 63 09/02/2021 07:10 AM    Protein, total 8.2 09/02/2021 07:10 AM    Albumin 3.6 09/02/2021 07:10 AM    Globulin 4.6 (H) 09/02/2021 07:10 AM        Lab Results   Component Value Date/Time    Iron % saturation 40 08/16/2021 01:44 PM    TIBC 317 08/16/2021 01:44 PM    Ferritin 142 08/16/2021 01:44 PM    Vitamin B12 725 04/16/2019 07:54 PM    Folate 24.4 (H) 04/16/2019 07:54 PM    Beta-2 Microglobulin, serum 2.4 04/14/2021 08:08 PM    TSH 2.490 11/02/2020 12:00 AM    M-Elvis 0.4 (H) 08/16/2021 01:44 PM    Lipase 64 (L) 09/02/2021 07:10 AM     Lab Results   Component Value Date/Time    INR 1.1 04/16/2019 07:54 PM    aPTT 30.0 04/16/2019 07:54 PM    Immunofixation shows IgM monoclonal protein with kappa light            chain      Component      Latest Ref Rng & Units 11/2/2020          12:00 AM   Free Kappa Lt Chains, serum      3.3 - 19.4 mg/L 50.0 (H)   Free Lambda Lt Chains, serum      5.7 - 26.3 mg/L 29.7 (H)   Kappa/Lambda ratio, serum      0.26 - 1.65 1.68 (H)     Records reviewed and summarized above. Pathology report(s) reviewed     Bone marrow biopsy 2/16/2021     FINAL PATHOLOGIC DIAGNOSIS   Bone marrow, posterior iliac crest, aspirate, clot section, and decalcified core biopsy:   Mildly hypercellular marrow with maturing trilineage hematopoiesis and scattered lymphoid   aggregates comprising less than 10% of the cellularity, see comment   No evidence of increased/clonal plasma cells in patient with history of IgM MGUS   Flow cytometry shows no diagnostic immunophenotypic abnormalities   Congo red stain on core biopsy is negative for amyloid deposition   MYD88 not mutated    Radiology report(s) reviewed above. Nuclear cardiac amyloid spect  Impression:    1. 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 Has some lymphoid aggregates. 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     Signed By: Tanya Gonzalez MD

## 2021-10-18 ENCOUNTER — TELEPHONE (OUTPATIENT)
Dept: CARDIOLOGY CLINIC | Age: 81
End: 2021-10-18

## 2021-10-18 NOTE — TELEPHONE ENCOUNTER
I returned patients call, MACARENA informing him of upcoming appt scheduled 11/17/21 at 10:30am with Dr. Erika Cesar, virtual appt.

## 2021-10-18 NOTE — TELEPHONE ENCOUNTER
Patient called office stating no one had returned his call to let him know when his next appointment is with Dr. Lynette Andrews. I informed him I left him a voicemail on his mobile number this morning. I also discussed the process of the virtual appointment with the patient.

## 2021-10-19 ENCOUNTER — TELEPHONE (OUTPATIENT)
Dept: CARDIOLOGY CLINIC | Age: 81
End: 2021-10-19

## 2021-10-19 NOTE — TELEPHONE ENCOUNTER
Patient called again to check date/time of upcoming appointment on 11/17/21 at 10:30am with Severo Koromat.

## 2021-11-16 ENCOUNTER — TELEPHONE (OUTPATIENT)
Dept: CARDIOLOGY CLINIC | Age: 81
End: 2021-11-16

## 2021-11-16 NOTE — TELEPHONE ENCOUNTER
Patient confirmed appointment. Patient was instructed to take their weight, BP, HR and temp before the appt. and have medications/list available. I informed the patient they would receive a call from our office 15 min before the appt. to provide readings.

## 2021-11-17 ENCOUNTER — VIRTUAL VISIT (OUTPATIENT)
Dept: CARDIOLOGY CLINIC | Age: 81
End: 2021-11-17
Payer: MEDICARE

## 2021-11-17 DIAGNOSIS — E79.0 ELEVATED BLOOD URIC ACID LEVEL: ICD-10-CM

## 2021-11-17 DIAGNOSIS — E55.9 VITAMIN D DEFICIENCY: ICD-10-CM

## 2021-11-17 DIAGNOSIS — E44.0 MALNUTRITION OF MODERATE DEGREE (HCC): ICD-10-CM

## 2021-11-17 DIAGNOSIS — M10.9 GOUT, UNSPECIFIED CAUSE, UNSPECIFIED CHRONICITY, UNSPECIFIED SITE: ICD-10-CM

## 2021-11-17 DIAGNOSIS — D50.9 IRON DEFICIENCY ANEMIA, UNSPECIFIED IRON DEFICIENCY ANEMIA TYPE: ICD-10-CM

## 2021-11-17 DIAGNOSIS — R06.02 SHORTNESS OF BREATH: Primary | ICD-10-CM

## 2021-11-17 DIAGNOSIS — I50.9 CONGESTIVE HEART FAILURE, UNSPECIFIED HF CHRONICITY, UNSPECIFIED HEART FAILURE TYPE (HCC): ICD-10-CM

## 2021-11-17 DIAGNOSIS — R63.4 WEIGHT LOSS: ICD-10-CM

## 2021-11-17 DIAGNOSIS — R53.83 FATIGUE, UNSPECIFIED TYPE: ICD-10-CM

## 2021-11-17 DIAGNOSIS — I15.9 SECONDARY HYPERTENSION: ICD-10-CM

## 2021-11-17 PROCEDURE — 99443 PR PHYS/QHP TELEPHONE EVALUATION 21-30 MIN: CPT | Performed by: INTERNAL MEDICINE

## 2021-11-17 RX ORDER — AMLODIPINE BESYLATE 5 MG/1
5 TABLET ORAL 2 TIMES DAILY
Qty: 60 TABLET | Refills: 3 | Status: SHIPPED | OUTPATIENT
Start: 2021-11-17 | End: 2022-06-15

## 2021-11-17 NOTE — PATIENT INSTRUCTIONS
Medication changes: Take amlodipine twice daily-please call next Wednesday with a diary of your blood pressure readings in the morning and 2 hours later    Please take this to your pharmacy to notify them of the change in medications. Testing Ordered:    Please have labs drawn in early January-lab slips provided    Other Recommendations:     Please call Dr. Josey Pierre to reschedule your appointment [phone]     Ensure your drinking an adequate amount of water with a goal of 6-8 eight ounce glasses (1.5-2 liters) of fluid daily. Your urine should be clear and light yellow straw colored. If your blood pressure begins to consistently run below 90/60 and/or you begin to experience dizziness or lightheadedness, please contact the [address] at [phone]. Follow up in 3 months with virtual appt with NP with [facility] [place]      Please monitor your weights daily upon waking and after using the bathroom. Keep a written records of your weights and bring to your next appointment. If you have a weight gain of 3 or more pounds overnight OR 5 or more pounds in one week please contact our office. Thank you for allowing us the privilege of being a part of your healthcare team! Please do not hesitate to contact our office at [phone] with any questions or concerns. Virtual Heart Failure [address] invites you to learn more about heart failure and to share your questions, ideas, and experiences with others. Each month, the Heart Failure Support Group features a new educational topic and a guest speaker, followed by an interactive discussion. Our Heart Failure Nurse Navigator will moderate each session. You will be able to participate by phone, tablet or computer through [address] [place] [address].  This support group takes place on the 3rd Thursday of each month from 6:00-7:30PM. All individuals living with heart failure and their caregivers are welcome to join.     If you are interested in participating, please contact us at [email or web address] and you will be sent the link to join the [employer].

## 2021-11-17 NOTE — PROGRESS NOTES
Patient has been recording BP, weight and HR readings for past 2 weeks. 11/9/21: weight: 166 lb, BP am: 153/76, HR 86, 2 hrs later: 138/75, HR 89  11/10: weight: 166 lb, /82, HR78, 2 hrs later: 145/77, HR 82  11/11: weight 162, /76, HR 83  11/12: weight 163, /84, HR 87, 2 hours later: 148/78, HR 86  11/13: weight 163 lb, /81, HR 97, 2 hrs later: 151/78, HR 91  11/15: weight 160 lb, /71, HR 75, 2 hrs later: 153/77, HR 84  11/16: weight 160 lb, /79, HR 85, 2 hours later: 174/90, HR 94  11/17: am weight 160 lb, /79, HR 85, 2 hours later: 159/79, HR 85  DR. Ojeda Nim notified.

## 2021-11-17 NOTE — PROGRESS NOTES
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; surveillance every 3-4 months with visits, labs, echo and CPEST in 2012; BM bx was negative   · Continue tafamidis, drink green tea, consider adding green tea extract and doxycycline if tefamidis tolerated and green tea tolerated     RECOMMENDATIONS:  Continue tefamidis  Resume norvasc to 5mg twice daily  If remains hypertensive may add and uptitrate hydralazine 10gm PO TID  Avoid beta-blockers and ACEi/ARB/ARNi due to orthostasis and exercise induced lightheadedness  Consider adding spironolactone once lab results available, watch K  Does not require diuretics  Does not require allopurinol, uric acid normal  Reinforced low salt diet  Reinforced fluid restriction to 6 x 8oz glasses per day  Patient needs advanced care plan on file  Recommend physical activity; walking 30 minutes daily  Previously referred for neurocognitive testing and neurology evaluation for memory loss; no show for visit on 21; we will place another referral  Follow up with hematology annually for MGUS with Dr. Bri Turcios  Follow up with sleep medicine/pulmonary re: CPAP trial  = pulmonary associates   Follow-up with primary cardiologist, Dr. Jacqueline Naqvi  Follow up with EP cardiologist, Dr. Kya Leonardo with PCP for iron deficiency anemia, h/o positive hemoccult and abdominal symptoms  Recommend flu, covid and pneumonia vaccinations  Provided patient education materials for COVID precautions  Follow up with [facility] in 3 months with NP virtually     IMPRESSION:  Fatigue  Chronic diastolic heart failure  · Stage C, NYHA class I symptoms  · HFpEF, LVEF 56-60% (by echo 2/2019), normal RV function  · aTTR cardiac amyloidosis (by PYP 12/2020)  · MGUS, negative bone marrow biopsy  · Genetic testing for iaTTR negative; possibly pathologic variant X-linked Duchanne  Coronary artery disease  · LHC (4/9/20) 30% p-LAD, 30% m-LAD, 50% 2nd OM (4/9/20); good flow reserve  Atrial fibrillation, paroxysmal  · S/p DCCV 3/2019  · S/p afib ablation (10/20/20 by Dr. 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 Normal cavity size, systolic function (ejection fraction normal) and diastolic function. Moderate concentric hypertrophy. Wall motion: unable to assess due to limited image quality. Normal left ventricular strain. · LA: Moderately dilated left atrium. · AV: With no significant stenosis. Mild aortic valve regurgitation is present. · MV: Mitral valve non-specific thickening. Mild to moderate mitral valve regurgitation is present.   · Image quality for this study was suboptimal.  · IVS 1.39cm  CTA for EP mapping reviewed (10/6/20) Watchman device occlusive  Cardiac CTA (3/30/20) moderate diffuse CAD  Cardiac PET (3/7/19) normal, Rest EF 32 and stress 38  Echo (3/2020) LVEF 55%, IVSd 1.6cm, LV mass 312g increased, RV function normal, PA pressure 30, sclerotic AV without stenosis, trace TR, mild mR, IVC normal  EKG (9/2020) atrial fib, rate in 90s, low voltae, V1-V2 q wave  LHC (LHC (4/9/2020) LHC 30% p-LAD, 30% m-LAD, 50% 2nd OM FFR flow reserve 0.92 (4/9/20)  [facility] (2011) tirvial luminal irregularities  Remainder in records (see in epic)     HEMODYNAMICS:  RHC not done  CPEST (12/15/20): MVO2 17.7 with RQ 1.7      OTHER IMAGING:  CT abd (3/24/17) There is distention of the proximal half of the small bowel with fecal-like material in the mid small bowel loops. This is consistent with at least partial obstruction of the small bowel. No structural abnormality is  identified to account for this. Chest CT( 1/2016) 1. Unchanged pulmonary nodule in the right upper lobe just over 6 mm on  today's exam. Per Fleischner criteria for high risk patients, follow-up is  recommended in 6-9 months. Low risk Fleischner criteria follow-up for 6 to 8mm pulmonary nodules is at 6-12  months and 18-24 months. 2. Aortic valve annular calcifications with suggestion of probable aortic valve leaflet calcifications. . This finding can be associated with aortic   stenosis. 3. Cholelithiasis.     SOCIAL HISTORY:  , never smoker  Alcohol occasional     SOCIAL HISTORY:  Never smoke. Social alcohol. No illicit drugs.     HISTORY OF PRESENT ILLNESS:  I had the pleasure of seeing Rakan Younggregoria Bethea at [facility] in [facility] via telephone visit.     Jeffy Purchase a 78 y. o. male with h/o chronic fatigue, DANILO (not started CPAP yet), HTN, HL, coronary artery disease (LHC on 4/9/20 showed 30% p-LAD, 30% m-LAD, 50% 2nd OM; good flow reserve), atrial fibrillation, paroxysmal, DCCV 3/2019 and s/p afib ablation (10/20/20 by Dr. Wali Mckinney), not on chronic anticoagulation due to life-threatening GI bleed/hemorrhoids, s/p Watchman device in [place] 7/23/19, well seated by echo (10/2020), depression. Patient was referred by Dr. Wali Mckinney to evaluated for possible HFpEF.     Patient had PYP test suggestive of aTTR. 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  Bone marrow biopsy was negative (2/24/21); no evidence of increased/clonal plasma cells in patient with history of IgM MGUS, congo red stain on core biopsy is negative for amyloid deposition. Troponin was negative and pro-NT-BNP in 400s, indicating stage 1 cardiac amyloidosis. Genetic studies were negative for inherited aTTR; suggesting this was wild type aTTR cardiac amyloidosis. CPEST (12/2020) revealed pVO2 17.7 consistent with excellent prognosis. Patient had sleep study which revealed DANILO and CPAP trial is pending. INTERVAL HISTORY:  Today, patient presents for follow up visit via telephone.     Patient reports feeling well.      Patient walked to our clinic from parking garage without having to slow down or stop. Patient states he can walk more than one block without symptoms of fatigue or shortness of breath. Patient can perform home activities without problem and routinely participates in daily walking for more than 15 minutes.      Patient denies symptoms of volume overload or leg edema. Patient denies abdominal bloating or change of appetite. Patient loast > 10 lbs after he had developed swollowing problems, those have been evaluated by GI and he is doing well now. Weight back to 175lbs.     Patient denies orthopnea, PND.      Patient denies irregular heart rate or palpitations.     Patient denies leg pain with walking.      Patient is compliant with fluid restriction and taking medications as prescribed. Patient manages his own medications.     REVIEW OF SYSTEMS:  General: Denies fever, night sweats. Ear, nose and throat: Denies difficulty hearing, sinus problems, runny nose, post-nasal drip, ringing in ears, mouth sores, loose teeth, ear pain, nosebleeds, sore throate, facial pain or numbess  Cardiovascular: see above in the interval history  Respiratory: Denies cough, wheezing, sputum production, hemoptysis.   Gastrointestinal: Denies heartburn, constipation, diarrhea, abdominal pain, nausea, vomiting, difficulty swallowing, blood in stool  Kidney and bladder: Denies painful urination, frequent urination, urgency  Musculoskeletal: Denies joint pain, muscle weakness  Skin and hair: Denies change in existing skin lesions, hair loss or increase, breast changes    PHYSICAL EXAM:  Patient-Reported Vitals 2021   Patient-Reported Weight 160.0 lb   Patient-Reported Pulse 85   Patient-Reported Systolic  905   Patient-Reported Diastolic 79      Not performed. PAST MEDICAL HISTORY:  Past Medical History:   Diagnosis Date    Atrial fibrillation (HCC)     Depression     GERD (gastroesophageal reflux disease)     Glaucoma     High cholesterol     IBS (irritable bowel syndrome)        PAST SURGICAL HISTORY:  Past Surgical History:   Procedure Laterality Date    HX CHOLECYSTECTOMY      HX HEMORRHOIDECTOMY      HX ORTHOPAEDIC      RIGHT knee    HX TONSILLECTOMY      CT CARDIOVERSION ELECTIVE ARRHYTHMIA EXTERNAL N/A 3/13/2019    EP CARDIOVERSION performed by Rosie López MD at [address], [facility]/s [address] [facility]       FAMILY HISTORY:  No family history on file. SOCIAL HISTORY:  Social History     Socioeconomic History    Marital status:    Tobacco Use    Smoking status: Former Smoker     Quit date:      Years since quittin.9    Smokeless tobacco: Never Used   Substance and Sexual Activity    Alcohol use: Yes    Drug use: No       LABORATORY RESULTS:  No flowsheet data found. ALLERGY:  Allergies   Allergen Reactions    Sulfa (Sulfonamide Antibiotics) Unknown (comments)     From infancy, unsure if actually allergic          CURRENT MEDICATIONS:    Current Outpatient Medications:     dexAMETHasone (DECADRON) 4 mg tablet, Take 12 mg by mouth once for pharyngitis, Disp: 3 Tablet, Rfl: 0    fluticasone propionate (FLONASE) 50 mcg/actuation nasal spray, 2 Sprays by Both Nostrils route daily. , Disp: 1 Each, Rfl: 0    Vyzulta 0.024 % drop, PLACE 1 DROP INTO THE RIGHT EYE NIGHTLY, Disp: , Rfl:     ferrous gluconate 324 mg (38 mg iron) tablet, as directed, Disp: , Rfl:     tafamidis (Vyndamax) 61 mg cap, Take 61 mg by mouth daily. , Disp: , Rfl:     amLODIPine (NORVASC) 5 mg tablet, Take 1 Tablet by mouth two (2) times a day., Disp: 60 Tablet, Rfl: 3    L.acid/B.bifidum/B.animal/FOS (PROBIOTIC COMPLEX PO), Take  by mouth., Disp: , Rfl:     OTHER, daily. SAME supplement, Disp: , Rfl:     OTHER, daily. soluber fiber 1 TBSP, Disp: , Rfl:     OTHER, daily. 3 TBSP Glucosamine/Chondroitin/MSM, Disp: , Rfl:     OTHER, daily. Super Digestive Enzymes, Disp: , Rfl:     OTHER, daily. Mirtogenol (bilberry, pycnogenol), Disp: , Rfl:     MAGNESIUM CITRATE PO, Take  by mouth daily. , Disp: , Rfl:     cholecalciferol (VITAMIN D3) (2,000 UNITS /50 MCG) cap capsule, Take 1 Cap by mouth two (2) times a day. (Patient taking differently: Take 2,000 Units by mouth daily.), Disp: 60 Cap, Rfl: 3    furosemide (LASIX) 40 mg tablet, Take 1 Tab by mouth daily. , Disp: 90 Tab, Rfl: 1    busPIRone (BUSPAR) 15 mg tablet, Take 15 mg by mouth two (2) times a day. Takes 25 mg total, Disp: , Rfl:     atorvastatin (LIPITOR) 80 mg tablet, Take 80 mg by mouth daily. , Disp: , Rfl:     OTHER,NON-FORMULARY,, two (2) times a day. Prostate health supplement , Disp: , Rfl:     OTHER,NON-FORMULARY,, cognitex, Disp: , Rfl:     OTHER,NON-FORMULARY,, DHEA, Disp: , Rfl:     OTHER,NON-FORMULARY,, two (2) times a day. Bone Restore (2x/wk) , Disp: , Rfl:     OTHER,NON-FORMULARY,, Horsetail (silica), Disp: , Rfl:     OTHER,NON-FORMULARY,, Omega 3 - 2 caps in the am and 2 in the pm, Disp: , Rfl:     OTHER,NON-FORMULARY,, Bio-Curcumin (Turmeric) 1000 mg, Disp: , Rfl:     co-enzyme Q-10 (Co Q-10) 100 mg capsule, Take 100 mg by mouth two (2) times a day., Disp: , Rfl:     vit A/vit C/vit E/zinc/copper (PRESERVISION AREDS PO), Take  by mouth two (2) times a day., Disp: , Rfl:     therapeutic multivitamin (THERAGRAN) tablet, Take 1 Tab by mouth daily. , Disp: , Rfl:     dorzolamide-timolol (COSOPT) 22.3-6.8 mg/mL ophthalmic solution, Administer 1 Drop to right eye two (2) times a day., Disp: , Rfl:     vortioxetine (BRINTELLIX) 10 mg tablet, Take 10 mg by mouth daily. , Disp: , Rfl:     Thank you for your referral and allowing me to participate in this patient's care. Cindi Guillen MD PhD  [address] [place] [address], [address]  Phone: [phone]  Fax: [phone]    PATIENT CARE TEAM:  Patient Care Team:  Abdirizak Floyd MD as PCP - General (Family Medicine)  Juvenal Mart MD (Sleep Medicine)     TELEPHONE VISIT DOCUMENTATION:  Pursuant to the emergency declaration under the [address] [place] [address] and the [employer] Act, this Telephone Visit was conducted, with patient's consent, to reduce the patient's risk of exposure to COVID-19 and provide continuity of care for an established patient. Services were provided through a telephone/ discussion virtually to substitute for in-person clinic visit. Farrah Naik is a 78 y.o. male evaluated via telephone discussion virtually on 11/17/2021. He and/or health care decision maker is aware that that he may receive a bill for this telephone service, depending on his insurance coverage, and has provided verbal consent to proceed: Yes. I affirm this is a Patient Initiated Episode with an Established Patient who has not had a related appointment within my department in the past 7 days or scheduled within the next 24 hours. This service was provided through telehealth; patient called from home and provider was in the office at the [address] [place] [address] [address].     Total Time: minutes: 21-30

## 2021-11-19 ENCOUNTER — TELEPHONE (OUTPATIENT)
Dept: CARDIOLOGY CLINIC | Age: 81
End: 2021-11-19

## 2021-11-19 NOTE — TELEPHONE ENCOUNTER
Returned patients call with no answer. Left a voicemail with [facility] call back number will await return call. Mary Nettles RN       Patient returned called. Used two patient identifiers. Patient states that he was upset because he did not get a call back notified him that I called this morning and left a message. Patient states angrily that he did not receive any messages or call. Patient then stated he is confused about his blood pressure medication. Advised him per his avs he is to resume amlodipine 5 mg twice a day. He states that Dr. Gildardo Wilde was talking about hydralazine during his appt. I notified I can speak with her and call him back. He states that would be fine. He then went on to report that his blood pressure machine takes higher readings then what he gets at doctors offices. I offered a nurse visit on this upcoming Monday 11/22/21 to check bp cuff and he stated that would work for him. He then went on to talk about amlodipine and how from his Internet research it is used often in animals and in kidney patients and that he was neither. Advised patient that amlodipine is a drug that is used often in patients to combat high blood pressure. He stated he would like dr. Gildardo Wilde to be aware. Advised him I will speak with Dr. Gildardo Wilde and call back. Ara Kemp RN        Called patient using two patient identifiers. Advised patient per Dr. Vesna Cheney to take amlodipine 5 mg two times daily. Educated patient to take blood pressure in the morning before medications and two hours after medications and keep a log. Asked patient to bring a log to clinic on Monday. Also advised patient per Dr. Gildardo Wilde that she will consider hydralazine if amlodipine is not effective in controlling blood pressure.  Educated patent per Dr. Gildardo Wilde that amlodipine is often more effective in patients greater than 79years old and that it is often used in HF population for blood pressure control. educated patient that we will evaluate bp and make changes if needed when seen in clinic. Patient stated understanding of all instructions and had no further questions.  Patient will present for in clinic appt on 11/22/21 at 1:30 pm. Teodora Sellers RN

## 2021-11-22 ENCOUNTER — CLINICAL SUPPORT (OUTPATIENT)
Dept: CARDIOLOGY CLINIC | Age: 81
End: 2021-11-22

## 2021-11-22 VITALS — HEART RATE: 86 BPM | DIASTOLIC BLOOD PRESSURE: 85 MMHG | SYSTOLIC BLOOD PRESSURE: 142 MMHG

## 2021-11-22 DIAGNOSIS — I50.22 CHRONIC SYSTOLIC HEART FAILURE (HCC): Primary | ICD-10-CM

## 2021-11-22 NOTE — PROGRESS NOTES
Patient saw today for a nurse visit to have his home blood pressure cuff calibrated with your blood pressure cuff at the clinic. Blood pressure readings shown to GREY Blanco NP. Per KINJAL Blanco, no medication changes or tests to be done today. Patient is going to call next week with blood pressures for a week (11/22/21-11/29/21) to see if another medication needs to be added on per his last visit note with Dr. Sara Avitia.     Jalil Jara RN

## 2021-11-22 NOTE — PATIENT INSTRUCTIONS
Medication changes:    No medication changes    Testing Ordered:    None    Other Recommendations:      Please continue to keep a log of your blood pressures and weights daily, check your blood pressure 15 minutes before medication and 2 hours after medications. Ensure your drinking an adequate amount of water with a goal of 6-8 eight ounce glasses (1.5-2 liters) of fluid daily. Your urine should be clear and light yellow straw colored. If your blood pressure begins to consistently run below 90/60 and/or you begin to experience dizziness or lightheadedness, please contact the [facility] at [phone]. Follow up 3 months with [facility]      Please monitor your weights daily upon waking and after using the bathroom. Keep a written records of your weights and bring to your next appointment. If you have a weight gain of 3 or more pounds overnight OR 5 or more pounds in one week please contact our office. Thank you for allowing us the privilege of being a part of your healthcare team! Please do not hesitate to contact our office at [phone] with any questions or concerns. Virtual Heart Failure [address] invites you to learn more about heart failure and to share your questions, ideas, and experiences with others. Each month, the Heart Failure Support Group features a new educational topic and a guest speaker, followed by an interactive discussion. Our Heart Failure Nurse Navigator will moderate each session. You will be able to participate by phone, tablet or computer through [address] [place] [address]. This support group takes place on the 3rd Thursday of each month from 6:00-7:30PM. All individuals living with heart failure and their caregivers are welcome to join. If you are interested in participating, please contact us at [email or web address] and you will be sent the link to join the [employer].

## 2021-11-29 ENCOUNTER — TELEPHONE (OUTPATIENT)
Dept: CARDIOLOGY CLINIC | Age: 81
End: 2021-11-29

## 2021-11-29 DIAGNOSIS — I50.22 CHRONIC SYSTOLIC HEART FAILURE (HCC): Primary | ICD-10-CM

## 2021-11-29 NOTE — TELEPHONE ENCOUNTER
Called patient regarding blood pressure log. Patient did not answer. Left a message with [facility] call back number. Will await return call. Mendel Arena RN       Called patient using two patient identifiers. Patient states he is busy and would like a call back at a later time. Will call back patient at a later time. Mendel Arena RN         Called patient using two patient identifiers. Asked patient for recent blood pressure log. Patient states he has not been keeping a blood pressure log because \"nobody cares about my high blood pressure and no changes are being done\". Advised patient that we use blood pressure log to titrate medications. Patient stated he will keep a log then. Educated patient to take blood pressures in the same arm daily in the morning before medications and two hours after medications and keep a log. Patient states that his blood pressure cuff is always inaccurate and has high reading at home and when he comes in they are always lower. Notified patient that he had a nurse visit and it was determined that the difference was not significant enough to change our course of treatment and that he should continue to take blood pressures as directed to have the best accurate results. Patient then stated that \"well what about all the other blood pressures yun taken you just going to disregard those for one reading. I notified him that I will notified providers of his concerns. Reviewed patients after visit summary line by line. Patient states he is frustrated because avs are not clear in what they are saying. Clarified when patient had questions. Patient notes that troponin I was not included in lab slips for lab usha. Notified him I will speak with provider. He also states that it is ridiculous and unacceptable that he is waiting until 7/2022 for an appt with Dr. Keyona Armas. Notified him I will let provider know and determine if appt needs to be moved up sooner.  Patient stated understanding of AVS and instructions and had no further questions. Reminded patient to call back in one week with bp log. Niko Velazquez RN     New orders placed for labs and lab slips sent to patients home address. Patient is to call in one week with blood pressure readings.   Niko Velazquez RN

## 2021-11-30 ENCOUNTER — HOSPITAL ENCOUNTER (EMERGENCY)
Age: 81
Discharge: HOME | End: 2021-11-30
Attending: EMERGENCY MEDICINE
Payer: MEDICARE

## 2021-11-30 VITALS
OXYGEN SATURATION: 100 % | RESPIRATION RATE: 16 BRPM | HEART RATE: 90 BPM | TEMPERATURE: 97.5 F | DIASTOLIC BLOOD PRESSURE: 85 MMHG | SYSTOLIC BLOOD PRESSURE: 151 MMHG

## 2021-11-30 DIAGNOSIS — J04.0 ACUTE LARYNGITIS: Primary | ICD-10-CM

## 2021-11-30 PROCEDURE — 74011250637 HC RX REV CODE- 250/637: Performed by: STUDENT IN AN ORGANIZED HEALTH CARE EDUCATION/TRAINING PROGRAM

## 2021-11-30 PROCEDURE — 99283 EMERGENCY DEPT VISIT LOW MDM: CPT

## 2021-11-30 RX ORDER — ACETAMINOPHEN 500 MG
1000 TABLET ORAL ONCE
Status: COMPLETED | OUTPATIENT
Start: 2021-11-30 | End: 2021-11-30

## 2021-11-30 RX ADMIN — BENZOCAINE AND MENTHOL 1 LOZENGE: 15; 3.6 LOZENGE ORAL at 17:23

## 2021-11-30 RX ADMIN — ACETAMINOPHEN 1000 MG: 500 TABLET ORAL at 18:17

## 2021-11-30 NOTE — DISCHARGE INSTRUCTIONS
Take new medication every two hours as needed for throat pain. Try to avoid raising your voice too much and can drink hot drinks in meantime for relief.  Should symptoms continue please consult ENT doctor

## 2021-11-30 NOTE — ED PROVIDER NOTES
78 y.o male presents to ED with sore throat for one day. He reports that yesterday he was yelling with his insurance over the phone and has had a sore throat since. He reports it is worsened with coughing or swallowing. He has tried aleve with little relief. He also notes an associated headache. Patient reports that he has been having some coughing for past 6 weeks, recently had a test (swallow test) with GI which indicated he may be aspirating. He has been counseled on behavior changes such as eating slower to prevent this. No fevers, chills, nasal congestion, N/V/D. Drug allergies up to date. Social: No tobacco, social EtOH use, and no drug use. Sore Throat  Pertinent negatives include no shortness of breath. Past Medical History:   Diagnosis Date    Atrial fibrillation (HCC)     Depression     GERD (gastroesophageal reflux disease)     Glaucoma     High cholesterol     IBS (irritable bowel syndrome)        Past Surgical History:   Procedure Laterality Date    HX CHOLECYSTECTOMY      HX HEMORRHOIDECTOMY      HX ORTHOPAEDIC      RIGHT knee    HX TONSILLECTOMY      [place] CARDIOVERSION ELECTIVE ARRHYTHMIA EXTERNAL N/A 3/13/2019    EP CARDIOVERSION performed by Pao Gold MD at [facility], [facility]         History reviewed. No pertinent family history. Social History     Socioeconomic History    Marital status:      Spouse name: Not on file    Number of children: Not on file    Years of education: Not on file    Highest education level: Not on file   Occupational History    Not on file   Tobacco Use    Smoking status: Former Smoker     Quit date:      Years since quittin.5    Smokeless tobacco: Never Used   Substance and Sexual Activity    Alcohol use:  Yes    Drug use: No    Sexual activity: Not on file   Other Topics Concern    Not on file   Social History Narrative    Not on file     Social Determinants of Health     Financial Resource Strain:     Difficulty of Paying Living Expenses: Not on file   Food Insecurity:     Worried About [address] in the Last Year: Not on file    Ran Out of Food in the Last Year: Not on file   Transportation Needs:     Lack of Transportation (Medical): Not on file    Lack of Transportation (Non-Medical): Not on file   Physical Activity:     Days of Exercise per Week: Not on file    Minutes of Exercise per Session: Not on file   Stress:     Feeling of Stress : Not on file   Social Connections:     Frequency of Communication with Friends and Family: Not on file    Frequency of Social Gatherings with Friends and Family: Not on file    Attends Worship Services: Not on file    Active Member of [address] [place] [address] or Organizations: Not on file    Attends Club or Organization Meetings: Not on file    Marital Status: Not on file   Intimate Partner Violence:     Fear of Current or Ex-Partner: Not on file    Emotionally Abused: Not on file    Physically Abused: Not on file    Sexually Abused: Not on file   Housing Stability:     Unable to Pay for Housing in the Last Year: Not on file    Number of [place] in the Last Year: Not on file    Unstable Housing in the Last Year: Not on file         ALLERGIES: Sulfa (sulfonamide antibiotics)    Review of Systems   Constitutional: Negative. HENT: Positive for sore throat. Negative for congestion and sinus pressure. Respiratory: Positive for cough. Negative for choking and shortness of breath. Cardiovascular: Negative. Gastrointestinal: Negative. Endocrine: Negative. Genitourinary: Negative. Neurological: Negative. Psychiatric/Behavioral: Negative. All other systems reviewed and are negative. Vitals:    11/30/21 1537   BP: (!) 151/85   Pulse: 90   Resp: 16   Temp: 97.5 °F (36.4 °C)   SpO2: 100%            Physical Exam  Vitals and nursing note reviewed. Constitutional:       Appearance: He is well-developed and normal weight.    HENT:      Mouth/Throat: Mouth: Mucous membranes are moist. No oral lesions. Pharynx: Oropharynx is clear. No oropharyngeal exudate or posterior oropharyngeal erythema. Cardiovascular:      Rate and Rhythm: Normal rate and regular rhythm. Heart sounds: Normal heart sounds. Pulmonary:      Effort: Pulmonary effort is normal.   Neurological:      Mental Status: He is alert. MDM  Number of Diagnoses or Management Options  Acute laryngitis  Diagnosis management comments: 78 y.o. male with history of aspiration presents to ED with concerns of sore throat s/p yelling. Patient got cepacol lozenge while in ED with great relief, was able to successfully complete PO challenge. Likely due to yelling and will be self-limiting but educated patient to follow with ENT should symptoms not improve. Patient stable for discharge with instructions for follow up and conservative care at home.            Procedures

## 2021-11-30 NOTE — ED TRIAGE NOTES
Pt arrives for sore throat starting yesterday. Pt states he was on the phone with car insurance which made him raise his voice. No other complaints.

## 2021-12-07 ENCOUNTER — TELEPHONE (OUTPATIENT)
Dept: CARDIOLOGY CLINIC | Age: 81
End: 2021-12-07

## 2021-12-07 NOTE — TELEPHONE ENCOUNTER
Called patient using two patient identifiers. Asked patient for recent blood pressure logs. Patient states he has not been taking his blood pressures recently due to neal covid and not feeling up to taking blood pressures. Asked patient about how he was feeling. Patient states that he has no heart failure symptoms but is feeling very fatigued. Educated patient on importance of daily blood pressure so medications can be titrated. Patient states he just does not feel up to taking blood pressures right now and will call back once he starts feeling up to taking blood pressures again. MD aware.  Latonia Onofre Rn

## 2021-12-23 NOTE — CONSULTS
Cardiology Consult Note    CC: PAF  Reason for consult:  PAF  Requesting MD:  Dr. Elisa Carroll     Subjective:      Date of  Admission: 4/16/2019  7:18 PM     Admission type:Emergency    Alexandr Davis is a [de-identified] y.o. male admitted for GI bleed [K92.2]  Anemia [D64.9]  A-fib ([facility] [address] [I48.91]. Patient complains of fatigue and recent hematochezia. His Hgb I took two days ago showed 7.5 which is markedly decreased as compared to previous Hgb. He denies any melena. He has not been sleeping well since his DCCV and has developed progressive weakness. He looked very pale when I saw him two days ago. He denies any failure sx. He did maintain sinus rhythm. I decided to switch to Amiodarone. Now we know why he has been so tired. His Eliquis was stopped yesterday. On monitoring overnight he did have 3 second long pauses, asymptomatic. Patient Active Problem List    Diagnosis Date Noted    A-fib [facility]) 04/16/2019    Anemia 04/16/2019    GI bleed 04/16/2019    Persistent atrial fibrillation ([facility] [address] 02/03/2019    Partial small bowel obstruction ([facility] [address] 03/24/2017    GERD (gastroesophageal reflux disease) 03/24/2017      Flora Angel MD  Past Medical History:   Diagnosis Date    Atrial fibrillation (HCC)     Depression     GERD (gastroesophageal reflux disease)     Glaucoma     High cholesterol     IBS (irritable bowel syndrome)       Past Surgical History:   Procedure Laterality Date    HX CHOLECYSTECTOMY      HX HEMORRHOIDECTOMY      HX ORTHOPAEDIC      RIGHT knee    HX TONSILLECTOMY      [place] CARDIOVERSION ELECTIVE ARRHYTHMIA EXTERNAL N/A 3/13/2019    EP CARDIOVERSION performed by Paramjit Virk MD at [place] [address], [facility]  CATH LAB     Allergies   Allergen Reactions    Sulfa (Sulfonamide Antibiotics) Unknown (comments)     From infancy, unsure if actually allergic        History reviewed. No pertinent family history.    Current Facility-Administered Medications   Medication Dose Route Frequency    0.9% sodium chloride infusion Patient has the following symptoms: fell on 12/18 and her upper right back and shoulder have terrible pain.  She wants to come in to be checked out.  She is having trouble receiving calls so if she doesn't hear anything she will check back around 4:30.  The symptoms have been present for 5 days    Pharmacy has been verified.    250 mL  250 mL IntraVENous PRN    latanoprost (XALATAN) 0.005 % ophthalmic solution 1 Drop  1 Drop Right Eye QHS    sotalol (BETAPACE) tablet 40 mg  40 mg Oral BID    0.9% sodium chloride infusion 250 mL  250 mL IntraVENous PRN    sodium chloride (NS) flush 5-40 mL  5-40 mL IntraVENous Q8H    sodium chloride (NS) flush 5-40 mL  5-40 mL IntraVENous PRN    atorvastatin (LIPITOR) tablet 40 mg  40 mg Oral DAILY    busPIRone (BUSPAR) tablet 10 mg  10 mg Oral BID    dilTIAZem CD (CARDIZEM CD) capsule 240 mg  240 mg Oral DAILY    dorzolamide-timolol (COSOPT) 22.3-6.8 mg/mL ophthalmic solution 1 Drop  1 Drop Right Eye BID    gabapentin (NEURONTIN) capsule 100-200 mg  100-200 mg Oral QHS PRN    LORazepam (ATIVAN) tablet 0.5-1 mg  0.5-1 mg Oral QHS PRN    losartan (COZAAR) tablet 50 mg  50 mg Oral DAILY    0.9% sodium chloride infusion  75 mL/hr IntraVENous CONTINUOUS        Prior to Admission Medications:  Prior to Admission medications    Medication Sig Start Date End Date Taking? Authorizing Provider   sotalol (BETAPACE) 80 mg tablet Take 80 mg by mouth two (2) times a day. Yes Provider, Historical   gabapentin (NEURONTIN) 100 mg capsule Take 100-200 mg by mouth nightly as needed. Yes Provider, Historical   LORazepam (ATIVAN) 0.5 mg tablet Take 0.5-1 mg by mouth nightly as needed for Anxiety. Yes Provider, Historical   atorvastatin (LIPITOR) 40 mg tablet Take 1 Tab by mouth daily. 2/3/19  Yes Mookie Farley NP   dilTIAZem CD (CARDIZEM CD) 240 mg ER capsule Take 1 Cap by mouth daily. 2/4/19  Yes Mookie Farley NP   losartan (COZAAR) 50 mg tablet Take 1 Tab by mouth daily. 2/4/19  Yes Mookie Farley NP   latanoprostene bunod (VYZULTA) 0.024 % drop Administer 1 Drop to right eye nightly. Yes Provider, Historical   busPIRone (BUSPAR) 10 mg tablet Take 10 mg by mouth two (2) times a day. Yes Provider, Historical   therapeutic multivitamin (THERAGRAN) tablet Take 1 Tab by mouth daily.    Yes Provider, Historical   dorzolamide-timolol (COSOPT) 22.3-6.8 mg/mL ophthalmic solution Administer 1 Drop to right eye two (2) times a day. Yes Provider, Historical   omeprazole (PRILOSEC) 20 mg capsule Take 20 mg by mouth daily. Yes Provider, Historical   vortioxetine (BRINTELLIX) 10 mg tablet Take 10 mg by mouth daily. Provider, Historical        Review of Symptoms:  Except as noted in HPI, patient denies recent fever or chills, nausea, vomiting, diarrhea, hemoptysis, hematemesis, dysuria, myalgias, focal neurologic symptoms, ecchymosis, angioedema, odynophagia, dysphagia, sore throat, earache,rash, melena, hematochezia, depression, GERD, cold intolerance, petechia, bleeding gums, or significant weight loss. A comprehensive review of systems was negative except for that written in the HPI. Subjective:    24 hr VS reviewed, overall VSSAF  Temp (24hrs), Av.8 °F (36.6 °C), Min:97.1 °F (36.2 °C), Max:98.7 °F (37.1 °C)    Patient Vitals for the past 8 hrs:   Pulse   19 1045 65   19 1015 65   19 1000 71   19 0935 72   19 0600 75    Patient Vitals for the past 8 hrs:   Resp   19 1045 21   19 1015 20   19 1000 17   19 0935 17   19 0600 21    Patient Vitals for the past 8 hrs:   BP   19 1045 112/61   19 1015 101/55   19 1000 102/58   19 0935 120/47   19 0600 94/62          Intake/Output Summary (Last 24 hours) at 2019 1114  Last data filed at 2019 0446  Gross per 24 hour   Intake    Output 200 ml   Net -200 ml         Physical Exam (complete single organ system exam)      Visit Vitals  /70 (BP 1 Location: Right arm, BP Patient Position: At rest)   Pulse 69   Temp 97.5 °F (36.4 °C)   Resp 21   Ht 6' (1.829 m)   Wt 181 lb 6.4 oz (82.3 kg)   SpO2 100%   BMI 24.60 kg/m²     General Appearance:  Well developed, well nourished,alert and oriented x 3, and individual in no acute distress.    Ears/Nose/Mouth/Throat: Hearing grossly normal.         Neck: Supple. Chest:   Lungs clear to auscultation bilaterally. Cardiovascular:  Regular rate and rhythm, S1, S2 normal, no murmur. Abdomen:   Soft, non-tender, bowel sounds are active. Extremities: No edema bilaterally. Skin: Warm and dry. Cardiographics    Telemetry: normal sinus rhythm  ECG: normal EKG, normal sinus rhythm, unchanged from previous tracings  Echocardiogram: Not done    Labs:   Recent Results (from the past 24 hour(s))   CBC WITH AUTOMATED DIFF    Collection Time: 04/16/19  7:54 PM   Result Value Ref Range    WBC 5.6 4.1 - 11.1 K/uL    RBC 3.08 (L) 4.10 - 5.70 M/uL    HGB 8.0 (L) 12.1 - 17.0 g/dL    HCT 26.5 (L) 36.6 - 50.3 %    MCV 86.0 80.0 - 99.0 FL    MCH 26.0 26.0 - 34.0 PG    MCHC 30.2 30.0 - 36.5 g/dL    RDW 14.5 11.5 - 14.5 %    PLATELET 770 210 - 400 K/uL    MPV 10.7 8.9 - 12.9 FL    NRBC 0.0 0  WBC    ABSOLUTE NRBC 0.00 0.00 - 0.01 K/uL    NEUTROPHILS 50 32 - 75 %    LYMPHOCYTES 31 12 - 49 %    MONOCYTES 12 5 - 13 %    EOSINOPHILS 6 0 - 7 %    BASOPHILS 1 0 - 1 %    IMMATURE GRANULOCYTES 0 0.0 - 0.5 %    ABS. NEUTROPHILS 2.8 1.8 - 8.0 K/UL    ABS. LYMPHOCYTES 1.7 0.8 - 3.5 K/UL    ABS. MONOCYTES 0.7 0.0 - 1.0 K/UL    ABS. EOSINOPHILS 0.3 0.0 - 0.4 K/UL    ABS. BASOPHILS 0.0 0.0 - 0.1 K/UL    ABS. IMM.  GRANS. 0.0 0.00 - 0.04 K/UL    DF AUTOMATED     METABOLIC PANEL, COMPREHENSIVE    Collection Time: 04/16/19  7:54 PM   Result Value Ref Range    Sodium 139 136 - 145 mmol/L    Potassium 4.0 3.5 - 5.1 mmol/L    Chloride 108 97 - 108 mmol/L    CO2 23 21 - 32 mmol/L    Anion gap 8 5 - 15 mmol/L    Glucose 103 (H) 65 - 100 mg/dL    BUN 16 6 - 20 MG/DL    Creatinine 1.05 0.70 - 1.30 MG/DL    BUN/Creatinine ratio 15 12 - 20      GFR est AA >60 >60 ml/min/1.73m2    GFR est non-AA >60 >60 ml/min/1.73m2    Calcium 9.0 8.5 - 10.1 MG/DL    Bilirubin, total 0.6 0.2 - 1.0 MG/DL    ALT (SGPT) 25 12 - 78 U/L    AST (SGOT) 22 15 - 37 U/L Alk. phosphatase 85 45 - 117 U/L    Protein, total 7.4 6.4 - 8.2 g/dL    Albumin 3.4 (L) 3.5 - 5.0 g/dL    Globulin 4.0 2.0 - 4.0 g/dL    A-G Ratio 0.9 (L) 1.1 - 2.2     SAMPLES BEING HELD    Collection Time: 04/16/19  7:54 PM   Result Value Ref Range    SAMPLES BEING HELD 1RED     COMMENT        Add-on orders for these samples will be processed based on acceptable specimen integrity and analyte stability, which may vary by analyte.    PROTHROMBIN TIME + INR    Collection Time: 04/16/19  7:54 PM   Result Value Ref Range    INR 1.1 0.9 - 1.1      Prothrombin time 11.4 (H) 9.0 - 11.1 sec   PTT    Collection Time: 04/16/19  7:54 PM   Result Value Ref Range    aPTT 30.0 22.1 - 32.0 sec    aPTT, therapeutic range     58.0 - 77.0 SECS   TROPONIN I    Collection Time: 04/16/19  7:54 PM   Result Value Ref Range    Troponin-I, Qt. <0.05 <0.05 ng/mL   TYPE + CROSSMATCH    Collection Time: 04/16/19  7:54 PM   Result Value Ref Range    Crossmatch Expiration 04/19/2019     ABO/Rh(D) A NEGATIVE     Antibody screen NEG     Unit number [id number]     Blood component type RC LR     Unit division 00     Status of unit ISSUED     Crossmatch result Compatible    VITAMIN B12    Collection Time: 04/16/19  7:54 PM   Result Value Ref Range    Vitamin B12 725 193 - 986 pg/mL   FOLATE    Collection Time: 04/16/19  7:54 PM   Result Value Ref Range    Folate 24.4 (H) 5.0 - 21.0 ng/mL   IRON PROFILE    Collection Time: 04/16/19  7:54 PM   Result Value Ref Range    Iron 16 (L) 35 - 150 ug/dL    TIBC 444 250 - 450 ug/dL    Iron % saturation 4 (L) 20 - 50 %   EKG, 12 LEAD, INITIAL    Collection Time: 04/16/19  8:31 PM   Result Value Ref Range    Ventricular Rate 108 BPM    Atrial Rate 98 BPM    QRS Duration 98 ms    Q-T Interval 384 ms    QTC Calculation (Bezet) 514 ms    Calculated R Axis -15 degrees    Calculated T Axis -11 degrees    Diagnosis       Atrial fibrillation  Nonspecific T wave abnormality  When compared with ECG of 13-MAR-2019 08:49,  Atrial fibrillation has replaced Sinus rhythm  Vent. rate has increased BY  42 BPM  Confirmed by Maria T Obsorne M.D., Marlo Soares ([id number]) on 4/17/2019 9:22:82 AM     METABOLIC PANEL, BASIC    Collection Time: 04/17/19  2:27 AM   Result Value Ref Range    Sodium 141 136 - 145 mmol/L    Potassium 4.0 3.5 - 5.1 mmol/L    Chloride 111 (H) 97 - 108 mmol/L    CO2 23 21 - 32 mmol/L    Anion gap 7 5 - 15 mmol/L    Glucose 118 (H) 65 - 100 mg/dL    BUN 16 6 - 20 MG/DL    Creatinine 0.99 0.70 - 1.30 MG/DL    BUN/Creatinine ratio 16 12 - 20      GFR est AA >60 >60 ml/min/1.73m2    GFR est non-AA >60 >60 ml/min/1.73m2    Calcium 8.7 8.5 - 10.1 MG/DL   CBC WITH AUTOMATED DIFF    Collection Time: 04/17/19  2:27 AM   Result Value Ref Range    WBC 5.8 4.1 - 11.1 K/uL    RBC 2.94 (L) 4.10 - 5.70 M/uL    HGB 7.5 (L) 12.1 - 17.0 g/dL    HCT 25.0 (L) 36.6 - 50.3 %    MCV 85.0 80.0 - 99.0 FL    MCH 25.5 (L) 26.0 - 34.0 PG    MCHC 30.0 30.0 - 36.5 g/dL    RDW 14.4 11.5 - 14.5 %    PLATELET 155 590 - 344 K/uL    MPV 10.7 8.9 - 12.9 FL    NRBC 0.0 0  WBC    ABSOLUTE NRBC 0.00 0.00 - 0.01 K/uL    NEUTROPHILS 44 32 - 75 %    LYMPHOCYTES 36 12 - 49 %    MONOCYTES 12 5 - 13 %    EOSINOPHILS 7 0 - 7 %    BASOPHILS 1 0 - 1 %    IMMATURE GRANULOCYTES 0 0.0 - 0.5 %    ABS. NEUTROPHILS 2.6 1.8 - 8.0 K/UL    ABS. LYMPHOCYTES 2.1 0.8 - 3.5 K/UL    ABS. MONOCYTES 0.7 0.0 - 1.0 K/UL    ABS. EOSINOPHILS 0.4 0.0 - 0.4 K/UL    ABS. BASOPHILS 0.0 0.0 - 0.1 K/UL    ABS. IMM.  GRANS. 0.0 0.00 - 0.04 K/UL    DF AUTOMATED     OCCULT BLOOD, STOOL    Collection Time: 04/17/19 10:45 AM   Result Value Ref Range    Occult blood, stool POSITIVE (A) NEG          Assessment:     Assessment:   Anemia; due to GI bleed on Eliquis  PAF; remains in sinus  Pauses; probably early sign of SSS; will decrease Sotalol     Plan:   Tele  Continue Cardizem but decrease Sotalol  Replace with blood product  Stop Eliquis, already done    Neha Crane MD

## 2022-01-10 ENCOUNTER — TELEPHONE (OUTPATIENT)
Dept: CARDIOLOGY CLINIC | Age: 82
End: 2022-01-10

## 2022-01-10 NOTE — TELEPHONE ENCOUNTER
Patient left message requesting return call. He would like to know when he is scheduled for his upcoming appt.  Message sent to  Emmie Ramírez

## 2022-01-11 ENCOUNTER — TELEPHONE (OUTPATIENT)
Dept: CARDIOLOGY CLINIC | Age: 82
End: 2022-01-11

## 2022-01-11 NOTE — TELEPHONE ENCOUNTER
I sent patient Qiniuhart message to confirm if he would like to continue with practice and if not, who he would like to continue to see in order to receive refills.

## 2022-01-11 NOTE — TELEPHONE ENCOUNTER
Called patient to schedule follow up appointment. Patient agreed to be scheduled for January 27th @ 11:00am. Patient said that he was suppose to take test and I advised patient that the last time he was here in November, his follow up was set for 3 months but wanted to get him scheduled sooner. Patient states that if it says 3 months then why is he being scheduled for the end of January which is only 2 months instead of February. I let patient know I was advised to get him scheduled soon. Patient said that he was quitting heart failure because no one knows what they are doing then hung up the phone.     Patient is still scheduled for January 27th @ 11:00am

## 2022-02-14 ENCOUNTER — TRANSCRIBE ORDER (OUTPATIENT)
Dept: REGISTRATION | Age: 82
End: 2022-02-14

## 2022-02-14 ENCOUNTER — HOSPITAL ENCOUNTER (OUTPATIENT)
Dept: GENERAL RADIOLOGY | Age: 82
Discharge: HOME | End: 2022-02-14
Payer: MEDICARE

## 2022-02-14 DIAGNOSIS — J18.9 UNRESOLVED PNEUMONIA: Primary | ICD-10-CM

## 2022-02-14 DIAGNOSIS — J18.9 UNRESOLVED PNEUMONIA: ICD-10-CM

## 2022-02-14 PROCEDURE — 71046 X-RAY EXAM CHEST 2 VIEWS: CPT

## 2022-02-15 LAB
25(OH)D3+25(OH)D2 SERPL-MCNC: 46.6 NG/ML (ref 30–100)
ALBUMIN SERPL-MCNC: 4.4 G/DL (ref 3.6–4.6)
ALBUMIN/GLOB SERPL: 1.5 {RATIO} (ref 1.2–2.2)
ALP SERPL-CCNC: 88 IU/L (ref 44–121)
ALT SERPL-CCNC: 18 IU/L (ref 0–44)
AST SERPL-CCNC: 24 IU/L (ref 0–40)
BILIRUB SERPL-MCNC: 0.4 MG/DL (ref 0–1.2)
BUN SERPL-MCNC: 16 MG/DL (ref 8–27)
BUN/CREAT SERPL: 16 (ref 10–24)
CALCIUM SERPL-MCNC: 9.6 MG/DL (ref 8.6–10.2)
CHLORIDE SERPL-SCNC: 102 MMOL/L (ref 96–106)
CHOLEST SERPL-MCNC: 129 MG/DL (ref 100–199)
CK SERPL-CCNC: 66 U/L (ref 30–208)
CO2 SERPL-SCNC: 22 MMOL/L (ref 20–29)
CREAT SERPL-MCNC: 1.01 MG/DL (ref 0.76–1.27)
ERYTHROCYTE [DISTWIDTH] IN BLOOD BY AUTOMATED COUNT: 14.2 % (ref 11.6–15.4)
FERRITIN SERPL-MCNC: 158 NG/ML (ref 30–400)
GLOBULIN SER CALC-MCNC: 3 G/DL (ref 1.5–4.5)
GLUCOSE SERPL-MCNC: 116 MG/DL (ref 65–99)
HCT VFR BLD AUTO: 37.3 % (ref 37.5–51)
HDLC SERPL-MCNC: 41 MG/DL
HGB BLD-MCNC: 12.6 G/DL (ref 13–17.7)
IRON SATN MFR SERPL: 28 % (ref 15–55)
IRON SERPL-MCNC: 78 UG/DL (ref 38–169)
LDLC SERPL CALC-MCNC: 63 MG/DL (ref 0–99)
MAGNESIUM SERPL-MCNC: 2.1 MG/DL (ref 1.6–2.3)
MCH RBC QN AUTO: 29.1 PG (ref 26.6–33)
MCHC RBC AUTO-ENTMCNC: 33.8 G/DL (ref 31.5–35.7)
MCV RBC AUTO: 86 FL (ref 79–97)
NT-PROBNP SERPL-MCNC: 223 PG/ML (ref 0–486)
PLATELET # BLD AUTO: 229 X10E3/UL (ref 150–450)
POTASSIUM SERPL-SCNC: 4 MMOL/L (ref 3.5–5.2)
PREALB SERPL-MCNC: 30 MG/DL (ref 9–32)
PROT SERPL-MCNC: 7.4 G/DL (ref 6–8.5)
RBC # BLD AUTO: 4.33 X10E6/UL (ref 4.14–5.8)
SODIUM SERPL-SCNC: 138 MMOL/L (ref 134–144)
TIBC SERPL-MCNC: 277 UG/DL (ref 250–450)
TRIGL SERPL-MCNC: 141 MG/DL (ref 0–149)
UIBC SERPL-MCNC: 199 UG/DL (ref 111–343)
URATE SERPL-MCNC: 6 MG/DL (ref 3.8–8.4)
VLDLC SERPL CALC-MCNC: 25 MG/DL (ref 5–40)
WBC # BLD AUTO: 5.8 X10E3/UL (ref 3.4–10.8)

## 2022-02-16 ENCOUNTER — OFFICE VISIT (OUTPATIENT)
Dept: CARDIOLOGY CLINIC | Age: 82
End: 2022-02-16
Payer: MEDICARE

## 2022-02-16 VITALS
OXYGEN SATURATION: 98 % | DIASTOLIC BLOOD PRESSURE: 74 MMHG | SYSTOLIC BLOOD PRESSURE: 136 MMHG | HEART RATE: 87 BPM | TEMPERATURE: 97.7 F | RESPIRATION RATE: 18 BRPM | HEIGHT: 72 IN | BODY MASS INDEX: 22.86 KG/M2 | WEIGHT: 168.8 LBS

## 2022-02-16 DIAGNOSIS — E55.9 VITAMIN D DEFICIENCY: ICD-10-CM

## 2022-02-16 DIAGNOSIS — R06.02 SHORTNESS OF BREATH: ICD-10-CM

## 2022-02-16 DIAGNOSIS — I50.9 CONGESTIVE HEART FAILURE, UNSPECIFIED HF CHRONICITY, UNSPECIFIED HEART FAILURE TYPE (HCC): Primary | ICD-10-CM

## 2022-02-16 DIAGNOSIS — M10.9 GOUT, UNSPECIFIED CAUSE, UNSPECIFIED CHRONICITY, UNSPECIFIED SITE: ICD-10-CM

## 2022-02-16 DIAGNOSIS — D50.9 IRON DEFICIENCY ANEMIA, UNSPECIFIED IRON DEFICIENCY ANEMIA TYPE: ICD-10-CM

## 2022-02-16 DIAGNOSIS — R53.83 FATIGUE, UNSPECIFIED TYPE: ICD-10-CM

## 2022-02-16 DIAGNOSIS — E79.0 ELEVATED BLOOD URIC ACID LEVEL: ICD-10-CM

## 2022-02-16 PROCEDURE — G8427 DOCREV CUR MEDS BY ELIG CLIN: HCPCS | Performed by: INTERNAL MEDICINE

## 2022-02-16 PROCEDURE — 99215 OFFICE O/P EST HI 40 MIN: CPT | Performed by: INTERNAL MEDICINE

## 2022-02-16 PROCEDURE — 1101F PT FALLS ASSESS-DOCD LE1/YR: CPT | Performed by: INTERNAL MEDICINE

## 2022-02-16 PROCEDURE — G8510 SCR DEP NEG, NO PLAN REQD: HCPCS | Performed by: INTERNAL MEDICINE

## 2022-02-16 PROCEDURE — G8420 CALC BMI NORM PARAMETERS: HCPCS | Performed by: INTERNAL MEDICINE

## 2022-02-16 PROCEDURE — G8536 NO DOC ELDER MAL SCRN: HCPCS | Performed by: INTERNAL MEDICINE

## 2022-02-16 RX ORDER — PANTOPRAZOLE SODIUM 40 MG/1
40 TABLET, DELAYED RELEASE ORAL DAILY
COMMUNITY
Start: 2022-01-17

## 2022-02-16 RX ORDER — HYDRALAZINE HYDROCHLORIDE 10 MG/1
10 TABLET, FILM COATED ORAL 3 TIMES DAILY
Qty: 90 TABLET | Refills: 1 | Status: SHIPPED | OUTPATIENT
Start: 2022-02-16 | End: 2022-03-11

## 2022-02-16 NOTE — PROGRESS NOTES
[facility] in [place], [facility] Note    Patient name: Melody Alvarado  Patient : 1940  Patient MRN: [id number]  Date of service: 22    Primary care physician: Summer Yanes MD  Primary general cardiologist:  Dr. Jordan Jara    Primary [facility] cardiologist: Alee Watkins MD    CHIEF COMPLAINT:  Chronic systolic heart failure    PLAN OF CARE:  · 79 y/o with heart failure with preserved LVEF; wild type aTTR cardiac amyloidosis and co-existing MGUS (bone marrow biopsy was negative), stage 1 by Lindsey criteria on vyndamax and routine cardiac surveillance every 3-4 months, next labs (prealbumin, pro-NT-BNP, troponin, uric acid), EKG, 6MW, orthostatics, echo and CPEST in May 2022  · Memory loss short- and long-term remains the key quality of life barrier; working on getting CPAP set up and neurocognitive/neurological evaluation scheduled 2022     RECOMMENDATIONS:  Continue tefamidis  Continue norvasc 5mg twice daily  Start hydralazine 10mg PO TID and uptitrate over the next few weeks  Avoid beta-blockers and ACEi/ARB/ARNi due to orthostasis and exercise induced lightheadedness  Does not require diuretics  Does not require allopurinol, uric acid normal  Reinforced low salt diet  Reinforced fluid restriction to 6 x 8oz glasses per day  Patient needs advanced care plan on file  Recommend physical activity; walking 30 minutes daily  Scheduled for neurocognitive testing and neurology evaluation for memory loss on 22 (no show on 21)  Follow up with hematology annually for MGUS with Dr. Meenakshi Schaffer  Follow up with sleep medicine/pulmonary associates re: CPAP trial and ongoing cough and aspiration   Follow-up with primary cardiologist, Dr. Fawn Rich  Follow up with EP cardiologist, Dr. Kayli Ojeda   Follow-up with PCP for iron deficiency anemia, h/o positive hemoccult and abdominal symptoms  Recommend flu, covid and pneumonia vaccinations  Provided patient education materials for COVID precautions  Follow up with [facility] in May with NP/MD and results of echo/cpest     IMPRESSION:  Fatigue  Chronic diastolic heart failure  · Stage C, NYHA class I symptoms  · HFpEF, LVEF 56-60% (by echo 2/2019), normal RV function  · aTTR cardiac amyloidosis (by PYP 12/2020)  · MGUS, negative bone marrow biopsy  · Genetic testing for iaTTR negative; possibly pathologic variant X-linked Duchanne  Coronary artery disease  · LHC (4/9/20) 30% p-LAD, 30% m-LAD, 50% 2nd OM (4/9/20); good flow reserve  Atrial fibrillation, paroxysmal  · S/p DCCV 3/2019  · S/p afib ablation (10/20/20 by Dr. 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 Normal cavity size, systolic function (ejection fraction normal) and diastolic function. Moderate concentric hypertrophy. Wall motion: unable to assess due to limited image quality. Normal left ventricular strain. · LA: Moderately dilated left atrium. · AV: With no significant stenosis. Mild aortic valve regurgitation is present. · MV: Mitral valve non-specific thickening. Mild to moderate mitral valve regurgitation is present.   · Image quality for this study was suboptimal.  · IVS 1.39cm  CTA for EP mapping reviewed (10/6/20) Watchman device occlusive  Cardiac CTA (3/30/20) moderate diffuse CAD  Cardiac PET (3/7/19) normal, Rest EF 32 and stress 38  Echo (3/2020) LVEF 55%, IVSd 1.6cm, LV mass 312g increased, RV function normal, PA pressure 30, sclerotic AV without stenosis, trace TR, mild mR, IVC normal  EKG (9/2020) atrial fib, rate in 90s, low voltae, V1-V2 q wave  LHC ([facility] (4/9/2020) LHC 30% p-LAD, 30% m-LAD, 50% 2nd OM FFR flow reserve 0.92 (4/9/20)  [facility] (2011) tirvial luminal irregularities  Remainder in records (see in epic)     HEMODYNAMICS:  RHC not done  CPEST (12/15/20): MVO2 17.7 with RQ 1.7   6 Min Walk Report 11/2/2020   (PRE) HR 76   (PRE) O2 Sat 99   (POST)    (POST) O2 Sat 90   Distance in Meters 312.15      OTHER IMAGING:  CT abd (3/24/17) There is distention of the proximal half of the small bowel with fecal-like material in the mid small bowel loops. This is consistent with at least partial obstruction of the small bowel. No structural abnormality is  identified to account for this. Chest CT( 1/2016) 1. Unchanged pulmonary nodule in the right upper lobe just over 6 mm on  today's exam. Per Fleischner criteria for high risk patients, follow-up is  recommended in 6-9 months. Low risk Fleischner criteria follow-up for 6 to 8mm pulmonary nodules is at 6-12  months and 18-24 months. 2. Aortic valve annular calcifications with suggestion of probable aortic valve leaflet calcifications. . This finding can be associated with aortic   stenosis. 3. Cholelithiasis.     SOCIAL HISTORY:  , never smoker  Alcohol occasional     SOCIAL HISTORY:  Never smoke. Social alcohol. No illicit drugs.     HISTORY OF PRESENT ILLNESS:  I had the pleasure of seeing Rakan Yonuggregoria Bethea at [facility] in [place] via telephone visit.     Jenny Man a 78 y. o. male with h/o chronic fatigue, DANILO (not started CPAP yet), HTN, HL, coronary artery disease (LHC on 4/9/20 showed 30% p-LAD, 30% m-LAD, 50% 2nd OM; good flow reserve), atrial fibrillation, paroxysmal, DCCV 3/2019 and s/p afib ablation (10/20/20 by Dr. Melvina Hedrick), not on chronic anticoagulation due to life-threatening GI bleed/hemorrhoids, s/p Watchman device in [place] 7/23/19, well seated by echo (10/2020), depression. Patient was referred by Dr. Melvina Hedrick to evaluated for possible HFpEF.     Patient had PYP test suggestive of aTTR. 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 Bone marrow biopsy was negative (2/24/21); no evidence of increased/clonal plasma cells in patient with history of IgM MGUS, congo red stain on core biopsy is negative for amyloid deposition. Troponin was negative and pro-NT-BNP in 400s, indicating stage 1 cardiac amyloidosis. Genetic studies were negative for inherited aTTR; suggesting this was wild type aTTR cardiac amyloidosis. CPEST (12/2020) revealed pVO2 17.7 consistent with excellent prognosis. Patient had sleep study which revealed DANILO and CPAP trial is pending.     INTERVAL HISTORY:  Today, patient presents for follow up visit accompanied by his wife and Lisha Sanders calling in via phone.     Patient reports feeling well.      Patient walked to our clinic from parking garage without having to slow down or stop. Patient states he can walk more than one block without symptoms of fatigue or shortness of breath. Patient can perform home activities without problem and routinely participates in daily walking for more than 15 minutes.      Patient denies symptoms of volume overload or leg edema. Patient denies abdominal bloating or change of appetite. Patient weight 168lbs.     Patient denies orthopnea, PND.      Patient denies irregular heart rate or palpitations.     Patient denies leg pain with walking.      Patient is compliant with fluid restriction and taking medications as prescribed. Patient manages his own medications. REVIEW OF SYSTEMS:  General: Denies fever, night sweats.   Ear, nose and throat: Denies difficulty hearing, sinus problems, runny nose, post-nasal drip, ringing in ears, mouth sores, loose teeth, ear pain, nosebleeds, sore throate, facial pain or numbess  Cardiovascular: see above in the interval history  Respiratory: Denies cough, wheezing, sputum production, hemoptysis. Gastrointestinal: Denies heartburn, constipation, diarrhea, abdominal pain, nausea, vomiting, difficulty swallowing, blood in stool  Kidney and bladder: Denies painful urination, frequent urination, urgency  Musculoskeletal: Denies joint pain, muscle weakness  Skin and hair: Denies change in existing skin lesions, hair loss or increase, breast changes    PHYSICAL EXAM:  Visit Vitals  /74 (BP 1 Location: Right arm, BP Patient Position: Sitting, BP Cuff Size: Adult)   Pulse 87   Temp 97.7 °F (36.5 °C) (Oral)   Resp 18   Ht 6' (1.829 m)   Wt 168 lb 12.8 oz (76.6 kg)   SpO2 98%   BMI 22.89 kg/m²     General: Patient is well developed, well-nourished in no acute distress  HEENT: Normocephalic and atraumatic. No scleral icterus. Pupils are equal, round and reactive to light and accomodation. No conjunctival injection. Oropharynx is clear. Neck: Supple. No evidence of thyroid enlargements or lymphadenopathy. JVD: Cannot be appreciated   Lungs: Breath sounds are equal and clear bilaterally. No wheezes, rhonchi, or rales. Heart: Regular rate and rhythm with normal S1 and S2. No murmurs, gallops or rubs. Abdomen: Soft, no mass or tenderness. No organomegaly or hernia. Bowel sounds present. Genitourinary and rectal: deferred  Extremities: No cyanosis, clubbing, or edema. Neurologic: No focal sensory or motor deficits are noted. Grossly intact. Psychiatric: Awake, alert an doriented x 3. Appropriate mood and affect. Skin: Warm, dry and well perfused. No lesions, nodules or rashes are noted.     PAST MEDICAL HISTORY:  Past Medical History:   Diagnosis Date    Atrial fibrillation (HCC)     Depression     GERD (gastroesophageal reflux disease)     Glaucoma     High cholesterol     IBS (irritable bowel syndrome)        PAST SURGICAL HISTORY:  Past Surgical History:   Procedure Laterality Date    HX CHOLECYSTECTOMY      HX HEMORRHOIDECTOMY      HX ORTHOPAEDIC      RIGHT knee    HX TONSILLECTOMY      NV CARDIOVERSION ELECTIVE ARRHYTHMIA EXTERNAL N/A 3/13/2019    EP CARDIOVERSION performed by Viji Ashford MD at [address], [facility]       FAMILY HISTORY:  No family history on file. SOCIAL HISTORY:  Social History     Socioeconomic History    Marital status:    Tobacco Use    Smoking status: Former Smoker     Quit date:      Years since quittin.1    Smokeless tobacco: Never Used   Substance and Sexual Activity    Alcohol use: Yes    Drug use: No       LABORATORY RESULTS:  Labs Latest Ref Rng & Units 2022   WBC 3.4 - 10.8 x10E3/uL 5.8   RBC 4.14 - 5.80 x10E6/uL 4.33   Hemoglobin 13.0 - 17.7 g/dL 12. 6(L)   Hematocrit 37.5 - 51.0 % 37. 3(L)   MCV 79 - 97 fL 86   Platelets 146 - 461 C48I7/   Albumin 3.6 - 4.6 g/dL 4.4   Calcium 8.6 - 10.2 mg/dL 9.6   Glucose 65 - 99 mg/dL 116(H)   BUN 8 - 27 mg/dL 16   Creatinine 0.76 - 1.27 mg/dL 1.01   Sodium 134 - 144 mmol/L 138   Potassium 3.5 - 5.2 mmol/L 4.0   Some recent data might be hidden       ALLERGY:  Allergies   Allergen Reactions    Sulfa (Sulfonamide Antibiotics) Unknown (comments)     From infancy, unsure if actually allergic          CURRENT MEDICATIONS:    Current Outpatient Medications:     pantoprazole (PROTONIX) 40 mg tablet, Take 40 mg by mouth daily. , Disp: , Rfl:     hydrALAZINE (APRESOLINE) 10 mg tablet, Take 1 Tablet by mouth three (3) times daily. , Disp: 90 Tablet, Rfl: 1    amLODIPine (NORVASC) 5 mg tablet, Take 1 Tablet by mouth two (2) times a day., Disp: 60 Tablet, Rfl: 3    fluticasone propionate (FLONASE) 50 mcg/actuation nasal spray, 2 Sprays by Both Nostrils route daily.  (Patient taking differently: 2 Sprays by Both Nostrils route daily as needed.), Disp: 1 Each, Rfl: 0    Vyzulta 0.024 % drop, PLACE 1 DROP INTO THE RIGHT EYE NIGHTLY, Disp: , Rfl:     tafamidis (Vyndamax) 61 mg cap, Take 61 mg by mouth daily. , Disp: , Rfl:     L.acid/B.bifidum/B.animal/FOS (PROBIOTIC COMPLEX PO), Take  by mouth., Disp: , Rfl:     OTHER, daily. SAME supplement, Disp: , Rfl:     OTHER, daily. soluber fiber 1 TBSP, Disp: , Rfl:     OTHER, daily. 3 TBSP Glucosamine/Chondroitin/MSM, Disp: , Rfl:     OTHER, daily. Super Digestive Enzymes, Disp: , Rfl:     OTHER, daily. Mirtogenol (bilberry, pycnogenol), Disp: , Rfl:     MAGNESIUM CITRATE PO, Take  by mouth daily. , Disp: , Rfl:     cholecalciferol (VITAMIN D3) (2,000 UNITS /50 MCG) cap capsule, Take 1 Cap by mouth two (2) times a day. (Patient taking differently: Take 2,000 Units by mouth daily.), Disp: 60 Cap, Rfl: 3    busPIRone (BUSPAR) 15 mg tablet, Take 15 mg by mouth two (2) times a day. Takes 25 mg total, Disp: , Rfl:     atorvastatin (LIPITOR) 80 mg tablet, Take 80 mg by mouth daily. , Disp: , Rfl:     OTHER,NON-FORMULARY,, two (2) times a day. Prostate health supplement , Disp: , Rfl:     OTHER,NON-FORMULARY,, cognitex, Disp: , Rfl:     OTHER,NON-FORMULARY,, DHEA, Disp: , Rfl:     OTHER,NON-FORMULARY,, two (2) times a day. Bone Restore, Disp: , Rfl:     OTHER,NON-FORMULARY,, Horsetail (silica), Disp: , Rfl:     OTHER,NON-FORMULARY,, Omega 3 - 2 caps in the am and 2 in the pm, Disp: , Rfl:     OTHER,NON-FORMULARY,, Bio-Curcumin (Turmeric) 1000 mg, Disp: , Rfl:     co-enzyme Q-10 (Co Q-10) 100 mg capsule, Take 100 mg by mouth two (2) times a day., Disp: , Rfl:     vit A/vit C/vit E/zinc/copper (PRESERVISION AREDS PO), Take  by mouth two (2) times a day., Disp: , Rfl:     therapeutic multivitamin (THERAGRAN) tablet, Take 1 Tab by mouth daily. , Disp: , Rfl:     dorzolamide-timolol (COSOPT) 22.3-6.8 mg/mL ophthalmic solution, Administer 1 Drop to right eye every Sunday. , Disp: , Rfl:     vortioxetine (BRINTELLIX) 10 mg tablet, Take 10 mg by mouth daily. , Disp: , Rfl:     Thank you for your referral and allowing me to participate in this patient's care.     Marlys Dillard MD PhD  [address] [place] [address], [address]  Phone: [phone]  Fax: [phone]    PATIENT CARE TEAM:  Patient Care Team:  Amelia Lee MD as PCP - General (Family Medicine)  Idania Meza MD (Sleep Medicine)  Teena Yang PsyD (Psychology)     Total visit time: 40 minutes (> 50% spent face-to-face counseling)

## 2022-02-16 NOTE — PATIENT INSTRUCTIONS
Medication changes:    Start Hydralazine 10 mg three times daily-call in one week with BP readings    Please take this to your pharmacy to notify them of the change in medications. Testing Ordered: An order for an echo cardiogram has been placed to be done in May. You will be receiving an automated call from Coordination of Care to schedule this test. If you are unavailable to receive the call or would like to contact coordination of care yourself you may contact [phone] to schedule. You will need to contact coordination of care yourself if you miss their calls as they will only make 3 attempts to reach you. An order for a Cardiopulmonary stress test has been placed to be done in May. You will be receiving an automated call from Coordination of Care to schedule this test. If you are unavailable to receive the call or would like to contact coordination of care yourself you may contact [phone] to schedule. You will need to contact coordination of care yourself if you miss their calls as they will only make 3 attempts to reach you. You will need to have COVID-19 testing done 2-4 days prior to your scheduled testing. Orders have been provided to you today. Please present to one of the following [facility] locations for COVID-19 testing:    [facility] location- Located at the patient discharge area at the corner of [address] [place] [address] and [address] [facility] ([facility], [address] [facility]). Testing center hours are from 7:00am-3:00pm Monday through Friday and 7:00am-10:00am Saturday and Sunday. [address] location- Located at [place] [address] ([address] [place] [address], [address] [facility]). Testing center hours are from 7:00am-12:00pm Monday through Friday and 7:00am-10:00am Saturday and Sunday. [address] [place] [address] Testing location- Located at [facility] ([address] [facility], [address] [facility]). Testing center hours Sunday-Saturday (7 days/week) from 8:00am-11:00am.     If you do not have the testing done within the recommended time frame your CPET will be rescheduled. Labs to be completed in May-lab slips provided    Other Recommendations:     Call [facility] at [phone]     Ensure your drinking an adequate amount of water with a goal of 6-8 eight ounce glasses (1.5-2 liters) of fluid daily. Your urine should be clear and light yellow straw colored. If your blood pressure begins to consistently run below 90/60 and/or you begin to experience dizziness or lightheadedness, please contact the [facility] at [phone]. Follow up in May with Dr. Melanie Hernandez with [facility]-after echo and CPET      Please monitor your weights daily upon waking and after using the bathroom. Keep a written records of your weights and bring to your next appointment. If you have a weight gain of 3 or more pounds overnight OR 5 or more pounds in one week please contact our office. Thank you for allowing us the privilege of being a part of your healthcare team! Please do not hesitate to contact our office at [phone] with any questions or concerns. Virtual Heart Failure [address] invites you to learn more about heart failure and to share your questions, ideas, and experiences with others. Each month, the Heart Failure Support Group features a new educational topic and a guest speaker, followed by an interactive discussion. Our Heart Failure Nurse Navigator will moderate each session. You will be able to participate by phone, tablet or computer through [employer]. This support group takes place on the 3rd Thursday of each month from 6:00-7:30PM. All individuals living with heart failure and their caregivers are welcome to join.      If you are interested in participating, please contact us at [email or web address] and you will be sent the link to join the [facility].

## 2022-02-17 ENCOUNTER — TELEPHONE (OUTPATIENT)
Dept: CARDIOLOGY CLINIC | Age: 82
End: 2022-02-17

## 2022-02-17 NOTE — TELEPHONE ENCOUNTER
Provider portion of [employer] application faxed on this date-patient states he has already sent in his portion for 2022. Received message from [facility], stating they never received patient portion of application. I notified patient he will come to office tomorrow to fill out his portion. Patient portion faxed on this date.

## 2022-03-01 ENCOUNTER — TRANSCRIBE ORDER (OUTPATIENT)
Dept: SCHEDULING | Age: 82
End: 2022-03-01

## 2022-03-01 DIAGNOSIS — J18.9 PNEUMONIA OF LEFT LOWER LOBE DUE TO INFECTIOUS ORGANISM: Primary | ICD-10-CM

## 2022-03-03 ENCOUNTER — TELEPHONE (OUTPATIENT)
Dept: CARDIOLOGY CLINIC | Age: 82
End: 2022-03-03

## 2022-03-03 NOTE — TELEPHONE ENCOUNTER
Patient approved for [employer] patient assistance. I notified him, gave him number to call to set up shipment. He states understanding.

## 2022-03-09 ENCOUNTER — TELEPHONE (OUTPATIENT)
Dept: CARDIOLOGY CLINIC | Age: 82
End: 2022-03-09

## 2022-03-10 DIAGNOSIS — I50.9 CONGESTIVE HEART FAILURE, UNSPECIFIED HF CHRONICITY, UNSPECIFIED HEART FAILURE TYPE (HCC): ICD-10-CM

## 2022-03-10 DIAGNOSIS — R53.83 FATIGUE, UNSPECIFIED TYPE: ICD-10-CM

## 2022-03-10 DIAGNOSIS — D50.9 IRON DEFICIENCY ANEMIA, UNSPECIFIED IRON DEFICIENCY ANEMIA TYPE: ICD-10-CM

## 2022-03-10 DIAGNOSIS — M10.9 GOUT, UNSPECIFIED CAUSE, UNSPECIFIED CHRONICITY, UNSPECIFIED SITE: ICD-10-CM

## 2022-03-10 DIAGNOSIS — E55.9 VITAMIN D DEFICIENCY: ICD-10-CM

## 2022-03-10 DIAGNOSIS — E79.0 ELEVATED BLOOD URIC ACID LEVEL: ICD-10-CM

## 2022-03-10 DIAGNOSIS — R06.02 SHORTNESS OF BREATH: ICD-10-CM

## 2022-03-11 RX ORDER — HYDRALAZINE HYDROCHLORIDE 10 MG/1
TABLET, FILM COATED ORAL
Qty: 90 TABLET | Refills: 1 | Status: SHIPPED | OUTPATIENT
Start: 2022-03-11 | End: 2022-03-22 | Stop reason: DRUGHIGH

## 2022-03-11 NOTE — TELEPHONE ENCOUNTER
Patient called to give his BP readings since starting hydralazine. I returned his call and left  to return my call. I called patient, he has weight and BP readings with taking hydralazine 10 mg three times daily:   2/26/22:weight: 158 lb, /72, HR 81, pm: 160/77, HR 82  2/27/22: weight: 159 lb, /90, HR 90, pm: 163/74, HR 83  2/28/22: weight: 158 lb, /67, HR 74, pm: 144/68, HR 86  3/1/22: weight 156, /75, HR 85, pm: 142/74, HR 81  3/3/22: weight 157, /82, HR 85, pm: 166/91,   3/4/22: weight 157 /68, HR 76, pm: 164/81, HR 87  3/5/22: weight 156, /77, , pm: 143/70, HR 83  3/6/22: weight: 155, /76, HR 82, pm: 146/84, HR 91  3/7/22: weight 156 lb, /72, HR 83, pm: 161/69, HR 91  3/9/22: weight 157 lb, /73, HR 81, pm: 146/79, HR 84  3/10/22: weight: 157 lb, /73, HR 77, pm: 157/74, HR 83    I called patient, advised him per Dr. Jr Ceja:  Increase hydralazine to 25mg PO TID; and call with BP/HR diary next week. Let me know if pills can be cut like that, if not - then 20mg PO TID   New prescription for 25 mg pills sent to pharmacy. Patient states understanding, will call in one week.

## 2022-03-15 ENCOUNTER — TELEPHONE (OUTPATIENT)
Dept: CARDIOLOGY CLINIC | Age: 82
End: 2022-03-15

## 2022-03-15 NOTE — TELEPHONE ENCOUNTER
Returned call to patient with no answer. Left voicemail with [facility] call back number will await return call.  Lopez Russo RN

## 2022-03-18 PROBLEM — E85.82 WILD-TYPE TRANSTHYRETIN-RELATED (ATTR) AMYLOIDOSIS (HCC): Status: ACTIVE | Noted: 2021-01-14

## 2022-03-18 PROBLEM — K21.9 GERD (GASTROESOPHAGEAL REFLUX DISEASE): Status: ACTIVE | Noted: 2017-03-24

## 2022-03-18 PROBLEM — K56.600 PARTIAL SMALL BOWEL OBSTRUCTION (HCC): Status: ACTIVE | Noted: 2017-03-24

## 2022-03-19 PROBLEM — C90.00 MULTIPLE MYELOMA (HCC): Status: ACTIVE | Noted: 2021-03-17

## 2022-03-19 PROBLEM — D47.2 MGUS (MONOCLONAL GAMMOPATHY OF UNKNOWN SIGNIFICANCE): Status: ACTIVE | Noted: 2021-01-14

## 2022-03-20 PROBLEM — I48.19 PERSISTENT ATRIAL FIBRILLATION (HCC): Status: ACTIVE | Noted: 2019-02-03

## 2022-03-22 RX ORDER — HYDRALAZINE HYDROCHLORIDE 25 MG/1
25 TABLET, FILM COATED ORAL 3 TIMES DAILY
Qty: 90 TABLET | Refills: 1 | Status: SHIPPED | OUTPATIENT
Start: 2022-03-22 | End: 2022-03-31 | Stop reason: DRUGHIGH

## 2022-03-23 ENCOUNTER — HOSPITAL ENCOUNTER (OUTPATIENT)
Dept: CT IMAGING | Age: 82
Discharge: HOME | End: 2022-03-23
Attending: INTERNAL MEDICINE
Payer: MEDICARE

## 2022-03-23 DIAGNOSIS — J18.9 PNEUMONIA OF LEFT LOWER LOBE DUE TO INFECTIOUS ORGANISM: ICD-10-CM

## 2022-03-23 PROCEDURE — 71250 CT THORAX DX C-: CPT

## 2022-03-30 ENCOUNTER — TELEPHONE (OUTPATIENT)
Dept: CARDIOLOGY CLINIC | Age: 82
End: 2022-03-30

## 2022-03-30 NOTE — TELEPHONE ENCOUNTER
Called patient using two patient identifiers. Asked patient for bp readings since starting hydralazine 25mg three times daily. Patient provided log as follows     3/26 157 lbs bp 137/77 (93), 158/82 (88)  3/27 156 lbs bp 152/76 (77), 139/75 (87)  3/28 156 lbs bp 132/74 (88), 152/78 (95)  3/29 156 lbs bp 138/74 (96), 168/84 (95)  3/30 157 lbs bp 141/70 (63), 144/78 (83)    Patient confirms that that he is currently taking hydralazine 25mg three times a day and amlodipine 5 mg twice a day. Patient denies any dizziness, lightheadedness, new symptoms, or changes in symptoms. Ibeth Karimi MD  You 17 hours ago (5:24 PM)     KL    Increase hydralazine to 50mg PO TID      Called patient using two patient identifiers. Advised patient on above information per Dr. Jack Lua. Patient stated understanding. Notified patient that a new prescription will be sent to his pharmacy. Patient asking what medication is targeting systolic or diastolic pressure. Educated patient that diastolic pressures seem to be within range for most bp readings and that the medication is being increased to decrease the systolic number but it can secondarily decrease the diastolic as well. Educated patient to take blood pressures in the morning before medications and two hours after medications and write them down. Notified patient to call in one week with blood pressure readings. Educated patient to call back if having any dizziness, light headiness, or changes on new dose. Patient stated understanding of all instructions and had no further questions. Lopez Russo RN     Requested Prescriptions     Signed Prescriptions Disp Refills    hydrALAZINE (APRESOLINE) 50 mg tablet 90 Tablet 1     Sig: Take 1 Tablet by mouth three (3) times daily.      Authorizing Provider: Jamel Laurent     Ordering User: Jorge Marquis

## 2022-03-31 RX ORDER — HYDRALAZINE HYDROCHLORIDE 50 MG/1
50 TABLET, FILM COATED ORAL 3 TIMES DAILY
Qty: 90 TABLET | Refills: 1 | Status: SHIPPED | OUTPATIENT
Start: 2022-03-31 | End: 2022-05-03

## 2022-04-08 ENCOUNTER — TELEPHONE (OUTPATIENT)
Dept: CARDIOLOGY CLINIC | Age: 82
End: 2022-04-08

## 2022-04-08 NOTE — TELEPHONE ENCOUNTER
Called patient using two patient identifiers. Asked patient for blood pressure log. He stated that he accidentally thought he was supposed to be taking 35mg of hydralazine TID so that is what patient has been taking. He states he took one 10mg tablet and one 25mg tablet together to equal 35 mg. Patient reported blood pressure log and weight log as follows     4/1 158 lbs 153/80 (88) two hours laters 140/69 (85)  4/2 160 lbs 138/74 (79)                             144/73 (81)  4/3 160 lbs  130/74 (73)                            154/77 (83)  4/4 160 lbs 137/81 (93)                             142/77 (90)   4/5 159 lbs  131/71 (80)                           158/70 (86  4/6 158 lbs  141/73 (82)                             138/79 (86)  4/7 158 lbs 137/76 (96)                              144/76 (89)       Patient states that he did start taking 50mg of hydralazine TID today but has not taken blood pressures. Patient denies any symptoms and had no other complaints or questions at this time. Dacia Beverly      Called patient using two patient identifiers. Advised patient per Dr. Huan Alonso to continue 50mg of hydralazine three times a day. Educated patient to keep blood pressure log and to call in one week with readings. Also notified patient that blood pressure is most accurate if taken at rest in the same arm daily. Also advised patient that second bp should be taken 1 to 2 hours after medications. patient stated understanding of instructions and had no further questions.  Ramirez Griffith RN

## 2022-04-11 ENCOUNTER — TELEPHONE (OUTPATIENT)
Dept: CARDIOLOGY CLINIC | Age: 82
End: 2022-04-11

## 2022-04-11 NOTE — TELEPHONE ENCOUNTER
Patient called to see if [facility] could [facility] prior to 3oth    Advised patient that he can go 3-4 days prior to his testing date

## 2022-04-22 ENCOUNTER — TELEPHONE (OUTPATIENT)
Dept: CARDIOLOGY CLINIC | Age: 82
End: 2022-04-22

## 2022-04-22 NOTE — TELEPHONE ENCOUNTER
Al with [employer] [facility] [employer] called regarding approval for patients echo. He states he will fax approval to [facility] fax [phone]. VERONA Ignacio RN

## 2022-05-02 ENCOUNTER — TELEPHONE (OUTPATIENT)
Dept: CARDIOLOGY CLINIC | Age: 82
End: 2022-05-02

## 2022-05-02 NOTE — TELEPHONE ENCOUNTER
Patient called in to ask what he is supposed to do regarding test due to patient unable to get COVID test    Advised patient that per Nurse(Dana) --- Patient needs to reschedule CPET & ECHO Test since Covid test wasn't completed

## 2022-05-03 ENCOUNTER — HOSPITAL ENCOUNTER (OUTPATIENT)
Dept: NON INVASIVE DIAGNOSTICS | Age: 82
Discharge: HOME | End: 2022-05-03
Attending: INTERNAL MEDICINE
Payer: MEDICARE

## 2022-05-03 VITALS — BODY MASS INDEX: 21.67 KG/M2 | WEIGHT: 160 LBS | HEIGHT: 72 IN

## 2022-05-03 VITALS
SYSTOLIC BLOOD PRESSURE: 130 MMHG | WEIGHT: 168.87 LBS | BODY MASS INDEX: 22.87 KG/M2 | DIASTOLIC BLOOD PRESSURE: 71 MMHG | HEIGHT: 72 IN

## 2022-05-03 DIAGNOSIS — R53.83 FATIGUE, UNSPECIFIED TYPE: ICD-10-CM

## 2022-05-03 DIAGNOSIS — M10.9 GOUT, UNSPECIFIED CAUSE, UNSPECIFIED CHRONICITY, UNSPECIFIED SITE: ICD-10-CM

## 2022-05-03 DIAGNOSIS — E55.9 VITAMIN D DEFICIENCY: ICD-10-CM

## 2022-05-03 DIAGNOSIS — I50.9 CONGESTIVE HEART FAILURE, UNSPECIFIED HF CHRONICITY, UNSPECIFIED HEART FAILURE TYPE (HCC): ICD-10-CM

## 2022-05-03 DIAGNOSIS — E79.0 ELEVATED BLOOD URIC ACID LEVEL: ICD-10-CM

## 2022-05-03 DIAGNOSIS — R06.02 SHORTNESS OF BREATH: ICD-10-CM

## 2022-05-03 DIAGNOSIS — D50.9 IRON DEFICIENCY ANEMIA, UNSPECIFIED IRON DEFICIENCY ANEMIA TYPE: ICD-10-CM

## 2022-05-03 LAB
ECHO AV AREA PEAK VELOCITY: 1.2 CM2
ECHO AV AREA PEAK VELOCITY: 1.3 CM2
ECHO AV AREA VTI: 1 CM2
ECHO AV AREA/BSA VTI: 0.5 CM2/M2
ECHO AV MEAN GRADIENT: 17 MMHG
ECHO AV MEAN VELOCITY: 1.9 M/S
ECHO AV PEAK GRADIENT: 25 MMHG
ECHO AV PEAK VELOCITY: 2.5 M/S
ECHO AV VTI: 59.8 CM
ECHO EST RA PRESSURE: 3 MMHG
ECHO LA VOL 4C: 83 ML (ref 18–58)
ECHO LA VOLUME INDEX A4C: 42 ML/M2 (ref 16–34)
ECHO LV E' LATERAL VELOCITY: 8 CM/S
ECHO LV E' SEPTAL VELOCITY: 7 CM/S
ECHO LV FRACTIONAL SHORTENING: 34 % (ref 28–44)
ECHO LV INTERNAL DIMENSION DIASTOLE INDEX: 2.53 CM/M2
ECHO LV INTERNAL DIMENSION DIASTOLIC: 5 CM (ref 4.2–5.9)
ECHO LV INTERNAL DIMENSION SYSTOLIC INDEX: 1.67 CM/M2
ECHO LV INTERNAL DIMENSION SYSTOLIC: 3.3 CM
ECHO LV IVSD: 1.2 CM (ref 0.6–1)
ECHO LV MASS 2D: 261.8 G (ref 88–224)
ECHO LV MASS INDEX 2D: 132.2 G/M2 (ref 49–115)
ECHO LV POSTERIOR WALL DIASTOLIC: 1.4 CM (ref 0.6–1)
ECHO LV RELATIVE WALL THICKNESS RATIO: 0.56
ECHO LVOT AREA: 3.1 CM2
ECHO LVOT AV VTI INDEX: 0.32
ECHO LVOT DIAM: 2 CM
ECHO LVOT MEAN GRADIENT: 2 MMHG
ECHO LVOT PEAK GRADIENT: 3 MMHG
ECHO LVOT PEAK GRADIENT: 4 MMHG
ECHO LVOT PEAK VELOCITY: 0.9 M/S
ECHO LVOT PEAK VELOCITY: 1 M/S
ECHO LVOT STROKE VOLUME INDEX: 30.3 ML/M2
ECHO LVOT SV: 60 ML
ECHO LVOT VTI: 19.1 CM
ECHO MV A VELOCITY: 0.6 M/S
ECHO MV E VELOCITY: 0.68 M/S
ECHO MV E/A RATIO: 1.13
ECHO MV E/E' LATERAL: 8.5
ECHO MV E/E' RATIO (AVERAGED): 9.11
ECHO MV E/E' SEPTAL: 9.71
ECHO RIGHT VENTRICULAR SYSTOLIC PRESSURE (RVSP): 15 MMHG
ECHO RV INTERNAL DIMENSION: 3.8 CM
ECHO RV TAPSE: 1.7 CM (ref 1.7–?)
ECHO TV REGURGITANT MAX VELOCITY: 1.73 M/S
ECHO TV REGURGITANT PEAK GRADIENT: 12 MMHG

## 2022-05-03 PROCEDURE — 94621 CARDIOPULM EXERCISE TESTING: CPT

## 2022-05-03 PROCEDURE — 93306 TTE W/DOPPLER COMPLETE: CPT

## 2022-05-03 PROCEDURE — 93306 TTE W/DOPPLER COMPLETE: CPT | Performed by: INTERNAL MEDICINE

## 2022-05-03 RX ORDER — HYDRALAZINE HYDROCHLORIDE 50 MG/1
TABLET, FILM COATED ORAL
Qty: 90 TABLET | Refills: 1 | Status: SHIPPED | OUTPATIENT
Start: 2022-05-03 | End: 2022-05-18 | Stop reason: SDUPTHER

## 2022-05-03 NOTE — TELEPHONE ENCOUNTER
Requested Prescriptions     Pending Prescriptions Disp Refills    hydrALAZINE (APRESOLINE) 50 mg tablet [Pharmacy Med Name: HYDRALAZINE 50 MG TABLET] 90 Tablet 1     Sig: TAKE 1 TABLET BY MOUTH THREE TIMES A DAY     Requested Prescriptions     Signed Prescriptions Disp Refills    hydrALAZINE (APRESOLINE) 50 mg tablet 270 Tablet 1     Sig: Take 1 Tablet by mouth three (3) times daily.      Authorizing Provider: Sanjuanita Aaron     Ordering User: Joshua Marrero

## 2022-05-05 ENCOUNTER — TELEPHONE (OUTPATIENT)
Dept: CARDIOLOGY CLINIC | Age: 82
End: 2022-05-05

## 2022-05-05 NOTE — TELEPHONE ENCOUNTER
Patient called in to ask if he could go to any location to get his labs done    Advised that as long as he has his lab slip, he can go into any [facility] location

## 2022-05-09 LAB
25(OH)D3+25(OH)D2 SERPL-MCNC: 40 NG/ML (ref 30–100)
ALBUMIN SERPL ELPH-MCNC: 3.9 G/DL (ref 2.9–4.4)
ALBUMIN SERPL-MCNC: 4.4 G/DL (ref 3.6–4.6)
ALBUMIN/GLOB SERPL: 1.3 {RATIO} (ref 0.7–1.7)
ALBUMIN/GLOB SERPL: 1.6 {RATIO} (ref 1.2–2.2)
ALP SERPL-CCNC: 81 IU/L (ref 44–121)
ALPHA1 GLOB SERPL ELPH-MCNC: 0.2 G/DL (ref 0–0.4)
ALPHA2 GLOB SERPL ELPH-MCNC: 1 G/DL (ref 0.4–1)
ALT SERPL-CCNC: 19 IU/L (ref 0–44)
AST SERPL-CCNC: 30 IU/L (ref 0–40)
B-GLOBULIN SERPL ELPH-MCNC: 0.9 G/DL (ref 0.7–1.3)
BILIRUB SERPL-MCNC: 0.3 MG/DL (ref 0–1.2)
BUN SERPL-MCNC: 16 MG/DL (ref 8–27)
BUN/CREAT SERPL: 14 (ref 10–24)
CALCIUM SERPL-MCNC: 9.5 MG/DL (ref 8.6–10.2)
CENTROMERE B AB SER-ACNC: <0.2 AI (ref 0–0.9)
CHLORIDE SERPL-SCNC: 106 MMOL/L (ref 96–106)
CHOLEST SERPL-MCNC: 97 MG/DL (ref 100–199)
CHROMATIN AB SERPL-ACNC: <0.2 AI (ref 0–0.9)
CK SERPL-CCNC: 123 U/L (ref 30–208)
CO2 SERPL-SCNC: 17 MMOL/L (ref 20–29)
CREAT SERPL-MCNC: 1.15 MG/DL (ref 0.76–1.27)
DSDNA AB SER-ACNC: <1 IU/ML (ref 0–9)
EGFR: 63 ML/MIN/1.73
ENA JO1 AB SER-ACNC: <0.2 AI (ref 0–0.9)
ENA RNP AB SER-ACNC: <0.2 AI (ref 0–0.9)
ENA SCL70 AB SER-ACNC: <0.2 AI (ref 0–0.9)
ENA SM AB SER-ACNC: <0.2 AI (ref 0–0.9)
ENA SS-A AB SER-ACNC: <0.2 AI (ref 0–0.9)
ENA SS-B AB SER-ACNC: <0.2 AI (ref 0–0.9)
ERYTHROCYTE [DISTWIDTH] IN BLOOD BY AUTOMATED COUNT: 12.6 % (ref 11.6–15.4)
FERRITIN SERPL-MCNC: 120 NG/ML (ref 30–400)
GAMMA GLOB SERPL ELPH-MCNC: 1.2 G/DL (ref 0.4–1.8)
GLOBULIN SER CALC-MCNC: 2.7 G/DL (ref 1.5–4.5)
GLOBULIN SER-MCNC: 3.2 G/DL (ref 2.2–3.9)
GLUCOSE SERPL-MCNC: 106 MG/DL (ref 65–99)
HCT VFR BLD AUTO: 36.3 % (ref 37.5–51)
HDLC SERPL-MCNC: 39 MG/DL
HGB BLD-MCNC: 12 G/DL (ref 13–17.7)
IGA SERPL-MCNC: 178 MG/DL (ref 61–437)
IGG SERPL-MCNC: 1005 MG/DL (ref 603–1613)
IGM SERPL-MCNC: 247 MG/DL (ref 15–143)
INTERPRETATION SERPL IEP-IMP: ABNORMAL
IRON SATN MFR SERPL: 25 % (ref 15–55)
IRON SERPL-MCNC: 72 UG/DL (ref 38–169)
KAPPA LC FREE SER-MCNC: 36.2 MG/L (ref 3.3–19.4)
KAPPA LC FREE/LAMBDA FREE SER: 1.58 {RATIO} (ref 0.26–1.65)
LAMBDA LC FREE SERPL-MCNC: 22.9 MG/L (ref 5.7–26.3)
LDLC SERPL CALC-MCNC: 34 MG/DL (ref 0–99)
M PROTEIN SERPL ELPH-MCNC: 0.4 G/DL
MAGNESIUM SERPL-MCNC: 2 MG/DL (ref 1.6–2.3)
MCH RBC QN AUTO: 30.2 PG (ref 26.6–33)
MCHC RBC AUTO-ENTMCNC: 33.1 G/DL (ref 31.5–35.7)
MCV RBC AUTO: 91 FL (ref 79–97)
NT-PROBNP SERPL-MCNC: 288 PG/ML (ref 0–486)
PLATELET # BLD AUTO: 197 X10E3/UL (ref 150–450)
PLEASE NOTE:, 149534: ABNORMAL
POTASSIUM SERPL-SCNC: 4.1 MMOL/L (ref 3.5–5.2)
PROT SERPL-MCNC: 7.1 G/DL (ref 6–8.5)
RBC # BLD AUTO: 3.98 X10E6/UL (ref 4.14–5.8)
SEE BELOW, 164869: NORMAL
SODIUM SERPL-SCNC: 141 MMOL/L (ref 134–144)
T4 FREE SERPL-MCNC: 0.75 NG/DL (ref 0.82–1.77)
TIBC SERPL-MCNC: 288 UG/DL (ref 250–450)
TRIGL SERPL-MCNC: 138 MG/DL (ref 0–149)
TROPONIN T SERPL HS-MCNC: 16 NG/L (ref 0–22)
TSH SERPL DL<=0.005 MIU/L-ACNC: 2.31 UIU/ML (ref 0.45–4.5)
UIBC SERPL-MCNC: 216 UG/DL (ref 111–343)
URATE SERPL-MCNC: 5.6 MG/DL (ref 3.8–8.4)
VLDLC SERPL CALC-MCNC: 24 MG/DL (ref 5–40)
WBC # BLD AUTO: 7 X10E3/UL (ref 3.4–10.8)

## 2022-05-17 ENCOUNTER — TELEPHONE (OUTPATIENT)
Dept: CARDIOLOGY CLINIC | Age: 82
End: 2022-05-17

## 2022-05-17 NOTE — TELEPHONE ENCOUNTER
Telephone Call RE:  Appointment reminder     Outcome:     [x] Patient confirmed appointment   [] Patient rescheduled appointment for    [] Unable to reach  [] Left message             [] Other:     ---------------------             [x] Screened for [address] [facility]

## 2022-05-17 NOTE — TELEPHONE ENCOUNTER
Patient wanted to speak with a nurse regarding heart  Monitoring and how come he hasn't received a call that he normally gets to ask about his heart rates   Contact # [phone]

## 2022-05-17 NOTE — TELEPHONE ENCOUNTER
Called patient using two patient identifiers. Advised patient to bring blood pressure log to patients appt tomorrow 5/18 at 2pm with Dr. Israel Heredia. Patient stated understanding and had no further questions at this time.  Candice Molina RN

## 2022-05-18 ENCOUNTER — OFFICE VISIT (OUTPATIENT)
Dept: CARDIOLOGY CLINIC | Age: 82
End: 2022-05-18
Payer: MEDICARE

## 2022-05-18 VITALS
BODY MASS INDEX: 22.75 KG/M2 | SYSTOLIC BLOOD PRESSURE: 124 MMHG | TEMPERATURE: 97.5 F | RESPIRATION RATE: 16 BRPM | HEART RATE: 82 BPM | DIASTOLIC BLOOD PRESSURE: 62 MMHG | HEIGHT: 72 IN | WEIGHT: 168 LBS | OXYGEN SATURATION: 98 %

## 2022-05-18 DIAGNOSIS — I50.9 CONGESTIVE HEART FAILURE, UNSPECIFIED HF CHRONICITY, UNSPECIFIED HEART FAILURE TYPE (HCC): Primary | ICD-10-CM

## 2022-05-18 LAB
STRESS BASELINE DIAS BP: 81 MMHG
STRESS BASELINE HR: 83 BPM
STRESS BASELINE SYS BP: 146 MMHG
STRESS O2 SAT REST: 93 %
STRESS STAGE 1 BP: NORMAL MMHG
STRESS STAGE 1 COMMENTS: NORMAL
STRESS STAGE 1 DURATION: 2 MIN:SEC
STRESS STAGE 1 HR: 100 BPM
STRESS STAGE 2 BP: NORMAL MMHG
STRESS STAGE 2 COMMENTS: NORMAL
STRESS STAGE 2 DURATION: 2 MIN:SEC
STRESS STAGE 2 HR: 104 BPM
STRESS STAGE 3 BP: NORMAL MMHG
STRESS STAGE 3 COMMENTS: NORMAL
STRESS STAGE 3 DURATION: 2 MIN:SEC
STRESS STAGE 3 HR: 108 BPM
STRESS STAGE 4 BP: NORMAL MMHG
STRESS STAGE 4 COMMENTS: NORMAL
STRESS STAGE 4 DURATION: 2 MIN:SEC
STRESS STAGE 4 HR: 114 BPM
STRESS STAGE 5 BP: NORMAL MMHG
STRESS STAGE 5 COMMENTS: NORMAL
STRESS STAGE 5 DURATION: NORMAL MIN:SEC
STRESS STAGE 5 HR: 127 BPM
STRESS STAGE RECOVERY 1 DURATION: NORMAL MIN:SEC
STRESS STAGE RECOVERY 1 HR: 103 BPM
STRESS STAGE RECOVERY 2 DURATION: NORMAL MIN:SEC
STRESS STAGE RECOVERY 2 HR: 99 BPM
STRESS STAGE RECOVERY 3 DURATION: NORMAL MIN:SEC
STRESS STAGE RECOVERY 3 HR: 110 BPM
STRESS STAGE RECOVERY 4 BP: NORMAL MMHG
STRESS STAGE RECOVERY 4 COMMENTS: NORMAL
STRESS STAGE RECOVERY 4 DURATION: NORMAL MIN:SEC
STRESS STAGE RECOVERY 4 HR: 100 BPM
STRESS TARGET HR: 137 BPM

## 2022-05-18 PROCEDURE — 99215 OFFICE O/P EST HI 40 MIN: CPT | Performed by: INTERNAL MEDICINE

## 2022-05-18 PROCEDURE — G8536 NO DOC ELDER MAL SCRN: HCPCS | Performed by: INTERNAL MEDICINE

## 2022-05-18 PROCEDURE — G8510 SCR DEP NEG, NO PLAN REQD: HCPCS | Performed by: INTERNAL MEDICINE

## 2022-05-18 PROCEDURE — 1101F PT FALLS ASSESS-DOCD LE1/YR: CPT | Performed by: INTERNAL MEDICINE

## 2022-05-18 PROCEDURE — G8427 DOCREV CUR MEDS BY ELIG CLIN: HCPCS | Performed by: INTERNAL MEDICINE

## 2022-05-18 PROCEDURE — G8420 CALC BMI NORM PARAMETERS: HCPCS | Performed by: INTERNAL MEDICINE

## 2022-05-18 RX ORDER — HYDRALAZINE HYDROCHLORIDE 50 MG/1
50 TABLET, FILM COATED ORAL 3 TIMES DAILY
Qty: 270 TABLET | Refills: 1 | Status: SHIPPED | OUTPATIENT
Start: 2022-05-18 | End: 2022-06-30

## 2022-05-18 NOTE — PROGRESS NOTES
[facility] in [place], [facility] Note    Patient name: Nahum Pérez  Patient : 1940  Patient MRN: [id number]  Date of service: 22    Primary care physician: Shanon Amezcua MD  Primary general cardiologist:  Dr. Gregg Benjamin    Primary [facility] cardiologist: Italia Hair MD     CHIEF COMPLAINT:  Chronic systolic heart failure     PLAN OF CARE:  · 79 y/o with heart failure with preserved LVEF; wild type aTTR cardiac amyloidosis and co-existing MGUS (bone marrow biopsy was negative), stage 1 by Lindsey criteria on vyndamax  · Continue routine cardiac surveillance every 3-4 months  · Memory loss short- and long-term remains the key quality of life barrier; working on getting CPAP set up and neurocognitive/neurological evaluation scheduled 2022     RECOMMENDATIONS:  Continue tefamidis  Continue norvasc 5mg twice daily  Continue hydralazine 50mg TID   Do not check blood pressure after going upstairs; keep BP cuff downstairs and resend data  Avoid beta-blockers and ACEi/ARB/ARNi due to orthostasis and exercise induced lightheadedness  Does not require diuretics  Does not require allopurinol, uric acid normal  Reinforced low salt diet  Reinforced fluid restriction to 6 x 8oz glasses per day  Patient needs advanced care plan on file  Recommend physical activity; walking 30 minutes daily  Scheduled for neurocognitive testing and neurology evaluation for memory loss on 22 (no show on 21)  Follow up with hematology annually for MGUS with Dr. Dulce Maria Jesus  Follow up with sleep medicine/pulmonary associates re: CPAP trial and ongoing cough and aspiration   Follow-up with primary cardiologist, Dr. Hayde Hernandez  Follow up with EP cardiologist, Dr. Josey Mendoza   Follow-up with PCP for iron deficiency anemia, h/o positive hemoccult and abdominal symptoms  Referral to [address] [facility] cardiac  Dr. 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 Findings consistent with moderate concentric hypertrophy. Normal wall motion. Grade II diastolic dysfunction with increased LAP.   Aortic Valve: Mild stenosis of the aortic valve. Mean gradient of 17 mmhg.   Mitral Valve: Mild regurgitation. Echo (5/18/21)  · LV: Estimated LVEF is 50 - 55%. Normal cavity size, systolic function (ejection fraction normal) and diastolic function. Moderate concentric hypertrophy. Wall motion: unable to assess due to limited image quality. Normal left ventricular strain. · LA: Moderately dilated left atrium. · AV: With no significant stenosis.  Mild aortic valve regurgitation is present. · MV: Mitral valve non-specific thickening. Mild to moderate mitral valve regurgitation is present. · Image quality for this study was suboptimal.  · IVS 1.39cm  CTA for EP mapping reviewed (10/6/20) Watchman device occlusive  Cardiac CTA (3/30/20) moderate diffuse CAD  Cardiac PET (3/7/19) normal, Rest EF 32 and stress 38  Echo (3/2020) LVEF 55%, IVSd 1.6cm, LV mass 312g increased, RV function normal, PA pressure 30, sclerotic AV without stenosis, trace TR, mild mR, IVC normal  EKG (9/2020) atrial fib, rate in 90s, low voltae, V1-V2 q wave  LHC (LHC (4/9/2020) LHC 30% p-LAD, 30% m-LAD, 50% 2nd OM FFR flow reserve 0.92 (4/9/20)  [facility] (2011) tirvial luminal irregularities  Remainder in records (see in epic)     HEMODYNAMICS:  RHC not done  CPEST (5/3/22) Moderately reduced functional capacity at 15 ml/kg/minute oxygen uptake at maximum work load. The results are underestimated due to patient was not able to achieve anaerobic threshold. CPEST (12/15/20): MVO2 17.7 with RQ 1.7   6 Min Walk Report 11/2/2020   (PRE) HR 76   (PRE) O2 Sat 99   (POST)    (POST) O2 Sat 90   Distance in Meters 312.15      OTHER IMAGING:  CT abd (3/24/17) There is distention of the proximal half of the small bowel with fecal-like material in the mid small bowel loops. This is consistent with at least partial obstruction of the small bowel. No structural abnormality is  identified to account for this. Chest CT( 1/2016) 1. Unchanged pulmonary nodule in the right upper lobe just over 6 mm on  today's exam. Per Fleischner criteria for high risk patients, follow-up is  recommended in 6-9 months. Low risk Fleischner criteria follow-up for 6 to 8mm pulmonary nodules is at 6-12  months and 18-24 months. 2. Aortic valve annular calcifications with suggestion of probable aortic valve leaflet calcifications. . This finding can be associated with aortic   stenosis.  3. Cholelithiasis.     SOCIAL HISTORY:  , never smoker  Alcohol occasional     SOCIAL HISTORY:  Never smoke. Social alcohol. No illicit drugs.     HISTORY OF PRESENT ILLNESS:  I had the pleasure of seeing Rakan Bethea at [facility] in [place] telephone visit.     Sabino Mosher a 78 y. o. male with h/o chronic fatigue, DANILO (not started CPAP yet), HTN, HL, coronary artery disease (LHC on 4/9/20 showed 30% p-LAD, 30% m-LAD, 50% 2nd OM; good flow reserve), atrial fibrillation, paroxysmal, DCCV 3/2019 and s/p afib ablation (10/20/20 by Dr. Sean Ballard), not on chronic anticoagulation due to life-threatening GI bleed/hemorrhoids, s/p Watchman device in [place] 7/23/19, well seated by echo (10/2020), depression. Patient was referred by Dr. Sean Ballard to evaluated for possible HFpEF.     Patient had PYP test suggestive of aTTR. 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 Bone marrow biopsy was negative (2/24/21); no evidence of increased/clonal plasma cells in patient with history of IgM MGUS, congo red stain on core biopsy is negative for amyloid deposition. Troponin was negative and pro-NT-BNP in 400s, indicating stage 1 cardiac amyloidosis. Genetic studies were negative for inherited aTTR; suggesting this was wild type aTTR cardiac amyloidosis. CPEST (12/2020) revealed pVO2 17.7 consistent with excellent prognosis. Patient had sleep study which revealed DANILO and patient started CPAP therapy with some improvement of cognitive function per his daughter.     INTERVAL HISTORY:  Today, patient presents for follow up visit accompanied by his wife and Esther Rodriguez calling in via phone.     Patient reports feeling well.      Patient walked to our clinic from parking garage without having to slow down or stop. Patient states he can walk more than one block without symptoms of fatigue or shortness of breath.  Patient can perform home activities without problem and routinely participates in daily walking for more than 15 minutes.      Patient denies symptoms of volume overload or leg edema. Patient denies abdominal bloating or change of appetite. Patient weight 168lbs.     Patient denies orthopnea, PND.      Patient denies irregular heart rate or palpitations.     Patient denies leg pain with walking.      Patient is compliant with fluid restriction and taking medications as prescribed. Patient manages his own medications. REVIEW OF SYSTEMS:  General: Denies fever, night sweats. Ear, nose and throat: Denies difficulty hearing, sinus problems, runny nose, post-nasal drip, ringing in ears, mouth sores, loose teeth, ear pain, nosebleeds, sore throate, facial pain or numbess  Cardiovascular: see above in the interval history  Respiratory: Denies cough, wheezing, sputum production, hemoptysis. Gastrointestinal: Denies heartburn, constipation, diarrhea, abdominal pain, nausea, vomiting, difficulty swallowing, blood in stool  Kidney and bladder: Denies painful urination, frequent urination, urgency  Musculoskeletal: Denies joint pain, muscle weakness  Skin and hair: Denies change in existing skin lesions, hair loss or increase, breast changes    PHYSICAL EXAM:  Visit Vitals  /62 (BP 1 Location: Left arm, BP Patient Position: Sitting, BP Cuff Size: Adult)   Pulse 82   Temp 97.5 °F (36.4 °C) (Oral)   Resp 16   Ht 6' (1.829 m)   Wt 168 lb (76.2 kg)   SpO2 98%   BMI 22.78 kg/m²     General: Patient is well developed, well-nourished in no acute distress  HEENT: Normocephalic and atraumatic. No scleral icterus. Pupils are equal, round and reactive to light and accomodation. No conjunctival injection. Oropharynx is clear. Neck: Supple. No evidence of thyroid enlargements or lymphadenopathy. JVD: Cannot be appreciated   Lungs: Breath sounds are equal and clear bilaterally. No wheezes, rhonchi, or rales. Heart: Regular rate and rhythm with normal S1 and S2.  No murmurs, gallops or rubs.  Abdomen: Soft, no mass or tenderness. No organomegaly or hernia. Bowel sounds present. Genitourinary and rectal: deferred  Extremities: No cyanosis, clubbing, or edema. Neurologic: No focal sensory or motor deficits are noted. Grossly intact. Psychiatric: Awake, alert an doriented x 3. Appropriate mood and affect. Skin: Warm, dry and well perfused. No lesions, nodules or rashes are noted. PAST MEDICAL HISTORY:  Past Medical History:   Diagnosis Date    Atrial fibrillation (HCC)     Depression     GERD (gastroesophageal reflux disease)     Glaucoma     High cholesterol     IBS (irritable bowel syndrome)        PAST SURGICAL HISTORY:  Past Surgical History:   Procedure Laterality Date    HX CHOLECYSTECTOMY      HX HEMORRHOIDECTOMY      HX ORTHOPAEDIC      RIGHT knee    HX TONSILLECTOMY      NE CARDIOVERSION ELECTIVE ARRHYTHMIA EXTERNAL N/A 3/13/2019    EP CARDIOVERSION performed by Diane Rider MD at [address], [facility]/s [address] [facility]       FAMILY HISTORY:  No family history on file. SOCIAL HISTORY:  Social History     Socioeconomic History    Marital status:    Tobacco Use    Smoking status: Former Smoker     Quit date:      Years since quittin.3    Smokeless tobacco: Never Used   Substance and Sexual Activity    Alcohol use: Yes     Comment: occasionally    Drug use: No       LABORATORY RESULTS:  Labs Latest Ref Rng & Units 2022   WBC 3.4 - 10.8 x10E3/uL 7.0   RBC 4.14 - 5.80 x10E6/uL 3.98(L)   Hemoglobin 13.0 - 17.7 g/dL 12. 0(L)   Hematocrit 37.5 - 51.0 % 36. 3(L)   MCV 79 - 97 fL 91   Platelets 529 - 827 Q80D3/   Albumin 3.6 - 4.6 g/dL 4.4   Calcium 8.6 - 10.2 mg/dL 9.5   Glucose 65 - 99 mg/dL 106(H)   BUN 8 - 27 mg/dL 16   Creatinine 0.76 - 1.27 mg/dL 1.15   Sodium 134 - 144 mmol/L 141   Potassium 3.5 - 5.2 mmol/L 4.1   TSH 0.450 - 4.500 uIU/mL 2.310   Some recent data might be hidden       ALLERGY:  Allergies   Allergen Reactions    Sulfa (Sulfonamide Antibiotics) Unknown (comments)     From infancy, unsure if actually allergic          CURRENT MEDICATIONS:    Current Outpatient Medications:     hydrALAZINE (APRESOLINE) 50 mg tablet, Take 1 Tablet by mouth three (3) times daily. , Disp: 270 Tablet, Rfl: 1    amLODIPine (NORVASC) 5 mg tablet, Take 1 Tablet by mouth two (2) times a day., Disp: 60 Tablet, Rfl: 3    busPIRone (BUSPAR) 15 mg tablet, Take 15 mg by mouth two (2) times a day. Takes 25 mg total, Disp: , Rfl:     atorvastatin (LIPITOR) 80 mg tablet, Take 80 mg by mouth daily. , Disp: , Rfl:     pantoprazole (PROTONIX) 40 mg tablet, Take 40 mg by mouth daily. , Disp: , Rfl:     fluticasone propionate (FLONASE) 50 mcg/actuation nasal spray, 2 Sprays by Both Nostrils route daily. (Patient taking differently: 2 Sprays by Both Nostrils route daily as needed.), Disp: 1 Each, Rfl: 0    Vyzulta 0.024 % drop, PLACE 1 DROP INTO THE RIGHT EYE NIGHTLY, Disp: , Rfl:     tafamidis (Vyndamax) 61 mg cap, Take 61 mg by mouth daily. , Disp: , Rfl:     L.acid/B.bifidum/B.animal/FOS (PROBIOTIC COMPLEX PO), Take  by mouth., Disp: , Rfl:     OTHER, daily. SAME supplement, Disp: , Rfl:     OTHER, daily. soluber fiber 1 TBSP, Disp: , Rfl:     OTHER, daily. 3 TBSP Glucosamine/Chondroitin/MSM, Disp: , Rfl:     OTHER, daily. Super Digestive Enzymes, Disp: , Rfl:     OTHER, daily. Mirtogenol (bilberry, pycnogenol), Disp: , Rfl:     MAGNESIUM CITRATE PO, Take  by mouth daily. , Disp: , Rfl:     cholecalciferol (VITAMIN D3) (2,000 UNITS /50 MCG) cap capsule, Take 1 Cap by mouth two (2) times a day. (Patient taking differently: Take 2,000 Units by mouth daily.), Disp: 60 Cap, Rfl: 3    OTHER,NON-FORMULARY,, two (2) times a day. Prostate health supplement , Disp: , Rfl:     OTHER,NON-FORMULARY,, cognitex, Disp: , Rfl:     OTHER,NON-FORMULARY,, DHEA, Disp: , Rfl:     OTHER,NON-FORMULARY,, two (2) times a day.  Bone Restore, Disp: , Rfl:     OTHER,NON-FORMULARY,, Horsetail (silica), Disp: , Rfl:     OTHER,NON-FORMULARY,, Omega 3 - 2 caps in the am and 2 in the pm, Disp: , Rfl:     OTHER,NON-FORMULARY,, Bio-Curcumin (Turmeric) 1000 mg, Disp: , Rfl:     co-enzyme Q-10 (Co Q-10) 100 mg capsule, Take 100 mg by mouth two (2) times a day., Disp: , Rfl:     vit A/vit C/vit E/zinc/copper (PRESERVISION AREDS PO), Take  by mouth two (2) times a day., Disp: , Rfl:     therapeutic multivitamin (THERAGRAN) tablet, Take 1 Tab by mouth daily. , Disp: , Rfl:     dorzolamide-timolol (COSOPT) 22.3-6.8 mg/mL ophthalmic solution, Administer 1 Drop to right eye every Sunday. , Disp: , Rfl:     vortioxetine (BRINTELLIX) 10 mg tablet, Take 10 mg by mouth daily. , Disp: , Rfl:     Thank you for your referral and allowing me to participate in this patient's care.     Talib Rausch MD PhD  [address] [place] [address], [address]  Phone: [phone]  Fax: [phone]    PATIENT CARE TEAM:  Patient Care Team:  Juan Antonio Prater MD as PCP - General (Family Medicine)  Christopher Levy MD (Sleep Medicine Physician)  Justin Clement PsyD (Psychology)     Total visit time: 40 minutes (> 50% spent face-to-face counseling)

## 2022-05-18 NOTE — PATIENT INSTRUCTIONS
Medication changes:    No medication changes     Please take this to your pharmacy to notify them of the change in medications. Testing Ordered: Other Recommendations: Revenew genetic testing results have been reviewed today. Your testing results qualify for complimentary genetic counseling through [facility]. Please visit [email or web address] to schedule your telephone genetic counseling appointment. Your RQ number from your test is RQ [id number] You will be asked for this number when you schedule the appointment. A referral has been placed to Dr. Jeffy Dawkins with [address] [address]. You will be contacted for scheduling. Paul Castelan RN     Take blood pressures in the morning before medications and two hours after medications. Write them down and keep a log call in one week with blood pressure readings. Please take blood pressures down stairs in the same arm every day. Ensure your drinking an adequate amount of water with a goal of 6-8 eight ounce glasses (1.5-2 liters) of fluid daily. Your urine should be clear and light yellow straw colored. If your blood pressure begins to consistently run below 90/60 and/or you begin to experience dizziness or lightheadedness, please contact the [employer] at [phone]. Follow up 3 to 4 months with [facility]      Please monitor your weights daily upon waking and after using the bathroom. Keep a written records of your weights and bring to your next appointment. If you have a weight gain of 3 or more pounds overnight OR 5 or more pounds in one week please contact our office. Thank you for allowing us the privilege of being a part of your healthcare team! Please do not hesitate to contact our office at [phone] with any questions or concerns.        Virtual Heart Failure [address] invites you to learn more about heart failure and to share your questions, ideas, and experiences with others. Each month, the Heart Failure Support Group features a new educational topic and a guest speaker, followed by an interactive discussion. Our Heart Failure Nurse Navigator will moderate each session. You will be able to participate by phone, tablet or computer through [address] [place] [address]. This support group takes place on the 3rd Thursday of each month from 6:00-7:30PM. All individuals living with heart failure and their caregivers are welcome to join. If you are interested in participating, please contact us at [email or web address] and you will be sent the link to join the [employer].

## 2022-05-25 ENCOUNTER — TELEPHONE (OUTPATIENT)
Dept: CARDIOLOGY CLINIC | Age: 82
End: 2022-05-25

## 2022-05-25 NOTE — TELEPHONE ENCOUNTER
Patient called in regarding prescription that he use to take before his abrasion.  Patient said medication was called \"Trinntellix\" Patient said he hasn't taken medication in over a year

## 2022-05-26 ENCOUNTER — TELEPHONE (OUTPATIENT)
Dept: CARDIOLOGY CLINIC | Age: 82
End: 2022-05-26

## 2022-05-26 NOTE — TELEPHONE ENCOUNTER
Left a voicemail to Dr. Lucinda Parker office for an appointment 5/26/22. Waiting for them to call back.      Contact number office: [phone]

## 2022-06-03 RX ORDER — FAMOTIDINE 20 MG/1
TABLET, FILM COATED ORAL
COMMUNITY
Start: 2022-04-20

## 2022-06-03 NOTE — TELEPHONE ENCOUNTER
Medication review completed with medication list that patient dropped off to office. I called and left patient a vm regarding his message and trintellix.

## 2022-06-09 NOTE — TELEPHONE ENCOUNTER
6/9 10:23     Called patient using two patient identifiers. Notified patient that I was calling back in regards to patients trinntellix. Notified patient that we do not have trinntellix on patients medication list, but we do have brintellix on his list. Patient states he is unsure if he is currently taking this medication. Notified him this medication was not prescribed by our office but we do currently have it listed under patients medication list. Patient asked if he is supposed to be taking this medication. Notified patient that this is not a heart failure medication and that it is most likely for depression. Advised patient that he should follow up with PCP regarding medication and whether or not patient should take medication. Also advised patient to  call back once he determines whether or not patient is taking medication so that we can up date medication list. Patient stated understanding and had no further questions.  Odin Welch RN

## 2022-06-13 ENCOUNTER — TELEPHONE (OUTPATIENT)
Dept: CARDIOLOGY CLINIC | Age: 82
End: 2022-06-13

## 2022-06-13 NOTE — TELEPHONE ENCOUNTER
I called Dr. Madison Lisa office ([phone]) to schedule patient an appointment. Voice mail was reached and I left a message asking for a return call.

## 2022-06-28 ENCOUNTER — TELEPHONE (OUTPATIENT)
Dept: CARDIOLOGY CLINIC | Age: 82
End: 2022-06-28

## 2022-06-28 NOTE — TELEPHONE ENCOUNTER
Patient is scheduled to see   Dr. Agustín Broderick (cardiogenetics)   8-8-22   2:00pm  [address] [place] [address], [facility]  [phone]    This information was left on patient's voice mail. Medical records were faxed to [phone]. At the end of the call patient stated he needed to discuss his blood pressure readings, that he \"had left messages for the nurse with no return call \"- This information was forwarded to Lamonte Monteiro. Kelly Peralta RN.

## 2022-06-28 NOTE — TELEPHONE ENCOUNTER
Message left for Kimberly with [address] ([phone]) asking for return call to schedule patient an appointment with Dr. Anny Cunningham.

## 2022-06-29 ENCOUNTER — TELEPHONE (OUTPATIENT)
Dept: CARDIOLOGY CLINIC | Age: 82
End: 2022-06-29

## 2022-06-29 NOTE — TELEPHONE ENCOUNTER
Alexandra Burrell 42 minutes ago (2:30 PM)     GR       Received call from patient requesting to speak with nurse regarding medication hydralazine. Was advised to take blood readings and report data. Patient called to report recent blood pressure log.     6/13 weight 160 lbs bp 150/73 (74) 2 hrs later 148/64 (79)  6/14 Weight 163 lbs bp 137/70 (74) 2 hrs later 140/65 (76)  6/15 did not keep log  6/16 did not keep log  6/17 did not keep log   6/18 did not keep log   6/19 did not keep log   6/20 weight 161 lbs bp 147/77 (84) 2 hrs later 164/71 (84)   6/21 weight 161 lbs bp 142/74 (77) 2 hrs later 142/70 (79)  6/22 did not keep log   6/23 weight 160 lbs bp 148/70 (70) 2 hrs later 140/64 (82)   6/24 weight 159 lbs bp 142/73 (82) 2 hrs later 139/67 (78)   6/25 did not keep log   6/26 weight 160 lbs bp 151/ 77 (78) 2 hrs later 146/68 (69)    Patient states that he is feeling fine and has no complaints. Patient confirms that he is taking hydralazine 50mg three times a day. MD Javan Hendricks RN, RN  Caller: Unspecified Tejayue Commander,  2:27 PM)  Increase hydralazine to 75mg TID     Called patient using to patient identifiers. Notified patient per Dr. Vanna Rosario to increase hydralazine to 75mg three times a day. Notified patient that he has 50mg tablets so this will be a tablet and half three times daily. Educated patient to keep blood pressure log with bp before medications and two hours after medications. Patient stated understanding and had no further questions at this time. Patient in agreement to report blood pressure log next Friday July 8th.  Yoselin Morse Rn

## 2022-06-29 NOTE — TELEPHONE ENCOUNTER
Received call from patient requesting to speak with nurse regarding medication hydralazine. Was advised to take blood readings and report data.

## 2022-06-30 RX ORDER — HYDRALAZINE HYDROCHLORIDE 50 MG/1
75 TABLET, FILM COATED ORAL 3 TIMES DAILY
Qty: 270 TABLET | Refills: 1
Start: 2022-06-30 | End: 2022-07-01

## 2022-07-01 ENCOUNTER — TELEPHONE (OUTPATIENT)
Dept: CARDIOLOGY CLINIC | Age: 82
End: 2022-07-01

## 2022-07-01 RX ORDER — HYDRALAZINE HYDROCHLORIDE 50 MG/1
100 TABLET, FILM COATED ORAL 3 TIMES DAILY
Qty: 270 TABLET | Refills: 1
Start: 2022-07-01 | End: 2022-07-20 | Stop reason: SDUPTHER

## 2022-07-01 NOTE — TELEPHONE ENCOUNTER
Called patient pharmacy and spoke with stacie. Notified patient is to be taking hydralazine 75mg daily and patient is having issues cutting tablets in half. Asked if pharmacy could precut patients tablets. She states that they are unable to pre cut tablets. She notes another option is to prescribe a 50mg tablet and 25mg tablet. Teodora Sellers RN     Returned call to patient using two patient identifiers. Notified patient per Dr. Lane Bowen to increase dose to 100mg of hydralazine three times a day. Educated patient this would be two 50mg tablets three times a day and notified him to continue to keep blood pressure logs. Patient stated understanding and had no further questions.  Teodora Sellers RN

## 2022-07-01 NOTE — TELEPHONE ENCOUNTER
Patient called regarding problem with medication. Patient said that he was told to increase his dosage on Hydrosine.  He cant split the pill, when he does, it just breaks up    Patient would like call back at [phone]

## 2022-07-08 ENCOUNTER — TELEPHONE (OUTPATIENT)
Dept: CARDIOLOGY CLINIC | Age: 82
End: 2022-07-08

## 2022-07-12 RX ORDER — ISOSORBIDE MONONITRATE 30 MG/1
30 TABLET, EXTENDED RELEASE ORAL DAILY
Qty: 30 TABLET | Refills: 1 | Status: SHIPPED | OUTPATIENT
Start: 2022-07-12 | End: 2022-07-20 | Stop reason: SDUPTHER

## 2022-07-13 ENCOUNTER — OFFICE VISIT (OUTPATIENT)
Dept: NEUROLOGY | Age: 82
End: 2022-07-13
Payer: MEDICARE

## 2022-07-13 ENCOUNTER — OFFICE VISIT (OUTPATIENT)
Dept: NEUROLOGY | Age: 82
End: 2022-07-13

## 2022-07-13 DIAGNOSIS — R41.3 SHORT-TERM MEMORY LOSS: ICD-10-CM

## 2022-07-13 DIAGNOSIS — G31.84 MILD COGNITIVE IMPAIRMENT: Primary | ICD-10-CM

## 2022-07-13 DIAGNOSIS — R41.89 COGNITIVE DECLINE: ICD-10-CM

## 2022-07-13 DIAGNOSIS — F41.1 GENERALIZED ANXIETY DISORDER: ICD-10-CM

## 2022-07-13 DIAGNOSIS — R45.86 MOOD SWINGS: ICD-10-CM

## 2022-07-13 PROCEDURE — 1123F ACP DISCUSS/DSCN MKR DOCD: CPT | Performed by: CLINICAL NEUROPSYCHOLOGIST

## 2022-07-13 PROCEDURE — 90791 PSYCH DIAGNOSTIC EVALUATION: CPT | Performed by: CLINICAL NEUROPSYCHOLOGIST

## 2022-07-13 NOTE — PROGRESS NOTES
[address] [facility],[address]  [address]   [place] [address] [place] [address] [facility]   [place]    [phone] Office   [phone] Fax      Neuropsychology    Initial Diagnostic Interview Note      Referral:  Chintan López MD    Bharath Jeronimo is a 78 y.o. right handed  male who was accompanied by his spouse to the initial clinical interview on 7/13/22 . Please refer to his medical records for details pertaining to his history. At the start of the appointment, I reviewed the patient's [facility] Epic Chart (including Media scanned in from previous providers) for the active Problem List, all pertinent Past Medical Hx, medications, recent radiologic and laboratory findings. In addition, I reviewed pt's documented Immunization Record and Encounter History. The patient has a h/o chronic fatigue, DANILO (not started CPAP yet), HTN, HL, coronary artery disease (LHC on 4/9/20 showed 30% p-LAD, 30% m-LAD, 50% 2nd OM; good flow reserve), atrial fibrillation, paroxysmal, DCCV 3/2019 and s/p afib ablation (10/20/20 by Dr. Jovanni Chamberlain), not on chronic anticoagulation due to life-threatening GI bleed/hemorrhoids, s/p Watchman device in [place] 7/23/19, well seated by echo (10/2020), depression. He completed a [employer] degree from [place] without history of previously diagnosed LD and/or receipt of special education services. Retired . He has noticed a progressive decline in short term memory. Forgets the content of conversations. Msiaplces things. Starts tasks and does not complete. He is on CPAP which he was told will help his memory but doesn't seem to have had any effect. Short term memory is impacted. Long term memory is generally okay. He has no known meningitis/encephalitis, MED Fever, Lupus, Lyme, TBI, sz. He had a TIA a couple of years ago and was seen at [facility].  He has no issues with driving, medications, finances, day-to-day chores, ADLs. Spouse notes progressive decline. Definite stress, anxiety, highly demanding person, nelson at times. No med for cognition. He is on medication for mood. No previous neuropsych. Neuropsychological Mental Status Exam (NMSE):      Historian: Good  Praxis: No UE apraxia  R/L Orientation: Intact to self and to other  Dress: within normal limits   Weight: within normal limits   Appearance/Hygiene: within normal limits   Gait: within normal limits   Assistive Devices: Glasses  Mood: within normal limits   Affect: within normal limits   Comprehension: within normal limits   Thought Process: within normal limits   Expressive Language: within normal limits   Receptive Language: within normal limits   Motor:  No cognitive or motor perseveration  ETOH: rarely  Tobacco: not since he was 38  Marijuana: Denied  Illicit: Denied  SI/HI: Denied  Psychosis: Denied  Insight: Within normal limits  Judgment: Within normal limits  Other Psych:      Past Medical History:   Diagnosis Date    Atrial fibrillation (HCC)     Depression     GERD (gastroesophageal reflux disease)     Glaucoma     High cholesterol     IBS (irritable bowel syndrome)        Past Surgical History:   Procedure Laterality Date    HX CHOLECYSTECTOMY      HX HEMORRHOIDECTOMY      HX ORTHOPAEDIC      RIGHT knee    HX TONSILLECTOMY      OK CARDIOVERSION ELECTIVE ARRHYTHMIA EXTERNAL N/A 3/13/2019    EP CARDIOVERSION performed by Fercho Haddad MD at [address]       Allergies   Allergen Reactions    Sulfa (Sulfonamide Antibiotics) Unknown (comments)     From infancy, unsure if actually allergic         History reviewed. No pertinent family history.     Social History     Tobacco Use    Smoking status: Former Smoker     Quit date:      Years since quittin.5    Smokeless tobacco: Never Used   Substance Use Topics    Alcohol use: Yes     Comment: occasionally    Drug use: No       Current Outpatient Medications Medication Sig Dispense Refill    isosorbide mononitrate ER (IMDUR) 30 mg tablet Take 1 Tablet by mouth daily. 30 Tablet 1    hydrALAZINE (APRESOLINE) 50 mg tablet Take 2 Tablets by mouth three (3) times daily. 270 Tablet 1    amLODIPine (NORVASC) 5 mg tablet TAKE 1 TABLET BY MOUTH TWO TIMES A DAY. 180 Tablet 1    famotidine (PEPCID) 20 mg tablet TAKE 1 (ONE) TABLET TWICE A DAY, BEFORE MEALS      L.acid/L.casei/B.bif/B.guadalupe/FOS (PROBIOTIC BLEND PO) as directed.  OTHER,NON-FORMULARY, daily. Zinc      OTHER,NON-FORMULARY, 2 Tablets daily. Research Verified: Hair      pantoprazole (PROTONIX) 40 mg tablet Take 40 mg by mouth daily.  fluticasone propionate (FLONASE) 50 mcg/actuation nasal spray 2 Sprays by Both Nostrils route daily. (Patient taking differently: 2 Sprays by Both Nostrils route daily as needed.) 1 Each 0    Vyzulta 0.024 % drop PLACE 1 DROP INTO THE RIGHT EYE NIGHTLY      tafamidis (Vyndamax) 61 mg cap Take 61 mg by mouth daily.  L.acid/B.bifidum/B.animal/FOS (PROBIOTIC COMPLEX PO) Take  by mouth.  OTHER daily. SAME supplement      OTHER daily. soluber fiber 1 TBSP      OTHER daily. 3 TBSP Glucosamine/Chondroitin/MSM      OTHER daily. Super Digestive Enzymes      OTHER daily. Mirtogenol (bilberry, pycnogenol)      MAGNESIUM CITRATE PO Take  by mouth daily.  cholecalciferol (VITAMIN D3) (2,000 UNITS /50 MCG) cap capsule Take 1 Cap by mouth two (2) times a day. (Patient taking differently: Take 2,000 Units by mouth daily.) 60 Cap 3    busPIRone (BUSPAR) 15 mg tablet Take 15 mg by mouth two (2) times a day. Takes 25 mg total      atorvastatin (LIPITOR) 80 mg tablet Take 80 mg by mouth daily.  OTHER,NON-FORMULARY, two (2) times a day. Prostate health supplement       OTHER,NON-FORMULARY, cognitex      OTHER,NON-FORMULARY, DHEA      OTHER,NON-FORMULARY, two (2) times a day.  Bone Restore      OTHER,NON-FORMULARY, Horsetail (silica)      OTHER,NON-FORMULARY, Omega 3 - 2 caps in the am and 2 in the pm      OTHER,NON-FORMULARY, Bio-Curcumin (Turmeric) 1000 mg      co-enzyme Q-10 (Co Q-10) 100 mg capsule Take 100 mg by mouth two (2) times a day.  vit A/vit C/vit E/zinc/copper (PRESERVISION AREDS PO) Take  by mouth two (2) times a day.  therapeutic multivitamin (THERAGRAN) tablet Take 1 Tablet by mouth two (2) times a day.  dorzolamide-timolol (COSOPT) 22.3-6.8 mg/mL ophthalmic solution Administer 1 Drop to right eye every Sunday.  vortioxetine (BRINTELLIX) 10 mg tablet Take 10 mg by mouth daily. Plan:  Obtain authorization for testing from insurance company. Report to follow once testing, scoring, and interpretation completed. ? Organic based neurocognitive issues versus mood disorder or combination of same. ? Problems organic, functional, or both? This note will not be viewable in [address]

## 2022-07-15 ENCOUNTER — OFFICE VISIT (OUTPATIENT)
Dept: NEUROLOGY | Age: 82
End: 2022-07-15
Payer: MEDICARE

## 2022-07-15 DIAGNOSIS — F90.0 ATTENTION DEFICIT HYPERACTIVITY DISORDER (ADHD), INATTENTIVE TYPE, MODERATE: Chronic | ICD-10-CM

## 2022-07-15 DIAGNOSIS — R45.86 MOOD SWINGS: ICD-10-CM

## 2022-07-15 DIAGNOSIS — G31.84 MILD COGNITIVE IMPAIRMENT: Primary | ICD-10-CM

## 2022-07-15 DIAGNOSIS — F41.8 ANXIETY ABOUT HEALTH: ICD-10-CM

## 2022-07-15 PROCEDURE — 96137 PSYCL/NRPSYC TST PHY/QHP EA: CPT | Performed by: CLINICAL NEUROPSYCHOLOGIST

## 2022-07-15 PROCEDURE — 96132 NRPSYC TST EVAL PHYS/QHP 1ST: CPT | Performed by: CLINICAL NEUROPSYCHOLOGIST

## 2022-07-15 PROCEDURE — 96139 PSYCL/NRPSYC TST TECH EA: CPT | Performed by: CLINICAL NEUROPSYCHOLOGIST

## 2022-07-15 PROCEDURE — 96138 PSYCL/NRPSYC TECH 1ST: CPT | Performed by: CLINICAL NEUROPSYCHOLOGIST

## 2022-07-15 PROCEDURE — 96133 NRPSYC TST EVAL PHYS/QHP EA: CPT | Performed by: CLINICAL NEUROPSYCHOLOGIST

## 2022-07-15 PROCEDURE — 96136 PSYCL/NRPSYC TST PHY/QHP 1ST: CPT | Performed by: CLINICAL NEUROPSYCHOLOGIST

## 2022-07-15 NOTE — LETTER
7/18/2022    Patient: Carson Gallegos   YOB: 1940   Date of Visit: 7/15/2022     Trixie Vazquez MD  [address] [facility] [address]  Via Fax: [phone]    Dear Trixie Vazquez MD,      Thank you for referring Mr. Carson Gallegos to [facility] for evaluation. My notes for this consultation are attached. If you have questions, please do not hesitate to call me. I look forward to following your patient along with you.       Sincerely,    Luisana Zuniga PsyD

## 2022-07-18 NOTE — PROGRESS NOTES
[address] [facility],5Th Floor  [address]   [address] [place] [address] [facility]    [phone] Office   [phone] Fax      Neuropsychological Evaluation Report      Referral:  Cynthia Chaudhary MD    Peyton Hutton is a 78 y.o. right handed  male who was accompanied by his spouse to the initial clinical interview on 7/13/22 . Please refer to his medical records for details pertaining to his history. At the start of the appointment, I reviewed the patient's [facility] Epic Chart (including Media scanned in from previous providers) for the active Problem List, all pertinent Past Medical Hx, medications, recent radiologic and laboratory findings. In addition, I reviewed pt's documented Immunization Record and Encounter History. The patient has a h/o chronic fatigue, DANILO (not started CPAP yet), HTN, HL, coronary artery disease (LHC on 4/9/20 showed 30% p-LAD, 30% m-LAD, 50% 2nd OM; good flow reserve), atrial fibrillation, paroxysmal, DCCV 3/2019 and s/p afib ablation (10/20/20 by Dr. Haley Leal), not on chronic anticoagulation due to life-threatening GI bleed/hemorrhoids, s/p Watchman device in [place] 7/23/19, well seated by echo (10/2020), depression. He completed a AN[employer] degree from [place] without history of previously diagnosed LD and/or receipt of special education services. Retired . He has noticed a progressive decline in short term memory. Forgets the content of conversations. Msiaplces things. Starts tasks and does not complete. He is on CPAP which he was told will help his memory but doesn't seem to have had any effect. Short term memory is impacted. Long term memory is generally okay. He has no known meningitis/encephalitis, MED Fever, Lupus, Lyme, TBI, sz. He had a TIA a couple of years ago and was seen at [facility]. He has no issues with driving, medications, finances, day-to-day chores, ADLs. Spouse notes progressive decline. Definite stress, anxiety, highly demanding person, nelson at times. No med for cognition. He is on medication for mood. No previous neuropsych. Neuropsychological Mental Status Exam (NMSE):      Historian: Good  Praxis: No UE apraxia  R/L Orientation: Intact to self and to other  Dress: within normal limits   Weight: within normal limits   Appearance/Hygiene: within normal limits   Gait: within normal limits   Assistive Devices: Glasses  Mood: within normal limits   Affect: within normal limits   Comprehension: within normal limits   Thought Process: within normal limits   Expressive Language: within normal limits   Receptive Language: within normal limits   Motor:  No cognitive or motor perseveration  ETOH: rarely  Tobacco: not since he was 38  Marijuana: Denied  Illicit: Denied  SI/HI: Denied  Psychosis: Denied  Insight: Within normal limits  Judgment: Within normal limits  Other Psych:      Past Medical History:   Diagnosis Date    Atrial fibrillation (HCC)     Depression     GERD (gastroesophageal reflux disease)     Glaucoma     High cholesterol     IBS (irritable bowel syndrome)        Past Surgical History:   Procedure Laterality Date    HX CHOLECYSTECTOMY      HX HEMORRHOIDECTOMY      HX ORTHOPAEDIC      RIGHT knee    HX TONSILLECTOMY      SC CARDIOVERSION ELECTIVE ARRHYTHMIA EXTERNAL N/A 3/13/2019    EP CARDIOVERSION performed by Nate Hernandez MD at [facility]       Allergies   Allergen Reactions    Sulfa (Sulfonamide Antibiotics) Unknown (comments)     From infancy, unsure if actually allergic         History reviewed. No pertinent family history.     Social History     Tobacco Use    Smoking status: Former Smoker     Quit date:      Years since quittin.5    Smokeless tobacco: Never Used   Substance Use Topics    Alcohol use: Yes     Comment: occasionally    Drug use: No       Current Outpatient Medications   Medication Sig Dispense Refill    isosorbide mononitrate ER (IMDUR) 30 mg tablet Take 1 Tablet by mouth daily. 30 Tablet 1    hydrALAZINE (APRESOLINE) 50 mg tablet Take 2 Tablets by mouth three (3) times daily. 270 Tablet 1    amLODIPine (NORVASC) 5 mg tablet TAKE 1 TABLET BY MOUTH TWO TIMES A DAY. 180 Tablet 1    famotidine (PEPCID) 20 mg tablet TAKE 1 (ONE) TABLET TWICE A DAY, BEFORE MEALS      L.acid/L.casei/B.bif/B.guadalupe/FOS (PROBIOTIC BLEND PO) as directed.  OTHER,NON-FORMULARY, daily. Zinc      OTHER,NON-FORMULARY, 2 Tablets daily. Research Verified: Hair      pantoprazole (PROTONIX) 40 mg tablet Take 40 mg by mouth daily.  fluticasone propionate (FLONASE) 50 mcg/actuation nasal spray 2 Sprays by Both Nostrils route daily. (Patient taking differently: 2 Sprays by Both Nostrils route daily as needed.) 1 Each 0    Vyzulta 0.024 % drop PLACE 1 DROP INTO THE RIGHT EYE NIGHTLY      tafamidis (Vyndamax) 61 mg cap Take 61 mg by mouth daily.  L.acid/B.bifidum/B.animal/FOS (PROBIOTIC COMPLEX PO) Take  by mouth.  OTHER daily. SAME supplement      OTHER daily. soluber fiber 1 TBSP      OTHER daily. 3 TBSP Glucosamine/Chondroitin/MSM      OTHER daily. Super Digestive Enzymes      OTHER daily. Mirtogenol (bilberry, pycnogenol)      MAGNESIUM CITRATE PO Take  by mouth daily.  cholecalciferol (VITAMIN D3) (2,000 UNITS /50 MCG) cap capsule Take 1 Cap by mouth two (2) times a day. (Patient taking differently: Take 2,000 Units by mouth daily.) 60 Cap 3    busPIRone (BUSPAR) 15 mg tablet Take 15 mg by mouth two (2) times a day. Takes 25 mg total      atorvastatin (LIPITOR) 80 mg tablet Take 80 mg by mouth daily.  OTHER,NON-FORMULARY, two (2) times a day. Prostate health supplement       OTHER,NON-FORMULARY, cognitex      OTHER,NON-FORMULARY, DHEA      OTHER,NON-FORMULARY, two (2) times a day.  Bone Restore      OTHER,NON-FORMULARY, Horsetail (silica)      OTHER,NON-FORMULARY, Omega 3 - 2 caps in the am and 2 in the pm      OTHER,NON-FORMULARY, Bio-Curcumin (Turmeric) 1000 mg      co-enzyme Q-10 (Co Q-10) 100 mg capsule Take 100 mg by mouth two (2) times a day.  vit A/vit C/vit E/zinc/copper (PRESERVISION AREDS PO) Take  by mouth two (2) times a day.  therapeutic multivitamin (THERAGRAN) tablet Take 1 Tablet by mouth two (2) times a day.  dorzolamide-timolol (COSOPT) 22.3-6.8 mg/mL ophthalmic solution Administer 1 Drop to right eye every Sunday.  vortioxetine (BRINTELLIX) 10 mg tablet Take 10 mg by mouth daily. Plan:  Obtain authorization for testing from insurance company. Report to follow once testing, scoring, and interpretation completed. ? Organic based neurocognitive issues versus mood disorder or combination of same. ? Problems organic, functional, or both? This note will not be viewable in [address] Neuropsychological Test Results  Patient Testing 7/13/22 and 7/15/22 Report Completed 7/18/22  A Psychometrist Assisted w/ portions of this evaluation while under my direct  supervision    The following evaluation procedures/tests were administered:      Neuropsychologist Administered/Interpreted:  Neuropsychological Mental Status Exam, Revised Memory & Behavior Checklist,  Mini Mental Status Exam, Clock Drawing Test, Test Of Premorbid Functioning, Evangelista-Melzack Pain Questionnaire, History Taking  & Clinical Interview With The Patient, Additional History Taking w/ The Patient's Spouse, ABAS-3, CASE, TEREZA, CPT-III, Review Of Available Records. Psychometrist Administered under Neuropsychologist Supervision & Neuropsychologist Interpreted:  Verbal Fluency Tests, Jarrett & Jarrett - Revised, Trailmaking Test Parts A & B, Wechsler Adult Intelligence Scale - IV, [place] Learning Test - 3, Grooved Pegboard, Tobar Depression Inventory - II, Tobar Anxiety Inventory.      Test Findings:  Test Findings:  Note:  The patients raw data have been compared with currently available norms which include demographic corrections for age, gender, and/or education. Sometimes, the patients scores are compared to demographically similar individuals as close to the patients age, education level, etc., as possible. \"Average\" is viewed as being +/- 1 standard deviation (SD) from the stated mean for a particular test score. \"Low average\" is viewed as being between 1 and 2 SD below the mean, and above average is viewed as being 1 and 2 SD above the mean. Scores falling in the borderline range (between 1-1/2 and 2 SD below the mean) are viewed with particular attention as to whether they are normal or abnormal neurocognitive test scores. Other methods of inference in analyzing the test data are also utilized, including the pattern and range of scores in the profile, bilateral motor functions, and the presence, if any, of pathognomonic signs. Behaviorally, the patient was friendly and cooperative and appeared motivated to perform well during this examination. He was tested over two appointments. Within this context, the results of this evaluation are viewed as a valid reflection of the patients actual neurocognitive and emotional status. His MMSE score of 27/30 correct was normal.  In this regard, he was not oriented to date or county. Recall for 3 words after brief delay was 2/3 correct. Clock drawing was normal.      His structured word list fluency, as assessed by the FAS Test, was within the above average range with a T score of 67. Category fluency was mildly impaired with a T score of 36. Confrontation naming, as assessed by the [facility] as revised, was within the average range with a T score of 46. This pattern of performance is not indicative of a patient who is at increased risk for day-to-day problems with verbal fluency and confrontation naming.      The patient was administered the [facility] Continuous Performance Test - III,a computer administered test of sustained attention, and review of the subscales within this instrument revealed mild concerns for inattentiveness with impulsivity. This pattern of performance is indicative of a patient who is at increased risk for day-to-day problems with sustained visual attention/concentration. The patient is not showing problems with working memory capacity (90th %ile) or processing speed (95th %ile) on the WAIS-IV. His Verbal Comprehension Index score of 108 was average. His Perceptual Reasoning Index score of 109 was average. These scores do not reflect a decline in functioning based on an assessment of premorbid functioning. The patient was administered the [place] Verbal Learning Test  - 3 and generated a normal range and positive learning curve over 5 repeated auditory word list learning trials. An interference trial was normal.  Recall for the original word list was well within the normal range after both short and long delays. Recognition recall was normal.  Forced choice recall was normal, suggesting good effort. Taken together, this pattern performance is not indicative of a patient who is at increased risk for day-to-day problems with auditory learning and memory. Simple timed visual motor sequencing (Trailmaking Test Part A) was within the average range with a T score of 50. His performance on a similar, but more complex task of timed visual motor sequencing (Trailmaking Test Part B) was within the above average range with a T score of 60. He made 0 sequencing errors on this latter completed test.  Taken together, this pattern of performance is not indicative of a patient who is at increased risk for day-to-day problems with executive functioning. Fine motor dexterity was within the normal range bilaterally. This pattern of performance is not  indicative of a patient who is at increased risk for day-to-day problems with bilateral motor dexterity.        The patient rated his current level of pain as \"1/5 - Mild\" on the Evangelista-Melzack Pain Questionnaire. He reported pain in his      His Tobar Depression Inventory- II score of 3 was within the normal range. His Tobar Anxiety Inventory score of 5 reflected minimal anxiety. The patient was administered the Personality Assessment Inventory and generated a valid profile for interpretation, though some defensiveness in responding is noted. Within this context, this is a normal range personality profile without evidence of clinically significant psychopathology. He is self-assured, confident, and dominant. He is less likely to mix indiscriminately, preferring to interact with others in situations over which he can exercise some measure of control. The spouse completed the ABAS-3 and did not report clinically significant concerns regarding the patient's general adaptive skills (42nd %ile), conceptual skills (53rd %ile), social skills (37th %ile), or practical skills (47th %ile). On the CASE, the spouse did not report clinically significant concerns regarding the patient's cognitive functioning or mood. Impressions & Recommendations: This patient generated a predominantly normal range Neuropsychological Evaluation with respect to neurocognitive functioning. In this regard, he is showing moderate problems with sustained attention. Otherwise, his performances across all other neurocognitive domains assessed, including mental status, verbal fluency, confrontation naming, verbal comprehension, perceptual reasoning, working memory, processing speed, auditory learning, auditory memory, executive functioning, and bilateral motor skills are within or above the normal range. iQ superior. From an emotional standpoint, the patient denied clinically significant psychopathology. He does have some mood swings and anxiety about health, though. In my opinion, this profile is not consistent with MCI or dementia.   Instead, this is an individual with superior range IQ who has chronic underlying ADHD-inattentive issues previously masked by his high IQ now compounded by age. In addition to continued medical care, it may be wise to consider medication management for attention if is not medically contraindicated. Monitor emotional status/functioning over time. Otherwise, the patient should be encouraged to remain as mentally, physically, and socially active as possible. I am not concerned about competency/capacity, driving, day-to-day supervision, etc.  I wish him well. I hope that he and the family are reassured by this positive news. We have extensive baseline neurocognitive and psychologic data on him. Follow-up as needed. Clinical correlation is concourse, indicated. I will discuss these findings with the patient and family when they follow up with me in the near future. A follow up Neuropsychological Evaluation is indicated on a prn basis. 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 If there is any additional information of which I am currently unaware, I would be more than happy to review it upon having it made available to me. Thank you for the opportunity to see this interesting individual.     Sincerely,       Bari Pettit. Latasha Donald PsyD, EdS      Attachments:  IQ Test Results (In Media Section Of This EMR)    Cc: Kaitlin Rodriguez MD    Time Documentation:    33066*6 96703*7 (60 minutes)    96138 x 1  96139 x 5 Test Administration/Data Gathering By Technician: (3 hours).  36930 x 1 (first 30 minutes), 48767 x 5 (each additional 30 minutes)    96132 x 1  96133 x 1 Testing Evaluation Services by Neuropsychologist (1 hour, 50 minutes) 96132 x 1 (first hour), 96133 x 1 (50 minutes)    Definitions:      93969/41115: Neurobehavioral Status Exam, Clinical interview. Clinical assessment of thinking, reasoning and judgment, by neuropsychologist, both face to face time with patient and time interpreting those test results and reporting, first and subsequent hours)    93382/33049: Neuropsychological Test Administration by Technician/Psychometrist, first 30 minutes and each additional 30 minutes. The above includes: Record review. Review of history provided by patient. Review of collaborative information. Testing by Clinician. Review of raw data. Scoring. Report writing of individual tests administered by Clinician. Integration of individual tests administered by psychometrist with NSE/testing by clinician, review of records/history/collaborative information, case Conceptualization, treatment planning, clinical decision making, report writing, coordination Of Care. Psychometry test codes as time spent by psychometrist administering and scoring neurocognitive/psychological tests under supervision of neuropsychologist.  Integral services including scoring of raw data, data interpretation, case conceptualization, report writing etcetera were initiated after the patient finished testing/raw data collected and was completed on the date the report was signed. Please note that this dictation was completed with "[employer] the [employer] voice recognition software. Quite often unanticipated grammatical, syntax, homophones, and other interpretive errors are inadvertently transcribed by the computer software. Please disregard these errors. Please excuse any errors that have escaped final proofreading. Thank you.

## 2022-07-20 RX ORDER — ISOSORBIDE MONONITRATE 30 MG/1
30 TABLET, EXTENDED RELEASE ORAL DAILY
Qty: 90 TABLET | Refills: 1 | Status: SHIPPED | OUTPATIENT
Start: 2022-07-20 | End: 2022-08-10 | Stop reason: DRUGHIGH

## 2022-07-20 RX ORDER — HYDRALAZINE HYDROCHLORIDE 50 MG/1
100 TABLET, FILM COATED ORAL 3 TIMES DAILY
Qty: 540 TABLET | Refills: 1 | Status: SHIPPED | OUTPATIENT
Start: 2022-07-20

## 2022-07-20 NOTE — TELEPHONE ENCOUNTER
Requested Prescriptions     Signed Prescriptions Disp Refills    isosorbide mononitrate ER (IMDUR) 30 mg tablet 90 Tablet 1     Sig: Take 1 Tablet by mouth in the morning. Authorizing Provider: Ira Juarez     Ordering User: Nusrat Wolfe    hydrALAZINE (APRESOLINE) 50 mg tablet 540 Tablet 1     Sig: Take 2 Tablets by mouth three (3) times daily.      Authorizing Provider: Ira Juarez     Ordering User: Nusrat Wolfe

## 2022-07-22 ENCOUNTER — TELEPHONE (OUTPATIENT)
Dept: CARDIOLOGY CLINIC | Age: 82
End: 2022-07-22

## 2022-07-26 ENCOUNTER — TELEPHONE (OUTPATIENT)
Dept: CARDIOLOGY CLINIC | Age: 82
End: 2022-07-26

## 2022-07-26 NOTE — TELEPHONE ENCOUNTER
Fariha Castillo 19 minutes ago (3:39 PM)     GR    Received call from patient asking reason for taking medication/isosorbide mononitrate ER. Patient requesting call back. Called patient using two patient identifiers. Patient states that he was googling uses of isosorbide mononitrate and saw that it was used for chest pain. Patient states he does not have chronic chest pain so is not sure why he is on medication. Notified patient that medication was started to help with high blood pressure management in combination with hydralazine and amlodipine. Patient stated understanding. Patient also notes that he received a letter in the mail addressed to him from [facility] cardiology practice regarding and appt on 8/08/22 but is not sure as to what this is for or where to go. He states number listed on letter to call was [phone]. Patient states he called but did not receive any helpful information. Notified patient I will call tomorrow to follow up and will call patient back with further information. Patient stated understanding and had no further questions. Levi Calle RN       Called number provided by patient [phone] and spoke with cardiology  gomez. She states that patient was scheduled for 8/8/22 with Dr. Rajeev George at [facility] cardiology but that appt has since been cancelled. She states she will send a message to have someone call back to reschedule appt. Provided [facility] call back number. Will await return call.  Levi Calle RN

## 2022-07-26 NOTE — TELEPHONE ENCOUNTER
Received call from patient asking reason for taking medication/isosorbide mononitrate ER. Patient requesting call back.

## 2022-08-05 DIAGNOSIS — I50.9 CONGESTIVE HEART FAILURE, UNSPECIFIED HF CHRONICITY, UNSPECIFIED HEART FAILURE TYPE (HCC): ICD-10-CM

## 2022-08-05 DIAGNOSIS — I15.9 SECONDARY HYPERTENSION: ICD-10-CM

## 2022-08-05 RX ORDER — AMLODIPINE BESYLATE 5 MG/1
5 TABLET ORAL 2 TIMES DAILY
Qty: 180 TABLET | Refills: 0 | Status: SHIPPED | OUTPATIENT
Start: 2022-08-05 | End: 2022-10-10

## 2022-08-05 NOTE — TELEPHONE ENCOUNTER
Requested Prescriptions     Signed Prescriptions Disp Refills    amLODIPine (NORVASC) 5 mg tablet 180 Tablet 0     Sig: Take 1 Tablet by mouth two (2) times a day. Authorizing Provider: Myla Villeda     Ordering User: Brayan Hicks ov 5/18/22 next 09/07/22 VERONA Lott RN

## 2022-08-10 RX ORDER — ISOSORBIDE MONONITRATE 30 MG/1
30 TABLET, EXTENDED RELEASE ORAL 2 TIMES DAILY
Qty: 60 TABLET | Refills: 1 | Status: SHIPPED | OUTPATIENT
Start: 2022-08-10 | End: 2022-09-06

## 2022-08-15 RX ORDER — HYDRALAZINE HYDROCHLORIDE 50 MG/1
TABLET, FILM COATED ORAL
Qty: 270 TABLET | Refills: 1 | OUTPATIENT
Start: 2022-08-15

## 2022-08-16 ENCOUNTER — TELEPHONE (OUTPATIENT)
Dept: CARDIOLOGY CLINIC | Age: 82
End: 2022-08-16

## 2022-08-16 NOTE — TELEPHONE ENCOUNTER
Patient called looking for results from a visit with neurology. He has been waiting for a return call but feels since he told them to send the results to our office that they thought we would call them back.     He can be reached on his cell phone #[phone]

## 2022-08-17 NOTE — TELEPHONE ENCOUNTER
Patient called looking for results from a visit with neurology. He has been waiting for a return call but feels since he told them to send the results to our office that they thought we would call them back. He can be reached on his cell phone #[phone] patient using two patient identifiers. Notified patient that he will need to follow up with neurology regarding testing results. Patient states he received an e-mail that results would be shared and was confused of [address] [place] [address] office to contact. Notified patient that he should contact Dr. Jody Zamudio office to discuss testing due to being the ordering provider of testing. Provided patient with number to neurology office. Patient stated understanding and had no further questions.  Ibeth MACK

## 2022-08-30 ENCOUNTER — OFFICE VISIT (OUTPATIENT)
Dept: NEUROLOGY | Age: 82
End: 2022-08-30
Payer: MEDICARE

## 2022-08-30 DIAGNOSIS — F90.0 ATTENTION DEFICIT HYPERACTIVITY DISORDER (ADHD), INATTENTIVE TYPE, MODERATE: Chronic | ICD-10-CM

## 2022-08-30 DIAGNOSIS — F41.8 ANXIETY ABOUT HEALTH: ICD-10-CM

## 2022-08-30 DIAGNOSIS — G31.84 MILD COGNITIVE IMPAIRMENT: Primary | ICD-10-CM

## 2022-08-30 PROCEDURE — 1123F ACP DISCUSS/DSCN MKR DOCD: CPT | Performed by: CLINICAL NEUROPSYCHOLOGIST

## 2022-08-30 PROCEDURE — 90832 PSYTX W PT 30 MINUTES: CPT | Performed by: CLINICAL NEUROPSYCHOLOGIST

## 2022-08-30 NOTE — PROGRESS NOTES
Prior to seeing the patient I reviewed the records, including the previously completed report, the records in [place], and any updated visits from other providers since I saw the patient last.      Today, I engaged in a psychoeducational and supportive and cognitive/behavioral psychotherapy session with the patient. I provided psychotherapy in the form of psychoeducation and support with respect to the results of the recent Neuropsychological Evaluation, including discussing individual tests as well as patient's areas of neurocognitive strength versus weakness. Late to this appointment but was able to be seen today. We discussed, in detail, the following: This patient generated a predominantly normal range Neuropsychological Evaluation with respect to neurocognitive functioning. In this regard, he is showing moderate problems with sustained attention. Otherwise, his performances across all other neurocognitive domains assessed, including mental status, verbal fluency, confrontation naming, verbal comprehension, perceptual reasoning, working memory, processing speed, auditory learning, auditory memory, executive functioning, and bilateral motor skills are within or above the normal range. iQ superior. From an emotional standpoint, the patient denied clinically significant psychopathology. He does have some mood swings and anxiety about health, though. In my opinion, this profile is not consistent with MCI or dementia. Instead, this is an individual with superior range IQ who has chronic underlying ADHD-inattentive issues previously masked by his high IQ now compounded by age. In addition to continued medical care, it may be wise to consider medication management for attention if is not medically contraindicated. Monitor emotional status/functioning over time. Otherwise, the patient should be encouraged to remain as mentally, physically, and socially active as possible.   I am not concerned about competency/capacity, driving, day-to-day supervision, etc.  I wish him well. I hope that he and the family are reassured by this positive news. We have extensive baseline neurocognitive and psychologic data on him. Follow-up as needed. Clinical correlation is concourse, indicated. I will discuss these findings with the patient and family when they follow up with me in the near future. A follow up Neuropsychological Evaluation is indicated on a prn basis. 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 I also answered numerous questions related to the clinical findings, including discussing various methods to improve cognition and mood. Counseling provided regarding mood and cognition. CBT and supportive psychotherapy techniques were utilized. Supportive/Cognitive Behavioral/Solution Focused psychotherapy provided  Discussed rational versus irrational thinking patterns and their consequences. Discussed healthy/adaptive and unhealthy/maladaptive coping. The patient needs to follow with PCP for tx for attention and mood - counseling if needed.        The patient had the following concerns which I deferred to their referring provider: meds for mood/cognition      Time spent today: 20

## 2022-09-02 LAB — PSA, EXTERNAL: <0.04

## 2022-09-20 DIAGNOSIS — D47.2 MGUS (MONOCLONAL GAMMOPATHY OF UNKNOWN SIGNIFICANCE): Primary | ICD-10-CM

## 2022-09-21 ENCOUNTER — TELEPHONE (OUTPATIENT)
Dept: ONCOLOGY | Age: 82
End: 2022-09-21

## 2022-09-26 ENCOUNTER — TELEPHONE (OUTPATIENT)
Dept: CARDIOLOGY CLINIC | Age: 82
End: 2022-09-26

## 2022-09-26 NOTE — TELEPHONE ENCOUNTER
9/26 15:35  Returned patients call and patient did not answer. Left vm with [facility] call back number. Will await return call. Ibeth MACK     9/27 15:57     Called patient using two patient identifiers. Patient states that he is on phone with Ford Mcdaniel right now and could not speak. Notified patient that I will call back tomorrow to discuss bp log. He stated understanding and had no further questions. Francheska Mejia RN     9/28  Called patient using two patient identifiers. Patient reports that he is concerned regarding his blood pressure logs because he is seeing significant differences when taking his blood pressure. He states that he read the manual that came with blood pressure cuff and that it recommends calibration every 2 years. Notified patient to follow up with [facility] and to possibly take blood pressure cuff to [facility] pharmacy and see if someone might be able to help. Also told patient to bring cuff to follow up appt on 10/13 with Dr. Melanie Hernandez. Patient states that he will follow up with [facility] and bring cuff to next appt.  Patient provided bp logs as follows     9/14 161 lbs 139/69 (81) two hours later  pt did not take second bp   9/15 160 lbs 146/77 (79) 2 hrs later 138/69 (77)   9/16 160 lbs 150/75 (81) 2 hours later did not take second bp   9/17 162 lbs 144/78 (75 2 hours later did not take second bp   9/18 162 lbs 168/87 (78) 2 hours later did not take second bp  9/19 161 lbs 144/74 (74) 2 hours later 144/75 (81)   9/20 161 lbs 142/69 (91) 2 hours later  did not take second bp   9/22 162 lbs 131/72 (76) 2 hours later 122/52 (85)  9/23 165 lbs 149/76 (78) 2 hours later  148/76 (81)  9/24 165 lbs 140/78 (80) 2 hours later 157/75 (76)   9/25 165 lbs 136/77 (84) 2 hours later did not take second bp   9/26 165 lbs 124/72 (81) 2 hours later 130/67 (82)   9/27 164 lbs 128/74 (79) 2 hours later 129/66 (74)   9/28 165 lbs 139/80 (91) 2 hours later did not take second bp     Patient states that on several days he retook blood pressure within one minute and got significantly different numbers. Patient states he only wrote down systolic numbers on specific days that he took within one minute of original blood pressure and got a significant difference in numbers. Patient provided the following dates     9/15 128   9/16 130  9/18 162  9/19 139      Patient notes that he has been feeling colder since starting imdur. He states that it is not chills but just generally feeling colder in a room and that wearing more layers has helped. He states that he has not had any other symptoms of concern. He confirms starting imdur 30mg BID as this was most recent change in medication regimen. Notified patient that I will send provider a message and call back if any changes are needed to current regimen. Althea Franco Rn          10/05/22     Courtney Tinajero MD  You 23 hours ago (12:36 PM)     Valinda Holstein  Increase imdur to 60mg twice daily, watch for headaches, let us know BP x 3 days        Called patient using two patient identifiers. Notified patient on above information per Dr. Sammy Reyes. Patient notes that he would like for new medication dose to be sent to [id number] [place] [address] E mail order pharmacy. Advised patient that he can take two tablets twice a day of his current 30mg isosorbide mononitrate to equal the same dose until new dose of 60mg BID can be shipped. Educated patient to call back if he is having any dizziness, lightheadness, or other changes on new dose. Patient states he will call Monday with his blood pressure log. Patient stated understanding of instructions and had no further questions VERONA Dominguez RN

## 2022-09-26 NOTE — TELEPHONE ENCOUNTER
Voice message received from patient requesting [address] Roberts to return his call to discuss his BP records.

## 2022-10-05 RX ORDER — ISOSORBIDE MONONITRATE 60 MG/1
60 TABLET, EXTENDED RELEASE ORAL 2 TIMES DAILY
Qty: 60 TABLET | Refills: 1 | Status: SHIPPED | OUTPATIENT
Start: 2022-10-05 | End: 2022-10-17 | Stop reason: SDUPTHER

## 2022-10-10 ENCOUNTER — TELEPHONE (OUTPATIENT)
Dept: CARDIOLOGY CLINIC | Age: 82
End: 2022-10-10

## 2022-10-10 NOTE — TELEPHONE ENCOUNTER
Patient called in. Used two patient identifiers. Patient provided blood pressure log as follows. 10/6 165 lbs bp 126/65 (76) 2 hours later 122/62 (84)   10/7 166 lbs bp 126/67 (80) 2 hours later 114/63 (83)   10/8 164 lbs bp 127/73 (79) 2 hours later 131/71 (83)   10/9 160 lbs bp 131/77 (80) 2 hours later 129/71 (89)  10/10 161 lbs bp 119/71 (83) 2 hours later 139/74 (80)    Patient confirms he has started imdur 60mg BID. Patient denies any symptoms. Deena Patel RN     Patient seen in clinic 10/13/22 and reviewed with Guero Blum Np who recommended no changes at this time to medications.  Deena Patel RN

## 2022-10-11 ENCOUNTER — TELEPHONE (OUTPATIENT)
Dept: CARDIOLOGY CLINIC | Age: 82
End: 2022-10-11

## 2022-10-11 NOTE — TELEPHONE ENCOUNTER
Called patient to move appt up to 1:30pm instead on 2pm on 10/13/22;  Patient agreed to come in office at 1:30pm

## 2022-10-13 ENCOUNTER — OFFICE VISIT (OUTPATIENT)
Dept: CARDIOLOGY CLINIC | Age: 82
End: 2022-10-13
Payer: MEDICARE

## 2022-10-13 VITALS
DIASTOLIC BLOOD PRESSURE: 70 MMHG | HEIGHT: 72 IN | WEIGHT: 172.4 LBS | TEMPERATURE: 98.2 F | RESPIRATION RATE: 18 BRPM | BODY MASS INDEX: 23.35 KG/M2 | OXYGEN SATURATION: 97 % | HEART RATE: 72 BPM | SYSTOLIC BLOOD PRESSURE: 106 MMHG

## 2022-10-13 DIAGNOSIS — E55.9 VITAMIN D DEFICIENCY: ICD-10-CM

## 2022-10-13 DIAGNOSIS — R06.02 SHORTNESS OF BREATH: ICD-10-CM

## 2022-10-13 DIAGNOSIS — I50.9 CONGESTIVE HEART FAILURE, UNSPECIFIED HF CHRONICITY, UNSPECIFIED HEART FAILURE TYPE (HCC): Primary | ICD-10-CM

## 2022-10-13 DIAGNOSIS — R53.83 FATIGUE, UNSPECIFIED TYPE: ICD-10-CM

## 2022-10-13 DIAGNOSIS — D50.9 IRON DEFICIENCY ANEMIA, UNSPECIFIED IRON DEFICIENCY ANEMIA TYPE: ICD-10-CM

## 2022-10-13 DIAGNOSIS — E53.8 VITAMIN B12 DEFICIENCY: ICD-10-CM

## 2022-10-13 PROCEDURE — 1123F ACP DISCUSS/DSCN MKR DOCD: CPT | Performed by: NURSE PRACTITIONER

## 2022-10-13 PROCEDURE — G8536 NO DOC ELDER MAL SCRN: HCPCS | Performed by: NURSE PRACTITIONER

## 2022-10-13 PROCEDURE — G8420 CALC BMI NORM PARAMETERS: HCPCS | Performed by: NURSE PRACTITIONER

## 2022-10-13 PROCEDURE — 99000 SPECIMEN HANDLING OFFICE-LAB: CPT | Performed by: NURSE PRACTITIONER

## 2022-10-13 PROCEDURE — 99214 OFFICE O/P EST MOD 30 MIN: CPT | Performed by: NURSE PRACTITIONER

## 2022-10-13 PROCEDURE — G8427 DOCREV CUR MEDS BY ELIG CLIN: HCPCS | Performed by: NURSE PRACTITIONER

## 2022-10-13 PROCEDURE — G8432 DEP SCR NOT DOC, RNG: HCPCS | Performed by: NURSE PRACTITIONER

## 2022-10-13 PROCEDURE — 1101F PT FALLS ASSESS-DOCD LE1/YR: CPT | Performed by: NURSE PRACTITIONER

## 2022-10-13 NOTE — PROGRESS NOTES
[facility] in [place], [facility] Note    Patient name: Beryle Marek  Patient : 1940  Patient MRN: [id number]  Date of service: 10/13/22    Primary care physician: Katie Torrez MD  Primary general cardiologist:  Dr. Huma Trimble    Primary [facility] cardiologist: Nury Platt MD     CHIEF COMPLAINT:  Chronic systolic heart failure     PLAN OF CARE:  77 y/o with heart failure with preserved LVEF; wild type aTTR cardiac amyloidosis and co-existing MGUS (bone marrow biopsy was negative), stage 1 by Lindsey criteria on vyndamax  Continue routine cardiac surveillance every 3-4 months  Memory loss short- and long-term remains the key quality of life barrier; working on getting CPAP set up and neurocognitive/neurological evaluation scheduled 2022     RECOMMENDATIONS:  Continue tafamidis  Continue norvasc 5mg twice daily  Continue hydralazine 50mg TID   Avoid beta-blockers and ACEi/ARB/ARNi due to orthostasis and exercise induced lightheadedness  Does not require diuretics  Does not require allopurinol, uric acid normal  Reinforced low salt diet  Reinforced fluid restriction to 6 x 8oz glasses per day  Patient needs advanced care plan on file  Recommend physical activity; walking 30 minutes daily  Underwent neurocognitive testing and neurology evaluation for memory loss   Follow up with hematology annually for MGUS with Dr. Laurita Hartley to wear CPAP  Follow-up with primary cardiologist, Dr. Huma Trimble for CAD  Follow up with EP cardiologist, Dr. Tariq Quinonez   Follow-up with PCP for iron deficiency anemia, h/o positive hemoccult and abdominal symptoms  Referral to [facility] cardiac  Dr. 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 Findings consistent with moderate concentric hypertrophy. Normal wall motion. Grade II diastolic dysfunction with increased LAP. Aortic Valve: Mild stenosis of the aortic valve. Mean gradient of 17 mmhg. Mitral Valve: Mild regurgitation. Echo (5/18/21)  LV: Estimated LVEF is 50 - 55%. Normal cavity size, systolic function (ejection fraction normal) and diastolic function. Moderate concentric hypertrophy. Wall motion: unable to assess due to limited image quality. Normal left ventricular strain. LA: Moderately dilated left atrium. AV: With no significant stenosis. Mild aortic valve regurgitation is present. MV: Mitral valve non-specific thickening. Mild to moderate mitral valve regurgitation is present.   Image quality for this study was suboptimal.  IVS 1.39cm    CTA for EP mapping reviewed (10/6/20) Watchman device occlusive    Cardiac CTA (3/30/20) moderate diffuse CAD    Cardiac PET (3/7/19) normal, Rest EF 32 and stress 38    Echo (3/2020) LVEF 55%, IVSd 1.6cm, LV mass 312g increased, RV function normal, PA pressure 30, sclerotic AV without stenosis, trace TR, mild mR, IVC normal    EKG (9/2020) atrial fib, rate in 90s, low voltae, V1-V2 q wave    LHC (LHC (4/9/2020) LHC 30% p-LAD, 30% m-LAD, 50% 2nd OM FFR flow reserve 0.92 (4/9/20)  LHC (2011) tirvial luminal irregularities  Remainder in records (see in epic)     CPEST (5/3/22) Moderately reduced functional capacity at 15 ml/kg/minute oxygen uptake at maximum work load. The results are underestimated due to patient was not able to achieve anaerobic threshold. CPEST (12/15/20): MVO2 17.7 with RQ 1.7   6 Min Walk Report 11/2/2020   (PRE) HR 76   (PRE) O2 Sat 99   (POST)    (POST) O2 Sat 90   Distance in Meters 312.15              HISTORY OF PRESENT ILLNESS:  I had the pleasure of seeing Yulisa Siddiqui in [facility] at [facility] via telephone visit. Briefly, Yulisa Siddiqui is a 78 y.o. male with h/o chronic fatigue, DANILO (not started CPAP yet), HTN, HL, coronary artery disease (LHC on 4/9/20 showed 30% p-LAD, 30% m-LAD, 50% 2nd OM; good flow reserve), atrial fibrillation, paroxysmal, DCCV 3/2019 and s/p afib ablation (10/20/20 by Dr. Ron Parisi), not on chronic anticoagulation due to life-threatening GI bleed/hemorrhoids, s/p Watchman device in [place] 7/23/19, well seated by echo (10/2020), depression. Patient was referred by Dr. Ron Parisi to evaluated for possible HFpEF. Patient had PYP test suggestive of aTTR. 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 Bone marrow biopsy was negative (2/24/21); no evidence of increased/clonal plasma cells in patient with history of IgM MGUS, congo red stain on core biopsy is negative for amyloid deposition.  Troponin was negative and pro-NT-BNP in 400s, indicating stage 1 cardiac amyloidosis. Genetic studies were negative for inherited aTTR; suggesting this was wild type aTTR cardiac amyloidosis. CPEST (12/2020) revealed pVO2 17.7 consistent with excellent prognosis. Patient had sleep study which revealed DANILO and patient started CPAP therapy with some improvement of cognitive function per his daughter. INTERVAL HISTORY:  Today, patient presents for routine clinic visit. he reports doing well. He is feeling good in terms of his heart failure. His biggest concern is his short term memory loss. He denies any acute HF symptoms, is able to walk around and perform daily activities without limitation. He reports a good appetite, no LE edema. he is compliant with fluid restriction and taking medications as prescribed. Patient manages his own medications. REVIEW OF SYSTEMS:  Review of Systems   Constitutional:  Negative for chills, fever and malaise/fatigue. Respiratory:  Negative for cough and shortness of breath. Cardiovascular:  Negative for chest pain, palpitations, orthopnea and leg swelling. Gastrointestinal:  Negative for abdominal pain, diarrhea, heartburn, nausea and vomiting. Neurological:  Negative for dizziness and weakness. Psychiatric/Behavioral:  Positive for memory loss. PHYSICAL EXAM:  Visit Vitals  /70 (BP 1 Location: Left upper arm, BP Patient Position: Sitting, BP Cuff Size: Adult)   Pulse 72   Temp 98.2 °F (36.8 °C) (Oral)   Resp 18   Ht 6' (1.829 m)   Wt 172 lb 6.4 oz (78.2 kg)   SpO2 97%   BMI 23.38 kg/m²     Physical Exam  Vitals and nursing note reviewed. Constitutional:       General: He is not in acute distress. Appearance: Normal appearance. Cardiovascular:      Rate and Rhythm: Normal rate and regular rhythm. Pulses: Normal pulses. Heart sounds: Normal heart sounds. No murmur heard. No friction rub. No gallop. Pulmonary:      Effort: Pulmonary effort is normal. No respiratory distress. Breath sounds: Normal breath sounds. Abdominal:      General: Abdomen is flat. There is no distension. Palpations: Abdomen is soft. Tenderness: There is no abdominal tenderness. Skin:     General: Skin is warm and dry. Capillary Refill: Capillary refill takes less than 2 seconds. Neurological:      General: No focal deficit present. Mental Status: He is alert and oriented to person, place, and time. Psychiatric:         Mood and Affect: Mood normal.         Behavior: Behavior normal.          PAST MEDICAL HISTORY:  Past Medical History:   Diagnosis Date    Atrial fibrillation (HCC)     Depression     GERD (gastroesophageal reflux disease)     Glaucoma     High cholesterol     IBS (irritable bowel syndrome)        PAST SURGICAL HISTORY:  Past Surgical History:   Procedure Laterality Date    HX CHOLECYSTECTOMY      HX HEMORRHOIDECTOMY      HX ORTHOPAEDIC      RIGHT knee    HX TONSILLECTOMY      IN CARDIOVERSION ELECTIVE ARRHYTHMIA EXTERNAL N/A 3/13/2019    EP CARDIOVERSION performed by Opal Mullins MD at [address], [facility]/s [address] [facility]       FAMILY HISTORY:  History reviewed. No pertinent family history. SOCIAL HISTORY:  Social History     Socioeconomic History    Marital status:    Tobacco Use    Smoking status: Former     Types: Cigarettes     Quit date:      Years since quittin.8    Smokeless tobacco: Never   Vaping Use    Vaping Use: Never used   Substance and Sexual Activity    Alcohol use: Yes     Comment: Typically once monthly    Drug use: No    Sexual activity: Not Currently       LABORATORY RESULTS:  No flowsheet data found.       ALLERGY:  Allergies   Allergen Reactions    Sulfa (Sulfonamide Antibiotics) Unknown (comments)     From infancy, unsure if actually allergic          CURRENT MEDICATIONS:    Current Outpatient Medications:     amLODIPine (NORVASC) 5 mg tablet, TAKE 1 TABLET TWICE DAILY, Disp: 180 Tablet, Rfl: 0    isosorbide mononitrate ER (IMDUR) 60 mg CR tablet, Take 1 Tablet by mouth two (2) times a day. (Patient taking differently: Take 60 mg by mouth two (2) times a day. Taking 2 tablets twice daily), Disp: 60 Tablet, Rfl: 1    hydrALAZINE (APRESOLINE) 50 mg tablet, Take 2 Tablets by mouth three (3) times daily. , Disp: 540 Tablet, Rfl: 1    famotidine (PEPCID) 20 mg tablet, TAKE 1 (ONE) TABLET TWICE A DAY, BEFORE MEALS, Disp: , Rfl:     L.acid/L.casei/B.bif/B.guadalupe/FOS (PROBIOTIC BLEND PO), as directed., Disp: , Rfl:     OTHER,NON-FORMULARY,, daily. Zinc, Disp: , Rfl:     pantoprazole (PROTONIX) 40 mg tablet, Take 40 mg by mouth daily. , Disp: , Rfl:     Vyzulta 0.024 % drop, PLACE 1 DROP INTO THE RIGHT EYE NIGHTLY, Disp: , Rfl:     tafamidis (Vyndamax) 61 mg cap, Take 61 mg by mouth daily. , Disp: , Rfl:     L.acid/B.bifidum/B.animal/FOS (PROBIOTIC COMPLEX PO), Take  by mouth., Disp: , Rfl:     OTHER, daily. SAME supplement, Disp: , Rfl:     OTHER, daily. soluber fiber 1 TBSP, Disp: , Rfl:     OTHER, daily. 3 TBSP Glucosamine/Chondroitin/MSM, Disp: , Rfl:     OTHER, daily. Super Digestive Enzymes, Disp: , Rfl:     OTHER, daily. Mirtogenol (bilberry, pycnogenol), Disp: , Rfl:     MAGNESIUM CITRATE PO, Take  by mouth daily. , Disp: , Rfl:     cholecalciferol (VITAMIN D3) (2,000 UNITS /50 MCG) cap capsule, Take 1 Cap by mouth two (2) times a day. (Patient taking differently: Take 2,000 Units by mouth daily.), Disp: 60 Cap, Rfl: 3    busPIRone (BUSPAR) 15 mg tablet, Take 15 mg by mouth two (2) times a day. Takes 25 mg total, Disp: , Rfl:     atorvastatin (LIPITOR) 80 mg tablet, Take 80 mg by mouth daily. , Disp: , Rfl:     OTHER,NON-FORMULARY,, two (2) times a day. Prostate health supplement , Disp: , Rfl:     OTHER,NON-FORMULARY,, cognitex, Disp: , Rfl:     OTHER,NON-FORMULARY,, DHEA, Disp: , Rfl:     OTHER,NON-FORMULARY,, two (2) times a day.  Bone Restore, Disp: , Rfl:     OTHER,NON-FORMULARY,, Horsetail (silica), Disp: , Rfl:     OTHER,NON-FORMULARY,, Omega 3 - 2 caps in the am and 2 in the pm, Disp: , Rfl:     OTHER,NON-FORMULARY,, Bio-Curcumin (Turmeric) 1000 mg, Disp: , Rfl:     co-enzyme Q-10 (CO Q-10) 100 mg capsule, Take 100 mg by mouth two (2) times a day., Disp: , Rfl:     vit A/vit C/vit E/zinc/copper (PRESERVISION AREDS PO), Take  by mouth two (2) times a day., Disp: , Rfl:     therapeutic multivitamin (THERAGRAN) tablet, Take 1 Tablet by mouth two (2) times a day., Disp: , Rfl:     dorzolamide-timolol (COSOPT) 22.3-6.8 mg/mL ophthalmic solution, Administer 1 Drop to right eye every Sunday. , Disp: , Rfl:     vortioxetine (TRINTELLIX) 10 mg tablet, Take 10 mg by mouth daily. , Disp: , Rfl:     OTHER,NON-FORMULARY,, 2 Tablets daily. Research Verified: Hair (Patient not taking: Reported on 10/13/2022), Disp: , Rfl:     fluticasone propionate (FLONASE) 50 mcg/actuation nasal spray, 2 Sprays by Both Nostrils route daily. (Patient not taking: Reported on 10/13/2022), Disp: 1 Each, Rfl: 0    Thank you for your referral and allowing me to participate in this patient's care.     Sabina Bauer NP  [address], [address] [place] [address]  Office [phone]  Fax [phone]      PATIENT CARE TEAM:  Patient Care Team:  Lolita Ross MD as PCP - General (Family Medicine)  Christina Page MD (Sleep Medicine Physician)  Azeem Ulloa PsyD (Psychology)  Bettie Guzman MD ([address] [place] [address] Vascular Surgery)

## 2022-10-13 NOTE — PATIENT INSTRUCTIONS
Look into the book, The End of Alzheimer's by Dr. Woody Zelaya    Medication changes:    none    Please take this to your pharmacy to notify them of the change in medications. Testing Ordered:    Lab work has been drawn today. You will be contacted with any abnormal results requiring changes to your current plan of care. Other Recommendations:        Please call and schedule your appointment with Sleep Medicine. You can call one of the numbers listed below. [facility] [place]. [place], [address]  Tel.  [phone]  Fax. [facility]  [place], [address] [facility]  Tel.  [phone]  Fax. [phone] [address] [address]  Tel.  [phone]  Fax. [phone]         Ensure your drinking an adequate amount of water with a goal of 6-8 eight ounce glasses (1.5-2 liters) of fluid daily. Your urine should be clear and light yellow straw colored. If your blood pressure begins to consistently run below 90/60 and/or you begin to experience dizziness or lightheadedness, please contact the [facility] at [phone]. Follow up in 4 months with NP with [facility]      Please monitor your weights daily upon waking and after using the bathroom. Keep a written records of your weights and bring to your next appointment. If you have a weight gain of 3 or more pounds overnight OR 5 or more pounds in one week please contact our office. Thank you for allowing us the privilege of being a part of your healthcare team! Please do not hesitate to contact our office at [phone] with any questions or concerns. Virtual Heart Failure [address] invites you to learn more about heart failure and to share your questions, ideas, and experiences with others.  Each month, the Heart Failure Support Group features a new educational topic and a guest speaker, followed by an interactive discussion. Our Heart Failure Nurse Navigator will moderate each session. You will be able to participate by phone, tablet or computer through [employer]. This support group takes place on the 3rd Thursday of each month from 6:00-7:30PM. All individuals living with heart failure and their caregivers are welcome to join. If you are interested in participating, please contact us at [email or web address] and you will be sent the link to join the [employer].

## 2022-10-14 LAB
25(OH)D3 SERPL-MCNC: 35.5 NG/ML (ref 30–100)
ALBUMIN SERPL-MCNC: 3.8 G/DL (ref 3.5–5)
ALBUMIN/GLOB SERPL: 1 {RATIO} (ref 1.1–2.2)
ALP SERPL-CCNC: 61 U/L (ref 45–117)
ALT SERPL-CCNC: 33 U/L (ref 12–78)
ANION GAP SERPL CALC-SCNC: 6 MMOL/L (ref 5–15)
AST SERPL-CCNC: 27 U/L (ref 15–37)
BILIRUB SERPL-MCNC: 0.5 MG/DL (ref 0.2–1)
BNP SERPL-MCNC: 236 PG/ML
BUN SERPL-MCNC: 19 MG/DL (ref 6–20)
BUN/CREAT SERPL: 16 (ref 12–20)
CALCIUM SERPL-MCNC: 9.6 MG/DL (ref 8.5–10.1)
CHLORIDE SERPL-SCNC: 109 MMOL/L (ref 97–108)
CO2 SERPL-SCNC: 23 MMOL/L (ref 21–32)
CREAT SERPL-MCNC: 1.18 MG/DL (ref 0.7–1.3)
ERYTHROCYTE [DISTWIDTH] IN BLOOD BY AUTOMATED COUNT: 13.4 % (ref 11.5–14.5)
FERRITIN SERPL-MCNC: 84 NG/ML (ref 26–388)
FOLATE SERPL-MCNC: 48.9 NG/ML (ref 5–21)
GLOBULIN SER CALC-MCNC: 3.9 G/DL (ref 2–4)
GLUCOSE SERPL-MCNC: 138 MG/DL (ref 65–100)
HCT VFR BLD AUTO: 35.9 % (ref 36.6–50.3)
HGB BLD-MCNC: 11.7 G/DL (ref 12.1–17)
IRON SATN MFR SERPL: 30 % (ref 20–50)
IRON SERPL-MCNC: 99 UG/DL (ref 35–150)
MAGNESIUM SERPL-MCNC: 2.2 MG/DL (ref 1.6–2.4)
MCH RBC QN AUTO: 30.3 PG (ref 26–34)
MCHC RBC AUTO-ENTMCNC: 32.6 G/DL (ref 30–36.5)
MCV RBC AUTO: 93 FL (ref 80–99)
NRBC # BLD: 0 K/UL (ref 0–0.01)
NRBC BLD-RTO: 0 PER 100 WBC
PLATELET # BLD AUTO: 223 K/UL (ref 150–400)
PMV BLD AUTO: 10.7 FL (ref 8.9–12.9)
POTASSIUM SERPL-SCNC: 4.1 MMOL/L (ref 3.5–5.1)
PROT SERPL-MCNC: 7.7 G/DL (ref 6.4–8.2)
RBC # BLD AUTO: 3.86 M/UL (ref 4.1–5.7)
SODIUM SERPL-SCNC: 138 MMOL/L (ref 136–145)
T3FREE SERPL-MCNC: 2 PG/ML (ref 2.2–4)
T4 FREE SERPL-MCNC: 0.8 NG/DL (ref 0.8–1.5)
TIBC SERPL-MCNC: 331 UG/DL (ref 250–450)
TSH SERPL DL<=0.05 MIU/L-ACNC: 1.95 UIU/ML (ref 0.36–3.74)
VIT B12 SERPL-MCNC: 392 PG/ML (ref 193–986)
WBC # BLD AUTO: 5.2 K/UL (ref 4.1–11.1)

## 2022-10-17 RX ORDER — ISOSORBIDE MONONITRATE 60 MG/1
60 TABLET, EXTENDED RELEASE ORAL 2 TIMES DAILY
Qty: 60 TABLET | Refills: 2 | Status: SHIPPED | OUTPATIENT
Start: 2022-10-17

## 2022-10-17 RX ORDER — ISOSORBIDE MONONITRATE 60 MG/1
TABLET, EXTENDED RELEASE ORAL
Qty: 120 TABLET | OUTPATIENT
Start: 2022-10-17

## 2022-10-17 NOTE — TELEPHONE ENCOUNTER
Requested Prescriptions     Signed Prescriptions Disp Refills    isosorbide mononitrate ER (IMDUR) 60 mg CR tablet 60 Tablet 2     Sig: Take 1 Tablet by mouth two (2) times a day.      Authorizing Provider: Justina Sarah     Ordering User: Antonio Ng

## 2022-12-07 ENCOUNTER — TELEPHONE (OUTPATIENT)
Dept: CARDIOLOGY CLINIC | Age: 82
End: 2022-12-07

## 2022-12-07 NOTE — LETTER
12/15/2022 9:34 AM    [facility] [id number]      Dear Mr. Diane Grubbs,     We have been trying to reach you by phone regarding your annual enrollment for vyndamax, but have been unable to contact you. Please call us at [phone] option 2.     Thank you,  Advanced Heart Failure Team

## 2022-12-07 NOTE — TELEPHONE ENCOUNTER
12/7/22 Called patient in regards to completing vyndamax application. No answer. Left vm with [facility] call back number. Will await return call. Lorenza Solorio RN.    12/8/22     Called patient in regards to above information. Left vm. Will await return call. Lorenza Solorio RN     12/13/22  Called patient with no answer. Will await return call . M. Kaylene Crigler RN       12/14/22  Called patient with no answer. Left voicemail with [facility] call back number. Will await return call. Lorenza Solorio RN      12/15/22   Letter mailed to patients home address.  Teresita Herrera RN

## 2022-12-20 ENCOUNTER — TELEPHONE (OUTPATIENT)
Dept: CARDIOLOGY CLINIC | Age: 82
End: 2022-12-20

## 2023-01-12 ENCOUNTER — TELEPHONE (OUTPATIENT)
Dept: CARDIOLOGY CLINIC | Age: 83
End: 2023-01-12

## 2023-01-12 NOTE — TELEPHONE ENCOUNTER
Returned patients call using two patient identifiers. Patient states that he was calling in regards to his blood pressure logs. Patient states that it is very cumbersome to take blood pressures daily and would like to know why its recommended especially for twice a day. Educated patient that we have patients take blood pressures twice a day because one is to be taken before patients take medication and one to be taken two hours after medications are taken so that we can determine how patients medications affect blood pressure. Patient states understanding. Patient states that he will likely not take blood pressures everyday but will try to take them several times a week. Patient also notes that he has had some troubles with his short term memory recently. Advised patient to follow up with Dr. Magan Loving office which completed patients neurocog testing. Patient states he will follow up and has their office number to call. Asked patient if [employer] patient assistance application had been completed. Patient states that he has not sent application in as it is not complete. He states that the application is very detailed and hard to understand. Offered for patient to come in during clinic hours so that a staff member could help complete application and answer questions. Patient states he will consider that and come in if additional help is needed. Notified patient that if application is sent in from home to notify our office so that the prescriber portion will be faxed to patient assistance to complete application. He stated understanding and had no further questions at this time.  Serena Garcia RN

## 2023-01-19 ENCOUNTER — TELEPHONE (OUTPATIENT)
Dept: CARDIOLOGY CLINIC | Age: 83
End: 2023-01-19

## 2023-01-19 NOTE — TELEPHONE ENCOUNTER
Returned patient call using two patient identifiers. Patient states that he has mailed in his [employer] patient assistance last friday and he was calling to let our office know. Advised patient that we will send in provider portion and will call once we receive decision from [employer]. He stated understanding and had no further questions. Margarita Alanis RN     Provider portion of [employer] patient assistance faxed.  Margarita Alanis RN

## 2023-02-09 ENCOUNTER — TELEPHONE (OUTPATIENT)
Dept: CARDIOLOGY CLINIC | Age: 83
End: 2023-02-09

## 2023-02-09 NOTE — TELEPHONE ENCOUNTER
Received call from [facility] [place] stating they have been unable to get in touch with patient to verify his income.   I called patient and left vm for him to return call to give him number to [phone]

## 2023-02-17 ENCOUNTER — OFFICE VISIT (OUTPATIENT)
Dept: CARDIOLOGY CLINIC | Age: 83
End: 2023-02-17

## 2023-02-17 VITALS
SYSTOLIC BLOOD PRESSURE: 124 MMHG | HEIGHT: 72 IN | BODY MASS INDEX: 23.16 KG/M2 | TEMPERATURE: 97.7 F | WEIGHT: 171 LBS | RESPIRATION RATE: 16 BRPM | HEART RATE: 88 BPM | OXYGEN SATURATION: 98 % | DIASTOLIC BLOOD PRESSURE: 70 MMHG

## 2023-02-17 DIAGNOSIS — R53.83 FATIGUE, UNSPECIFIED TYPE: ICD-10-CM

## 2023-02-17 DIAGNOSIS — I50.9 CONGESTIVE HEART FAILURE, UNSPECIFIED HF CHRONICITY, UNSPECIFIED HEART FAILURE TYPE (HCC): Primary | ICD-10-CM

## 2023-02-17 NOTE — PATIENT INSTRUCTIONS
Medication changes:    No medication changes     Lisbeth recommends the following supplements:     Add on LifeExtensions Only Trace Minerals, 1 capsule daily to help support thyroid function    Add on [employer]Extensions B12 Elite, 1 lozenge per day. Allow the lozenge to dissolve in the mouth- do not chew and swallow. Eat 1-2 [place] Nuts daily for food source of selenium to support thyroid function. Please take this to your pharmacy to notify them of the change in medications. Testing Ordered: Other Recommendations:            Please call and schedule your appointment with Sleep Medicine. You can call one of the numbers listed below. [address] [place], [address]  Tel.  [phone]  Fax. [address] [facility] 60  [facility], [address] [facility]  Tel.  [phone]  Fax. [phone]          Ensure your drinking an adequate amount of water with a goal of 6-8 eight ounce glasses (1.5-2 liters) of fluid daily. Your urine should be clear and light yellow straw colored. If your blood pressure begins to consistently run below 90/60 and/or you begin to experience dizziness or lightheadedness, please contact the Maribel Phipps [facility] at [phone]. Follow up 6 months with [facility]      Please monitor your weights daily upon waking and after using the bathroom. Keep a written records of your weights and bring to your next appointment. If you have a weight gain of 3 or more pounds overnight OR 5 or more pounds in one week please contact our office. Thank you for allowing us the privilege of being a part of your healthcare team! Please do not hesitate to contact our office at [phone] with any questions or concerns. Virtual Heart Failure [address] invites you to learn more about heart failure and to share your questions, ideas, and experiences with others.  Each month, the Heart Failure Support Group features a new educational topic and a guest speaker, followed by an interactive discussion. Our Heart Failure Nurse Navigator will moderate each session. You will be able to participate by phone, tablet or computer through [employer]. This support group takes place on the 3rd Thursday of each month from 6:00-7:30PM. All individuals living with heart failure and their caregivers are welcome to join. If you are interested in participating, please contact us at [email or web address] and you will be sent the link to join the [employer].

## 2023-02-17 NOTE — PROGRESS NOTES
[facility] in [facility], [address] Note    Patient name: Emily Owens  Patient : 1940  Patient MRN: [id number]  Date of service: 23    Primary care physician: Jacy Rock MD  Primary general cardiologist:  Dr. Aroldo Levy    Primary [facility] cardiologist: Alva Stewart MD     CHIEF COMPLAINT:  Chronic systolic heart failure     PLAN OF CARE:  79 y/o with heart failure with preserved LVEF; wild type aTTR cardiac amyloidosis and co-existing MGUS (bone marrow biopsy was negative), stage 1 by Lindsey criteria on vyndamax  Continue routine cardiac surveillance every 3-4 months  Memory loss short- and long-term remains the key quality of life barrier; working on getting CPAP optimized     RECOMMENDATIONS:  Continue tafamidis  Continue norvasc 5mg twice daily  Continue hydralazine 50mg TID   Avoid beta-blockers and ACEi/ARB/ARNi due to orthostasis and exercise induced lightheadedness  Ok to monitor BP at home twice per week  Does not require diuretics  Does not require allopurinol, uric acid normal  Recommend B-12 lozenge supplement, 1000mcg daily  Recommend trace minerals complex and adding in [place] nuts to diet for nutrient support to optimize thyroid function  Reinforced low salt diet  Reinforced fluid restriction to 6 x 8oz glasses per day  Patient needs advanced care plan on file  Recommend physical activity; walking 30 minutes daily  Underwent neurocognitive testing and neurology evaluation for memory loss   Follow up with hematology annually for MGUS with Dr. Sean Concepcion to wear CPAP- recommend referral to Sleep Medicine for titration study  Follow-up with primary cardiologist, Dr. Aroldo Levy  Follow up with EP cardiologist, Dr. Hamida Fernandez with PCP for iron deficiency anemia, h/o positive hemoccult and abdominal symptoms  Referred previously to [facility] cardiac  Dr. 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 Findings consistent with moderate concentric hypertrophy. Normal wall motion. Grade II diastolic dysfunction with increased LAP. Aortic Valve: Mild stenosis of the aortic valve. Mean gradient of 17 mmhg. Mitral Valve: Mild regurgitation. Echo (5/18/21)  LV: Estimated LVEF is 50 - 55%. Normal cavity size, systolic function (ejection fraction normal) and diastolic function. Moderate concentric hypertrophy. Wall motion: unable to assess due to limited image quality. Normal left ventricular strain. LA: Moderately dilated left atrium. AV: With no significant stenosis. Mild aortic valve regurgitation is present.   MV: Mitral valve non-specific thickening. Mild to moderate mitral valve regurgitation is present. Image quality for this study was suboptimal.  IVS 1.39cm    CTA for EP mapping reviewed (10/6/20) Watchman device occlusive    Cardiac CTA (3/30/20) moderate diffuse CAD    Cardiac PET (3/7/19) normal, Rest EF 32 and stress 38    Echo (3/2020) LVEF 55%, IVSd 1.6cm, LV mass 312g increased, RV function normal, PA pressure 30, sclerotic AV without stenosis, trace TR, mild mR, IVC normal    EKG (9/2020) atrial fib, rate in 90s, low voltae, V1-V2 q wave    LHC (LHC (4/9/2020) LHC 30% p-LAD, 30% m-LAD, 50% 2nd OM FFR flow reserve 0.92 (4/9/20)  [facility] (2011) tirvial luminal irregularities  Remainder in records (see in epic)     CPEST (5/3/22) Moderately reduced functional capacity at 15 ml/kg/minute oxygen uptake at maximum work load. The results are underestimated due to patient was not able to achieve anaerobic threshold. CPEST (12/15/20): MVO2 17.7 with RQ 1.7   6 Min Walk Report 11/2/2020   (PRE) HR 76   (PRE) O2 Sat 99   (POST)    (POST) O2 Sat 90   Distance in Meters 312.15              HISTORY OF PRESENT ILLNESS:  I had the pleasure of seeing Rina Norman in [facility] at [address] [place] [address] via telephone visit. Briefly, Rina Norman is a 78 y.o. male with h/o chronic fatigue, DANILO (not started CPAP yet), HTN, HL, coronary artery disease (LHC on 4/9/20 showed 30% p-LAD, 30% m-LAD, 50% 2nd OM; good flow reserve), atrial fibrillation, paroxysmal, DCCV 3/2019 and s/p afib ablation (10/20/20 by Dr. Ulises Harry), not on chronic anticoagulation due to life-threatening GI bleed/hemorrhoids, s/p Watchman device in [place] 7/23/19, well seated by echo (10/2020), depression. Patient was referred by Dr. Ulises Harry to evaluated for possible HFpEF. Patient had PYP test suggestive of aTTR. 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  Bone marrow biopsy was negative (2/24/21); no evidence of increased/clonal plasma cells in patient with history of IgM MGUS, congo red stain on core biopsy is negative for amyloid deposition. Troponin was negative and pro-NT-BNP in 400s, indicating stage 1 cardiac amyloidosis. Genetic studies were negative for inherited aTTR; suggesting this was wild type aTTR cardiac amyloidosis. CPEST (12/2020) revealed pVO2 17.7 consistent with excellent prognosis. Patient had sleep study which revealed DANILO and patient started CPAP therapy with some improvement of cognitive function per his daughter. INTERVAL HISTORY:  Today, patient presents for routine clinic visit. he reports doing well. He is feeling good in terms of his heart failure. His biggest concern is his short term memory loss. He denies any acute HF symptoms, is able to walk around and perform daily activities without limitation. He reports a good appetite, no LE edema. he is compliant with fluid restriction and taking medications as prescribed. Patient manages his own medications. REVIEW OF SYSTEMS:  Review of Systems   Constitutional:  Negative for chills, fever and malaise/fatigue. Respiratory:  Negative for cough and shortness of breath. Cardiovascular:  Negative for chest pain, palpitations, orthopnea and leg swelling. Gastrointestinal:  Negative for abdominal pain, diarrhea, heartburn, nausea and vomiting. Neurological:  Negative for dizziness and weakness. Psychiatric/Behavioral:  Positive for memory loss. PHYSICAL EXAM:  Visit Vitals  /70 (BP 1 Location: Left upper arm, BP Patient Position: Sitting, BP Cuff Size: Adult)   Pulse 88   Temp 97.7 °F (36.5 °C) (Oral)   Resp 16   Ht 6' (1.829 m)   Wt 171 lb (77.6 kg)   SpO2 98%   BMI 23.19 kg/m²     Physical Exam  Vitals and nursing note reviewed. Constitutional:       General: He is not in acute distress. Appearance: Normal appearance.    Cardiovascular:      Rate and Rhythm: Normal rate and regular rhythm. Pulses: Normal pulses. Heart sounds: Normal heart sounds. No murmur heard. No friction rub. No gallop. Pulmonary:      Effort: Pulmonary effort is normal. No respiratory distress. Breath sounds: Normal breath sounds. Abdominal:      General: Abdomen is flat. There is no distension. Palpations: Abdomen is soft. Tenderness: There is no abdominal tenderness. Skin:     General: Skin is warm and dry. Capillary Refill: Capillary refill takes less than 2 seconds. Neurological:      General: No focal deficit present. Mental Status: He is alert and oriented to person, place, and time. Psychiatric:         Mood and Affect: Mood normal.         Behavior: Behavior normal.          PAST MEDICAL HISTORY:  Past Medical History:   Diagnosis Date    Atrial fibrillation (HCC)     Depression     GERD (gastroesophageal reflux disease)     Glaucoma     High cholesterol     IBS (irritable bowel syndrome)        PAST SURGICAL HISTORY:  Past Surgical History:   Procedure Laterality Date    HX CHOLECYSTECTOMY      HX HEMORRHOIDECTOMY      HX ORTHOPAEDIC      RIGHT knee    HX TONSILLECTOMY      WI CARDIOVERSION ELECTIVE ARRHYTHMIA EXTERNAL N/A 3/13/2019    EP CARDIOVERSION performed by Sheri Best MD at [facility], [facility]/s [facility]       FAMILY HISTORY:  History reviewed. No pertinent family history. SOCIAL HISTORY:  Social History     Socioeconomic History    Marital status:    Tobacco Use    Smoking status: Former     Types: Cigarettes     Quit date:      Years since quittin.1    Smokeless tobacco: Never   Vaping Use    Vaping Use: Never used   Substance and Sexual Activity    Alcohol use: Yes     Comment: Typically once monthly    Drug use: No    Sexual activity: Not Currently       LABORATORY RESULTS:  No flowsheet data found.       ALLERGY:  Allergies   Allergen Reactions    Sulfa (Sulfonamide Antibiotics) Unknown (comments)     From infancy, unsure if actually allergic          CURRENT MEDICATIONS:    Current Outpatient Medications:     isosorbide mononitrate ER (IMDUR) 60 mg CR tablet, TAKE 1 TABLET TWICE DAILY, Disp: 180 Tablet, Rfl: 1    amLODIPine (NORVASC) 5 mg tablet, TAKE 1 TABLET TWICE DAILY, Disp: 180 Tablet, Rfl: 0    hydrALAZINE (APRESOLINE) 50 mg tablet, Take 2 Tablets by mouth three (3) times daily. , Disp: 540 Tablet, Rfl: 1    famotidine (PEPCID) 20 mg tablet, TAKE 1 (ONE) TABLET TWICE A DAY, BEFORE MEALS, Disp: , Rfl:     L.acid/L.casei/B.bif/B.guadalupe/FOS (PROBIOTIC BLEND PO), as directed., Disp: , Rfl:     OTHER,NON-FORMULARY,, daily. Zinc, Disp: , Rfl:     OTHER,NON-FORMULARY,, 2 Tablets daily. Research Verified: Hair, Disp: , Rfl:     pantoprazole (PROTONIX) 40 mg tablet, Take 40 mg by mouth daily. , Disp: , Rfl:     Vyzulta 0.024 % drop, PLACE 1 DROP INTO THE RIGHT EYE NIGHTLY, Disp: , Rfl:     tafamidis (Vyndamax) 61 mg cap, Take 61 mg by mouth daily. , Disp: , Rfl:     L.acid/B.bifidum/B.animal/FOS (PROBIOTIC COMPLEX PO), Take  by mouth., Disp: , Rfl:     OTHER, daily. SAME supplement, Disp: , Rfl:     OTHER, daily. soluber fiber 1 TBSP, Disp: , Rfl:     OTHER, daily. 3 TBSP Glucosamine/Chondroitin/MSM, Disp: , Rfl:     OTHER, daily. Super Digestive Enzymes, Disp: , Rfl:     OTHER, daily. Mirtogenol (bilberry, pycnogenol), Disp: , Rfl:     MAGNESIUM CITRATE PO, Take  by mouth daily. , Disp: , Rfl:     cholecalciferol (VITAMIN D3) (2,000 UNITS /50 MCG) cap capsule, Take 1 Cap by mouth two (2) times a day. (Patient taking differently: Take 2,000 Units by mouth daily.), Disp: 60 Cap, Rfl: 3    busPIRone (BUSPAR) 15 mg tablet, Take 15 mg by mouth two (2) times a day. Takes 25 mg total, Disp: , Rfl:     atorvastatin (LIPITOR) 80 mg tablet, Take 80 mg by mouth daily. , Disp: , Rfl:     OTHER,NON-FORMULARY,, two (2) times a day.  Prostate health supplement , Disp: , Rfl:     OTHER,NON-FORMULARY,, cognitex, Disp: , Rfl:     OTHER,NON-FORMULARY,, DHEA, Disp: , Rfl: OTHER,NON-FORMULARY,, two (2) times a day. Bone Restore, Disp: , Rfl:     OTHER,NON-FORMULARY,, Horsetail (silica), Disp: , Rfl:     OTHER,NON-FORMULARY,, Omega 3 - 2 caps in the am and 2 in the pm, Disp: , Rfl:     OTHER,NON-FORMULARY,, Bio-Curcumin (Turmeric) 1000 mg, Disp: , Rfl:     co-enzyme Q-10 (CO Q-10) 100 mg capsule, Take 100 mg by mouth two (2) times a day., Disp: , Rfl:     vit A/vit C/vit E/zinc/copper (PRESERVISION AREDS PO), Take  by mouth two (2) times a day., Disp: , Rfl:     therapeutic multivitamin (THERAGRAN) tablet, Take 1 Tablet by mouth two (2) times a day., Disp: , Rfl:     dorzolamide-timolol (COSOPT) 22.3-6.8 mg/mL ophthalmic solution, Administer 1 Drop to right eye every Sunday. , Disp: , Rfl:     vortioxetine (TRINTELLIX) 10 mg tablet, Take 10 mg by mouth daily. , Disp: , Rfl:     fluticasone propionate (FLONASE) 50 mcg/actuation nasal spray, 2 Sprays by Both Nostrils route daily. (Patient not taking: No sig reported), Disp: 1 Each, Rfl: 0    Thank you for your referral and allowing me to participate in this patient's care.     Genesis Strauss NP  [address]  Office [phone]  Fax [phone]      PATIENT CARE TEAM:  Patient Care Team:  Rosa Matthews MD as PCP - General (Family Medicine)  Jarrett Herron MD (Sleep Medicine Physician)  Abdirizak Briggs PsyD (Psychology)  Alexa Sen MD ([address] [place] [address] Vascular Surgery)

## 2023-02-21 ENCOUNTER — TRANSCRIBE ORDER (OUTPATIENT)
Dept: SCHEDULING | Age: 83
End: 2023-02-21

## 2023-02-21 DIAGNOSIS — J84.10 INTERSTITIAL PULMONARY FIBROSIS (HCC): Primary | ICD-10-CM

## 2023-02-23 LAB — HBA1C MFR BLD HPLC: 5.5 %

## 2023-02-28 ENCOUNTER — TELEPHONE (OUTPATIENT)
Dept: CARDIOLOGY CLINIC | Age: 83
End: 2023-02-28

## 2023-02-28 RX ORDER — CHOLECALCIFEROL (VITAMIN D3) 125 MCG
2000 CAPSULE ORAL DAILY
COMMUNITY

## 2023-02-28 NOTE — TELEPHONE ENCOUNTER
Returned patient call using two patient identifiers. Reviewed AVS with patient again. Notified patient that that \"upcoming health maintenance\" may have things listed incorrectly if patients pcp is not within [facility] so it does not download information. Advised patient that these are recommend for patient and that he should discuss with PCP if have not completed these tasks. He stated understanding. Patient also notes that there are a couple of differences in how he is taking his medications. Patient is no longer taking Flonase. Advised patient to follow up with PCP if he continues to have nasal congestion. Patient is only taking 2,000 units of vitamin d3 once daily. Lastly he is taking his dorzolamide- timolol eye drops in right eye two times a day. Advised patient that will notify provider and call back for any changes with medications. Patient lastly notes he is concerned about his amlodipine. He states that he used to take amlodipine besylate but now the medication bottle just says amlodipine on it. He states he also received a letter in the mail from his insurance company that was stating patient should ask about being on amlodipine besylate. Notified patient that I will follow up and call back in regards to amlodipine. Advised patient that he is approved for patient assistance for vyndamax. He states that he has the number and will call to set up shipment of medication. Patient  stated understanding and had no further questions. Pavel Moran RN      Per Mamta Patricio NP v.o.r.b okay to update med rec to reflect how patient is currently taking medications. Amlodipine besylate is the only form of tablet available for amlodipine so its likely the same but you can check with pharmacy. Called [facility] pharmacy and spoke with lucero mcgrath. She states that patient is receiving amlodipine besylate tablets but it is listed on the bottle as just amlodipine.  She states that all amlodipine tablets are amlodipine besylate. Called patient using two patient identifiers. Advised him on above information per pharmacy. Patient stated understanding and had no further questions. Luis Antonio Banegas RN       Updated med rec.

## 2023-03-01 ENCOUNTER — TELEPHONE (OUTPATIENT)
Dept: CARDIOLOGY CLINIC | Age: 83
End: 2023-03-01

## 2023-03-01 NOTE — TELEPHONE ENCOUNTER
Pt left message saying that he has communication to send to Dr. Sharyn Rivera. He is no longer receiving Vyndamax because the distributor does not have a prescription from us. Had has not taken it for three days and wants to know if there are any concerns about that.   When he called [employer] to ask, they referred him to his .  His number is [phone]

## 2023-03-01 NOTE — TELEPHONE ENCOUNTER
Called [employer] and spoke with a representative. She states that they have not received the faxed back prescription with the patient name. She recommends re faxing form and telling patient to call back in 48 hours to schedule medication shipment. Called patient using two patient identifiers. Notified patient on above information per [employer] rep. Patient stated understanding and will call back on Friday to schedule shipment. Patient asks if it is okay that he has been without his medication for several days and will be without it until [employer] will ship it. Advised patient that there is no alternative to medication so it is okay for the time being for patient to be off but provider would likely recommend resuming as soon as possible. Patient notes he would like to know if there is testing to determine if vyndamax is having a measurable difference in his amyloid. He would like testing repeated to know if vyndamax is having positive affect. Notified him that I am unsure if there are measurable outcomes for vyndalink but I will send message to provider and call back with more information. KINJAL Pompa RN  Caller: Unspecified (Yesterday,  2:05 PM)  Its ok for him to be off the vyndamax for a few days until he gets his shipment. We don't currently have any specific testing to see the affects of amyloid, these are usually clinical changes such as improved BP, improved energy levels and improved tolerance of GDMT medications. Called patient using two patient identifiers. Notified patient on above information per Benjamin Amor NP. He stated understanding. He states that he has set up shipment of vyndamax. Patient had no further questions at this time.  Nickie Gabriel RN

## 2023-04-13 ENCOUNTER — HOSPITAL ENCOUNTER (OUTPATIENT)
Dept: CT IMAGING | Age: 83
Discharge: HOME | End: 2023-04-13
Attending: INTERNAL MEDICINE
Payer: MEDICARE

## 2023-04-13 DIAGNOSIS — J84.10 INTERSTITIAL PULMONARY FIBROSIS (HCC): ICD-10-CM

## 2023-04-13 PROCEDURE — 71250 CT THORAX DX C-: CPT

## 2023-04-20 ENCOUNTER — TELEPHONE (OUTPATIENT)
Dept: CARDIOLOGY CLINIC | Age: 83
End: 2023-04-20

## 2023-04-20 NOTE — TELEPHONE ENCOUNTER
----- Message from Ismael Holt MD sent at 4/19/2023  2:11 PM EDT -----  Forward to PCP +/- pulmonologist if patient has one.  ----- Message -----  From: Yadiel, Rad Results In  Sent: 4/14/2023   4:10 PM EDT  To: Ismael Holt MD    CT was ordered by Dr Alphia Landau (patients pulmonologist) no need to send.

## 2023-04-22 DIAGNOSIS — J84.10 INTERSTITIAL PULMONARY FIBROSIS (HCC): Primary | ICD-10-CM

## 2023-04-27 ENCOUNTER — TELEPHONE (OUTPATIENT)
Dept: CARDIOLOGY CLINIC | Age: 83
End: 2023-04-27

## 2023-04-28 ENCOUNTER — OFFICE VISIT (OUTPATIENT)
Dept: CARDIOLOGY CLINIC | Age: 83
End: 2023-04-28

## 2023-04-28 VITALS
WEIGHT: 170 LBS | RESPIRATION RATE: 18 BRPM | DIASTOLIC BLOOD PRESSURE: 78 MMHG | TEMPERATURE: 97.5 F | BODY MASS INDEX: 23.03 KG/M2 | OXYGEN SATURATION: 98 % | HEIGHT: 72 IN | SYSTOLIC BLOOD PRESSURE: 122 MMHG | HEART RATE: 85 BPM

## 2023-04-28 DIAGNOSIS — E53.8 VITAMIN B12 DEFICIENCY: ICD-10-CM

## 2023-04-28 DIAGNOSIS — E44.0 MALNUTRITION OF MODERATE DEGREE (HCC): ICD-10-CM

## 2023-04-28 DIAGNOSIS — R53.83 FATIGUE, UNSPECIFIED TYPE: ICD-10-CM

## 2023-04-28 DIAGNOSIS — D47.2 MGUS (MONOCLONAL GAMMOPATHY OF UNKNOWN SIGNIFICANCE): ICD-10-CM

## 2023-04-28 DIAGNOSIS — I50.9 CONGESTIVE HEART FAILURE, UNSPECIFIED HF CHRONICITY, UNSPECIFIED HEART FAILURE TYPE (HCC): Primary | ICD-10-CM

## 2023-04-28 DIAGNOSIS — D50.9 IRON DEFICIENCY ANEMIA, UNSPECIFIED IRON DEFICIENCY ANEMIA TYPE: ICD-10-CM

## 2023-04-28 DIAGNOSIS — E55.9 VITAMIN D DEFICIENCY: ICD-10-CM

## 2023-04-28 NOTE — PATIENT INSTRUCTIONS
Medication changes:    No changes    Please take this to your pharmacy to notify them of the change in medications. Testing Ordered:    Orthostatics and EKG completed in clinic    Lab work has been ordered to be completed in 1-2 weeks. Please present to a [facility] location of your choice with the orders provided to have lab work done. Our office will notify you of any abnormal results requiring changes to your current plan of care. An order for an echocardiogram has been placed to be done in May. You will be receiving an automated call from [facility]ination  [facility] to schedule this test. If you are unavailable to receive the call or would like to contact coordination of care yourself you may contact [phone] to schedule. You will need to contact coordination of care yourself if you miss their calls as they will only make 3 attempts to reach you. Other Recommendations:      Ensure your drinking an adequate amount of water with a goal of 6-8 eight ounce glasses (1.5-2 liters) of fluid daily. Your urine should be clear and light yellow straw colored. If your blood pressure begins to consistently run below 90/60 and/or you begin to experience dizziness or lightheadedness, please contact the [facility] at [phone]. Follow up in 3-4 months with NP/MD with [facility]      Please monitor your weights daily upon waking and after using the bathroom. Keep a written records of your weights and bring to your next appointment. If you have a weight gain of 3 or more pounds overnight OR 5 or more pounds in one week please contact our office. Thank you for allowing us the privilege of being a part of your healthcare team! Please do not hesitate to contact our office at [phone] with any questions or concerns.        Virtual Heart Failure [address] invites you to learn more about heart failure and to share your questions, ideas, and experiences with others. Each month, the Heart Failure Support Group features a new educational topic and a guest speaker, followed by an interactive discussion. Our Heart Failure Nurse Navigator will moderate each session. You will be able to participate by phone, tablet or computer through [address] [place] [address]. This support group takes place on the 3rd Thursday of each month from 6:00-7:30PM. All individuals living with heart failure and their caregivers are welcome to join. If you are interested in participating, please contact us at [email or web address] and you will be sent the link to join the [employer].

## 2023-04-28 NOTE — PROGRESS NOTES
[facility] in [place], [facility] Note    Patient name: Roxann Walden  Patient : 1940  Patient MRN: [id number]  Date of service: 23    Primary care physician: Yudi Loredo MD  Primary general cardiologist:  Dr. Rebolledo Case    Primary F cardiologist: Marlon Sanchez MD     CHIEF COMPLAINT:  Chronic systolic heart failure     PLAN OF CARE:  79 y/o with diastolic heart failure due to HFpEF 60-65%, stage C, NYHA class I on GDMT limited by cardiac amyloidosis  Wild type aTTR cardiac amyloidosis and co-existing MGUS (bone marrow biopsy was negative), stage 1 by Lindsey criteria on vyndamax  Continue routine cardiac surveillance every 3-4 months  Memory loss short- and long-term remains the key quality of life barrier; working on getting CPAP optimized     RECOMMENDATIONS:  Continue tafamidis  Continue norvasc 5mg twice daily  Continue hydralazine 50mg TID   Avoid beta-blockers and ACEi/ARB/ARNi due to orthostasis and exercise induced lightheadedness  Ok to monitor BP at home twice per week  Does not require diuretics  Does not require allopurinol, uric acid normal  Reinforced low salt diet  Reinforced fluid restriction to 6 x 8oz glasses per day  Patient needs advanced care plan on file  Recommend physical activity; walking 30 minutes daily  Check echocardiogram in May 2023  Underwent neurocognitive testing and neurology evaluation for memory loss   Follow up with hematology annually for MGUS with Dr. Manolo Ruff to wear CPAP- recommend referral to Sleep Medicine for titration study  Follow-up with primary cardiologist, Dr. Rebolledo Case  Follow up with EP cardiologist, Dr. Deuce Quezada with PCP for iron deficiency anemia, h/o positive hemoccult and abdominal symptoms  Referred previously to [facility] cardiac  Dr. 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 Findings consistent with moderate concentric hypertrophy. Normal wall motion. Grade II diastolic dysfunction with increased LAP. Aortic Valve: Mild stenosis of the aortic valve. Mean gradient of 17 mmhg. Mitral Valve: Mild regurgitation. Echo (5/3/21)    Left Ventricle: Normal left ventricular systolic function with a visually estimated EF of 60 - 65%. Findings consistent with moderate concentric hypertrophy. Normal wall motion. Grade II diastolic dysfunction with increased LAP. Aortic Valve: Mild stenosis of the aortic valve. Mean gradient of 17 mmhg. Mitral Valve: Mild regurgitation. Echo (5/18/21)  LV: Estimated LVEF is 50 - 55%.  Normal cavity size, systolic function (ejection fraction normal) and diastolic function. Moderate concentric hypertrophy. Wall motion: unable to assess due to limited image quality. Normal left ventricular strain. LA: Moderately dilated left atrium. AV: With no significant stenosis. Mild aortic valve regurgitation is present. MV: Mitral valve non-specific thickening. Mild to moderate mitral valve regurgitation is present. Image quality for this study was suboptimal.  IVS 1.39cm     CTA for EP mapping reviewed (10/6/20) Watchman device occlusive     Cardiac CTA (3/30/20) moderate diffuse CAD     Cardiac PET (3/7/19) normal, Rest EF 32 and stress 38     Echo (3/2020) LVEF 55%, IVSd 1.6cm, LV mass 312g increased, RV function normal, PA pressure 30, sclerotic AV without stenosis, trace TR, mild mR, IVC normal     EKG (9/2020) atrial fib, rate in 90s, low voltae, V1-V2 q wave     LHC (LHC (4/9/2020) LHC 30% p-LAD, 30% m-LAD, 50% 2nd OM FFR flow reserve 0.92 (4/9/20)  [facility] (2011) tirvial luminal irregularities  Remainder in records (see in epic)     CPEST (5/3/22) Moderately reduced functional capacity at 15 ml/kg/minute oxygen uptake at maximum work load. The results are underestimated due to patient was not able to achieve anaerobic threshold. CPEST (12/15/20): MVO2 17.7 with RQ 1.7   6 Min Walk Report 11/2/2020   (PRE) HR 76   (PRE) O2 Sat 99   (POST)    (POST) O2 Sat 90   Distance in Meters 312.15      HISTORY OF PRESENT ILLNESS:  I had the pleasure of seeing Kateryna Tello in [facility] at [address] [place] [address] via telephone visit.      Briefly, Kateryna Tello is a 78 y.o. male with h/o chronic fatigue, DANILO (not started CPAP yet), HTN, HL, coronary artery disease (LHC on 4/9/20 showed 30% p-LAD, 30% m-LAD, 50% 2nd OM; good flow reserve), atrial fibrillation, paroxysmal, DCCV 3/2019 and s/p afib ablation (10/20/20 by Dr. Jillian Velasquez), not on chronic anticoagulation due to life-threatening GI bleed/hemorrhoids, s/p Watchman device in [place] 7/23/19, well seated by echo (10/2020), depression. Patient was referred by Dr. Shahriar Wells to evaluated for possible HFpEF. Patient had PYP test suggestive of aTTR. 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 Bone marrow biopsy was negative (2/24/21); no evidence of increased/clonal plasma cells in patient with history of IgM MGUS, congo red stain on core biopsy is negative for amyloid deposition. Troponin was negative and pro-NT-BNP in 400s, indicating stage 1 cardiac amyloidosis. Genetic studies were negative for inherited aTTR; suggesting this was wild type aTTR cardiac amyloidosis. CPEST (12/2020) revealed pVO2 17.7 consistent with excellent prognosis. Patient had sleep study which revealed DANILO and patient started CPAP therapy with some improvement of cognitive function per his daughter. INTERVAL HISTORY:  Today, patient presents for routine clinic visit. he reports doing well. He is feeling good in terms of his heart failure. His biggest concern is his short term memory loss. He denies any acute HF symptoms, is able to walk around and perform daily activities without limitation. He reports a good appetite, no LE edema. he is compliant with fluid restriction and taking medications as prescribed. Patient manages his own medications. REVIEW OF SYSTEMS:  Review of Systems   Constitutional:  Negative for chills, fever and malaise/fatigue. Respiratory:  Negative for cough and shortness of breath. Cardiovascular:  Negative for chest pain, palpitations, orthopnea and leg swelling. Gastrointestinal:  Negative for abdominal pain, diarrhea, heartburn, nausea and vomiting. Neurological:  Negative for dizziness and weakness. Psychiatric/Behavioral:  Positive for memory loss.       PHYSICAL EXAM:  Visit Vitals  /78 (BP 1 Location: Left upper arm, BP Patient Position: Sitting, BP Cuff Size: Adult)   Pulse 85   Temp 97.5 °F (36.4 °C) (Oral)   Resp 18   Ht 6' (1.829 m)   Wt 170 lb (77.1 kg)   SpO2 98%   BMI 23.06 kg/m²     General: Patient is well developed, well-nourished in no acute distress  HEENT: Unremarkable   Neck: Supple. No evidence of thyroid enlargements or lymphadenopathy. JVD: Cannot be appreciated   Lungs: Breath sounds are equal and clear bilaterally. No wheezes, rhonchi, or rales. Heart: Regular rate and rhythm with normal S1 and S2. No murmurs, gallops or rubs. Abdomen: Soft, no mass or tenderness. No organomegaly or hernia. Bowel sounds present. Genitourinary and rectal: deferred  Extremities: No cyanosis, clubbing, or edema. Neurologic: No focal sensory or motor deficits are noted. Grossly intact. Psychiatric: Awake, alert an doriented x 3. Appropriate mood and affect. Skin: Warm, dry and well perfused. PAST MEDICAL HISTORY:  Past Medical History:   Diagnosis Date    Atrial fibrillation (HCC)     Depression     GERD (gastroesophageal reflux disease)     Glaucoma     High cholesterol     IBS (irritable bowel syndrome)        PAST SURGICAL HISTORY:  Past Surgical History:   Procedure Laterality Date    HX CHOLECYSTECTOMY      HX HEMORRHOIDECTOMY      HX ORTHOPAEDIC      RIGHT knee    HX TONSILLECTOMY      WI CARDIOVERSION ELECTIVE ARRHYTHMIA EXTERNAL N/A 3/13/2019    EP CARDIOVERSION performed by Kristan Collet, MD at [address], [facility]/s [facility]       FAMILY HISTORY:  History reviewed. No pertinent family history. SOCIAL HISTORY:  Social History     Socioeconomic History    Marital status:    Tobacco Use    Smoking status: Former     Types: Cigarettes     Quit date:      Years since quittin.3    Smokeless tobacco: Never   Vaping Use    Vaping Use: Never used   Substance and Sexual Activity    Alcohol use: Yes     Comment: Typically once monthly    Drug use: No    Sexual activity: Not Currently       LABORATORY RESULTS:  No flowsheet data found.     ALLERGY:  Allergies   Allergen Reactions    Sulfa (Sulfonamide Antibiotics) Unknown (comments)     From infancy, unsure if actually allergic          CURRENT MEDICATIONS:    Current Outpatient Medications:     amLODIPine (NORVASC) 5 mg tablet, TAKE 1 TABLET TWICE DAILY, Disp: 180 Tablet, Rfl: 0    cholecalciferol, vitamin D3, 50 mcg (2,000 unit) tab, Take 1 Tablet by mouth daily. , Disp: , Rfl:     isosorbide mononitrate ER (IMDUR) 60 mg CR tablet, TAKE 1 TABLET TWICE DAILY, Disp: 180 Tablet, Rfl: 1    famotidine (PEPCID) 20 mg tablet, TAKE 1 (ONE) TABLET TWICE A DAY, BEFORE MEALS, Disp: , Rfl:     L.acid/L.casei/B.bif/B.guadalupe/FOS (PROBIOTIC BLEND PO), as directed., Disp: , Rfl:     OTHER,NON-FORMULARY,, daily. Zinc, Disp: , Rfl:     OTHER,NON-FORMULARY,, 2 Tablets daily. Research Verified: Hair, Disp: , Rfl:     Vyzulta 0.024 % drop, PLACE 1 DROP INTO THE RIGHT EYE NIGHTLY, Disp: , Rfl:     tafamidis (Vyndamax) 61 mg cap, Take 61 mg by mouth daily. , Disp: , Rfl:     L.acid/B.bifidum/B.animal/FOS (PROBIOTIC COMPLEX PO), Take  by mouth., Disp: , Rfl:     OTHER, daily. SAME supplement, Disp: , Rfl:     OTHER, daily. soluber fiber 1 TBSP, Disp: , Rfl:     OTHER, daily. 3 TBSP Glucosamine/Chondroitin/MSM, Disp: , Rfl:     OTHER, daily. Super Digestive Enzymes, Disp: , Rfl:     OTHER, daily. Mirtogenol (bilberry, pycnogenol), Disp: , Rfl:     MAGNESIUM CITRATE PO, Take  by mouth daily. , Disp: , Rfl:     busPIRone (BUSPAR) 15 mg tablet, Take 1 Tablet by mouth two (2) times a day. Takes 25 mg total, Disp: , Rfl:     atorvastatin (LIPITOR) 80 mg tablet, Take 1 Tablet by mouth daily. , Disp: , Rfl:     OTHER,NON-FORMULARY,, two (2) times a day. Prostate health supplement , Disp: , Rfl:     OTHER,NON-FORMULARY,, cognitex, Disp: , Rfl:     OTHER,NON-FORMULARY,, DHEA, Disp: , Rfl:     OTHER,NON-FORMULARY,, two (2) times a day.  Bone Restore, Disp: , Rfl:     OTHER,NON-FORMULARY,, Horsetail (silica), Disp: , Rfl:     OTHER,NON-FORMULARY,, Omega 3 - 2 caps in the am and 2 in the pm, Disp: , Rfl:     OTHER,NON-FORMULARY,, Bio-Curcumin (Turmeric) 1000 mg, Disp: , Rfl:     co-enzyme Q-10 (CO Q-10) 100 mg capsule, Take 1 Capsule by mouth two (2) times a day., Disp: , Rfl:     vit A/vit C/vit E/zinc/copper (PRESERVISION AREDS PO), Take  by mouth two (2) times a day., Disp: , Rfl:     therapeutic multivitamin (THERAGRAN) tablet, Take 1 Tablet by mouth two (2) times a day., Disp: , Rfl:     dorzolamide-timolol (COSOPT) 22.3-6.8 mg/mL ophthalmic solution, Administer 1 Drop to right eye two (2) times a day., Disp: , Rfl:     vortioxetine (TRINTELLIX) 10 mg tablet, Take 1 Tablet by mouth daily. , Disp: , Rfl:     hydrALAZINE (APRESOLINE) 50 mg tablet, Take 2 Tablets by mouth three (3) times daily. , Disp: 540 Tablet, Rfl: 1    pantoprazole (PROTONIX) 40 mg tablet, Take 40 mg by mouth daily. (Patient not taking: Reported on 4/28/2023), Disp: , Rfl:     Thank you for your referral and allowing me to participate in this patient's care.     Sami Alicea MD PhD  [address] [place] [address]  Phone: [phone]  Fax: [phone]    PATIENT CARE TEAM:  Patient Care Team:  Queen Candelaria MD as PCP - General (Family Medicine)  Lupillo Jett MD (Sleep Medicine Physician)  Nicanor Monzon PsyD (Psychology)  Jaspreet Turner MD (Cardio Vascular Surgery)     Total visit time: 40 minutes  (> 50% spent face-to-face counseling)

## 2023-05-02 ENCOUNTER — TELEPHONE (OUTPATIENT)
Dept: CARDIOLOGY CLINIC | Age: 83
End: 2023-05-02

## 2023-05-02 RX ORDER — HYDRALAZINE HYDROCHLORIDE 100 MG/1
100 TABLET, FILM COATED ORAL 3 TIMES DAILY
Qty: 90 TABLET | Refills: 0 | Status: SHIPPED | OUTPATIENT
Start: 2023-05-02

## 2023-05-04 ENCOUNTER — HOSPITAL ENCOUNTER (OUTPATIENT)
Dept: NON INVASIVE DIAGNOSTICS | Age: 83
Discharge: HOME | End: 2023-05-04
Attending: INTERNAL MEDICINE
Payer: MEDICARE

## 2023-05-04 VITALS — HEIGHT: 72 IN | BODY MASS INDEX: 23.02 KG/M2 | WEIGHT: 169.97 LBS

## 2023-05-04 DIAGNOSIS — I50.9 CONGESTIVE HEART FAILURE, UNSPECIFIED HF CHRONICITY, UNSPECIFIED HEART FAILURE TYPE (HCC): ICD-10-CM

## 2023-05-04 LAB
ECHO AO ROOT DIAM: 2.7 CM
ECHO AO ROOT INDEX: 1.36 CM/M2
ECHO AV AREA PEAK VELOCITY: 0.9 CM2
ECHO AV AREA PEAK VELOCITY: 1 CM2
ECHO AV AREA VTI: 1.1 CM2
ECHO AV AREA/BSA VTI: 0.6 CM2/M2
ECHO AV MEAN GRADIENT: 15 MMHG
ECHO AV MEAN VELOCITY: 1.8 M/S
ECHO AV PEAK GRADIENT: 24 MMHG
ECHO AV PEAK VELOCITY: 2.5 M/S
ECHO AV VTI: 52 CM
ECHO EST RA PRESSURE: 3 MMHG
ECHO LA DIAMETER INDEX: 2.11 CM/M2
ECHO LA DIAMETER: 4.2 CM
ECHO LA TO AORTIC ROOT RATIO: 1.56
ECHO LV E' LATERAL VELOCITY: 8 CM/S
ECHO LV E' SEPTAL VELOCITY: 7 CM/S
ECHO LV FRACTIONAL SHORTENING: 35 % (ref 28–44)
ECHO LV INTERNAL DIMENSION DIASTOLE INDEX: 2.61 CM/M2
ECHO LV INTERNAL DIMENSION DIASTOLIC: 5.2 CM (ref 4.2–5.9)
ECHO LV INTERNAL DIMENSION SYSTOLIC INDEX: 1.71 CM/M2
ECHO LV INTERNAL DIMENSION SYSTOLIC: 3.4 CM
ECHO LV IVSD: 1.1 CM (ref 0.6–1)
ECHO LV MASS 2D: 220.8 G (ref 88–224)
ECHO LV MASS INDEX 2D: 110.9 G/M2 (ref 49–115)
ECHO LV POSTERIOR WALL DIASTOLIC: 1.1 CM (ref 0.6–1)
ECHO LV RELATIVE WALL THICKNESS RATIO: 0.42
ECHO LVOT AREA: 3.8 CM2
ECHO LVOT AV VTI INDEX: 0.29
ECHO LVOT DIAM: 2.2 CM
ECHO LVOT MEAN GRADIENT: 1 MMHG
ECHO LVOT PEAK GRADIENT: 1 MMHG
ECHO LVOT PEAK GRADIENT: 2 MMHG
ECHO LVOT PEAK VELOCITY: 0.6 M/S
ECHO LVOT PEAK VELOCITY: 0.7 M/S
ECHO LVOT STROKE VOLUME INDEX: 29 ML/M2
ECHO LVOT SV: 57.8 ML
ECHO LVOT VTI: 15.2 CM
ECHO MV A VELOCITY: 0.25 M/S
ECHO MV E DECELERATION TIME (DT): 36.5 MS
ECHO MV E VELOCITY: 0.41 M/S
ECHO MV E/A RATIO: 1.64
ECHO MV E/E' LATERAL: 5.13
ECHO MV E/E' RATIO (AVERAGED): 5.49
ECHO MV E/E' SEPTAL: 5.86
ECHO PV MAX VELOCITY: 1.1 M/S
ECHO PV PEAK GRADIENT: 5 MMHG
ECHO RIGHT VENTRICULAR SYSTOLIC PRESSURE (RVSP): 11 MMHG
ECHO RV FREE WALL PEAK S': 12 CM/S
ECHO RV TAPSE: 2.3 CM (ref 1.7–?)
ECHO TV REGURGITANT MAX VELOCITY: 1.43 M/S
ECHO TV REGURGITANT PEAK GRADIENT: 8 MMHG

## 2023-05-04 PROCEDURE — 93306 TTE W/DOPPLER COMPLETE: CPT

## 2023-05-04 PROCEDURE — 93306 TTE W/DOPPLER COMPLETE: CPT | Performed by: INTERNAL MEDICINE

## 2023-05-05 ENCOUNTER — TELEPHONE (OUTPATIENT)
Dept: CARDIOLOGY CLINIC | Age: 83
End: 2023-05-05

## 2023-05-11 LAB
ALBUMIN SERPL-MCNC: 4.6 G/DL (ref 3.6–4.6)
ALBUMIN/GLOB SERPL: 1.6 {RATIO} (ref 1.2–2.2)
ALP SERPL-CCNC: 90 IU/L (ref 44–121)
ALT SERPL-CCNC: 17 IU/L (ref 0–44)
AST SERPL-CCNC: 26 IU/L (ref 0–40)
BILIRUB SERPL-MCNC: 0.3 MG/DL (ref 0–1.2)
BUN SERPL-MCNC: 12 MG/DL (ref 8–27)
BUN/CREAT SERPL: 12 (ref 10–24)
CALCIUM SERPL-MCNC: 9.9 MG/DL (ref 8.6–10.2)
CHLORIDE SERPL-SCNC: 104 MMOL/L (ref 96–106)
CHOLEST SERPL-MCNC: 139 MG/DL (ref 100–199)
CK SERPL-CCNC: 96 U/L (ref 30–208)
CO2 SERPL-SCNC: 22 MMOL/L (ref 20–29)
CREAT SERPL-MCNC: 1.01 MG/DL (ref 0.76–1.27)
EGFRCR SERPLBLD CKD-EPI 2021: 73 ML/MIN/1.73
FERRITIN SERPL-MCNC: 154 NG/ML (ref 30–400)
FOLATE SERPL-MCNC: >20 NG/ML
GLOBULIN SER CALC-MCNC: 2.9 G/DL (ref 1.5–4.5)
GLUCOSE SERPL-MCNC: 87 MG/DL (ref 70–99)
HDLC SERPL-MCNC: 41 MG/DL
IRON SATN MFR SERPL: 20 % (ref 15–55)
IRON SERPL-MCNC: 62 UG/DL (ref 38–169)
LDLC SERPL CALC-MCNC: 68 MG/DL (ref 0–99)
NT-PROBNP SERPL-MCNC: 210 PG/ML (ref 0–486)
POTASSIUM SERPL-SCNC: 4.4 MMOL/L (ref 3.5–5.2)
PREALB SERPL-MCNC: 29 MG/DL (ref 9–32)
SODIUM SERPL-SCNC: 141 MMOL/L (ref 134–144)
TIBC SERPL-MCNC: 306 UG/DL (ref 250–450)
TRIGL SERPL-MCNC: 181 MG/DL (ref 0–149)
TROPONIN T SERPL HS-MCNC: 17 NG/L (ref 0–22)
UIBC SERPL-MCNC: 244 UG/DL (ref 111–343)
URATE SERPL-MCNC: 5.9 MG/DL (ref 3.8–8.4)
VIT B12 SERPL-MCNC: 521 PG/ML (ref 232–1245)
VLDLC SERPL CALC-MCNC: 30 MG/DL (ref 5–40)

## 2023-05-15 LAB
ALBUMIN SERPL ELPH-MCNC: 3.8 G/DL (ref 2.9–4.4)
ALBUMIN/GLOB SERPL: 1.1 {RATIO} (ref 0.7–1.7)
ALPHA1 GLOB SERPL ELPH-MCNC: 0.3 G/DL (ref 0–0.4)
ALPHA2 GLOB SERPL ELPH-MCNC: 1.1 G/DL (ref 0.4–1)
B-GLOBULIN SERPL ELPH-MCNC: 1 G/DL (ref 0.7–1.3)
GAMMA GLOB SERPL ELPH-MCNC: 1.3 G/DL (ref 0.4–1.8)
GLOBULIN SER-MCNC: 3.7 G/DL (ref 2.2–3.9)
IGA SERPL-MCNC: 192 MG/DL (ref 61–437)
IGG SERPL-MCNC: 1024 MG/DL (ref 603–1613)
IGM SERPL-MCNC: 360 MG/DL (ref 15–143)
INTERPRETATION SERPL IEP-IMP: ABNORMAL
KAPPA LC FREE SER-MCNC: 45.8 MG/L (ref 3.3–19.4)
KAPPA LC FREE/LAMBDA FREE SER: 1.8 {RATIO} (ref 0.26–1.65)
LABORATORY COMMENT REPORT: ABNORMAL
LAMBDA LC FREE SERPL-MCNC: 25.4 MG/L (ref 5.7–26.3)
M PROTEIN SERPL ELPH-MCNC: 0.4 G/DL
PROT SERPL-MCNC: 7.5 G/DL (ref 6–8.5)
VIT B1 BLD-SCNC: 227.7 NMOL/L (ref 66.5–200)

## 2023-07-21 ENCOUNTER — TELEPHONE (OUTPATIENT)
Age: 83
End: 2023-07-21

## 2023-07-25 ENCOUNTER — TELEPHONE (OUTPATIENT)
Age: 83
End: 2023-07-25

## 2023-07-25 NOTE — TELEPHONE ENCOUNTER
Returned call to patient with no answer. Left vm with [facility] call back number. Will await return call. 7/26/23   Called patient using two patient identifiers. Patient states that he was originally calling because he had not received vyndamax prescription but received it yesterday. Patient had no further questions at this time.

## 2023-08-24 ASSESSMENT — ENCOUNTER SYMPTOMS
EYE PAIN: 0
BLOOD IN STOOL: 0
SORE THROAT: 0
COUGH: 0
ABDOMINAL PAIN: 0
ABDOMINAL DISTENTION: 0
CHEST TIGHTNESS: 0
SHORTNESS OF BREATH: 0

## 2023-08-24 NOTE — PROGRESS NOTES
CAPS capsule, Take 1 capsule by mouth 2 times daily, Disp: , Rfl:     dorzolamide-timolol (COSOPT) 22.3-6.8 MG/ML ophthalmic solution, Apply 1 drop to eye 2 times daily, Disp: , Rfl:     famotidine (PEPCID) 20 MG tablet, TAKE 1 (ONE) TABLET TWICE A DAY, BEFORE MEALS, Disp: , Rfl:     hydrALAZINE (APRESOLINE) 50 MG tablet, Take 2 tablets by mouth 3 times daily, Disp: , Rfl:     isosorbide mononitrate (IMDUR) 60 MG extended release tablet, TAKE 1 TABLET TWICE DAILY, Disp: , Rfl:     Latanoprostene Bunod (VYZULTA) 0.024 % SOLN, PLACE 1 DROP INTO THE RIGHT EYE NIGHTLY, Disp: , Rfl:     Tafamidis (VYNDAMAX) 61 MG CAPS, Take 61 mg by mouth daily, Disp: , Rfl:     VORTIoxetine (TRINTELLIX) 10 MG TABS tablet, Take 1 tablet by mouth daily, Disp: , Rfl:     pantoprazole (PROTONIX) 40 MG tablet, Take 40 mg by mouth daily (Patient not taking: Reported on 8/25/2023), Disp: , Rfl:     Thank you for your referral and allowing me to participate in this patient's care. Rosemary Jansen, MAT  DNP, RN, [address], [address]  Phone: [phone]      PATIENT CARE TEAM:  Patient Care Team:  Shadia Souza MD as PCP - Francis Deras MD as PCP - Empaneled Provider     On this date 8/25/2023, I have spent a total time of  35 minutes personally reviewing new vitals, test results, notes from recent visits, face to face encounter/physical exam of patient with counseling, writing orders, performing medical decision making, and documenting.

## 2023-08-25 ENCOUNTER — OFFICE VISIT (OUTPATIENT)
Age: 83
End: 2023-08-25
Payer: MEDICARE

## 2023-08-25 ENCOUNTER — TELEPHONE (OUTPATIENT)
Age: 83
End: 2023-08-25

## 2023-08-25 VITALS
RESPIRATION RATE: 18 BRPM | OXYGEN SATURATION: 96 % | DIASTOLIC BLOOD PRESSURE: 82 MMHG | TEMPERATURE: 97.8 F | HEART RATE: 64 BPM | WEIGHT: 170.4 LBS | SYSTOLIC BLOOD PRESSURE: 150 MMHG | HEIGHT: 72 IN | BODY MASS INDEX: 23.08 KG/M2

## 2023-08-25 DIAGNOSIS — I50.9 CHRONIC HEART FAILURE, UNSPECIFIED HEART FAILURE TYPE (HCC): Primary | ICD-10-CM

## 2023-08-25 DIAGNOSIS — M25.512 BILATERAL SHOULDER PAIN, UNSPECIFIED CHRONICITY: ICD-10-CM

## 2023-08-25 DIAGNOSIS — M25.511 BILATERAL SHOULDER PAIN, UNSPECIFIED CHRONICITY: ICD-10-CM

## 2023-08-25 DIAGNOSIS — M54.2 NECK PAIN: ICD-10-CM

## 2023-08-25 PROCEDURE — 99214 OFFICE O/P EST MOD 30 MIN: CPT | Performed by: NURSE PRACTITIONER

## 2023-08-25 PROCEDURE — G8420 CALC BMI NORM PARAMETERS: HCPCS | Performed by: NURSE PRACTITIONER

## 2023-08-25 PROCEDURE — G8427 DOCREV CUR MEDS BY ELIG CLIN: HCPCS | Performed by: NURSE PRACTITIONER

## 2023-08-25 PROCEDURE — 4004F PT TOBACCO SCREEN RCVD TLK: CPT | Performed by: NURSE PRACTITIONER

## 2023-08-25 PROCEDURE — 1123F ACP DISCUSS/DSCN MKR DOCD: CPT | Performed by: NURSE PRACTITIONER

## 2023-08-25 RX ORDER — GABAPENTIN 300 MG/1
300 CAPSULE ORAL
COMMUNITY

## 2023-08-25 ASSESSMENT — PATIENT HEALTH QUESTIONNAIRE - PHQ9
1. LITTLE INTEREST OR PLEASURE IN DOING THINGS: 0
2. FEELING DOWN, DEPRESSED OR HOPELESS: 1
SUM OF ALL RESPONSES TO PHQ9 QUESTIONS 1 & 2: 1
SUM OF ALL RESPONSES TO PHQ QUESTIONS 1-9: 1

## 2023-08-25 NOTE — TELEPHONE ENCOUNTER
Telephone Call RE:  Appointment reminder     Outcome:     [] Patient confirmed appointment   [] Patient rescheduled appointment for    [] Unable to reach  [] Left message              [] Other:        Opened in error

## 2023-08-25 NOTE — PATIENT INSTRUCTIONS
Medication changes:    No medication changes     Please take this to your pharmacy to notify them of the change in medications. Testing Ordered:    Labs drawn in clinic. You will be notified of any abnormal results that requires a change in medication regimen. Other Recommendations:     Please call [facility] to schedule [phone] for evaluation of neck and shoulder pain     Ensure your drinking an adequate amount of water with a goal of 6-8 eight ounce glasses (1.5-2 liters) of fluid daily. Your urine should be clear and light yellow straw colored. If your blood pressure begins to consistently run below 90/60 and/or you begin to experience dizziness or lightheadedness, please contact the [facility]  at [phone]. Follow up 3 to 4 months with NP with [facility]      Please monitor your weights daily upon waking and after using the bathroom. Keep a written records of your weights and bring to your next appointment. If you have a weight gain of 3 or more pounds overnight OR 5 or more pounds in one week please contact our office. Thank you for allowing us the privilege of being a part of your healthcare team! Please do not hesitate to contact our office at [phone] with any questions or concerns.

## 2023-08-26 DIAGNOSIS — I50.9 CHRONIC HEART FAILURE, UNSPECIFIED HEART FAILURE TYPE (HCC): ICD-10-CM

## 2023-08-26 LAB
ALBUMIN SERPL-MCNC: 4 G/DL (ref 3.5–5)
ALBUMIN/GLOB SERPL: 1.2 (ref 1.1–2.2)
ALP SERPL-CCNC: 68 U/L (ref 45–117)
ALT SERPL-CCNC: 26 U/L (ref 12–78)
ANION GAP SERPL CALC-SCNC: 4 MMOL/L (ref 5–15)
AST SERPL-CCNC: 22 U/L (ref 15–37)
BILIRUB SERPL-MCNC: 0.6 MG/DL (ref 0.2–1)
BUN SERPL-MCNC: 18 MG/DL (ref 6–20)
BUN/CREAT SERPL: 15 (ref 12–20)
CALCIUM SERPL-MCNC: 9.5 MG/DL (ref 8.5–10.1)
CHLORIDE SERPL-SCNC: 111 MMOL/L (ref 97–108)
CO2 SERPL-SCNC: 25 MMOL/L (ref 21–32)
CREAT SERPL-MCNC: 1.21 MG/DL (ref 0.7–1.3)
GLOBULIN SER CALC-MCNC: 3.4 G/DL (ref 2–4)
GLUCOSE SERPL-MCNC: 94 MG/DL (ref 65–100)
MAGNESIUM SERPL-MCNC: 2.3 MG/DL (ref 1.6–2.4)
NT PRO BNP: 447 PG/ML
POTASSIUM SERPL-SCNC: 4.5 MMOL/L (ref 3.5–5.1)
PROT SERPL-MCNC: 7.4 G/DL (ref 6.4–8.2)
SODIUM SERPL-SCNC: 140 MMOL/L (ref 136–145)

## 2023-09-12 RX ORDER — ISOSORBIDE MONONITRATE 60 MG/1
TABLET, EXTENDED RELEASE ORAL
Qty: 180 TABLET | Refills: 1 | Status: SHIPPED | OUTPATIENT
Start: 2023-09-12

## 2023-09-19 ENCOUNTER — OFFICE VISIT (OUTPATIENT)
Age: 83
End: 2023-09-19
Payer: MEDICARE

## 2023-09-19 VITALS
OXYGEN SATURATION: 96 % | BODY MASS INDEX: 23.19 KG/M2 | HEART RATE: 74 BPM | RESPIRATION RATE: 18 BRPM | TEMPERATURE: 98.1 F | DIASTOLIC BLOOD PRESSURE: 58 MMHG | WEIGHT: 171 LBS | SYSTOLIC BLOOD PRESSURE: 102 MMHG

## 2023-09-19 DIAGNOSIS — I50.9 CHRONIC CONGESTIVE HEART FAILURE, UNSPECIFIED HEART FAILURE TYPE (HCC): ICD-10-CM

## 2023-09-19 DIAGNOSIS — D47.2 MONOCLONAL GAMMOPATHY: Primary | ICD-10-CM

## 2023-09-19 PROCEDURE — G8427 DOCREV CUR MEDS BY ELIG CLIN: HCPCS | Performed by: INTERNAL MEDICINE

## 2023-09-19 PROCEDURE — 1123F ACP DISCUSS/DSCN MKR DOCD: CPT | Performed by: INTERNAL MEDICINE

## 2023-09-19 PROCEDURE — G8420 CALC BMI NORM PARAMETERS: HCPCS | Performed by: INTERNAL MEDICINE

## 2023-09-19 PROCEDURE — 99213 OFFICE O/P EST LOW 20 MIN: CPT | Performed by: INTERNAL MEDICINE

## 2023-09-19 PROCEDURE — 1036F TOBACCO NON-USER: CPT | Performed by: INTERNAL MEDICINE

## 2023-09-19 RX ORDER — GABAPENTIN 100 MG/1
CAPSULE ORAL
COMMUNITY
Start: 2023-07-18

## 2023-09-19 NOTE — PROGRESS NOTES
Leny Blackwell is a 78 y.o. male    Chief Complaint   Patient presents with    Follow-up     probable MGUS    . 1. Have you been to the ER, urgent care clinic since your last visit? Hospitalized since your last visit? No    2. Have you seen or consulted any other health care providers outside of the [address] [place] [address] since your last visit? Include any pap smears or colon screening.  No

## 2023-09-21 ENCOUNTER — TELEPHONE (OUTPATIENT)
Age: 83
End: 2023-09-21

## 2023-09-21 ENCOUNTER — OFFICE VISIT (OUTPATIENT)
Age: 83
End: 2023-09-21
Payer: MEDICARE

## 2023-09-21 DIAGNOSIS — F41.8 ANXIETY ABOUT HEALTH: ICD-10-CM

## 2023-09-21 DIAGNOSIS — R41.89 COGNITIVE DECLINE: ICD-10-CM

## 2023-09-21 DIAGNOSIS — G31.84 MILD COGNITIVE IMPAIRMENT: Primary | ICD-10-CM

## 2023-09-21 PROCEDURE — 90791 PSYCH DIAGNOSTIC EVALUATION: CPT | Performed by: CLINICAL NEUROPSYCHOLOGIST

## 2023-09-21 PROCEDURE — 1036F TOBACCO NON-USER: CPT | Performed by: CLINICAL NEUROPSYCHOLOGIST

## 2023-09-21 PROCEDURE — 1123F ACP DISCUSS/DSCN MKR DOCD: CPT | Performed by: CLINICAL NEUROPSYCHOLOGIST

## 2023-09-21 NOTE — TELEPHONE ENCOUNTER
Patient called to confirm follow up appt with Dr. Zara Butler.   Patient confirmed 6/19/24 @ 11:30am.

## 2023-09-21 NOTE — TELEPHONE ENCOUNTER
Patient called back regarding lab orders. Patient ask if he can take the lab order to [facility], I acknowledged yes, and provided the patient with the location of [facility] and advised that he has the option of walk in or scheduling an appointment. Patient acknowledge understanding.

## 2023-09-25 RX ORDER — AMLODIPINE BESYLATE 5 MG/1
TABLET ORAL
Qty: 180 TABLET | Refills: 1 | Status: SHIPPED | OUTPATIENT
Start: 2023-09-25

## 2023-10-04 ENCOUNTER — TELEPHONE (OUTPATIENT)
Age: 83
End: 2023-10-04

## 2023-10-04 NOTE — TELEPHONE ENCOUNTER
Called Pt at ~12:45pm. Pt. Did not think that his appointment was today.  Rescheduled testing for 10/13 @12:30 b/c Wednesday mornings do not work for him

## 2023-10-13 ENCOUNTER — PROCEDURE VISIT (OUTPATIENT)
Age: 83
End: 2023-10-13
Payer: MEDICARE

## 2023-10-13 DIAGNOSIS — F41.9 MILD ANXIETY: ICD-10-CM

## 2023-10-13 DIAGNOSIS — F90.2 ATTENTION DEFICIT HYPERACTIVITY DISORDER (ADHD), COMBINED TYPE, MODERATE: Chronic | ICD-10-CM

## 2023-10-13 DIAGNOSIS — G31.84 MILD COGNITIVE IMPAIRMENT: Primary | ICD-10-CM

## 2023-10-13 PROCEDURE — 96137 PSYCL/NRPSYC TST PHY/QHP EA: CPT | Performed by: CLINICAL NEUROPSYCHOLOGIST

## 2023-10-13 PROCEDURE — 96139 PSYCL/NRPSYC TST TECH EA: CPT | Performed by: CLINICAL NEUROPSYCHOLOGIST

## 2023-10-13 PROCEDURE — 96132 NRPSYC TST EVAL PHYS/QHP 1ST: CPT | Performed by: CLINICAL NEUROPSYCHOLOGIST

## 2023-10-13 PROCEDURE — 96136 PSYCL/NRPSYC TST PHY/QHP 1ST: CPT | Performed by: CLINICAL NEUROPSYCHOLOGIST

## 2023-10-13 PROCEDURE — 96133 NRPSYC TST EVAL PHYS/QHP EA: CPT | Performed by: CLINICAL NEUROPSYCHOLOGIST

## 2023-10-13 PROCEDURE — 96138 PSYCL/NRPSYC TECH 1ST: CPT | Performed by: CLINICAL NEUROPSYCHOLOGIST

## 2023-10-16 NOTE — PROGRESS NOTES
[address] [place] [address]  [id number] [place] [address] [address] [facility]   [phone] Office   [phone] Fax      Neuropsychological Evaluation Report    Exam # 2  Referral:  Beulah Artis MD    Davey Rouse is a 78 y.o. right handed  male who was accompanied by his spouse to the initial clinical interview on 9/21/23. Please refer to his medical records for details pertaining to his history. At the start of the appointment, I reviewed the patient's [facility] Epic Chart (including Media scanned in from previous providers) for the active Problem List, all pertinent Past Medical Hx, medications, recent radiologic and laboratory findings. In addition, I reviewed pt's documented Immunization Record and Encounter History. When I saw him last:      Davey Rouse is a 78 y.o. right handed  male who was accompanied by his spouse to the initial clinical interview on 7/13/22 . Please refer to his medical records for details pertaining to his history. At the start of the appointment, I reviewed the patient's [facility] Epic Chart (including Media scanned in from previous providers) for the active Problem List, all pertinent Past Medical Hx, medications, recent radiologic and laboratory findings. In addition, I reviewed pt's documented Immunization Record and Encounter History. The patient has a h/o chronic fatigue, SARAVANAN (not started CPAP yet), HTN, HL, coronary artery disease (LHC on 4/9/20 showed 30% p-LAD, 30% m-LAD, 50% 2nd OM; good flow reserve), atrial fibrillation, paroxysmal, DCCV 3/2019 and s/p afib ablation (10/20/20 by Dr. Donovan Meng), not on chronic anticoagulation due to life-threatening GI bleed/hemorrhoids, s/p Watchman device in [place] 7/23/19, well seated by echo (10/2020), depression.   He completed a [employer]IS[employer] degree from [place] without history of previously diagnosed LD and/or receipt of special

## 2023-10-23 ENCOUNTER — TELEPHONE (OUTPATIENT)
Age: 83
End: 2023-10-23

## 2023-10-23 NOTE — TELEPHONE ENCOUNTER
Called patient in regards to medication question. Patient did not answer. Left  with [facility] call back number. Will await return call.

## 2023-10-23 NOTE — TELEPHONE ENCOUNTER
Pt left message on Friday for Karena. He mentioned that it was regarding a script. He can be reached at [phone].

## 2023-10-26 RX ORDER — HYDRALAZINE HYDROCHLORIDE 100 MG/1
100 TABLET, FILM COATED ORAL 3 TIMES DAILY
Qty: 270 TABLET | Refills: 1 | Status: SHIPPED | OUTPATIENT
Start: 2023-11-02

## 2023-10-26 RX ORDER — HYDRALAZINE HYDROCHLORIDE 100 MG/1
100 TABLET, FILM COATED ORAL 3 TIMES DAILY
Qty: 90 TABLET | Refills: 0 | Status: SHIPPED | OUTPATIENT
Start: 2023-10-26 | End: 2023-10-26

## 2023-10-26 NOTE — TELEPHONE ENCOUNTER
Requested Prescriptions     Signed Prescriptions Disp Refills    hydrALAZINE (APRESOLINE) 100 MG tablet 270 tablet 1     Sig: Take 1 tablet by mouth 3 times daily     Authorizing Provider: Sharri Nuñez     Ordering User: Silvina Agent      #) day RX sent to local [facility], and 90 day sent to [facility].  Confirmed patients follow up appt with Katarina Chauhan NP  11/28 at 11am

## 2023-11-20 ENCOUNTER — TELEPHONE (OUTPATIENT)
Age: 83
End: 2023-11-20

## 2023-11-20 RX ORDER — HYDRALAZINE HYDROCHLORIDE 100 MG/1
100 TABLET, FILM COATED ORAL 3 TIMES DAILY
Qty: 270 TABLET | Refills: 0 | Status: SHIPPED | OUTPATIENT
Start: 2023-11-20

## 2023-11-20 NOTE — TELEPHONE ENCOUNTER
The medical assistant line received a voicemail from the patient in regards to picking a new provider after the departure of Dr. Carlos Callahan. Attempted to contact patient in regards to this matter.     Gladis Pompa, [facility]

## 2023-11-20 NOTE — TELEPHONE ENCOUNTER
Requested Prescriptions     Signed Prescriptions Disp Refills    hydrALAZINE (APRESOLINE) 100 MG tablet 270 tablet 0     Sig: Take 1 tablet by mouth 3 times daily     Authorizing Provider: Stacey Goldberg     Ordering User: Celestina Romo

## 2023-11-22 ENCOUNTER — TELEPHONE (OUTPATIENT)
Age: 83
End: 2023-11-22

## 2023-11-27 ENCOUNTER — OFFICE VISIT (OUTPATIENT)
Age: 83
End: 2023-11-27
Payer: MEDICARE

## 2023-11-27 VITALS
SYSTOLIC BLOOD PRESSURE: 126 MMHG | WEIGHT: 172 LBS | RESPIRATION RATE: 20 BRPM | TEMPERATURE: 97.5 F | HEART RATE: 73 BPM | DIASTOLIC BLOOD PRESSURE: 72 MMHG | BODY MASS INDEX: 23.3 KG/M2 | HEIGHT: 72 IN | OXYGEN SATURATION: 98 %

## 2023-11-27 DIAGNOSIS — I43 CARDIAC AMYLOIDOSIS (HCC): ICD-10-CM

## 2023-11-27 DIAGNOSIS — E85.4 CARDIAC AMYLOIDOSIS (HCC): ICD-10-CM

## 2023-11-27 DIAGNOSIS — I50.9 CHRONIC HEART FAILURE, UNSPECIFIED HEART FAILURE TYPE (HCC): Primary | ICD-10-CM

## 2023-11-27 PROCEDURE — 1036F TOBACCO NON-USER: CPT | Performed by: NURSE PRACTITIONER

## 2023-11-27 PROCEDURE — G8484 FLU IMMUNIZE NO ADMIN: HCPCS | Performed by: NURSE PRACTITIONER

## 2023-11-27 PROCEDURE — G8420 CALC BMI NORM PARAMETERS: HCPCS | Performed by: NURSE PRACTITIONER

## 2023-11-27 PROCEDURE — G8427 DOCREV CUR MEDS BY ELIG CLIN: HCPCS | Performed by: NURSE PRACTITIONER

## 2023-11-27 PROCEDURE — 99214 OFFICE O/P EST MOD 30 MIN: CPT | Performed by: NURSE PRACTITIONER

## 2023-11-27 PROCEDURE — 1123F ACP DISCUSS/DSCN MKR DOCD: CPT | Performed by: NURSE PRACTITIONER

## 2023-11-27 RX ORDER — TAFAMIDIS 61 MG/1
61 CAPSULE, LIQUID FILLED ORAL DAILY
Qty: 30 CAPSULE | Refills: 11 | Status: SHIPPED | OUTPATIENT
Start: 2023-11-27

## 2023-11-27 ASSESSMENT — ENCOUNTER SYMPTOMS
SORE THROAT: 0
BLOOD IN STOOL: 0
ABDOMINAL PAIN: 0
EYE PAIN: 0
SHORTNESS OF BREATH: 0
ABDOMINAL DISTENTION: 0
CHEST TIGHTNESS: 0
COUGH: 0

## 2023-11-27 ASSESSMENT — PATIENT HEALTH QUESTIONNAIRE - PHQ9
SUM OF ALL RESPONSES TO PHQ QUESTIONS 1-9: 0
2. FEELING DOWN, DEPRESSED OR HOPELESS: 0
SUM OF ALL RESPONSES TO PHQ9 QUESTIONS 1 & 2: 0
SUM OF ALL RESPONSES TO PHQ QUESTIONS 1-9: 0
1. LITTLE INTEREST OR PLEASURE IN DOING THINGS: 0
SUM OF ALL RESPONSES TO PHQ QUESTIONS 1-9: 0
SUM OF ALL RESPONSES TO PHQ QUESTIONS 1-9: 0

## 2023-11-27 NOTE — PROGRESS NOTES
[address] in [facility], [address] [place] [address] Note    Patient name: Michael Santos  Patient : 1940  Patient MRN: [id number]  Date of service: 23    Primary care physician: Cassidy Sequeira MD  Primary general cardiologist:    Dr. Rachael Girard  Primary F cardiologist: Pierce Prince MD    Chief Complaint   Patient presents with    Congestive Heart Failure    Follow-up         ASSESSMENT:  77 y/o with diastolic heart failure due to HFpEF 60-65%, stage C, NYHA class I on GDMT limited by cardiac amyloidosis  Wild type aTTR cardiac amyloidosis and co-existing MGUS (bone marrow biopsy was negative), stage 1 by Hughes criteria on vyndamax  Continue routine cardiac surveillance every 3-4 months  Memory loss short- and long-term remains the key quality of life barrier; working on getting CPAP optimized      INTERVAL HISTORY:  -VSS  -labs :  K 4.5; creat 1.21; pBNP 447  -weight up 2lbs  -Michael Santos is here for routine HF follow up. He is feeling good. His joint and muscle aches have started to improve with treatment. No SOB, chest pain, dizziness, swelling. He has concerns about the changes with vyndamax. RECOMMENDATIONS:  Continue tafamidis.   RN to help patient with paperwork   Continue norvasc 5mg twice daily  Continue hydralazine 100mg TID   Avoid beta-blockers and ACEi/ARB/ARNi due to orthostasis and exercise induced lightheadedness  Ok to monitor BP at home twice per week  Does not require diuretics  Does not require allopurinol, uric acid normal  Reinforced low salt diet  Reinforced fluid restriction to 6 x 8oz glasses per day  Patient needs advanced care plan on file  Recommend physical activity; he's walking 30 minutes daily 3x a week  Check echocardiogram in May 2024  Routine labs  Underwent neurocognitive testing and neurology evaluation for memory loss   Follow up with hematology annually for MGUS with Dr. Goran Emmanuel

## 2023-11-27 NOTE — PATIENT INSTRUCTIONS
Medication changes:    No medication changes     Please take this to your pharmacy to notify them of the change in medications. Testing Ordered:    Please present to a local lab nate in 2 weeks to have labs drawn. You will be notified of any abnormal results that requires a change in medication regimen. Other Recommendations:      Please record blood pressure daily before medication and two hours after medication, please record weight daily upon waking/after using the bathroom. Keep a written records of your weights and blood pressure and bring to your next appointment. If you have a weight gain of 3 or more pounds overnight OR 5 or more pounds in one week, new/worsened shortness of breath or swelling, or if your blood pressure begins to consistently run below 90/60 and/or you begin to experience dizziness or lightheadedness please contact our office at [phone] option 2. Ensure your drinking an adequate amount of water with a goal of 6-8 eight ounce glasses (1.5-2 liters) of fluid daily. Your urine should be clear and light yellow straw colored. Follow up 6 months  with [address]    Thank you for allowing us the privilege of being a part of your healthcare team! Please do not hesitate to contact our office at [phone] option 2 with any questions or concerns.

## 2023-11-29 ENCOUNTER — TELEPHONE (OUTPATIENT)
Age: 83
End: 2023-11-29

## 2023-12-08 ENCOUNTER — TELEPHONE (OUTPATIENT)
Age: 83
End: 2023-12-08

## 2023-12-08 NOTE — TELEPHONE ENCOUNTER
Pt left message requesting appt with Natasha to discuss [facility] Scre[facility] results. Gurjit German suggested that perhaps Karena could convince him to review those with his PCP.

## 2023-12-11 ENCOUNTER — TELEPHONE (OUTPATIENT)
Age: 83
End: 2023-12-11

## 2023-12-11 NOTE — TELEPHONE ENCOUNTER
Pt called to say that he has lost his paperwork telling him what tests he needs done at [address] [place] [address].  It was supposed to have been done in 10 days, and it has been 10 days. Please contact him with the what has been requested.

## 2023-12-11 NOTE — TELEPHONE ENCOUNTER
Spoke with patient using two patient identifiers. Patient states that he will present to office this morning to  lab slips and drop off results from life line tesitng. He states this testing is done around every 5 years for his heart and he had an abnormal test that he would like to speak with arlet about. Advised him that once we receive records we will provide it to provider for review and call back about appointment scheduling.

## 2023-12-12 LAB
ALBUMIN SERPL-MCNC: 4.5 G/DL (ref 3.7–4.7)
ALBUMIN/GLOB SERPL: 1.7 {RATIO} (ref 1.2–2.2)
ALP SERPL-CCNC: 70 IU/L (ref 44–121)
ALT SERPL-CCNC: 18 IU/L (ref 0–44)
AST SERPL-CCNC: 25 IU/L (ref 0–40)
BILIRUB SERPL-MCNC: 0.4 MG/DL (ref 0–1.2)
BNP SERPL-MCNC: 71.6 PG/ML (ref 0–100)
BUN SERPL-MCNC: 16 MG/DL (ref 8–27)
BUN/CREAT SERPL: 13 (ref 10–24)
CALCIUM SERPL-MCNC: 9.8 MG/DL (ref 8.6–10.2)
CHLORIDE SERPL-SCNC: 105 MMOL/L (ref 96–106)
CO2 SERPL-SCNC: 22 MMOL/L (ref 20–29)
CREAT SERPL-MCNC: 1.22 MG/DL (ref 0.76–1.27)
EGFRCR SERPLBLD CKD-EPI 2021: 58 ML/MIN/1.73
GLOBULIN SER CALC-MCNC: 2.7 G/DL (ref 1.5–4.5)
GLUCOSE SERPL-MCNC: 133 MG/DL (ref 70–99)
POTASSIUM SERPL-SCNC: 4.3 MMOL/L (ref 3.5–5.2)
PROT SERPL-MCNC: 7.2 G/DL (ref 6–8.5)
SODIUM SERPL-SCNC: 140 MMOL/L (ref 134–144)

## 2023-12-14 ENCOUNTER — TELEPHONE (OUTPATIENT)
Age: 83
End: 2023-12-14

## 2023-12-14 DIAGNOSIS — I50.9 CHRONIC HEART FAILURE, UNSPECIFIED HEART FAILURE TYPE (HCC): Primary | ICD-10-CM

## 2023-12-14 NOTE — TELEPHONE ENCOUNTER
MAGNUS Ordoñez - NP  Kely Lemus RN  His labs look good. To respond to his DANNIELLE results; let's refer him to Dr. Santhosh Gallo for PVD. Mr. Tavia Garcia has previously seen Dr. Jamia Dockery with [facility]. Let's please send a copy of his DANNIELLE report with the referral.     Called patient using two patient identifiers. Advised patient on above information per TONNY Arreola NP. Patient stated understanding provided patient with [facility] office number and let him know to call if he has not received a call in one week. He stated understanding and had no further questions. Referral faxed to S.

## 2024-01-03 ENCOUNTER — TELEPHONE (OUTPATIENT)
Age: 84
End: 2024-01-03

## 2024-01-03 NOTE — TELEPHONE ENCOUNTER
Returned patient call with no answer. Unable to leave vm at this time due to mailbox being full. Will try again later.

## 2024-01-03 NOTE — TELEPHONE ENCOUNTER
Pt left message for Natasha.  He wants to know the purpose of the Dr. Hart appt that was scheduled for him for this Fri, 1/5/24.  He can be reached at [phone].

## 2024-01-04 NOTE — TELEPHONE ENCOUNTER
Spoke with patient using two patient identifiers. Advised patient that follow up with Dr. Hart is scheduled for follow up regarding abnormal DANNIELLE's and symptoms. Advised patient that life line testing was sent with referral. Patient stated understanding and had no further questions at this time.

## 2024-02-07 ENCOUNTER — TELEPHONE (OUTPATIENT)
Age: 84
End: 2024-02-07

## 2024-02-07 DIAGNOSIS — I43 CARDIAC AMYLOIDOSIS (HCC): ICD-10-CM

## 2024-02-07 DIAGNOSIS — E85.4 CARDIAC AMYLOIDOSIS (HCC): ICD-10-CM

## 2024-02-07 DIAGNOSIS — I50.9 CHRONIC HEART FAILURE, UNSPECIFIED HEART FAILURE TYPE (HCC): Primary | ICD-10-CM

## 2024-02-07 NOTE — TELEPHONE ENCOUNTER
Spoke with pat with [email or web address] patient assistance program who reports that patient has been denied assistance for vyndamax. She states that even though patient was previously enrolled in program income guidelines changed from 500% to 300% of federal poverty level. Patient was also notified from program.         Dung Gracia MD  You16 minutes ago (1:14 PM)       This gentleman had a pretty borderline test that was not exactly positive and echo is not particularly suggestive of amyloid. I recommended he have an MRI to evaluate further if he has amyloidosis. If he is agreeable to MRI can we get this scheduled. If he is not agreeable to MRI, would set him up for repeat PYP scan if he agrees to that. I am reaching out to the [facility] amyloid program to see what they are doing for these patients falling through the cracks.     You routed conversation to Dung Gracia MD34 minutes ago (12:56 PM)     Natasha Parada APRN - NP  You2 hours ago (11:28 AM)     RH  Ok, that's unfortunate.   Will continue with the rest of his medication regimen then.  Thank you.       Spoke with patient using two patient identifiers. Reviewed with him that our office was notified that he has been denied for patient assistance. Patient states that he thought he was approved as he spoke with someone earlier in the week and provided financial documents. Advised patient that I will follow back up with [email or web address] and call back with more information.       Called [email or web address] and spoke with jose d. She states that financial documents were received and that patient was denied patient assistance do to being above income guidelines.       Called patient in regards to above information. Left message with [facility] call back number. Will await return call.

## 2024-02-09 ENCOUNTER — OFFICE VISIT (OUTPATIENT)
Age: 84
End: 2024-02-09
Payer: MEDICARE

## 2024-02-09 DIAGNOSIS — G31.84 MILD COGNITIVE IMPAIRMENT: Primary | ICD-10-CM

## 2024-02-09 DIAGNOSIS — F41.9 MILD ANXIETY: ICD-10-CM

## 2024-02-09 PROCEDURE — 96132 NRPSYC TST EVAL PHYS/QHP 1ST: CPT | Performed by: CLINICAL NEUROPSYCHOLOGIST

## 2024-02-09 NOTE — PROGRESS NOTES
A neuropsychological evaluation was completed with the patient on 10/13/23     Prior to seeing the patient for today's visit, I reviewed pertinent records, including the previously completed report, the records in Epic, and any updated visits from other providers since the patient's last visit.     During today's appointment I administered the following measures: NMSE, Fluency.  Exam normal          I provided feedback services related to the previously completed report. Attendees included: Patient. Education was provided regarding my diagnostic impressions, and we discussed treatment plan/options. Attendees were provided with the opportunity to ask questions, which were answered to the best of my ability.     We discussed, in detail, the following:    This patient has now undergone two evaluations of neurocognitive and psychologic status.  He was previously showing problems with sustained attention masked to some degree by high IQ.  Comparison of current with previous testing shows no decline in functioning over time. IQ scores have improved.  From an emotional standpoint, he reports (somewhat defensively) a mild worsening in anxiety.                   In my opinion, this profile remains inconsistent with MCI or dementia.  Instead, this is an individual with superior range IQ who has chronic underlying ADHD-inattentive issues previously masked by his high IQ now compounded by age.  In addition to continued medical care, it may be wise to consider medication management for attention if is not medically contraindicated.   Consider counseling for mild anxiety. Otherwise, the patient should be encouraged to remain as mentally, physically, and socially active as possible.  I am not concerned about competency/capacity, driving, day-to-day supervision, etc.  I wish him well.  I hope that he and the family are reassured by this positive news.  We have extensive baseline and updated neurocognitive and psychologic data on

## 2024-02-19 ENCOUNTER — ANESTHESIA (OUTPATIENT)
Facility: HOSPITAL | Age: 84
End: 2024-02-19
Payer: MEDICARE

## 2024-02-19 ENCOUNTER — HOSPITAL ENCOUNTER (OUTPATIENT)
Facility: HOSPITAL | Age: 84
Setting detail: OUTPATIENT SURGERY
Discharge: HOME | End: 2024-02-19
Attending: INTERNAL MEDICINE | Admitting: INTERNAL MEDICINE
Payer: MEDICARE

## 2024-02-19 ENCOUNTER — ANESTHESIA EVENT (OUTPATIENT)
Facility: HOSPITAL | Age: 84
End: 2024-02-19
Payer: MEDICARE

## 2024-02-19 VITALS
TEMPERATURE: 98 F | HEIGHT: 72 IN | WEIGHT: 169 LBS | BODY MASS INDEX: 22.89 KG/M2 | OXYGEN SATURATION: 97 % | HEART RATE: 85 BPM | SYSTOLIC BLOOD PRESSURE: 158 MMHG | RESPIRATION RATE: 19 BRPM | DIASTOLIC BLOOD PRESSURE: 88 MMHG

## 2024-02-19 PROCEDURE — 2500000003 HC RX 250 WO HCPCS: Performed by: NURSE ANESTHETIST, CERTIFIED REGISTERED

## 2024-02-19 PROCEDURE — 3700000001 HC ADD 15 MINUTES (ANESTHESIA): Performed by: INTERNAL MEDICINE

## 2024-02-19 PROCEDURE — 3700000000 HC ANESTHESIA ATTENDED CARE: Performed by: INTERNAL MEDICINE

## 2024-02-19 PROCEDURE — 3600007502: Performed by: INTERNAL MEDICINE

## 2024-02-19 PROCEDURE — 7100000010 HC PHASE II RECOVERY - FIRST 15 MIN: Performed by: INTERNAL MEDICINE

## 2024-02-19 PROCEDURE — 7100000011 HC PHASE II RECOVERY - ADDTL 15 MIN: Performed by: INTERNAL MEDICINE

## 2024-02-19 PROCEDURE — 6360000002 HC RX W HCPCS: Performed by: NURSE ANESTHETIST, CERTIFIED REGISTERED

## 2024-02-19 PROCEDURE — 2580000003 HC RX 258: Performed by: INTERNAL MEDICINE

## 2024-02-19 PROCEDURE — 3600007512: Performed by: INTERNAL MEDICINE

## 2024-02-19 RX ORDER — SODIUM CHLORIDE 9 MG/ML
25 INJECTION, SOLUTION INTRAVENOUS PRN
Status: DISCONTINUED | OUTPATIENT
Start: 2024-02-19 | End: 2024-02-19 | Stop reason: HOSPADM

## 2024-02-19 RX ORDER — SODIUM CHLORIDE 0.9 % (FLUSH) 0.9 %
5-40 SYRINGE (ML) INJECTION EVERY 12 HOURS SCHEDULED
Status: DISCONTINUED | OUTPATIENT
Start: 2024-02-19 | End: 2024-02-19 | Stop reason: HOSPADM

## 2024-02-19 RX ORDER — SODIUM CHLORIDE 0.9 % (FLUSH) 0.9 %
5-40 SYRINGE (ML) INJECTION PRN
Status: DISCONTINUED | OUTPATIENT
Start: 2024-02-19 | End: 2024-02-19 | Stop reason: HOSPADM

## 2024-02-19 RX ORDER — LIDOCAINE HYDROCHLORIDE 20 MG/ML
INJECTION, SOLUTION EPIDURAL; INFILTRATION; INTRACAUDAL; PERINEURAL PRN
Status: DISCONTINUED | OUTPATIENT
Start: 2024-02-19 | End: 2024-02-19 | Stop reason: SDUPTHER

## 2024-02-19 RX ORDER — SODIUM CHLORIDE 9 MG/ML
INJECTION, SOLUTION INTRAVENOUS CONTINUOUS
Status: DISCONTINUED | OUTPATIENT
Start: 2024-02-19 | End: 2024-02-19 | Stop reason: HOSPADM

## 2024-02-19 RX ADMIN — PROPOFOL 100 MG: 10 INJECTION, EMULSION INTRAVENOUS at 09:45

## 2024-02-19 RX ADMIN — PROPOFOL 40 MG: 10 INJECTION, EMULSION INTRAVENOUS at 10:07

## 2024-02-19 RX ADMIN — PROPOFOL 40 MG: 10 INJECTION, EMULSION INTRAVENOUS at 09:51

## 2024-02-19 RX ADMIN — PROPOFOL 40 MG: 10 INJECTION, EMULSION INTRAVENOUS at 09:59

## 2024-02-19 RX ADMIN — PROPOFOL 40 MG: 10 INJECTION, EMULSION INTRAVENOUS at 09:48

## 2024-02-19 RX ADMIN — PROPOFOL 40 MG: 10 INJECTION, EMULSION INTRAVENOUS at 09:55

## 2024-02-19 RX ADMIN — SODIUM CHLORIDE: 9 INJECTION, SOLUTION INTRAVENOUS at 09:42

## 2024-02-19 RX ADMIN — LIDOCAINE HYDROCHLORIDE 25 MG: 20 INJECTION, SOLUTION EPIDURAL; INFILTRATION; INTRACAUDAL; PERINEURAL at 09:45

## 2024-02-19 ASSESSMENT — PAIN SCALES - GENERAL
PAINLEVEL_OUTOF10: 0
PAINLEVEL_OUTOF10: 0

## 2024-02-19 NOTE — ANESTHESIA POSTPROCEDURE EVALUATION
Department of Anesthesiology  Postprocedure Note    Patient: Eugenio Trevino  MRN: [id number]  YOB: 1940  Date of evaluation: 2/19/2024    Procedure Summary       Date: 02/19/24 Room / Location: [facility] ENDO 03 / [facility] ENDOSCOPY    Anesthesia Start: 0942 Anesthesia Stop: 1018    Procedure: COLONOSCOPY DIAGNOSTIC (Upper GI Region) Diagnosis:       Abnormal computed tomography of gastrointestinal tract      (Abnormal computed tomography of gastrointestinal tract [R93.3])    Surgeons: Lynda Daly MD Responsible Provider: Eugenio Desai MD    Anesthesia Type: MAC ASA Status: 3            Anesthesia Type: MAC    Thomas Phase I: Thomas Score: 10    Thomas Phase II: Thomas Score: 10    Anesthesia Post Evaluation    Patient location during evaluation: bedside  Patient participation: complete - patient participated  Level of consciousness: awake  Airway patency: patent  Nausea & Vomiting: no nausea  Cardiovascular status: blood pressure returned to baseline  Respiratory status: acceptable  Hydration status: euvolemic  Pain management: adequate    No notable events documented.

## 2024-02-19 NOTE — H&P
83 y.o. male presents for diagnostic colonoscopy for colon wall thickening on CTA.  Additional H&P data will be attached on the day of procedure.    Lynda Daly Jr, MD

## 2024-02-19 NOTE — OP NOTE
[facility]  [facility]  MAGGIE Daly Jr, MD  [phone]      2024    Colonoscopy Procedure Note  Eugenio Trevino  :  1940  [facility] Medical Record Number: [id number]    Indications:   Abnormal CT of the GI tract  PCP:  Enmanuel De La Fuente MD  Anesthesia/Sedation: See Anesthesia Record  Endoscopist:  Dr. MAGGIE Daly Jr  Complications:  None  Estimated Blood Loss:  None    Surgical assistant: Circulator: Laurie Teixeira RN  Endoscopy Technician: Yang Collazo none unless otherwise specified.     Permit:  The indications, risks, benefits and alternatives were reviewed with the patient or their decision maker who was provided an opportunity to ask questions and all questions were answered.  The specific risks of colonoscopy with conscious sedation were reviewed, including but not limited to anesthetic complication, bleeding, adverse drug reaction, missed lesion, infection, IV site reactions, and intestinal perforation which would lead to the need for surgical repair.  Alternatives to colonoscopy including radiographic imaging, observation without testing, or laboratory testing were reviewed including the limitations of those alternatives.  After considering the options and having all their questions answered, the patient or their decision maker provided both verbal and written consent to proceed.        Procedure in Detail:  After obtaining informed consent, positioning of the patient in the left lateral decubitus position, and conduction of a pre-procedure pause or \"time out\" the endoscope was introduced into the anus and advanced to the cecum, which was identified by the ileocecal valve and appendiceal orifice.  The quality of the colonic preparation was good.  A careful inspection was made as the colonoscope was withdrawn, findings and interventions are described

## 2024-02-19 NOTE — INTERVAL H&P NOTE
Pre-Endoscopy H&P Update  Chief complaint/HPI/ROS:  The indication for the procedure, the patient's history and the patient's current medications are reviewed prior to the procedure and that data is reported on the H&P to which this document is attached.  Any significant complaints with regard to organ systems will be noted, and if not mentioned then a review of systems is not contributory.  Past Medical History:   Diagnosis Date    Depression     GERD (gastroesophageal reflux disease)     Glaucoma     h/o Atrial fibrillation (HCC) 2021    Watchman procedure    High cholesterol     IBS (irritable bowel syndrome)       Past Surgical History:   Procedure Laterality Date    CARDIAC SURGERY      Watchman procedure    CARDIOVERSION ELECTIVE ARRHYTHMIA EXTERNAL N/A 3/13/2019    EP CARDIOVERSION performed by Eugenio Martinez MD at [facility] CARDIAC CATH LAB    CHOLECYSTECTOMY      CT BONE MARROW BIOPSY  2021    CT BONE MARROW BIOPSY 2021 [facility] RAD CT    HEMORRHOID SURGERY      ORTHOPEDIC SURGERY      RIGHT knee    TONSILLECTOMY       Social   Social History     Tobacco Use    Smoking status: Former     Current packs/day: 0.00     Types: Cigarettes     Quit date: 1978     Years since quittin.1    Smokeless tobacco: Never   Substance Use Topics    Alcohol use: Yes     Alcohol/week: 1.0 standard drink of alcohol     Types: 1 Glasses of wine per week     Comment: occasional      History reviewed. No pertinent family history.   No Known Allergies   Prior to Admission Medications   Prescriptions Last Dose Informant Patient Reported? Taking?   Cholecalciferol 50 MCG ( UT) TABS 2024  Yes No   Sig: Take 1 tablet by mouth daily   Latanoprostene Bunod (VYZULTA) 0.024 % SOLN 2024  Yes No   Sig: PLACE 1 DROP INTO THE RIGHT EYE NIGHTLY   MAGNESIUM CITRATE PO 2024  Yes No   Sig: Take by mouth daily   Tafamidis (VYNDAMAX) 61 MG CAPS   No No   Sig: Take 61 mg by mouth daily   VORTIoxetine (TRINTELLIX) 10 MG

## 2024-02-19 NOTE — ANESTHESIA PRE PROCEDURE
Department of Anesthesiology  Preprocedure Note       Name:  Eugenio Trevino   Age:  83 y.o.  :  1940                                          MRN:  [id number]         Date:  2024      Surgeon: Surgeon(s):  Lynda Daly MD    Procedure: Procedure(s):  COLONOSCOPY DIAGNOSTIC    Medications prior to admission:   Prior to Admission medications    Medication Sig Start Date End Date Taking? Authorizing Provider   Tafamidis (VYNDAMAX) 61 MG CAPS Take 61 mg by mouth daily 23   Natasha Parada APRN - MAT   hydrALAZINE (APRESOLINE) 100 MG tablet Take 1 tablet by mouth 3 times daily 23   Natasha Parada APRN - NP   amLODIPine (NORVASC) 5 MG tablet TAKE 1 TABLET TWICE DAILY 23   Natasha Parada APRN - NP   gabapentin (NEURONTIN) 100 MG capsule  23   Provider, MD Raimundo   isosorbide mononitrate (IMDUR) 60 MG extended release tablet TAKE 1 TABLET TWICE DAILY 23   Sue Varela MD   MAGNESIUM CITRATE PO Take by mouth daily    Automatic Reconciliation, Ar   atorvastatin (LIPITOR) 80 MG tablet Take 1 tablet by mouth daily    Automatic Reconciliation, Ar   busPIRone (BUSPAR) 15 MG tablet Take 15 mg by mouth 2 times daily 21   Automatic Reconciliation, Ar   Cholecalciferol 50 MCG (2000) TABS Take 1 tablet by mouth daily    Automatic Reconciliation, Ar   coenzyme Q10 100 MG CAPS capsule Take 1 capsule by mouth 2 times daily    Automatic Reconciliation, Ar   dorzolamide-timolol (COSOPT) 22.3-6.8 MG/ML ophthalmic solution Apply 1 drop to eye 2 times daily    Automatic Reconciliation, Ar   famotidine (PEPCID) 20 MG tablet TAKE 1 (ONE) TABLET TWICE A DAY, BEFORE MEALS 22   Automatic Reconciliation, Ar   Latanoprostene Bunod (VYZULTA) 0.024 % SOLN PLACE 1 DROP INTO THE RIGHT EYE NIGHTLY 21   Automatic Reconciliation, Ar   pantoprazole (PROTONIX) 40 MG tablet Take 1 tablet by mouth daily 22   Automatic Reconciliation, Ar   VORTIoxetine (TRINTELLIX) 10

## 2024-02-19 NOTE — DISCHARGE INSTRUCTIONS
[facility]  [facility]  MAGGIE Muhammad Jr, MD  [phone]      February 19, 2024    Eugenio Trevino  YOB: 1940    COLONOSCOPY DISCHARGE INSTRUCTIONS    If there is redness at IV site you should apply warm compress to area.  If redness or soreness persist contact Dr. Muhammad's office or your primary care doctor.    There may be a slight amount of blood passed from the rectum.  Gaseous discomfort may develop, but walking, belching will help relieve this.  You may not operate a vehicle for 12 hours  You may not operate machinery or dangerous appliances for rest of today  You may not drink alcoholic beverages for 12 hours  Avoid making any critical decisions for 24 hours    DIET:  You may resume your normal diet, but some patients find that heavy or large meals may lead to indigestion or vomiting.  I suggest a light meal as first food intake.    MEDICATIONS:  The use of some over-the-counter pain medication may lead to bleeding after colon biopsies or polyp removal.  Tylenol (also called acetaminophen) is safe to take even if you have had colonoscopy with polyp removal.  Based on the procedure you had today you may safely take aspirin or aspirin-like products for the next seven (7) days.  Remember that Tylenol (also called acetaminophen) is safe to take after colonoscopy even if you have had biopsies or polyps removed.    ACTIVITY:  You may resume your normal household activities, but it is recommended that you spend the remainder of the day resting -  avoid any strenuous activity.    CALL DR. MUHAMMAD'S OFFICE IF:  Increasing pain, nausea, vomiting  Abdominal distension (swelling)  Significant new or increased bleeding (oral or rectal)  Fever/Chills  Chest pain/shortness of breath                       Additional instructions:   Impression:  There was diverticulosis throughout the entire colon. Most severe in the

## 2024-02-19 NOTE — PROGRESS NOTES

## 2024-02-20 ENCOUNTER — TELEPHONE (OUTPATIENT)
Age: 84
End: 2024-02-20

## 2024-02-20 NOTE — TELEPHONE ENCOUNTER
Pt called and lvm stating he had his colonoscopy yesterday, in which the papers he received afterwards said to call Dr. Wilder office for any questions. Pt also stated he lost a small book-sized package from the colonoscopy recovery room. Pt was wondering what it was about and if he could get a replacement.    Pt would like call back from someone who deals w/ colonoscopy recovery.     # [phone]

## 2024-02-26 RX ORDER — TAFAMIDIS 61 MG/1
61 CAPSULE, LIQUID FILLED ORAL DAILY
Qty: 30 CAPSULE | Refills: 11 | Status: SHIPPED | OUTPATIENT
Start: 2024-02-26 | End: 2024-02-29 | Stop reason: ALTCHOICE

## 2024-02-28 ENCOUNTER — TELEPHONE (OUTPATIENT)
Age: 84
End: 2024-02-28

## 2024-02-29 RX ORDER — TAFAMIDIS MEGLUMINE 20 MG/1
80 CAPSULE, LIQUID FILLED ORAL DAILY
Qty: 120 CAPSULE | Refills: 2 | OUTPATIENT
Start: 2024-02-29

## 2024-02-29 NOTE — TELEPHONE ENCOUNTER
Spoke with patient using two patient identifiers. Patient reports that he has heard from mecca and wanted to let our office know. Notified patient that mecca likely called patient to discuss that he was denied for vyndamax because perferred drug is vyndaquel. Advised him per Dr. Basil van vyndaquel has very similar mechanism of action and can be used as substitute for vyndamax so we will try to complete prior authorization and let patient know once we have more answers. Patient stated understanding. Also advised patient that he should call [phone] to schedule cardiac MRI for soonest available appointment. He states that he will work on scheduling soon. Patient had no further questions at this time.

## 2024-03-08 ENCOUNTER — TELEPHONE (OUTPATIENT)
Age: 84
End: 2024-03-08

## 2024-03-08 RX ORDER — TAFAMIDIS MEGLUMINE 20 MG/1
80 CAPSULE, LIQUID FILLED ORAL DAILY
Qty: 120 CAPSULE | Refills: 2 | Status: SHIPPED | OUTPATIENT
Start: 2024-03-08

## 2024-03-08 NOTE — TELEPHONE ENCOUNTER
his pressures. He states that he usually rests about 2 to 3 mins but will now try around 10mins to be sure bp is accurate. He confirms he is taking hydralazine 100mg TID and norvasc 5mg BID. He denies any headaches or other symptoms. He reported logs as follows     12/31 136/62 (69) several hours later 132/66 (74)   1/27 141/85 (80) several hours later 124/62(77)   2/26 133/70 (75) several hours later 140/68 (79)  2/27 140/74 (80) several hours later 149/78 (84)  3/8 146/77 (69) several hours later 168/72(73) took several mins later 143/69 (72)   3/12 157/74(68) 2 mins later 153/91 (79)    Patient confirms this morning bp was before medications. Educated patient to keep consistent logs during the next week in the morning before medications and 2 hours after medications and call in one week to report logs again. Notfiied him that I will follow up with NP about bp logs and only call if changes are needed. Also asked that patient bring bp cuff to next appointment and confirmed appointment for 5/31/24.     Natsaha Parada APRN - NP  You2 minutes ago (2:51 PM)       In the past he has had issue with some other medications.  Let's tentatively try 2.5mg lisinopril daily at bedtime and see if his BP/symptoms will tolerate it.  Please have him send logs by Friday.       Called patient using two patient identifiers. Advised patient on above information. Patient asking about accuracy of blood pressure cuff. Advised patient that he can purchase a new cuff or potentially check accuracy at pharmacy or bring into office.  He states that since he purchased cuff at [facility] and will be going to [facility] to  his prescription he will start there. Patient had no further questions at this time.

## 2024-03-11 ENCOUNTER — TELEPHONE (OUTPATIENT)
Age: 84
End: 2024-03-11

## 2024-03-11 NOTE — TELEPHONE ENCOUNTER
Pt left message asking to speak with Karena about a medication problem on which she has been working for him.  He can be reached at [phone].

## 2024-03-12 RX ORDER — LISINOPRIL 2.5 MG/1
2.5 TABLET ORAL NIGHTLY
Qty: 30 TABLET | Refills: 0 | Status: SHIPPED | OUTPATIENT
Start: 2024-03-12

## 2024-03-18 ENCOUNTER — TELEPHONE (OUTPATIENT)
Age: 84
End: 2024-03-18

## 2024-03-18 NOTE — TELEPHONE ENCOUNTER
Called patient in regards to blood pressure logs after starting lisinopril. Patient did not answer. Left vm with [facility] call back number. Will await return call.       Returned patients call in regards to blood pressure logs. Patient did not answer. Left vm with [facility] call back number. Will await return call.     3/21/24     Called patient using two patient identifiers. Patient confirms that he started lisinopril 2.5 mg. He provided logs as follows     3/13 weight 163 lbs bp 135/74 (73) approx 2 hrs later 136/69 (78)   3/15 weight 164 lbs bp 141/75 (71) approx 2 hrs later 113/62(83)   3/16 weight 164lbs bp 127/64 (71) approx 2 hrs later pt did not take  3/16 weight 160lbs bp 132/80 (69) approx 2 hrs later 122/59 (78)   3/17 weight 161 lbs bp 143/69 (69) approx 2 hrs later 141/65 (72)   3/18 weight 160 lbs bp 142/64(65) approx 2 hrs later 144/65 (73)   3/19 weight 159 lbs bp 115/61 (68) approx 2 hrs later 122/67 (66)   3/20 weight 160 lbs bp 145/76 (69) approx 2 hrs later 129/72 (76)   3/21 weight 162 lbs bp 127/63 (65)     Patient states that he has been feeling well with no symptoms. Patient denies lightheadedness, dizziness, ect. Spoke with patient about need for scheduling MRI and provided patient with number for scheduling. He states he will call today. Advised patient that I will send logs to provider and call back if any changes are needed.       Message  Received: Today  Natasha Parada APRN - Karena Doshi, RN  Caller: Unspecified (3 days ago,  2:01 PM)  Those look good for now.  No additional changes.  Thank you.      Called patient using two patient identifiers. Reviewed above message per TONNY Parada NP. Patient stated understanding and had no further questions.

## 2024-04-04 RX ORDER — LISINOPRIL 2.5 MG/1
2.5 TABLET ORAL NIGHTLY
Qty: 90 TABLET | Refills: 0 | Status: SHIPPED | OUTPATIENT
Start: 2024-04-04

## 2024-04-04 NOTE — TELEPHONE ENCOUNTER
Requested Prescriptions     Signed Prescriptions Disp Refills    lisinopril (PRINIVIL;ZESTRIL) 2.5 MG tablet 90 tablet 0     Sig: Take 1 tablet by mouth at bedtime     Authorizing Provider: SIA HILTON     Ordering User: JIAN SETH

## 2024-04-18 RX ORDER — LISINOPRIL 2.5 MG/1
2.5 TABLET ORAL NIGHTLY
Qty: 90 TABLET | Refills: 0 | Status: SHIPPED | OUTPATIENT
Start: 2024-04-18

## 2024-04-18 NOTE — TELEPHONE ENCOUNTER
Requested Prescriptions     Pending Prescriptions Disp Refills    lisinopril (PRINIVIL;ZESTRIL) 2.5 MG tablet 90 tablet 0     Sig: Take 1 tablet by mouth at bedtime      Rx sent to [facility], previous rx sent to [facility] send to local pharmacy

## 2024-04-29 ENCOUNTER — TELEPHONE (OUTPATIENT)
Age: 84
End: 2024-04-29

## 2024-04-29 NOTE — TELEPHONE ENCOUNTER
Spoke with patient using two patient identifiers. Confirmed next appointment on 5/31 with GELACIO Cash NP. Advised patient that if he would like follow up with Dr. Gracia for next appointment he can request when scheduling. Reviewed date and time for cardiac MRI. Patient had questions if vyndamax was replaced by lisinopril. Advised patient that lisinopril is used for bp management and reviewed information from note on 3/8/24  per dr. Gracia about vyndamax and need for MRI to evaluate amyloid. Patient states understanding and had no further questions.

## 2024-05-24 ENCOUNTER — TELEPHONE (OUTPATIENT)
Age: 84
End: 2024-05-24

## 2024-05-30 ASSESSMENT — ENCOUNTER SYMPTOMS
COUGH: 0
ABDOMINAL PAIN: 0
BLOOD IN STOOL: 0
SHORTNESS OF BREATH: 0
SORE THROAT: 0
ABDOMINAL DISTENTION: 0
CHEST TIGHTNESS: 0
EYE PAIN: 0

## 2024-05-30 NOTE — PROGRESS NOTES
Negative for activity change, appetite change, chills, fatigue, fever and unexpected weight change.   HENT:  Negative for congestion, nosebleeds and sore throat.    Eyes:  Negative for pain and visual disturbance.   Respiratory:  Negative for cough, chest tightness and shortness of breath.    Cardiovascular:  Negative for chest pain, palpitations and leg swelling.   Gastrointestinal:  Negative for abdominal distention, abdominal pain and blood in stool.   Genitourinary:  Negative for dysuria and hematuria.   Musculoskeletal:  Negative for arthralgias, gait problem, myalgias and neck pain.             Skin:  Negative for rash and wound.   Neurological:  Negative for dizziness, weakness, light-headedness and headaches.        Foregetfulness   Psychiatric/Behavioral:  Negative for confusion, dysphoric mood and sleep disturbance. The patient is not nervous/anxious.          PAST MEDICAL HISTORY:  Past Medical History:   Diagnosis Date    CHF (congestive heart failure) ([facility])     Depression     GERD (gastroesophageal reflux disease)     Glaucoma     h/o Atrial fibrillation (HCC) 02/01/2021    Watchman procedure    High cholesterol     IBS (irritable bowel syndrome)        PAST SURGICAL HISTORY:  Past Surgical History:   Procedure Laterality Date    CARDIAC SURGERY      Watchman procedure    CARDIOVERSION ELECTIVE ARRHYTHMIA EXTERNAL N/A 3/13/2019    EP CARDIOVERSION performed by Eugenio Martinez MD at [facility] CARDIAC CATH LAB    CHOLECYSTECTOMY      COLONOSCOPY N/A 2/19/2024    COLONOSCOPY DIAGNOSTIC performed by Lynda Daly MD at [facility] ENDOSCOPY    CT BONE MARROW BIOPSY  02/16/2021    CT BONE MARROW BIOPSY 2/16/2021 [facility] RAD CT    HEMORRHOID SURGERY      ORTHOPEDIC SURGERY      RIGHT knee    TONSILLECTOMY         FAMILY HISTORY:  No family history on file.    SOCIAL HISTORY:  Social History     Socioeconomic History    Marital status:      Spouse name: None    Number of children: None    Years of education: None

## 2024-05-31 ENCOUNTER — OFFICE VISIT (OUTPATIENT)
Age: 84
End: 2024-05-31

## 2024-05-31 VITALS
RESPIRATION RATE: 18 BRPM | HEIGHT: 72 IN | HEART RATE: 80 BPM | WEIGHT: 162 LBS | OXYGEN SATURATION: 98 % | SYSTOLIC BLOOD PRESSURE: 106 MMHG | TEMPERATURE: 98.1 F | BODY MASS INDEX: 21.94 KG/M2 | DIASTOLIC BLOOD PRESSURE: 64 MMHG

## 2024-05-31 DIAGNOSIS — I50.9 CHRONIC HEART FAILURE, UNSPECIFIED HEART FAILURE TYPE (HCC): Primary | ICD-10-CM

## 2024-05-31 DIAGNOSIS — E61.1 IRON DEFICIENCY: ICD-10-CM

## 2024-05-31 DIAGNOSIS — I50.9 CHRONIC HEART FAILURE, UNSPECIFIED HEART FAILURE TYPE (HCC): ICD-10-CM

## 2024-05-31 NOTE — PATIENT INSTRUCTIONS
Medication changes:    No medication changes     Please take this to your pharmacy to notify them of the change in medications.     Testing Ordered:    Lab work has been completed in clinic. If results are normal you will just receive a message through my chart. Please make sure to have an active my chart account. Having issues logging in? Contact the [facility] Help Desk at [phone].Our office will notify you of any abnormal results requiring changes to your current plan of care.       Other Recommendations:     A referral has been sent to GI please call  [phone]  for scheduling.     Please call [phone] for pulmonary associates to discuss CPAP and Inspire       Please record blood pressure and heart rate daily before medication and two hours after medication, please record weight daily upon waking/after using the bathroom. Keep a written records of your weights and blood pressure and bring to your next appointment. If you have a weight gain of 3 or more pounds overnight OR 5 or more pounds in one week, new/worsened shortness of breath or swelling, or if your blood pressure begins to consistently run below 90/60 and/or you begin to experience dizziness or lightheadedness please contact our office at [phone] option 2.    Ensure your drinking an adequate amount of water with a goal of 6-8 eight ounce glasses (1.5-2 liters) of fluid daily. Your urine should be clear and light yellow straw colored.     Follow up soonest available with Dr. Gracia  with [facility]    Thank you for allowing us the privilege of being a part of your healthcare team! Please do not hesitate to contact our office at [phone] option 2 with any questions or concerns.     We are restarting a monthly heart failure support group, this will be the last Wednesday of every month from 5-6pm at [facility]. If you would like to attend you will need to RSVP to [email or web address]

## 2024-06-01 DIAGNOSIS — D47.2 MONOCLONAL GAMMOPATHY: ICD-10-CM

## 2024-06-02 LAB
ALBUMIN SERPL-MCNC: 4.1 G/DL (ref 3.5–5)
ALBUMIN/GLOB SERPL: 1.1 (ref 1.1–2.2)
ALP SERPL-CCNC: 80 U/L (ref 45–117)
ALT SERPL-CCNC: 23 U/L (ref 12–78)
ANION GAP SERPL CALC-SCNC: 7 MMOL/L (ref 5–15)
AST SERPL-CCNC: 21 U/L (ref 15–37)
BASOPHILS # BLD: 0 K/UL (ref 0–0.1)
BASOPHILS NFR BLD: 1 % (ref 0–1)
BILIRUB SERPL-MCNC: 0.4 MG/DL (ref 0.2–1)
BUN SERPL-MCNC: 27 MG/DL (ref 6–20)
BUN/CREAT SERPL: 22 (ref 12–20)
CALCIUM SERPL-MCNC: 9.5 MG/DL (ref 8.5–10.1)
CHLORIDE SERPL-SCNC: 109 MMOL/L (ref 97–108)
CO2 SERPL-SCNC: 23 MMOL/L (ref 21–32)
CREAT SERPL-MCNC: 1.25 MG/DL (ref 0.7–1.3)
DIFFERENTIAL METHOD BLD: NORMAL
EOSINOPHIL # BLD: 0.4 K/UL (ref 0–0.4)
EOSINOPHIL NFR BLD: 7 % (ref 0–7)
ERYTHROCYTE [DISTWIDTH] IN BLOOD BY AUTOMATED COUNT: 14.3 % (ref 11.5–14.5)
FERRITIN SERPL-MCNC: 156 NG/ML (ref 26–388)
GLOBULIN SER CALC-MCNC: 3.8 G/DL (ref 2–4)
GLUCOSE SERPL-MCNC: 127 MG/DL (ref 65–100)
HCT VFR BLD AUTO: 37.7 % (ref 36.6–50.3)
HGB BLD-MCNC: 12.3 G/DL (ref 12.1–17)
IMM GRANULOCYTES # BLD AUTO: 0 K/UL (ref 0–0.04)
IMM GRANULOCYTES NFR BLD AUTO: 0 % (ref 0–0.5)
IRON SATN MFR SERPL: 28 % (ref 20–50)
IRON SERPL-MCNC: 83 UG/DL (ref 35–150)
LYMPHOCYTES # BLD: 1.6 K/UL (ref 0.8–3.5)
LYMPHOCYTES NFR BLD: 29 % (ref 12–49)
MCH RBC QN AUTO: 29.6 PG (ref 26–34)
MCHC RBC AUTO-ENTMCNC: 32.6 G/DL (ref 30–36.5)
MCV RBC AUTO: 90.8 FL (ref 80–99)
MONOCYTES # BLD: 0.6 K/UL (ref 0–1)
MONOCYTES NFR BLD: 11 % (ref 5–13)
NEUTS SEG # BLD: 2.9 K/UL (ref 1.8–8)
NEUTS SEG NFR BLD: 52 % (ref 32–75)
NRBC # BLD: 0 K/UL (ref 0–0.01)
NRBC BLD-RTO: 0 PER 100 WBC
NT PRO BNP: 231 PG/ML
PLATELET # BLD AUTO: 201 K/UL (ref 150–400)
PMV BLD AUTO: 11.5 FL (ref 8.9–12.9)
POTASSIUM SERPL-SCNC: 4.4 MMOL/L (ref 3.5–5.1)
PROT SERPL-MCNC: 7.9 G/DL (ref 6.4–8.2)
RBC # BLD AUTO: 4.15 M/UL (ref 4.1–5.7)
SODIUM SERPL-SCNC: 139 MMOL/L (ref 136–145)
T4 FREE SERPL-MCNC: 0.7 NG/DL (ref 0.8–1.5)
TIBC SERPL-MCNC: 295 UG/DL (ref 250–450)
TSH SERPL DL<=0.05 MIU/L-ACNC: 1.87 UIU/ML (ref 0.36–3.74)
WBC # BLD AUTO: 5.4 K/UL (ref 4.1–11.1)

## 2024-06-03 ENCOUNTER — TELEPHONE (OUTPATIENT)
Age: 84
End: 2024-06-03

## 2024-06-03 NOTE — TELEPHONE ENCOUNTER
Pt would like to speak with Shanice.  He has questions about his 5/31/24 after visit summary.  He can best be reached at [phone].

## 2024-06-04 ENCOUNTER — HOSPITAL ENCOUNTER (OUTPATIENT)
Facility: HOSPITAL | Age: 84
Discharge: HOME | End: 2024-06-07
Attending: INTERNAL MEDICINE
Payer: MEDICARE

## 2024-06-04 ENCOUNTER — TELEPHONE (OUTPATIENT)
Age: 84
End: 2024-06-04

## 2024-06-04 DIAGNOSIS — E85.4 CARDIAC AMYLOIDOSIS (HCC): ICD-10-CM

## 2024-06-04 DIAGNOSIS — I43 CARDIAC AMYLOIDOSIS (HCC): ICD-10-CM

## 2024-06-04 DIAGNOSIS — I50.9 CHRONIC HEART FAILURE, UNSPECIFIED HEART FAILURE TYPE (HCC): ICD-10-CM

## 2024-06-04 PROCEDURE — 6360000004 HC RX CONTRAST MEDICATION: Performed by: INTERNAL MEDICINE

## 2024-06-04 PROCEDURE — 75561 CARDIAC MRI FOR MORPH W/DYE: CPT

## 2024-06-04 PROCEDURE — A9579 GAD-BASE MR CONTRAST NOS,1ML: HCPCS | Performed by: INTERNAL MEDICINE

## 2024-06-04 RX ADMIN — GADOTERIDOL 21 ML: 279.3 INJECTION, SOLUTION INTRAVENOUS at 14:22

## 2024-06-04 NOTE — TELEPHONE ENCOUNTER
Called patient in regards to questions about AVS. Patient did not answer. Left  with [facility] call back number. Will await return call.

## 2024-06-12 ENCOUNTER — TELEPHONE (OUTPATIENT)
Age: 84
End: 2024-06-12

## 2024-06-12 NOTE — TELEPHONE ENCOUNTER
Dung Gracia MD P [facility] Heart Failure Center [facility] Clinical Staff  Please let patient know MRI is normal. EF 57% and no amyloidosis. Will stay off Vyndamax      Called patient using two patient identifiers. Advised patient on above information. Patient states understanding of results and had no further questions.       Patient called and left message requesting return call.       Called patient with no answer. Left vm with [facility] call back number.      Reviewed patients AVS with him from last visit. Patient states understanding of instructions and will reach out to make GI and pulmonary associates visits. Patient then transferred upfront to schedule next visit with Dr. Gracia.

## 2024-06-12 NOTE — TELEPHONE ENCOUNTER
1224: received voicemail from patient requesting call back to discuss AVS directions from visit in May

## 2024-07-03 RX ORDER — AMLODIPINE BESYLATE 5 MG/1
TABLET ORAL
Qty: 180 TABLET | Refills: 0 | Status: SHIPPED | OUTPATIENT
Start: 2024-07-03

## 2024-07-03 RX ORDER — HYDRALAZINE HYDROCHLORIDE 100 MG/1
100 TABLET, FILM COATED ORAL 3 TIMES DAILY
Qty: 270 TABLET | Refills: 0 | Status: SHIPPED | OUTPATIENT
Start: 2024-07-03

## 2024-07-03 NOTE — TELEPHONE ENCOUNTER
Requested Prescriptions     Pending Prescriptions Disp Refills    hydrALAZINE (APRESOLINE) 100 MG tablet [Pharmacy Med Name: HYDRALAZINE 100MG TAB] 270 tablet 0     Sig: Take 1 tablet by mouth 3 times daily     Last ov 5/31/24 next scheduled office visit 9/25/24

## 2024-07-03 NOTE — TELEPHONE ENCOUNTER
Requested Prescriptions     Pending Prescriptions Disp Refills    amLODIPine (NORVASC) 5 MG tablet [Pharmacy Med Name: AMLODIPINE BESYLATE 5 MG Tablet] 180 tablet 0     Sig: TAKE 1 TABLET TWICE DAILY     Last ov 5/31/24 next scheduled ov 9/25/24

## 2024-07-05 RX ORDER — LISINOPRIL 2.5 MG/1
2.5 TABLET ORAL
Qty: 90 TABLET | Refills: 0 | Status: SHIPPED | OUTPATIENT
Start: 2024-07-05

## 2024-07-05 NOTE — TELEPHONE ENCOUNTER
Shanon Walker RN   to [facility]    7/5/24  9:11 AM  Gabo, can you please check in to this?  Thank you  Johanna Posey, MAGNUS - NP   to Shanon Walker RN       7/5/24  8:52 AM  Is he taking it? I though the wasn't taking it due to orthostasis.  IF he is taking it and his BP is normal and no symptoms of dizziness we can refill it; otherwise do not refill.  Shanon Walker RN   to Johanna Posey, MAGNUS - NP         7/5/24  8:37 AM  Just checking on this refill-your note said no ACE due to orthostatic hypotension      Spoke with patient using two patient identifiers. Patient states that he has been taking lisinopril since prescribed in march. He states that he has not been taking blood pressures regularly but denies any dizziness or lightheadedness. Patient will take blood pressure and callback.     Spoke with patient again using two patient identifiers. Patient reports blood pressure is 149/78 (72).     Per GELACIO Posey NP okay to refill lisinopril 2.5mg nightly.     Requested Prescriptions     Signed Prescriptions Disp Refills    lisinopril (PRINIVIL;ZESTRIL) 2.5 MG tablet 90 tablet 0     Sig: TAKE 1 TABLET BY MOUTH EVERYDAY AT BEDTIME     Authorizing Provider: JOHANNA POSEY     Ordering User: GABO MUNSON

## 2024-07-12 ENCOUNTER — TELEPHONE (OUTPATIENT)
Age: 84
End: 2024-07-12

## 2024-07-12 NOTE — TELEPHONE ENCOUNTER
Patient called in to state that [facility] called in regards to his referral noting that he was possibly anemic. Patient was asking if he is anemic and if this could be causing him to feel cold all the time. Patient states that his feeling cold has been ongoing and he would like to know if he could be tested and treated for a cause. Patient also would like to confirm reasoning for GI referral. Reviewed with him per GELACIO Cash note reasoning for referral was : Reflux- Pt states that he frequently feels like something is stuck in his throat, it causes him to have coughing fits and feels like he is choking. Patient states he would li. ke for me to confirm reason for GI referral with provider and to confirm he does not have anemia.       Shanice Cash, MAGNUS - Karena Doshi RN  Caller: Unspecified (4 days ago,  4:13 PM)  The labs we checked on 5/31 are not consistent with anemia. Hg, ferritin, and iron saturation all normal.  The GI referral was for reflux.  He was telling me he frequently felt like something was stuck in his throat which would cause him to cough and feel like he was choking.  He had an episode of coughing/feeling like he was choking on something while he was sitting in clinic.  I referred him to GI for reflux.  IF this is no longer an issue then he does not have to follow up with the referral.    Per GELACIO Cash v.o.r.b we have tested for all probable causes of patient feeling of being cold including, hemoglobin, iron, ferritin, tsh, t4, and patient is not on blood thinners. Patient should follow up with PCP for further testing as to cause of this feeling.     Called patient using two patient identifiers. Advised patient on above information per GELACIO Cash NP. Patient states understanding. Notified patient that I will fax updated office visit note and labs to Dr. De La Fuente office for review. Patient states he will follow up with his PCP office tomorrow.     Records faxed.    
2019

## 2024-07-18 ENCOUNTER — TELEPHONE (OUTPATIENT)
Age: 84
End: 2024-07-18

## 2024-07-18 NOTE — TELEPHONE ENCOUNTER
Pt called stating he was called yesterday and told that his appt on 7/19 would have to be pushed out 2 weeks due to his labs not coming back in time. Pt stated when he had his labs drawn this morning, [facility] told him they would be back by Saturday morning. Advised message would be sent to team as there is no availability for a while. Pt expressed understanding.

## 2024-07-19 LAB
ALBUMIN SERPL-MCNC: 4.4 G/DL (ref 3.7–4.7)
ALP SERPL-CCNC: 69 IU/L (ref 44–121)
ALT SERPL-CCNC: 19 IU/L (ref 0–44)
AST SERPL-CCNC: 24 IU/L (ref 0–40)
BASOPHILS # BLD AUTO: 0.1 X10E3/UL (ref 0–0.2)
BASOPHILS NFR BLD AUTO: 1 %
BILIRUB SERPL-MCNC: 0.3 MG/DL (ref 0–1.2)
BUN SERPL-MCNC: 26 MG/DL (ref 8–27)
BUN/CREAT SERPL: 22 (ref 10–24)
CALCIUM SERPL-MCNC: 9.8 MG/DL (ref 8.6–10.2)
CHLORIDE SERPL-SCNC: 102 MMOL/L (ref 96–106)
CO2 SERPL-SCNC: 22 MMOL/L (ref 20–29)
CREAT SERPL-MCNC: 1.17 MG/DL (ref 0.76–1.27)
EGFRCR SERPLBLD CKD-EPI 2021: 61 ML/MIN/1.73
EOSINOPHIL # BLD AUTO: 0.5 X10E3/UL (ref 0–0.4)
EOSINOPHIL NFR BLD AUTO: 9 %
ERYTHROCYTE [DISTWIDTH] IN BLOOD BY AUTOMATED COUNT: 14 % (ref 11.6–15.4)
GLOBULIN SER CALC-MCNC: 2.8 G/DL (ref 1.5–4.5)
GLUCOSE SERPL-MCNC: 99 MG/DL (ref 70–99)
HCT VFR BLD AUTO: 35.9 % (ref 37.5–51)
HGB BLD-MCNC: 11.4 G/DL (ref 13–17.7)
IMM GRANULOCYTES # BLD AUTO: 0 X10E3/UL (ref 0–0.1)
IMM GRANULOCYTES NFR BLD AUTO: 0 %
LYMPHOCYTES # BLD AUTO: 1.7 X10E3/UL (ref 0.7–3.1)
LYMPHOCYTES NFR BLD AUTO: 30 %
MCH RBC QN AUTO: 29.5 PG (ref 26.6–33)
MCHC RBC AUTO-ENTMCNC: 31.8 G/DL (ref 31.5–35.7)
MCV RBC AUTO: 93 FL (ref 79–97)
MONOCYTES # BLD AUTO: 0.5 X10E3/UL (ref 0.1–0.9)
MONOCYTES NFR BLD AUTO: 9 %
NEUTROPHILS # BLD AUTO: 2.9 X10E3/UL (ref 1.4–7)
NEUTROPHILS NFR BLD AUTO: 51 %
PLATELET # BLD AUTO: 193 X10E3/UL (ref 150–450)
POTASSIUM SERPL-SCNC: 4.2 MMOL/L (ref 3.5–5.2)
RBC # BLD AUTO: 3.86 X10E6/UL (ref 4.14–5.8)
SODIUM SERPL-SCNC: 137 MMOL/L (ref 134–144)
WBC # BLD AUTO: 5.6 X10E3/UL (ref 3.4–10.8)

## 2024-07-22 RX ORDER — HYDRALAZINE HYDROCHLORIDE 100 MG/1
100 TABLET, FILM COATED ORAL 3 TIMES DAILY
Qty: 270 TABLET | Refills: 0 | OUTPATIENT
Start: 2024-07-22

## 2024-07-23 RX ORDER — ISOSORBIDE MONONITRATE 60 MG/1
60 TABLET, EXTENDED RELEASE ORAL 2 TIMES DAILY
Qty: 180 TABLET | Refills: 1 | Status: SHIPPED | OUTPATIENT
Start: 2024-07-23

## 2024-07-23 NOTE — TELEPHONE ENCOUNTER
Requested Prescriptions     Signed Prescriptions Disp Refills    isosorbide mononitrate (IMDUR) 60 MG extended release tablet 180 tablet 1     Sig: Take 1 tablet by mouth 2 times daily     Authorizing Provider: JOHANNA POSEY     Ordering User: JIAN SETH      Last appt May 2024  Next appt Sept 2024

## 2024-07-24 LAB
ALBUMIN SERPL ELPH-MCNC: 4.1 G/DL (ref 2.9–4.4)
ALBUMIN/GLOB SERPL: 1.4 {RATIO} (ref 0.7–1.7)
ALPHA1 GLOB SERPL ELPH-MCNC: 0.2 G/DL (ref 0–0.4)
ALPHA2 GLOB SERPL ELPH-MCNC: 0.9 G/DL (ref 0.4–1)
B-GLOBULIN SERPL ELPH-MCNC: 0.8 G/DL (ref 0.7–1.3)
GAMMA GLOB SERPL ELPH-MCNC: 1.3 G/DL (ref 0.4–1.8)
GLOBULIN SER-MCNC: 3.1 G/DL (ref 2.2–3.9)
IGA SERPL-MCNC: 178 MG/DL (ref 61–437)
IGG SERPL-MCNC: 1034 MG/DL (ref 603–1613)
IGM SERPL-MCNC: 361 MG/DL (ref 15–143)
INTERPRETATION SERPL IEP-IMP: ABNORMAL
KAPPA LC FREE SER-MCNC: 44.8 MG/L (ref 3.3–19.4)
KAPPA LC FREE/LAMBDA FREE SER: 1.64 {RATIO} (ref 0.26–1.65)
LABORATORY COMMENT REPORT: ABNORMAL
LAMBDA LC FREE SERPL-MCNC: 27.4 MG/L (ref 5.7–26.3)
M PROTEIN SERPL ELPH-MCNC: 0.5 G/DL
PROT SERPL-MCNC: 7.2 G/DL (ref 6–8.5)

## 2024-08-26 NOTE — PROGRESS NOTES
[facility] [place] [facility]   [address], [address] [place] [address]   W: [phone]   F: [phone]  Reason for Visit:   Eugenio Trevino is a 84 y.o.  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 male with Afib, CAD, CMP seen for abnormal SPEP  and r/o amyloidosis   His PYP scan was positive.    He is on observation for MGUS     Here today for follow up with labs.  Reports feeling well.  Denies fever, chills, pain, drenching night sweats, denies lymphadenopathy.     Presents alone.      Review of systems was obtained and pertinent findings reviewed above. Past medical history, social history, family history, medications, and allergies are located in the electronic medical record.         Physical Exam:       Vitals:    08/27/24 1326   BP: (!) 91/49   Pulse: 74   Resp: 18   Temp: 98.3 °F (36.8 °C)   SpO2: 95%          ECOG PS: 1  General: no distress  Eyes: anicteric sclerae  HENT: oropharynx clear  Neck: supple  Lymphatic: no cervical, supraclavicular adenopathy  Respiratory: normal respiratory effort  CV: no peripheral edema  GI: soft, nontender, nondistended, no masses  Skin: no rashes; no ecchymoses; no petechiae            Results:     Lab Results   Component Value Date    WBC 5.6 07/18/2024    HGB 11.4 (L) 07/18/2024    HCT 35.9 (L) 07/18/2024    MCV 93 07/18/2024     07/18/2024    LYMPHOPCT 30 07/18/2024    RBC 3.86 (L) 07/18/2024    MCH 29.5 07/18/2024    MCHC 31.8 07/18/2024    RDW 14.0 07/18/2024          Lab Results   Component Value Date     07/18/2024    K 4.2 07/18/2024     07/18/2024    CO2 22 07/18/2024    BUN 26 07/18/2024    CREATININE 1.17 07/18/2024    GLUCOSE 99 07/18/2024    CALCIUM 9.8 07/18/2024    BILITOT 0.3 07/18/2024    ALKPHOS 69 07/18/2024    AST 24

## 2024-08-27 ENCOUNTER — OFFICE VISIT (OUTPATIENT)
Age: 84
End: 2024-08-27
Payer: MEDICARE

## 2024-08-27 VITALS
DIASTOLIC BLOOD PRESSURE: 49 MMHG | BODY MASS INDEX: 22.38 KG/M2 | HEART RATE: 74 BPM | OXYGEN SATURATION: 95 % | RESPIRATION RATE: 18 BRPM | SYSTOLIC BLOOD PRESSURE: 91 MMHG | WEIGHT: 165 LBS | TEMPERATURE: 98.3 F

## 2024-08-27 DIAGNOSIS — D47.2 MONOCLONAL GAMMOPATHY: Primary | ICD-10-CM

## 2024-08-27 PROCEDURE — 1123F ACP DISCUSS/DSCN MKR DOCD: CPT

## 2024-08-27 PROCEDURE — G8420 CALC BMI NORM PARAMETERS: HCPCS

## 2024-08-27 PROCEDURE — 1036F TOBACCO NON-USER: CPT

## 2024-08-27 PROCEDURE — 99213 OFFICE O/P EST LOW 20 MIN: CPT

## 2024-08-27 PROCEDURE — G8427 DOCREV CUR MEDS BY ELIG CLIN: HCPCS

## 2024-08-27 NOTE — PROGRESS NOTES
ADVANCED HEART FAILURE CENTER  [facility] in [place], [place]  Heart Failure Outpatient Clinic Note    Patient name: Eugenio Trevino  Patient : 1940  Patient MRN: [id number]  Date of service: 24    Primary care physician: Enmanuel De La Fuente MD  Primary cardiologist: Eugenio Martinez MD  Primary F cardiologist: Elizabeth Gracia MD      CHIEF COMPLAINT:  Follow up for chronic diastolic heart failure    ASSESSMENT:  Eugenio Trevino is a 84 y.o. male with a history of HFpEF (LVH, diastolic dysfunction, elevated LVEDP on [facility] ), but without current or prior symptoms of heart failure. He has been treated for wild type TTR amyloid based on borderline positive PYP in . Subsequent cardiac MRI 2024 did not show signs of cardiac amyloidosis. Vyndamax discontinued 2024 as patient was unable to afford after changes with medication assistance program through [email or web address] and not restarted with MRI findings.      PLAN:  Diastolic Heart Failure:  NYHA I  Continue current medical therapy for heart failure:  ACE/ARB/ARNi: Continue Lisinopril 2.5 mg QHS  MRA: NYHA class I, not indicated  SGLT2 inhibitor: NYHA class I, not indicated  Diuretic: Euvolemic, normal proBNP. Does not require diuretics  Reinforced low salt diet  Reinforced fluid restriction to 6 x 8oz glasses per day  Recommended 30 minutes of aerobic exercise 5 days weekly  Labs: Labs  reviewed     PAF:  S/p A-fib ablation.  Regular rhythm today  S/p Watchman and not anticoagulated    Hypertension:  Hypotensive, not symptomatic. I reduced Amlodipine to 5 mg daily from BID  He remains on Lisinopril 2.5 mg QHS, Hydralazine 100 mg TID and Imdur 60 mg BID    SARAVANAN:  Continue CPAP therapy. He plans to discuss Inspire with sleep medicine.     Needs Advanced Care Plan   Recommend flu, covid and pneumonia vaccinations  Follow-up with primary cardiologist  Return to AHF Clinic in 6 months      HISTORY OF PRESENT ILLNESS:  I had the pleasure of seeing  CITRATE PO, Take by mouth daily, Disp: , Rfl:     atorvastatin (LIPITOR) 80 MG tablet, Take 1 tablet by mouth daily, Disp: , Rfl:     busPIRone (BUSPAR) 15 MG tablet, Take 15 mg by mouth 2 times daily, Disp: , Rfl:     Cholecalciferol 50 MCG (2000 UT) TABS, Take 1 tablet by mouth daily, Disp: , Rfl:     coenzyme Q10 100 MG CAPS capsule, Take 1 capsule by mouth 2 times daily, Disp: , Rfl:     dorzolamide-timolol (COSOPT) 22.3-6.8 MG/ML ophthalmic solution, Apply 1 drop to eye 2 times daily, Disp: , Rfl:     famotidine (PEPCID) 20 MG tablet, TAKE 1 (ONE) TABLET TWICE A DAY, BEFORE MEALS, Disp: , Rfl:     Latanoprostene Bunod (VYZULTA) 0.024 % SOLN, PLACE 1 DROP INTO THE RIGHT EYE NIGHTLY, Disp: , Rfl:     pantoprazole (PROTONIX) 40 MG tablet, Take 1 tablet by mouth daily, Disp: , Rfl:     VORTIoxetine (TRINTELLIX) 10 MG TABS tablet, Take 1 tablet by mouth daily, Disp: , Rfl:     Thank you for your referral and allowing me to participate in this patient's care.    Elizabeth Gracia MD, [facility]  Advanced Heart Failure and Pulmonary Hypertension    Advanced Heart Failure Center  [facility]  [address] [place] [address], [address]  Phone: [phone]  Fax: [phone]    PATIENT CARE TEAM:  Patient Care Team:  Enmanuel De La Fuente MD as PCP - General  Enmanuel De La Fuente MD as PCP - Empaneled Provider  Arelis Barlow MD (Orthopedic Surgery)  Tj Hart MD as Consulting Physician (Cardiothoracic Surgery)

## 2024-08-27 NOTE — PROGRESS NOTES
Eugenio Trevino is a 84 y.o. 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 Have you been to the ER, urgent care clinic since your last visit?  Hospitalized since your last visit?No    2. Have you seen or consulted any other health care providers outside of the [facility] since your last visit?  Include any pap smears or colon screening. Yes

## 2024-08-28 ENCOUNTER — OFFICE VISIT (OUTPATIENT)
Age: 84
End: 2024-08-28
Payer: MEDICARE

## 2024-08-28 VITALS
DIASTOLIC BLOOD PRESSURE: 58 MMHG | WEIGHT: 164 LBS | BODY MASS INDEX: 22.21 KG/M2 | HEIGHT: 72 IN | RESPIRATION RATE: 16 BRPM | SYSTOLIC BLOOD PRESSURE: 92 MMHG | TEMPERATURE: 97.9 F | HEART RATE: 77 BPM | OXYGEN SATURATION: 98 %

## 2024-08-28 DIAGNOSIS — I50.32 CHRONIC DIASTOLIC HEART FAILURE (HCC): Primary | ICD-10-CM

## 2024-08-28 DIAGNOSIS — G47.33 OSA ON CPAP: ICD-10-CM

## 2024-08-28 DIAGNOSIS — I48.0 PAROXYSMAL ATRIAL FIBRILLATION (HCC): ICD-10-CM

## 2024-08-28 DIAGNOSIS — I10 ESSENTIAL (PRIMARY) HYPERTENSION: ICD-10-CM

## 2024-08-28 PROCEDURE — G8420 CALC BMI NORM PARAMETERS: HCPCS | Performed by: INTERNAL MEDICINE

## 2024-08-28 PROCEDURE — G8427 DOCREV CUR MEDS BY ELIG CLIN: HCPCS | Performed by: INTERNAL MEDICINE

## 2024-08-28 PROCEDURE — 1123F ACP DISCUSS/DSCN MKR DOCD: CPT | Performed by: INTERNAL MEDICINE

## 2024-08-28 PROCEDURE — 99214 OFFICE O/P EST MOD 30 MIN: CPT | Performed by: INTERNAL MEDICINE

## 2024-08-28 PROCEDURE — 1036F TOBACCO NON-USER: CPT | Performed by: INTERNAL MEDICINE

## 2024-08-28 RX ORDER — AMLODIPINE BESYLATE 5 MG/1
5 TABLET ORAL 2 TIMES DAILY
Qty: 180 TABLET | Refills: 0 | Status: SHIPPED | OUTPATIENT
Start: 2024-08-28 | End: 2024-08-28

## 2024-08-28 RX ORDER — AMLODIPINE BESYLATE 5 MG/1
5 TABLET ORAL DAILY
Qty: 90 TABLET | Refills: 0 | Status: SHIPPED | OUTPATIENT
Start: 2024-08-28

## 2024-08-28 ASSESSMENT — PATIENT HEALTH QUESTIONNAIRE - PHQ9
1. LITTLE INTEREST OR PLEASURE IN DOING THINGS: NOT AT ALL
SUM OF ALL RESPONSES TO PHQ9 QUESTIONS 1 & 2: 0
SUM OF ALL RESPONSES TO PHQ QUESTIONS 1-9: 0
2. FEELING DOWN, DEPRESSED OR HOPELESS: NOT AT ALL

## 2024-08-28 NOTE — PATIENT INSTRUCTIONS
Medication changes:      Ideal blood pressure for you would be between 110 and 140 systolic (top number on blood pressure machine)    We are lowering Amlodipine to 5 mg once per day    Please take this to your pharmacy to notify them of the change in medications.     Testing Ordered:        Referrals:      Other Recommendations:      - Record blood pressure and heart rate daily before medication and two hours after medication  - Record weight daily upon waking/after using the bathroom.   - Keep a written records of your weights and blood pressure and bring to your next appointment.   - Call the clinic at if you have a weight gain of 3 or more pounds overnight OR 5 or more pounds in one week, new/worsened shortness of breath or swelling, or if your blood pressure begins to consistently run below 90/60 and/or you begin to experience dizziness or lightheadedness. Our office phone number [phone] option 2.  - Ensure you are drinking an adequate amount of water with a goal of 6-8 eight ounce glasses (1.5-2 liters) of fluid daily. Your urine should be clear and light yellow straw colored.       Follow up 6 months with NP  with [facility]    Thank you for allowing us the privilege of being a part of your healthcare team! Please do not hesitate to contact our office at [phone] option 2 with any questions or concerns.     We are restarting a monthly heart failure support group, this will be the last Wednesday of every month from 5-6pm at [facility]. If you would like to attend you will need to RSVP to [email or web address]

## 2024-08-28 NOTE — TELEPHONE ENCOUNTER
Requested Prescriptions     Signed Prescriptions Disp Refills    amLODIPine (NORVASC) 5 MG tablet 180 tablet 0     Sig: Take 1 tablet by mouth 2 times daily     Authorizing Provider: JOHANNA POSEY     Ordering User: JIAN SETH      Last appy May 2024  Next appt Sept 2024

## 2024-09-03 ENCOUNTER — TELEPHONE (OUTPATIENT)
Age: 84
End: 2024-09-03

## 2024-09-03 NOTE — TELEPHONE ENCOUNTER
Patient is calling to have questions answer base of last office appt with MAT Salinas.    1) Why were the labs order for his next visit.  What are they for?       # [phone]

## 2024-09-03 NOTE — TELEPHONE ENCOUNTER
[facility]  R/t call to pt. Pt would like more info on why he is being seen and why we are doing labs again prior to his next OV  Advised pt that he is under surveillance for MGUS which is an abnormal protein in his blood. Most times MGUS does not cause any issues, but some people with this condition can develop a blood cancer or more serious blood condition.  This is why we have him do repeat blood work to monitor.  I made pt aware I will mail him information on MGUS.  Pt voiced understanding and was appreciative of my call.

## 2024-09-11 RX ORDER — LISINOPRIL 2.5 MG/1
2.5 TABLET ORAL NIGHTLY
Qty: 90 TABLET | Refills: 1 | Status: SHIPPED | OUTPATIENT
Start: 2024-09-11

## 2024-09-16 RX ORDER — HYDRALAZINE HYDROCHLORIDE 100 MG/1
100 TABLET, FILM COATED ORAL 3 TIMES DAILY
Qty: 270 TABLET | Refills: 0 | Status: SHIPPED | OUTPATIENT
Start: 2024-09-16

## 2024-09-25 ENCOUNTER — OFFICE VISIT (OUTPATIENT)
Age: 84
End: 2024-09-25
Payer: MEDICARE

## 2024-09-25 VITALS — BODY MASS INDEX: 22.75 KG/M2 | WEIGHT: 168 LBS | HEIGHT: 72 IN

## 2024-09-25 DIAGNOSIS — R68.89 COLD INTOLERANCE: Primary | ICD-10-CM

## 2024-09-25 DIAGNOSIS — R79.89 ELEVATED TSH: ICD-10-CM

## 2024-09-25 DIAGNOSIS — R68.89 NECK SYMPTOMS: ICD-10-CM

## 2024-09-25 PROCEDURE — G8420 CALC BMI NORM PARAMETERS: HCPCS | Performed by: INTERNAL MEDICINE

## 2024-09-25 PROCEDURE — G2211 COMPLEX E/M VISIT ADD ON: HCPCS | Performed by: INTERNAL MEDICINE

## 2024-09-25 PROCEDURE — 1123F ACP DISCUSS/DSCN MKR DOCD: CPT | Performed by: INTERNAL MEDICINE

## 2024-09-25 PROCEDURE — 1036F TOBACCO NON-USER: CPT | Performed by: INTERNAL MEDICINE

## 2024-09-25 PROCEDURE — 76536 US EXAM OF HEAD AND NECK: CPT | Performed by: INTERNAL MEDICINE

## 2024-09-25 PROCEDURE — 99204 OFFICE O/P NEW MOD 45 MIN: CPT | Performed by: INTERNAL MEDICINE

## 2024-09-25 PROCEDURE — G8428 CUR MEDS NOT DOCUMENT: HCPCS | Performed by: INTERNAL MEDICINE

## 2024-09-26 LAB
FT4I SERPL CALC-MCNC: 1.5 (ref 1.2–4.9)
T3 SERPL-MCNC: 94 NG/DL (ref 71–180)
T3RU NFR SERPL: 25 % (ref 24–39)
T4 SERPL-MCNC: 6 UG/DL (ref 4.5–12)
THYROGLOB AB SERPL-ACNC: <1 IU/ML (ref 0–0.9)
THYROPEROXIDASE AB SERPL-ACNC: 11 IU/ML (ref 0–34)
TSH SERPL DL<=0.005 MIU/L-ACNC: 3.02 UIU/ML (ref 0.45–4.5)

## 2024-11-08 DIAGNOSIS — R79.89 ELEVATED TSH: ICD-10-CM

## 2024-11-08 DIAGNOSIS — R68.89 COLD INTOLERANCE: ICD-10-CM

## 2024-11-26 RX ORDER — AMLODIPINE BESYLATE 5 MG/1
5 TABLET ORAL DAILY
Qty: 90 TABLET | Refills: 3 | Status: SHIPPED | OUTPATIENT
Start: 2024-11-26

## 2024-11-26 NOTE — TELEPHONE ENCOUNTER
Requested Prescriptions     Pending Prescriptions Disp Refills    amLODIPine (NORVASC) 5 MG tablet [Pharmacy Med Name: amLODIPine Besylate Oral Tablet 5 MG] 90 tablet 3     Sig: TAKE 1 TABLET EVERY DAY

## 2024-11-29 ENCOUNTER — TRANSCRIBE ORDERS (OUTPATIENT)
Dept: SCHEDULING | Age: 84
End: 2024-11-29

## 2024-11-29 DIAGNOSIS — J31.0 ATROPHIC RHINITIS: Primary | ICD-10-CM

## 2024-11-29 DIAGNOSIS — R13.10 DYSPHAGIA, UNSPECIFIED TYPE: ICD-10-CM

## 2024-11-29 DIAGNOSIS — J30.9 ALLERGIC RHINITIS, UNSPECIFIED SEASONALITY, UNSPECIFIED TRIGGER: ICD-10-CM

## 2024-12-04 RX ORDER — HYDRALAZINE HYDROCHLORIDE 100 MG/1
100 TABLET, FILM COATED ORAL 3 TIMES DAILY
Qty: 270 TABLET | Refills: 1 | Status: SHIPPED | OUTPATIENT
Start: 2024-12-04

## 2024-12-04 NOTE — TELEPHONE ENCOUNTER
Requested Prescriptions     Pending Prescriptions Disp Refills    hydrALAZINE (APRESOLINE) 100 MG tablet [Pharmacy Med Name: hydrALAZINE HCl Oral Tablet 100 MG] 270 tablet 1     Sig: TAKE 1 TABLET THREE TIMES DAILY

## 2024-12-18 ENCOUNTER — TELEPHONE (OUTPATIENT)
Age: 84
End: 2024-12-18

## 2024-12-18 RX ORDER — ISOSORBIDE MONONITRATE 60 MG/1
60 TABLET, EXTENDED RELEASE ORAL 2 TIMES DAILY
Qty: 180 TABLET | Refills: 0 | Status: SHIPPED | OUTPATIENT
Start: 2024-12-18

## 2024-12-18 NOTE — TELEPHONE ENCOUNTER
Last appointment 8/28/24 next scheduled appointment 2/26/24    Requested Prescriptions     Pending Prescriptions Disp Refills    isosorbide mononitrate (IMDUR) 60 MG extended release tablet 180 tablet 0     Sig: Take 1 tablet by mouth 2 times daily

## 2024-12-26 NOTE — PROGRESS NOTES
Pt verbalized understanding of discharge instructions. PIV removed and guaze with tape placed; no redness or swelling. Pt escorted to car via wheelchair with belongings. Wife is driving and verbalized understanding of discharge instructions. No

## 2024-12-30 RX ORDER — HYDRALAZINE HYDROCHLORIDE 100 MG/1
100 TABLET, FILM COATED ORAL 3 TIMES DAILY
Qty: 270 TABLET | Refills: 0 | Status: SHIPPED | OUTPATIENT
Start: 2024-12-30

## 2024-12-30 NOTE — TELEPHONE ENCOUNTER
Requested Prescriptions     Pending Prescriptions Disp Refills    hydrALAZINE (APRESOLINE) 100 MG tablet [Pharmacy Med Name: HYDRALAZINE 100 MG TABLET] 270 tablet 0     Sig: TAKE 1 TABLET BY MOUTH THREE TIMES A DAY     Last visit 8/28/24  Next visit 2/26/25

## 2024-12-31 RX ORDER — LISINOPRIL 2.5 MG/1
2.5 TABLET ORAL NIGHTLY
Qty: 90 TABLET | Refills: 0 | Status: SHIPPED | OUTPATIENT
Start: 2024-12-31

## 2024-12-31 NOTE — TELEPHONE ENCOUNTER
Requested Prescriptions     Signed Prescriptions Disp Refills    lisinopril (PRINIVIL;ZESTRIL) 2.5 MG tablet 90 tablet 0     Sig: Take 1 tablet by mouth nightly at bedtime.     Authorizing Provider: YAYA MULLINS     Ordering User: JIAN SETH     Last appt 9/25/24  Next appt 2/26/25

## 2025-01-20 ENCOUNTER — TELEPHONE (OUTPATIENT)
Age: 85
End: 2025-01-20

## 2025-01-20 NOTE — TELEPHONE ENCOUNTER
Patient called to notify of insurance changes and pharmacy change. Meds will now need to go to [facility] instead of [facility] [employer] medicare adv 3 HMO-POS  ID: [id number]  [id number]  Plan 332 issurer [id number]  RxBIN: [id number]  RxPCN: IS  RxGRP: [id number]  RxID: [id number]  CMS- -[id number]    Insurance updated in EPIC    Requested Prescriptions     Signed Prescriptions Disp Refills    amLODIPine (NORVASC) 5 MG tablet 90 tablet 0     Sig: Take 1 tablet by mouth daily     Authorizing Provider: JOHANNA POSEY     Ordering User: JIAN SETH    hydrALAZINE (APRESOLINE) 100 MG tablet 270 tablet 0     Sig: Take 1 tablet by mouth 3 times daily     Authorizing Provider: JOHANNA POSEY     Ordering User: JIAN SETH    isosorbide mononitrate (IMDUR) 60 MG extended release tablet 180 tablet 0     Sig: Take 1 tablet by mouth 2 times daily     Authorizing Provider: JOHANNA POSEY     Ordering User: JIAN SETH    lisinopril (PRINIVIL;ZESTRIL) 2.5 MG tablet 90 tablet 0     Sig: Take 1 tablet by mouth nightly at bedtime.     Authorizing Provider: JOHANNA POSEY     Ordering User: JIAN SETH      Last appt 9/25  Next appt 2/26

## 2025-01-21 RX ORDER — HYDRALAZINE HYDROCHLORIDE 100 MG/1
100 TABLET, FILM COATED ORAL 3 TIMES DAILY
Qty: 270 TABLET | Refills: 0 | Status: SHIPPED | OUTPATIENT
Start: 2025-01-21

## 2025-01-21 RX ORDER — LISINOPRIL 2.5 MG/1
2.5 TABLET ORAL NIGHTLY
Qty: 90 TABLET | Refills: 0 | Status: SHIPPED | OUTPATIENT
Start: 2025-01-21

## 2025-01-21 RX ORDER — ISOSORBIDE MONONITRATE 60 MG/1
60 TABLET, EXTENDED RELEASE ORAL 2 TIMES DAILY
Qty: 180 TABLET | Refills: 0 | Status: SHIPPED | OUTPATIENT
Start: 2025-01-21

## 2025-01-21 RX ORDER — AMLODIPINE BESYLATE 5 MG/1
5 TABLET ORAL DAILY
Qty: 90 TABLET | Refills: 0 | Status: SHIPPED | OUTPATIENT
Start: 2025-01-21

## 2025-02-24 ENCOUNTER — TELEPHONE (OUTPATIENT)
Age: 85
End: 2025-02-24

## 2025-02-24 NOTE — TELEPHONE ENCOUNTER
Telephone Call RE:  Appointment reminder     Outcome:     [] Patient confirmed appointment   [] Patient rescheduled appointment for    [] Unable to reach  [x] Left message              [] Other:       First attempt, left vm.

## 2025-02-25 ENCOUNTER — TELEPHONE (OUTPATIENT)
Age: 85
End: 2025-02-25

## 2025-02-25 NOTE — PROGRESS NOTES
regular exercise. He denies leg swelling or abdominal fullness, orthopnea and PND. He denies palpitations, lightheadedness or syncope. Weight has been stable. He is compliant with medications. He reports home BP ranging 90s systolic.      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    CARDIAC IMAGING and DIAGNOSTICS Reviewed:  Echo 5/4/23    Left Ventricle: Normal left ventricular systolic function with a visually estimated EF of 60 - 65%. Findings consistent with moderate concentric hypertrophy. Normal wall motion. Grade II diastolic dysfunction with increased LAP.    Aortic Valve: Mild stenosis of the aortic valve. Mean gradient of 17 mmhg.    Aortic Valve: Moderately thickened cusp. Mildly calcified cusp. Moderate sclerosis of the aortic valve cusp.    Mitral Valve: Mild regurgitation.  Echo 5/3/22    Left Ventricle: Normal left ventricular systolic function with a visually estimated EF of 60 - 65%. Findings consistent with moderate concentric hypertrophy. Normal wall motion. Grade II

## 2025-02-25 NOTE — TELEPHONE ENCOUNTER
Telephone Call RE:  Appointment reminder     Outcome:     [x] Patient confirmed appointment   [] Patient rescheduled appointment for    [] Unable to reach  [] Left message              [] Other:       Covered all appt details, including parking permit, in msg and with pt when I reached him on second #.

## 2025-02-26 ENCOUNTER — OFFICE VISIT (OUTPATIENT)
Age: 85
End: 2025-02-26

## 2025-02-26 VITALS
HEIGHT: 72 IN | TEMPERATURE: 97.7 F | WEIGHT: 167.2 LBS | DIASTOLIC BLOOD PRESSURE: 60 MMHG | SYSTOLIC BLOOD PRESSURE: 104 MMHG | HEART RATE: 81 BPM | BODY MASS INDEX: 22.65 KG/M2 | OXYGEN SATURATION: 96 % | RESPIRATION RATE: 16 BRPM

## 2025-02-26 DIAGNOSIS — E55.9 VITAMIN D DEFICIENCY: ICD-10-CM

## 2025-02-26 DIAGNOSIS — E61.1 IRON DEFICIENCY: ICD-10-CM

## 2025-02-26 DIAGNOSIS — G47.33 OSA ON CPAP: ICD-10-CM

## 2025-02-26 DIAGNOSIS — I50.32 CHRONIC DIASTOLIC HEART FAILURE (HCC): ICD-10-CM

## 2025-02-26 DIAGNOSIS — I42.9 CARDIOMYOPATHY, UNSPECIFIED TYPE (HCC): Primary | ICD-10-CM

## 2025-02-26 ASSESSMENT — ENCOUNTER SYMPTOMS
NAUSEA: 0
VOMITING: 0
CHEST TIGHTNESS: 1
SHORTNESS OF BREATH: 1

## 2025-02-26 ASSESSMENT — PATIENT HEALTH QUESTIONNAIRE - PHQ9: DEPRESSION UNABLE TO ASSESS: PT REFUSES

## 2025-02-26 NOTE — PATIENT INSTRUCTIONS
Medication Changes:    NONE      Testing Ordered:    LABS - Collected in clinic  EKG - Performed in clinic    ECHOCARDIOGRAM  An order was placed to be done in MARCH 2025. You will be receiving an automated call from Coordination of Care to schedule this test. If you are unavailable to receive the call or would like to contact coordination of care yourself you may contact [phone] to schedule. You will need to contact coordination of care yourself if you miss their calls as they will only make 3 attempts to reach you.        Referrals:      Other Recommendations:      - Record blood pressure and heart rate 2 times daily: before medication and two hours after medication  - Record weight daily upon waking/after using the bathroom.   - Keep a written records of your weights and blood pressure and bring to your next appointment.   - Call the clinic if you have a weight gain of 3 or more pounds overnight OR 5 or more pounds in one week, new/worsened shortness of breath or swelling, or if your blood pressure begins to consistently run below 90/60 and/or you begin to experience dizziness or lightheadedness. Our office phone number [phone] option 2.  - Drink an adequate amount of water with a goal of 6-8 eight ounce glasses (1.5-2 liters) of fluid daily. Your urine should be clear and light yellow straw colored.       Follow up     6 MONTH FOLLOW UP with [facility]      Thank you for allowing us the privilege of being a part of your healthcare team!  Please do not hesitate to contact our office at [phone] option 2 with any questions or concerns.     Please make sure to have an active My Chart account. Having issues logging in? Contact the [employer] Help Desk at [phone].   - Couple is the best way to communicate with [facility] Nurses. We can answer your questions, you can report your weight and BP logs and we notify you of any abnormal results requiring changes to your current plan

## 2025-02-27 LAB
25(OH)D3 SERPL-MCNC: 48.5 NG/ML (ref 30–100)
ALBUMIN SERPL-MCNC: 3.5 G/DL (ref 3.5–5)
ALBUMIN/GLOB SERPL: 1.1 (ref 1.1–2.2)
ALP SERPL-CCNC: 87 U/L (ref 45–117)
ALT SERPL-CCNC: 20 U/L (ref 12–78)
ANION GAP SERPL CALC-SCNC: 7 MMOL/L (ref 2–12)
AST SERPL-CCNC: 21 U/L (ref 15–37)
BILIRUB SERPL-MCNC: 0.4 MG/DL (ref 0.2–1)
BUN SERPL-MCNC: 19 MG/DL (ref 6–20)
BUN/CREAT SERPL: 13 (ref 12–20)
CALCIUM SERPL-MCNC: 9.3 MG/DL (ref 8.5–10.1)
CHLORIDE SERPL-SCNC: 112 MMOL/L (ref 97–108)
CO2 SERPL-SCNC: 22 MMOL/L (ref 21–32)
CREAT SERPL-MCNC: 1.41 MG/DL (ref 0.7–1.3)
ERYTHROCYTE [DISTWIDTH] IN BLOOD BY AUTOMATED COUNT: 13.6 % (ref 11.5–14.5)
FERRITIN SERPL-MCNC: 127 NG/ML (ref 26–388)
GLOBULIN SER CALC-MCNC: 3.3 G/DL (ref 2–4)
GLUCOSE SERPL-MCNC: 114 MG/DL (ref 65–100)
HCT VFR BLD AUTO: 29.8 % (ref 36.6–50.3)
HGB BLD-MCNC: 9.6 G/DL (ref 12.1–17)
IRON SATN MFR SERPL: 14 % (ref 20–50)
IRON SERPL-MCNC: 37 UG/DL (ref 35–150)
MAGNESIUM SERPL-MCNC: 2 MG/DL (ref 1.6–2.4)
MCH RBC QN AUTO: 29.4 PG (ref 26–34)
MCHC RBC AUTO-ENTMCNC: 32.2 G/DL (ref 30–36.5)
MCV RBC AUTO: 91.4 FL (ref 80–99)
NRBC # BLD: 0 K/UL (ref 0–0.01)
NRBC BLD-RTO: 0 PER 100 WBC
NT PRO BNP: 1311 PG/ML
PLATELET # BLD AUTO: 186 K/UL (ref 150–400)
PMV BLD AUTO: 11.6 FL (ref 8.9–12.9)
POTASSIUM SERPL-SCNC: 4.6 MMOL/L (ref 3.5–5.1)
PROT SERPL-MCNC: 6.8 G/DL (ref 6.4–8.2)
RBC # BLD AUTO: 3.26 M/UL (ref 4.1–5.7)
SODIUM SERPL-SCNC: 141 MMOL/L (ref 136–145)
TIBC SERPL-MCNC: 266 UG/DL (ref 250–450)
WBC # BLD AUTO: 7 K/UL (ref 4.1–11.1)

## 2025-03-03 ENCOUNTER — TELEPHONE (OUTPATIENT)
Age: 85
End: 2025-03-03

## 2025-03-03 DIAGNOSIS — E61.1 IRON DEFICIENCY: ICD-10-CM

## 2025-03-03 DIAGNOSIS — R07.89 CHEST TIGHTNESS: Primary | ICD-10-CM

## 2025-03-03 DIAGNOSIS — I50.32 CHRONIC DIASTOLIC HEART FAILURE (HCC): ICD-10-CM

## 2025-03-03 NOTE — TELEPHONE ENCOUNTER
----- Message from MAGNUS Arrington NP sent at 2/28/2025  8:59 AM EST -----  Sorry he hasn't checked his Mychart in some time. His Iron is low and if he is agreeable I would send him for iron infusions.  He still needs to get his TTE done and I would send him for a lexiscan for the chest tightness he is feeling since his EKG was ok.  ----- Message -----  From: Geovanna Garcia Incoming [place] [employer]  Sent: 2/27/2025   4:54 PM EST  To: MAGNUS Hardin NP      3/3/25     Called patient in regards to above results. Patient did not answer. Left  with [facility] call back number.     3/6/25   Spoke with patient using two patient identifiers. Reviewed above information per ABDIRAHMAN Javier NP. Patient states understanding of results and instructions. Discussed with patient that IV iron is typically better tolerated and absorbed more quickly helping relieve symptoms. Patient was agreeable to IV iron. Provided patient with phone number for [facility]. Patient also agreeable to have echo and lexiscan provided patient with central scheduling number. Patient had no further questions at this time.

## 2025-03-06 PROBLEM — E61.1 IRON DEFICIENCY: Status: ACTIVE | Noted: 2025-03-06

## 2025-03-06 RX ORDER — SODIUM CHLORIDE 0.9 % (FLUSH) 0.9 %
5-40 SYRINGE (ML) INJECTION PRN
OUTPATIENT
Start: 2025-03-06

## 2025-03-06 RX ORDER — SODIUM CHLORIDE 9 MG/ML
INJECTION, SOLUTION INTRAVENOUS CONTINUOUS
OUTPATIENT
Start: 2025-03-06

## 2025-03-06 RX ORDER — SODIUM CHLORIDE 9 MG/ML
5-250 INJECTION, SOLUTION INTRAVENOUS PRN
OUTPATIENT
Start: 2025-03-06

## 2025-03-06 RX ORDER — FAMOTIDINE 10 MG/ML
20 INJECTION, SOLUTION INTRAVENOUS
OUTPATIENT
Start: 2025-03-06

## 2025-03-06 RX ORDER — DIPHENHYDRAMINE HYDROCHLORIDE 50 MG/ML
50 INJECTION INTRAMUSCULAR; INTRAVENOUS
OUTPATIENT
Start: 2025-03-06

## 2025-03-06 RX ORDER — HEPARIN SODIUM (PORCINE) LOCK FLUSH IV SOLN 100 UNIT/ML 100 UNIT/ML
500 SOLUTION INTRAVENOUS PRN
OUTPATIENT
Start: 2025-03-06

## 2025-03-06 RX ORDER — ALBUTEROL SULFATE 90 UG/1
4 INHALANT RESPIRATORY (INHALATION) PRN
OUTPATIENT
Start: 2025-03-06

## 2025-03-06 RX ORDER — EPINEPHRINE 1 MG/ML
0.3 INJECTION, SOLUTION, CONCENTRATE INTRAVENOUS PRN
OUTPATIENT
Start: 2025-03-06

## 2025-03-06 RX ORDER — ONDANSETRON 2 MG/ML
8 INJECTION INTRAMUSCULAR; INTRAVENOUS
OUTPATIENT
Start: 2025-03-06

## 2025-03-06 RX ORDER — ACETAMINOPHEN 325 MG/1
650 TABLET ORAL
OUTPATIENT
Start: 2025-03-06

## 2025-03-06 RX ORDER — HYDROCORTISONE SODIUM SUCCINATE 100 MG/2ML
100 INJECTION INTRAMUSCULAR; INTRAVENOUS
OUTPATIENT
Start: 2025-03-06

## 2025-03-12 ENCOUNTER — HOSPITAL ENCOUNTER (OUTPATIENT)
Facility: HOSPITAL | Age: 85
Discharge: HOME | End: 2025-03-14
Payer: MEDICARE

## 2025-03-12 ENCOUNTER — HOSPITAL ENCOUNTER (OUTPATIENT)
Facility: HOSPITAL | Age: 85
Discharge: HOME | End: 2025-03-15
Payer: MEDICARE

## 2025-03-12 VITALS — BODY MASS INDEX: 22.62 KG/M2 | HEIGHT: 72 IN | WEIGHT: 167 LBS

## 2025-03-12 DIAGNOSIS — I50.32 CHRONIC DIASTOLIC HEART FAILURE (HCC): ICD-10-CM

## 2025-03-12 DIAGNOSIS — I42.9 CARDIOMYOPATHY, UNSPECIFIED TYPE (HCC): ICD-10-CM

## 2025-03-12 DIAGNOSIS — R07.89 CHEST TIGHTNESS: ICD-10-CM

## 2025-03-12 LAB
ECHO AV AREA PEAK VELOCITY: 1 CM2
ECHO AV AREA PLAN/BSA: 1.42 CM2/M2
ECHO AV AREA PLAN: 2.8 CM2
ECHO AV AREA VTI: 1 CM2
ECHO AV AREA/BSA PEAK VELOCITY: 0.5 CM2/M2
ECHO AV AREA/BSA VTI: 0.5 CM2/M2
ECHO AV MEAN GRADIENT: 27 MMHG
ECHO AV MEAN VELOCITY: 2.5 M/S
ECHO AV PEAK GRADIENT: 41 MMHG
ECHO AV PEAK VELOCITY: 3.2 M/S
ECHO AV VELOCITY RATIO: 0.31
ECHO AV VTI: 71.3 CM
ECHO BSA: 1.96 M2
ECHO EST RA PRESSURE: 3 MMHG
ECHO LA DIAMETER INDEX: 2.54 CM/M2
ECHO LA DIAMETER: 5 CM
ECHO LA VOL A-L A4C: 42 ML (ref 18–58)
ECHO LA VOL MOD A4C: 41 ML (ref 18–58)
ECHO LA VOLUME INDEX A-L A4C: 21 ML/M2 (ref 16–34)
ECHO LA VOLUME INDEX MOD A4C: 21 ML/M2 (ref 16–34)
ECHO LV EF PHYSICIAN: 65 %
ECHO LV FRACTIONAL SHORTENING: 36 % (ref 28–44)
ECHO LV INTERNAL DIMENSION DIASTOLE INDEX: 1.98 CM/M2
ECHO LV INTERNAL DIMENSION DIASTOLIC: 3.9 CM (ref 4.2–5.9)
ECHO LV INTERNAL DIMENSION SYSTOLIC INDEX: 1.27 CM/M2
ECHO LV INTERNAL DIMENSION SYSTOLIC: 2.5 CM
ECHO LV IVSD: 1 CM (ref 0.6–1)
ECHO LV MASS 2D: 140.1 G (ref 88–224)
ECHO LV MASS INDEX 2D: 71.1 G/M2 (ref 49–115)
ECHO LV POSTERIOR WALL DIASTOLIC: 1.2 CM (ref 0.6–1)
ECHO LV RELATIVE WALL THICKNESS RATIO: 0.62
ECHO LVOT AREA: 3.1 CM2
ECHO LVOT AV VTI INDEX: 0.31
ECHO LVOT DIAM: 2 CM
ECHO LVOT MEAN GRADIENT: 3 MMHG
ECHO LVOT PEAK GRADIENT: 4 MMHG
ECHO LVOT PEAK VELOCITY: 1 M/S
ECHO LVOT STROKE VOLUME INDEX: 35.2 ML/M2
ECHO LVOT SV: 69.4 ML
ECHO LVOT VTI: 22.1 CM
ECHO MV A VELOCITY: 0.6 M/S
ECHO MV E VELOCITY: 0.88 M/S
ECHO MV E/A RATIO: 1.47
ECHO MV REGURGITANT PEAK GRADIENT: 135 MMHG
ECHO MV REGURGITANT PEAK VELOCITY: 5.8 M/S
ECHO RIGHT VENTRICULAR SYSTOLIC PRESSURE (RVSP): 44 MMHG
ECHO RV INTERNAL DIMENSION: 3.5 CM
ECHO RV TAPSE: 2.2 CM (ref 1.7–?)
ECHO TV REGURGITANT MAX VELOCITY: 3.22 M/S
ECHO TV REGURGITANT PEAK GRADIENT: 42 MMHG

## 2025-03-12 PROCEDURE — 93017 CV STRESS TEST TRACING ONLY: CPT

## 2025-03-12 PROCEDURE — A9500 TC99M SESTAMIBI: HCPCS | Performed by: NURSE PRACTITIONER

## 2025-03-12 PROCEDURE — 93306 TTE W/DOPPLER COMPLETE: CPT

## 2025-03-12 PROCEDURE — 78452 HT MUSCLE IMAGE SPECT MULT: CPT

## 2025-03-12 PROCEDURE — 6360000002 HC RX W HCPCS: Performed by: SPECIALIST

## 2025-03-12 PROCEDURE — 3430000000 HC RX DIAGNOSTIC RADIOPHARMACEUTICAL: Performed by: NURSE PRACTITIONER

## 2025-03-12 RX ORDER — CAFFEINE CITRATE 20 MG/ML
60 SOLUTION INTRAVENOUS
Status: COMPLETED | OUTPATIENT
Start: 2025-03-12 | End: 2025-03-12

## 2025-03-12 RX ORDER — TETRAKIS(2-METHOXYISOBUTYLISOCYANIDE)COPPER(I) TETRAFLUOROBORATE 1 MG/ML
11 INJECTION, POWDER, LYOPHILIZED, FOR SOLUTION INTRAVENOUS
Status: COMPLETED | OUTPATIENT
Start: 2025-03-12 | End: 2025-03-12

## 2025-03-12 RX ORDER — TETRAKIS(2-METHOXYISOBUTYLISOCYANIDE)COPPER(I) TETRAFLUOROBORATE 1 MG/ML
31.8 INJECTION, POWDER, LYOPHILIZED, FOR SOLUTION INTRAVENOUS
Status: COMPLETED | OUTPATIENT
Start: 2025-03-12 | End: 2025-03-12

## 2025-03-12 RX ORDER — REGADENOSON 0.08 MG/ML
0.4 INJECTION, SOLUTION INTRAVENOUS
Status: COMPLETED | OUTPATIENT
Start: 2025-03-12 | End: 2025-03-12

## 2025-03-12 RX ADMIN — TETRAKIS(2-METHOXYISOBUTYLISOCYANIDE)COPPER(I) TETRAFLUOROBORATE 11 MILLICURIE: 1 INJECTION, POWDER, LYOPHILIZED, FOR SOLUTION INTRAVENOUS at 08:15

## 2025-03-12 RX ADMIN — REGADENOSON 0.4 MG: 0.08 INJECTION, SOLUTION INTRAVENOUS at 09:48

## 2025-03-12 RX ADMIN — TETRAKIS(2-METHOXYISOBUTYLISOCYANIDE)COPPER(I) TETRAFLUOROBORATE 31.8 MILLICURIE: 1 INJECTION, POWDER, LYOPHILIZED, FOR SOLUTION INTRAVENOUS at 09:35

## 2025-03-12 RX ADMIN — CAFFEINE CITRATE 60 MG: 60 INJECTION INTRAVENOUS at 09:47

## 2025-03-13 LAB
ECHO BSA: 1.96 M2
STRESS BASELINE DIAS BP: 73 MMHG
STRESS BASELINE HR: 75 BPM
STRESS BASELINE SYS BP: 151 MMHG
STRESS ESTIMATED WORKLOAD: 1 METS
STRESS EXERCISE DUR MIN: 3 MIN
STRESS PEAK DIAS BP: 73 MMHG
STRESS PEAK SYS BP: 151 MMHG
STRESS PERCENT HR ACHIEVED: 65 %
STRESS POST PEAK HR: 87 BPM
STRESS RATE PRESSURE PRODUCT: NORMAL BPM*MMHG
STRESS STAGE 1 BP: NORMAL MMHG
STRESS STAGE 1 DURATION: 1 MIN:SEC
STRESS STAGE 1 HR: 75 BPM
STRESS STAGE 2 DURATION: 1 MIN:SEC
STRESS STAGE 2 HR: 78 BPM
STRESS STAGE 3 DURATION: 1 MIN:SEC
STRESS STAGE 3 HR: 86 BPM
STRESS STAGE RECOVERY 1 BP: NORMAL MMHG
STRESS STAGE RECOVERY 1 DURATION: 1 MIN:SEC
STRESS STAGE RECOVERY 1 HR: 86 BPM
STRESS STAGE RECOVERY 2 DURATION: 1 MIN:SEC
STRESS STAGE RECOVERY 2 HR: 84 BPM
STRESS STAGE RECOVERY 3 BP: NORMAL MMHG
STRESS STAGE RECOVERY 3 DURATION: 1 MIN:SEC
STRESS STAGE RECOVERY 3 HR: 79 BPM
STRESS TARGET HR: 134 BPM

## 2025-03-13 NOTE — LETTER
[employer], an artificial intelligence clinical decision support software, has identified that Your patient, STEPHENIE PALMA (1940), meets the criteria for Moderate and/or Severe Aortic Stenosis based on the echo performed on 03/12/2025. Per* ACC/AHA's Valvular Heart disease guidelines, an intervention may be indicated. A referral requires a multifactorial decision outside the purview of the algorithm. For determining appropriate referral, please evaluate the patientâ€™s record.  If you find a referral is necessary and the patient does not already have a cardiologist, Isabela Song, the [facility] Structural Heart Valve Coordinator will be happy to assist your office in connecting this patient with the [facility] multidisciplinary heart team for further evaluation. To do so, please either send an [employer] referral to Dr. Zhang Medrano, an [employer] message to Isabela Song or you can call [phone].  We appreciate your partnership in providing our patients the best cardiac care possible.  You are receiving this notification as part of a quality initiative using the power of artificial intelligence and the electronic health record to improve patient outcomes. Note: It is the clinicianâ€™s decision and ultimate responsibility whether to not refer if clinically indicated to do so. [employer] is intended for use by a qualified healthcare professional.

## 2025-03-14 ENCOUNTER — RESULTS FOLLOW-UP (OUTPATIENT)
Facility: HOSPITAL | Age: 85
End: 2025-03-14

## 2025-03-17 ENCOUNTER — TELEPHONE (OUTPATIENT)
Age: 85
End: 2025-03-17

## 2025-03-17 NOTE — TELEPHONE ENCOUNTER
Returned a page to Mr. Trevino.      He was calling about a letter he received from [facility] saying that he is approved for Venofer.  He did not understand what this was pertaining to.  I described what venofer was, and why it was ordered, and told him I would touch base with the nurses tomorrow about next steps to get his infusion scheduled.        Natasha Parada NP  DNP, RN, [facility]-BC  Advanced Heart Failure Center  [facility]  [address] [place], [address]  Phone: [phone]

## 2025-03-17 NOTE — TELEPHONE ENCOUNTER
----- Message from MAGNUS Rojas NP sent at 3/14/2025  3:57 PM EDT -----  His EF is mildly increased to 65-70%;  his aortic valve appears slightly more tight (\"moderate stenosis\").  Mild leakiness of mitral valve.      I have no specific changes at this time, but I would like this ECHO faxed to Dr. Martinez, so that he can review it as well.      Ignacia Zamarripa APRN - NP  [facility] Heart Failure [address] Clinical Staff  Negative stress test, his chest pain might be from his aortic stenosis.  Can we send his most recent TTE to Dr. Martinez?              Called patient with no answer. Left  with [facility] call back number. Will await return call.       3/21/25   Called patient with no answer. Left  with [facility] call back number. Will await return call.       Spoke with patient using two patient identifiers. Reviewed above information per TONNY Parada and ABDIRAHMAN Zamarripa Np. Provided patient with number to Dr. Martinez at [facility]. Patient states understanding. Patient also provided with reasoning for iron infusions and number to schedule iron infusions. All of patients questions answered.

## 2025-03-20 ENCOUNTER — TELEPHONE (OUTPATIENT)
Age: 85
End: 2025-03-20

## 2025-03-20 NOTE — TELEPHONE ENCOUNTER
Pt called to find out testing results (echo & stress test).  He has not heard from anyone, and the tests were done on 3/12/25.  Please contact him at [phone].

## 2025-04-03 ENCOUNTER — TELEPHONE (OUTPATIENT)
Age: 85
End: 2025-04-03

## 2025-04-04 NOTE — TELEPHONE ENCOUNTER
Called patient with no answer. Left  with [facility] call back number. Will await return call.

## 2025-04-07 ENCOUNTER — TELEPHONE (OUTPATIENT)
Age: 85
End: 2025-04-07

## 2025-04-07 NOTE — TELEPHONE ENCOUNTER
Spoke with patient using two patient identifiers. Discussed with that we have reached out and left several voicemails. Patient states he will look at messages and check his phone. Reviewed results from 3/21 telephone encounters. Patient stated understanding. Patient notes he has not heard from Dr. Gaitan office so he will reach out to discuss aortic stenosis and potentially schedule an appointment. He reports that he had not scheduled iron infusions but will call the number provided to schedule.  Patient had no further questions at this time.

## 2025-04-07 NOTE — TELEPHONE ENCOUNTER
Pt called to say that he's having a big problem getting in touch with Ignacia Zamarripa.  He has never been contacted with test results.  He reported that he has called several times regarding this.  Pt can be reached at [phone].

## 2025-04-30 ENCOUNTER — TELEPHONE (OUTPATIENT)
Age: 85
End: 2025-04-30

## 2025-04-30 NOTE — TELEPHONE ENCOUNTER
Return call placed to Mr. Smalls regarding his Echo and stress test results. VM message included results of Echo and stress test (out going message identifies the patient by name) with the intention that Mr. Smalls can refer to the result information as he wants to know it.  [facility] call back number also provided.

## 2025-04-30 NOTE — TELEPHONE ENCOUNTER
Pt is calling to get results for echo and stress test. Pt is upset that he has completed testing over a month ago and has been unable to receive the results. Pt states he was informed that the results were sent over to Dr. Martinez, per Mr. Belinda Martinez does not have the results. Pt has requested to speak with Karena regarding this matter.

## 2025-05-02 ENCOUNTER — TELEPHONE (OUTPATIENT)
Age: 85
End: 2025-05-02

## 2025-05-02 NOTE — TELEPHONE ENCOUNTER
Patient called, stated multiple times that he has been calling the clinic and no one answers his calls.  He also stated no one ever sent copy of his echo or nuclear test to Dr. Martinez's office.  I explained to him to there is documentation of multiple calls and multiple voicemails left,  and I advised he use mychart, which he declined vehemently.  He stated this was not true and asked for my name.  He stated he was going to leave the practice.  I advised I would call [facility] and re-send test results. Patient yelled at me and then hung up on me.   I called and re-sent to [facility] [place] per their request.  They stated patient had not seen Dr. Martinez since 2021, but he has been seeing DR. Hart.  I sent results to both doctors, they will call back if they have not received.

## 2025-05-09 ENCOUNTER — HOSPITAL ENCOUNTER (OUTPATIENT)
Facility: HOSPITAL | Age: 85
Setting detail: INFUSION SERIES
Discharge: HOME | End: 2025-05-09
Payer: MEDICARE

## 2025-05-09 VITALS
BODY MASS INDEX: 22.65 KG/M2 | SYSTOLIC BLOOD PRESSURE: 120 MMHG | HEART RATE: 73 BPM | RESPIRATION RATE: 18 BRPM | DIASTOLIC BLOOD PRESSURE: 72 MMHG | TEMPERATURE: 97.6 F | HEIGHT: 72 IN

## 2025-05-09 DIAGNOSIS — E61.1 IRON DEFICIENCY: Primary | ICD-10-CM

## 2025-05-09 PROCEDURE — 96374 THER/PROPH/DIAG INJ IV PUSH: CPT

## 2025-05-09 PROCEDURE — 6360000002 HC RX W HCPCS: Performed by: NURSE PRACTITIONER

## 2025-05-09 PROCEDURE — 2580000003 HC RX 258: Performed by: NURSE PRACTITIONER

## 2025-05-09 RX ORDER — SODIUM CHLORIDE 0.9 % (FLUSH) 0.9 %
5-40 SYRINGE (ML) INJECTION PRN
Status: CANCELLED | OUTPATIENT
Start: 2025-05-16

## 2025-05-09 RX ORDER — HYDROCORTISONE SODIUM SUCCINATE 100 MG/2ML
100 INJECTION INTRAMUSCULAR; INTRAVENOUS
Status: CANCELLED | OUTPATIENT
Start: 2025-05-16

## 2025-05-09 RX ORDER — SODIUM CHLORIDE 9 MG/ML
INJECTION, SOLUTION INTRAVENOUS CONTINUOUS
Status: CANCELLED | OUTPATIENT
Start: 2025-05-16

## 2025-05-09 RX ORDER — ACETAMINOPHEN 325 MG/1
650 TABLET ORAL
Status: CANCELLED | OUTPATIENT
Start: 2025-05-16

## 2025-05-09 RX ORDER — SODIUM CHLORIDE 9 MG/ML
5-250 INJECTION, SOLUTION INTRAVENOUS PRN
Status: CANCELLED | OUTPATIENT
Start: 2025-05-16

## 2025-05-09 RX ORDER — DIPHENHYDRAMINE HYDROCHLORIDE 50 MG/ML
50 INJECTION, SOLUTION INTRAMUSCULAR; INTRAVENOUS
Status: CANCELLED | OUTPATIENT
Start: 2025-05-16

## 2025-05-09 RX ORDER — SODIUM CHLORIDE 9 MG/ML
5-250 INJECTION, SOLUTION INTRAVENOUS PRN
Status: DISCONTINUED | OUTPATIENT
Start: 2025-05-09 | End: 2025-05-10 | Stop reason: HOSPADM

## 2025-05-09 RX ORDER — ALBUTEROL SULFATE 90 UG/1
4 INHALANT RESPIRATORY (INHALATION) PRN
Status: CANCELLED | OUTPATIENT
Start: 2025-05-16

## 2025-05-09 RX ORDER — SODIUM CHLORIDE 0.9 % (FLUSH) 0.9 %
5-40 SYRINGE (ML) INJECTION PRN
Status: DISCONTINUED | OUTPATIENT
Start: 2025-05-09 | End: 2025-05-10 | Stop reason: HOSPADM

## 2025-05-09 RX ORDER — ONDANSETRON 2 MG/ML
8 INJECTION INTRAMUSCULAR; INTRAVENOUS
Status: CANCELLED | OUTPATIENT
Start: 2025-05-16

## 2025-05-09 RX ORDER — EPINEPHRINE 1 MG/ML
0.3 INJECTION, SOLUTION INTRAMUSCULAR; SUBCUTANEOUS PRN
Status: CANCELLED | OUTPATIENT
Start: 2025-05-16

## 2025-05-09 RX ORDER — HEPARIN 100 UNIT/ML
500 SYRINGE INTRAVENOUS PRN
Status: CANCELLED | OUTPATIENT
Start: 2025-05-16

## 2025-05-09 RX ADMIN — IRON SUCROSE 200 MG: 20 INJECTION, SOLUTION INTRAVENOUS at 15:09

## 2025-05-09 RX ADMIN — SODIUM CHLORIDE 50 ML/HR: 0.9 INJECTION, SOLUTION INTRAVENOUS at 15:03

## 2025-05-09 ASSESSMENT — PAIN SCALES - GENERAL: PAINLEVEL_OUTOF10: 0

## 2025-05-09 NOTE — PLAN OF CARE
[facility] Short Note                   Date: May 9, 2025    Name: Eugenio Trevino    MRN: [id number]         : 1940      Pt admit to [facility] for Venofer () ambulatory in stable condition. Assessment completed and documented in flowsheets. PIV placed to left AC with positive blood return.      Mr. Trevino's vitals were reviewed prior to and after treatment.   Patient Vitals for the past 12 hrs:   Temp Pulse Resp BP   25 1557 -- 73 -- 120/72   25 1454 97.6 °F (36.4 °C) 67 18 128/62              Medications given: via PIV  Medications Administered         0.9 % sodium chloride infusion Admin Date  2025 Action  New Bag Dose  50 mL/hr Rate  50 mL/hr Route  IntraVENous Documented By  Isabela Colón RN        iron sucrose (VENOFER) injection 200 mg Admin Date  2025 Action  Given Dose  200 mg Rate   Route  IntraVENous Documented By  Isabela Colón RN            PIV was flushed and removed per protocol prior to discharge.    Mr. Trevino tolerated the infusion and had no complaints. Patient remained in [facility] for 30 minute post infusion observation period. Remaining stay was uneventful.    [facility] and is aware of future appointments.     Future Appointments   Date Time Provider Department Center   2025 11:45 AM PEDS INF CHAIR 5 [facility]   2025 10:30 AM Ignacia Zamarripa APRN - NP [facility]   2025 10:00 AM Anjelica Hansen MD [facility]       Isabela Colón RN  May 9, 2025  4:20 PM   Problem: Pain  Goal: Verbalizes/displays adequate comfort level or baseline comfort level  Outcome: Progressing     Problem: Safety - Adult  Goal: Free from fall injury  Outcome: Progressing

## 2025-05-16 ENCOUNTER — HOSPITAL ENCOUNTER (OUTPATIENT)
Facility: HOSPITAL | Age: 85
Setting detail: INFUSION SERIES
Discharge: HOME | End: 2025-05-16
Payer: MEDICARE

## 2025-05-16 VITALS
HEART RATE: 68 BPM | RESPIRATION RATE: 18 BRPM | DIASTOLIC BLOOD PRESSURE: 52 MMHG | SYSTOLIC BLOOD PRESSURE: 99 MMHG | TEMPERATURE: 97.7 F | OXYGEN SATURATION: 99 %

## 2025-05-16 DIAGNOSIS — E61.1 IRON DEFICIENCY: Primary | ICD-10-CM

## 2025-05-16 PROCEDURE — 2580000003 HC RX 258: Performed by: NURSE PRACTITIONER

## 2025-05-16 PROCEDURE — 6360000002 HC RX W HCPCS: Performed by: NURSE PRACTITIONER

## 2025-05-16 PROCEDURE — 96374 THER/PROPH/DIAG INJ IV PUSH: CPT

## 2025-05-16 RX ORDER — EPINEPHRINE 1 MG/ML
0.3 INJECTION, SOLUTION INTRAMUSCULAR; SUBCUTANEOUS PRN
OUTPATIENT
Start: 2025-05-16

## 2025-05-16 RX ORDER — DIPHENHYDRAMINE HYDROCHLORIDE 50 MG/ML
50 INJECTION, SOLUTION INTRAMUSCULAR; INTRAVENOUS
OUTPATIENT
Start: 2025-05-16

## 2025-05-16 RX ORDER — HYDROCORTISONE SODIUM SUCCINATE 100 MG/2ML
100 INJECTION INTRAMUSCULAR; INTRAVENOUS
OUTPATIENT
Start: 2025-05-16

## 2025-05-16 RX ORDER — SODIUM CHLORIDE 9 MG/ML
INJECTION, SOLUTION INTRAVENOUS CONTINUOUS
OUTPATIENT
Start: 2025-05-16

## 2025-05-16 RX ORDER — SODIUM CHLORIDE 9 MG/ML
5-250 INJECTION, SOLUTION INTRAVENOUS PRN
Status: DISCONTINUED | OUTPATIENT
Start: 2025-05-16 | End: 2025-05-17 | Stop reason: HOSPADM

## 2025-05-16 RX ORDER — HEPARIN 100 UNIT/ML
500 SYRINGE INTRAVENOUS PRN
OUTPATIENT
Start: 2025-05-16

## 2025-05-16 RX ORDER — ALBUTEROL SULFATE 90 UG/1
4 INHALANT RESPIRATORY (INHALATION) PRN
OUTPATIENT
Start: 2025-05-16

## 2025-05-16 RX ORDER — SODIUM CHLORIDE 9 MG/ML
5-250 INJECTION, SOLUTION INTRAVENOUS PRN
OUTPATIENT
Start: 2025-05-16

## 2025-05-16 RX ORDER — ACETAMINOPHEN 325 MG/1
650 TABLET ORAL
OUTPATIENT
Start: 2025-05-16

## 2025-05-16 RX ORDER — SODIUM CHLORIDE 0.9 % (FLUSH) 0.9 %
5-40 SYRINGE (ML) INJECTION PRN
OUTPATIENT
Start: 2025-05-16

## 2025-05-16 RX ORDER — ONDANSETRON 2 MG/ML
8 INJECTION INTRAMUSCULAR; INTRAVENOUS
OUTPATIENT
Start: 2025-05-16

## 2025-05-16 RX ADMIN — SODIUM CHLORIDE 50 ML/HR: 0.9 INJECTION, SOLUTION INTRAVENOUS at 12:11

## 2025-05-16 RX ADMIN — IRON SUCROSE 200 MG: 20 INJECTION, SOLUTION INTRAVENOUS at 12:12

## 2025-05-16 ASSESSMENT — PAIN SCALES - GENERAL: PAINLEVEL_OUTOF10: 0

## 2025-05-16 NOTE — PROGRESS NOTES
Pt arrived to [facility] for Venofer 2/5 in stable condition. Assessment completed. 24G PIV inserted in LAC with positive blood return. Parameters met for treatment.        Vitals:    05/16/25 1201 05/16/25 1256   BP: 123/65 (!) 99/52   Pulse: 75 68   Resp: 18    Temp: 97.7 °F (36.5 °C)    TempSrc: Temporal    SpO2: 99%        Medications Administered         0.9 % sodium chloride infusion Admin Date  05/16/2025 Action  New Bag Dose  50 mL/hr Rate  50 mL/hr Route  IntraVENous Documented By  Karlie Helms, NIKOLAY        iron sucrose (VENOFER) injection 200 mg Admin Date  05/16/2025 Action  Given Dose  200 mg Rate   Route  IntraVENous Documented By  Karlie Helms RN          30 min observation completed.     PIV flushed and removed per protocol. Pt tolerated treatment well. D/Cd from [facility] in no distress.    Future Appointments   Date Time Provider Department Center   8/11/2025 10:30 AM Ignacia Zamarripa APRN - NP [facility] BS AMB   8/28/2025 10:00 AM Anjelica Hansen MD [facility] BS AMB

## 2025-07-14 ENCOUNTER — HOSPITAL ENCOUNTER (EMERGENCY)
Facility: HOSPITAL | Age: 85
Discharge: HOME | End: 2025-07-14
Attending: STUDENT IN AN ORGANIZED HEALTH CARE EDUCATION/TRAINING PROGRAM
Payer: MEDICARE

## 2025-07-14 VITALS
RESPIRATION RATE: 18 BRPM | TEMPERATURE: 98.1 F | DIASTOLIC BLOOD PRESSURE: 68 MMHG | SYSTOLIC BLOOD PRESSURE: 139 MMHG | HEART RATE: 81 BPM | OXYGEN SATURATION: 96 % | WEIGHT: 163.8 LBS | BODY MASS INDEX: 22.19 KG/M2 | HEIGHT: 72 IN

## 2025-07-14 DIAGNOSIS — Z97.8 FOLEY CATHETER PRESENT: Primary | ICD-10-CM

## 2025-07-14 PROCEDURE — 99282 EMERGENCY DEPT VISIT SF MDM: CPT

## 2025-07-14 ASSESSMENT — PAIN SCALES - GENERAL: PAINLEVEL_OUTOF10: 0

## 2025-07-14 ASSESSMENT — PAIN - FUNCTIONAL ASSESSMENT: PAIN_FUNCTIONAL_ASSESSMENT: 0-10

## 2025-07-14 NOTE — ED PROVIDER NOTES
[facility] EMERGENCY DEPARTMENT  EMERGENCY DEPARTMENT ENCOUNTER      Pt Name: Eugenio Trevino  MRN: [id number]  Birthdate 1940  Date of evaluation: 7/14/2025  Provider: Gautam Vallejo DO    CHIEF COMPLAINT       Chief Complaint   Patient presents with    Urinary Catheter         HISTORY OF PRESENT ILLNESS   (Location/Symptom, Timing/Onset, Context/Setting, Quality, Duration, Modifying Factors, Severity)  Note limiting factors.   Patient is an 85-year-old male presented today requesting urology evaluation.  He is not exactly sure why he is here but states that he was told to see a urologist in the urgent setting.  Yesterday he was at an outside facility in [place] for urinary retention.  He had an extensive workup and was diagnosed with urinary retention.  A Payan was placed.  He was under the impression that he had to have the Payan catheter exchanged today.  He has an appointment with urology on Thursday he believes.    I spoke with his wife on the phone to further clarify.  She states that she was also under the impression that he needed to not have this catheter in for more than a day or so.  He had an extensive workup including an MRI of his spine and they had no acute findings.  Patient today has no acute complaints.  No pain.  He is having good urine output which is nonbloody.  No fevers.            Review of External Medical Records:     Nursing Notes were reviewed.    REVIEW OF SYSTEMS    (2-9 systems for level 4, 10 or more for level 5)     Review of Systems   Genitourinary:  Negative for hematuria.       Except as noted above the remainder of the review of systems was reviewed and negative.       PAST MEDICAL HISTORY     Past Medical History:   Diagnosis Date    CHF (congestive heart failure) ([facility])     Depression     GERD (gastroesophageal reflux disease)     Glaucoma     h/o Atrial fibrillation (HCC) 02/01/2021    Watchman procedure    High cholesterol     Hypertension     IBS (irritable

## 2025-07-14 NOTE — ED TRIAGE NOTES
Pt arrives ambulatory to ED reporting that he needs a urologist. Pt states he has an appointment on Thursday but was unsure if he needed to be evaluated sooner than that. Pt states he had a peña catheter placed yesterday for urinary rentention.     Dr. Vallejo assessing pt in triage.

## 2025-07-18 ENCOUNTER — HOSPITAL ENCOUNTER (EMERGENCY)
Facility: HOSPITAL | Age: 85
Discharge: HOME | End: 2025-07-18
Attending: STUDENT IN AN ORGANIZED HEALTH CARE EDUCATION/TRAINING PROGRAM
Payer: MEDICARE

## 2025-07-18 ENCOUNTER — APPOINTMENT (OUTPATIENT)
Facility: HOSPITAL | Age: 85
End: 2025-07-18
Payer: MEDICARE

## 2025-07-18 VITALS
RESPIRATION RATE: 13 BRPM | WEIGHT: 163.58 LBS | TEMPERATURE: 97.8 F | HEART RATE: 89 BPM | OXYGEN SATURATION: 98 % | DIASTOLIC BLOOD PRESSURE: 61 MMHG | SYSTOLIC BLOOD PRESSURE: 118 MMHG | BODY MASS INDEX: 22.19 KG/M2

## 2025-07-18 DIAGNOSIS — R33.9 URINARY RETENTION: Primary | ICD-10-CM

## 2025-07-18 PROCEDURE — 74018 RADEX ABDOMEN 1 VIEW: CPT

## 2025-07-18 PROCEDURE — 99284 EMERGENCY DEPT VISIT MOD MDM: CPT

## 2025-07-18 RX ORDER — LIDOCAINE HYDROCHLORIDE 20 MG/ML
JELLY TOPICAL PRN
Status: DISCONTINUED | OUTPATIENT
Start: 2025-07-18 | End: 2025-07-18 | Stop reason: HOSPADM

## 2025-07-18 ASSESSMENT — PAIN - FUNCTIONAL ASSESSMENT
PAIN_FUNCTIONAL_ASSESSMENT: NONE - DENIES PAIN
PAIN_FUNCTIONAL_ASSESSMENT: NONE - DENIES PAIN
PAIN_FUNCTIONAL_ASSESSMENT: 0-10

## 2025-07-18 ASSESSMENT — PAIN DESCRIPTION - ONSET: ONSET: ON-GOING

## 2025-07-18 ASSESSMENT — PAIN DESCRIPTION - ORIENTATION: ORIENTATION: MID

## 2025-07-18 ASSESSMENT — PAIN DESCRIPTION - FREQUENCY: FREQUENCY: CONTINUOUS

## 2025-07-18 ASSESSMENT — PAIN DESCRIPTION - PAIN TYPE: TYPE: ACUTE PAIN

## 2025-07-18 ASSESSMENT — PAIN SCALES - GENERAL: PAINLEVEL_OUTOF10: 8

## 2025-07-18 ASSESSMENT — PAIN DESCRIPTION - DESCRIPTORS: DESCRIPTORS: ACHING

## 2025-07-18 ASSESSMENT — PAIN DESCRIPTION - LOCATION: LOCATION: BUTTOCKS

## 2025-07-18 NOTE — ED TRIAGE NOTES
PT ambulatory to ED with CC difficulty urinating and constipation. PT reports constipation started over the weekend and has become worse over the past few days. PT reports last BM was a few days ago. PT reports 8/10 non-radiating pain in anus. PT reports eating normally, but \"not drinking as much as I should\".    PT reports peña catheter was removed this morning after 4 days to see if he was able to urinate.    PT denies treatment for constipation PTA. PT denies N/V or fever.

## 2025-07-21 ENCOUNTER — HOSPITAL ENCOUNTER (EMERGENCY)
Facility: HOSPITAL | Age: 85
Discharge: HOME | End: 2025-07-21

## 2025-07-21 VITALS
TEMPERATURE: 97.9 F | SYSTOLIC BLOOD PRESSURE: 131 MMHG | RESPIRATION RATE: 12 BRPM | HEART RATE: 88 BPM | OXYGEN SATURATION: 96 % | DIASTOLIC BLOOD PRESSURE: 53 MMHG

## 2025-07-21 ASSESSMENT — LIFESTYLE VARIABLES
HOW OFTEN DO YOU HAVE A DRINK CONTAINING ALCOHOL: NEVER
HOW MANY STANDARD DRINKS CONTAINING ALCOHOL DO YOU HAVE ON A TYPICAL DAY: PATIENT DOES NOT DRINK

## 2025-07-21 ASSESSMENT — PAIN - FUNCTIONAL ASSESSMENT
PAIN_FUNCTIONAL_ASSESSMENT: 0-10
PAIN_FUNCTIONAL_ASSESSMENT: ACTIVITIES ARE NOT PREVENTED

## 2025-07-21 ASSESSMENT — PAIN DESCRIPTION - ONSET: ONSET: ON-GOING

## 2025-07-21 ASSESSMENT — PAIN DESCRIPTION - ORIENTATION: ORIENTATION: OTHER (COMMENT)

## 2025-07-21 ASSESSMENT — PAIN DESCRIPTION - DESCRIPTORS: DESCRIPTORS: PENETRATING

## 2025-07-21 ASSESSMENT — PAIN DESCRIPTION - PAIN TYPE: TYPE: ACUTE PAIN

## 2025-07-21 ASSESSMENT — PAIN SCALES - GENERAL: PAINLEVEL_OUTOF10: 8

## 2025-07-21 ASSESSMENT — PAIN DESCRIPTION - FREQUENCY: FREQUENCY: CONTINUOUS

## 2025-07-21 ASSESSMENT — PAIN DESCRIPTION - LOCATION: LOCATION: PENIS

## 2025-07-21 NOTE — ED NOTES
Pt arrives from home c/o 8/10 peña cath (secured to R leg) pain since placement on Friday. Denies redness or swelling. 4th ED visit for dysuria. States he feels the urge to pee. Hx of sciatica, memory issues.

## 2025-08-05 ENCOUNTER — HOSPITAL ENCOUNTER (EMERGENCY)
Facility: HOSPITAL | Age: 85
Discharge: HOME | End: 2025-08-05
Attending: STUDENT IN AN ORGANIZED HEALTH CARE EDUCATION/TRAINING PROGRAM
Payer: MEDICARE

## 2025-08-05 ENCOUNTER — APPOINTMENT (OUTPATIENT)
Facility: HOSPITAL | Age: 85
End: 2025-08-05
Payer: MEDICARE

## 2025-08-05 VITALS
RESPIRATION RATE: 18 BRPM | HEART RATE: 75 BPM | HEIGHT: 72 IN | DIASTOLIC BLOOD PRESSURE: 74 MMHG | BODY MASS INDEX: 23.62 KG/M2 | WEIGHT: 174.38 LBS | OXYGEN SATURATION: 100 % | TEMPERATURE: 97.8 F | SYSTOLIC BLOOD PRESSURE: 179 MMHG

## 2025-08-05 DIAGNOSIS — K59.00 CONSTIPATION, UNSPECIFIED CONSTIPATION TYPE: Primary | ICD-10-CM

## 2025-08-05 LAB
ALBUMIN SERPL-MCNC: 3.5 G/DL (ref 3.5–5.2)
ALBUMIN/GLOB SERPL: 0.9 (ref 1.1–2.2)
ALP SERPL-CCNC: 88 U/L (ref 40–129)
ALT SERPL-CCNC: 11 U/L (ref 10–50)
ANION GAP SERPL CALC-SCNC: 12 MMOL/L (ref 2–14)
AST SERPL-CCNC: 20 U/L (ref 10–50)
BASOPHILS # BLD: 0.03 K/UL (ref 0–0.1)
BASOPHILS NFR BLD: 0.5 % (ref 0–1)
BILIRUB SERPL-MCNC: 0.5 MG/DL (ref 0–1.2)
BUN SERPL-MCNC: 21 MG/DL (ref 8–23)
BUN/CREAT SERPL: 16 (ref 12–20)
CALCIUM SERPL-MCNC: 9.5 MG/DL (ref 8.8–10.2)
CHLORIDE SERPL-SCNC: 105 MMOL/L (ref 98–107)
CO2 SERPL-SCNC: 22 MMOL/L (ref 20–29)
COMMENT:: NORMAL
CREAT SERPL-MCNC: 1.32 MG/DL (ref 0.7–1.2)
DIFFERENTIAL METHOD BLD: ABNORMAL
EOSINOPHIL # BLD: 0.41 K/UL (ref 0–0.4)
EOSINOPHIL NFR BLD: 6.9 % (ref 0–7)
ERYTHROCYTE [DISTWIDTH] IN BLOOD BY AUTOMATED COUNT: 14.1 % (ref 11.5–14.5)
GLOBULIN SER CALC-MCNC: 4 G/DL (ref 2–4)
GLUCOSE SERPL-MCNC: 91 MG/DL (ref 65–100)
HCT VFR BLD AUTO: 33.9 % (ref 36.6–50.3)
HGB BLD-MCNC: 10.9 G/DL (ref 12.1–17)
IMM GRANULOCYTES # BLD AUTO: 0.02 K/UL (ref 0–0.04)
IMM GRANULOCYTES NFR BLD AUTO: 0.3 % (ref 0–0.5)
LIPASE SERPL-CCNC: 27 U/L (ref 13–60)
LYMPHOCYTES # BLD: 1.88 K/UL (ref 0.8–3.5)
LYMPHOCYTES NFR BLD: 31.8 % (ref 12–49)
MCH RBC QN AUTO: 28.9 PG (ref 26–34)
MCHC RBC AUTO-ENTMCNC: 32.2 G/DL (ref 30–36.5)
MCV RBC AUTO: 89.9 FL (ref 80–99)
MONOCYTES # BLD: 0.77 K/UL (ref 0–1)
MONOCYTES NFR BLD: 13 % (ref 5–13)
NEUTS SEG # BLD: 2.8 K/UL (ref 1.8–8)
NEUTS SEG NFR BLD: 47.5 % (ref 32–75)
NRBC # BLD: 0 K/UL (ref 0–0.01)
NRBC BLD-RTO: 0 PER 100 WBC
PLATELET # BLD AUTO: 261 K/UL (ref 150–400)
PMV BLD AUTO: 10.2 FL (ref 8.9–12.9)
POTASSIUM SERPL-SCNC: 4.4 MMOL/L (ref 3.5–5.1)
PROT SERPL-MCNC: 7.5 G/DL (ref 6.4–8.3)
RBC # BLD AUTO: 3.77 M/UL (ref 4.1–5.7)
SODIUM SERPL-SCNC: 139 MMOL/L (ref 136–145)
SPECIMEN HOLD: NORMAL
WBC # BLD AUTO: 5.9 K/UL (ref 4.1–11.1)

## 2025-08-05 PROCEDURE — 83690 ASSAY OF LIPASE: CPT

## 2025-08-05 PROCEDURE — 85025 COMPLETE CBC W/AUTO DIFF WBC: CPT

## 2025-08-05 PROCEDURE — 6360000004 HC RX CONTRAST MEDICATION: Performed by: RADIOLOGY

## 2025-08-05 PROCEDURE — 74177 CT ABD & PELVIS W/CONTRAST: CPT

## 2025-08-05 PROCEDURE — 80053 COMPREHEN METABOLIC PANEL: CPT

## 2025-08-05 PROCEDURE — 99285 EMERGENCY DEPT VISIT HI MDM: CPT

## 2025-08-05 RX ORDER — IOPAMIDOL 755 MG/ML
100 INJECTION, SOLUTION INTRAVASCULAR
Status: COMPLETED | OUTPATIENT
Start: 2025-08-05 | End: 2025-08-05

## 2025-08-05 RX ADMIN — IOPAMIDOL 100 ML: 755 INJECTION, SOLUTION INTRAVENOUS at 17:58

## 2025-08-05 ASSESSMENT — PAIN SCALES - GENERAL: PAINLEVEL_OUTOF10: 0

## 2025-08-05 ASSESSMENT — ENCOUNTER SYMPTOMS: CONSTIPATION: 1

## 2025-08-07 ENCOUNTER — TELEPHONE (OUTPATIENT)
Age: 85
End: 2025-08-07

## 2025-08-11 ENCOUNTER — TELEPHONE (OUTPATIENT)
Age: 85
End: 2025-08-11

## 2025-08-11 ENCOUNTER — OFFICE VISIT (OUTPATIENT)
Age: 85
End: 2025-08-11
Payer: MEDICARE

## 2025-08-11 VITALS
BODY MASS INDEX: 21.67 KG/M2 | SYSTOLIC BLOOD PRESSURE: 88 MMHG | RESPIRATION RATE: 18 BRPM | HEIGHT: 72 IN | DIASTOLIC BLOOD PRESSURE: 56 MMHG | HEART RATE: 68 BPM | WEIGHT: 160 LBS | OXYGEN SATURATION: 98 %

## 2025-08-11 DIAGNOSIS — G47.33 OSA ON CPAP: ICD-10-CM

## 2025-08-11 DIAGNOSIS — I50.32 CHRONIC DIASTOLIC HEART FAILURE (HCC): Primary | ICD-10-CM

## 2025-08-11 PROCEDURE — 99214 OFFICE O/P EST MOD 30 MIN: CPT | Performed by: NURSE PRACTITIONER

## 2025-08-11 PROCEDURE — 1126F AMNT PAIN NOTED NONE PRSNT: CPT | Performed by: NURSE PRACTITIONER

## 2025-08-11 PROCEDURE — 1123F ACP DISCUSS/DSCN MKR DOCD: CPT | Performed by: NURSE PRACTITIONER

## 2025-08-11 PROCEDURE — 1160F RVW MEDS BY RX/DR IN RCRD: CPT | Performed by: NURSE PRACTITIONER

## 2025-08-11 PROCEDURE — 1159F MED LIST DOCD IN RCRD: CPT | Performed by: NURSE PRACTITIONER

## 2025-08-11 ASSESSMENT — PATIENT HEALTH QUESTIONNAIRE - PHQ9
2. FEELING DOWN, DEPRESSED OR HOPELESS: NOT AT ALL
SUM OF ALL RESPONSES TO PHQ QUESTIONS 1-9: 0
1. LITTLE INTEREST OR PLEASURE IN DOING THINGS: NOT AT ALL

## 2025-08-11 ASSESSMENT — ENCOUNTER SYMPTOMS
NAUSEA: 0
CONSTIPATION: 1
SHORTNESS OF BREATH: 0
VOMITING: 0
CHEST TIGHTNESS: 0

## 2025-08-16 DIAGNOSIS — D47.2 MONOCLONAL GAMMOPATHY: ICD-10-CM

## 2025-08-20 ENCOUNTER — TELEPHONE (OUTPATIENT)
Age: 85
End: 2025-08-20

## 2025-08-26 ENCOUNTER — TELEPHONE (OUTPATIENT)
Age: 85
End: 2025-08-26

## (undated) DEVICE — KIT MFLD ISOLATN NACL CNTRST PRT TBNG SPIK W/ PRSS TRNSDUC

## (undated) DEVICE — PINNACLE INTRODUCER SHEATH: Brand: PINNACLE

## (undated) DEVICE — PACK PROCEDURE SURG HRT CATH

## (undated) DEVICE — ANGIO-SEAL VIP VASCULAR CLOSURE DEVICE: Brand: ANGIO-SEAL

## (undated) DEVICE — Device: Brand: PADPRO

## (undated) DEVICE — KIT HND CTRL 3 W STPCOCK ROT END 54IN PREM HI PRSS TBNG AT

## (undated) DEVICE — GUIDEWIRE VASC L185CM DIA0.014IN HYDRPHLC PTFE STR TIP

## (undated) DEVICE — CUSTOM KT PTCA INFL DEV K05 00052M

## (undated) DEVICE — ANGIOGRAPHIC CATHETER: Brand: IMPULSE™

## (undated) DEVICE — CATH GUID COR JL4.0 6FR 100CM -- LAUNCHER

## (undated) DEVICE — ANGIOGRAPHY KIT

## (undated) DEVICE — DRESSING HEMOSTATIC SFT INTVENT W/O SLT DBL WRP QUIKCLOT LF

## (undated) DEVICE — KIT MED IMAG CNTRST AGNT W/ IOPAMIDOL REUSE